# Patient Record
Sex: FEMALE | Race: WHITE
[De-identification: names, ages, dates, MRNs, and addresses within clinical notes are randomized per-mention and may not be internally consistent; named-entity substitution may affect disease eponyms.]

---

## 2017-01-31 ENCOUNTER — HOSPITAL ENCOUNTER (INPATIENT)
Dept: HOSPITAL 10 - E/R | Age: 47
LOS: 16 days | Discharge: NURSING, REHAB OR LONG-TERM CARE | DRG: 963 | End: 2017-02-16
Attending: INTERNAL MEDICINE | Admitting: INTERNAL MEDICINE
Payer: MEDICARE

## 2017-01-31 VITALS — DIASTOLIC BLOOD PRESSURE: 73 MMHG | SYSTOLIC BLOOD PRESSURE: 101 MMHG | RESPIRATION RATE: 22 BRPM | HEART RATE: 103 BPM

## 2017-01-31 VITALS — SYSTOLIC BLOOD PRESSURE: 112 MMHG | RESPIRATION RATE: 18 BRPM | DIASTOLIC BLOOD PRESSURE: 70 MMHG | HEART RATE: 77 BPM

## 2017-01-31 VITALS — SYSTOLIC BLOOD PRESSURE: 128 MMHG | RESPIRATION RATE: 22 BRPM | HEART RATE: 101 BPM | DIASTOLIC BLOOD PRESSURE: 89 MMHG

## 2017-01-31 VITALS — RESPIRATION RATE: 23 BRPM | DIASTOLIC BLOOD PRESSURE: 89 MMHG | HEART RATE: 95 BPM | SYSTOLIC BLOOD PRESSURE: 137 MMHG

## 2017-01-31 VITALS — HEART RATE: 84 BPM | DIASTOLIC BLOOD PRESSURE: 71 MMHG | SYSTOLIC BLOOD PRESSURE: 98 MMHG | RESPIRATION RATE: 22 BRPM

## 2017-01-31 VITALS — RESPIRATION RATE: 22 BRPM | DIASTOLIC BLOOD PRESSURE: 84 MMHG | HEART RATE: 96 BPM | SYSTOLIC BLOOD PRESSURE: 121 MMHG

## 2017-01-31 VITALS
BODY MASS INDEX: 24.31 KG/M2 | WEIGHT: 120.59 LBS | BODY MASS INDEX: 24.31 KG/M2 | WEIGHT: 120.59 LBS | HEIGHT: 59 IN | HEIGHT: 59 IN

## 2017-01-31 VITALS — DIASTOLIC BLOOD PRESSURE: 76 MMHG | RESPIRATION RATE: 28 BRPM | SYSTOLIC BLOOD PRESSURE: 118 MMHG | HEART RATE: 117 BPM

## 2017-01-31 VITALS — SYSTOLIC BLOOD PRESSURE: 119 MMHG | DIASTOLIC BLOOD PRESSURE: 87 MMHG | RESPIRATION RATE: 22 BRPM | HEART RATE: 85 BPM

## 2017-01-31 VITALS — SYSTOLIC BLOOD PRESSURE: 117 MMHG | HEART RATE: 103 BPM | DIASTOLIC BLOOD PRESSURE: 58 MMHG | RESPIRATION RATE: 22 BRPM

## 2017-01-31 VITALS — RESPIRATION RATE: 22 BRPM

## 2017-01-31 VITALS — RESPIRATION RATE: 20 BRPM | HEART RATE: 86 BPM | DIASTOLIC BLOOD PRESSURE: 78 MMHG | SYSTOLIC BLOOD PRESSURE: 110 MMHG

## 2017-01-31 VITALS — HEART RATE: 96 BPM | DIASTOLIC BLOOD PRESSURE: 90 MMHG | SYSTOLIC BLOOD PRESSURE: 135 MMHG | RESPIRATION RATE: 20 BRPM

## 2017-01-31 VITALS — HEART RATE: 103 BPM | RESPIRATION RATE: 24 BRPM | SYSTOLIC BLOOD PRESSURE: 139 MMHG | DIASTOLIC BLOOD PRESSURE: 96 MMHG

## 2017-01-31 VITALS — RESPIRATION RATE: 22 BRPM | SYSTOLIC BLOOD PRESSURE: 115 MMHG | HEART RATE: 94 BPM | DIASTOLIC BLOOD PRESSURE: 86 MMHG

## 2017-01-31 VITALS — HEART RATE: 108 BPM | RESPIRATION RATE: 22 BRPM | DIASTOLIC BLOOD PRESSURE: 87 MMHG | SYSTOLIC BLOOD PRESSURE: 114 MMHG

## 2017-01-31 DIAGNOSIS — D72.829: ICD-10-CM

## 2017-01-31 DIAGNOSIS — N18.6: ICD-10-CM

## 2017-01-31 DIAGNOSIS — I95.9: ICD-10-CM

## 2017-01-31 DIAGNOSIS — J96.00: ICD-10-CM

## 2017-01-31 DIAGNOSIS — I12.0: ICD-10-CM

## 2017-01-31 DIAGNOSIS — G93.40: ICD-10-CM

## 2017-01-31 DIAGNOSIS — S06.5X0A: Primary | ICD-10-CM

## 2017-01-31 DIAGNOSIS — E87.0: ICD-10-CM

## 2017-01-31 DIAGNOSIS — R29.709: ICD-10-CM

## 2017-01-31 DIAGNOSIS — E87.5: ICD-10-CM

## 2017-01-31 DIAGNOSIS — J94.8: ICD-10-CM

## 2017-01-31 DIAGNOSIS — S02.19XA: ICD-10-CM

## 2017-01-31 DIAGNOSIS — J93.9: ICD-10-CM

## 2017-01-31 DIAGNOSIS — S02.0XXA: ICD-10-CM

## 2017-01-31 DIAGNOSIS — J69.0: ICD-10-CM

## 2017-01-31 DIAGNOSIS — R14.0: ICD-10-CM

## 2017-01-31 DIAGNOSIS — S06.330A: ICD-10-CM

## 2017-01-31 DIAGNOSIS — S27.391A: ICD-10-CM

## 2017-01-31 DIAGNOSIS — G40.209: ICD-10-CM

## 2017-01-31 DIAGNOSIS — R09.02: ICD-10-CM
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
## 2017-01-31 DIAGNOSIS — K21.9: ICD-10-CM

## 2017-01-31 DIAGNOSIS — E87.2: ICD-10-CM

## 2017-01-31 DIAGNOSIS — T79.7XXA: ICD-10-CM
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
## 2017-01-31 LAB
ALBUMIN SERPL-MCNC: 4.8 G/DL (ref 3.3–4.9)
ALBUMIN/GLOB SERPL: 1.26 {RATIO}
ALP SERPL-CCNC: 242 IU/L (ref 42–121)
ALT SERPL-CCNC: 20 IU/L (ref 13–69)
AMMONIA PLAS-SCNC: < 9 UMOL/L (ref 9–30)
ANION GAP SERPL CALC-SCNC: 27 MMOL/L (ref 8–16)
APTT BLD: 30.9 SEC (ref 25–35)
ARTERIAL COHB: 0.5 % (ref 0–3)
ARTERIAL FRACTION OF OXYHGB: 98.8 % (ref 93–99)
ARTERIAL METHB: 0.3 % (ref 0–1.5)
ARTERIAL PATENCY WRIST A: (no result)
ARTERIAL TOTAL HEMGLOBIN: 13.7 G/DL (ref 12–18)
AST SERPL-CCNC: 26 IU/L (ref 15–46)
BASE EXCESS BLDA CALC-SCNC: -0.6 MMOL/L (ref -3–3)
BASOPHILS # BLD AUTO: 0 10^3/UL (ref 0–0.1)
BASOPHILS NFR BLD: 0 % (ref 0–2)
BILIRUB DIRECT SERPL-MCNC: 0 MG/DL (ref 0–0.2)
BILIRUB SERPL-MCNC: 0.5 MG/DL (ref 0.2–1.3)
BLOOD GAS TIDAL VOLUME: 450 ML
BUN SERPL-MCNC: 24 MG/DL (ref 7–20)
CALCIUM SERPL-MCNC: 9.6 MG/DL (ref 8.4–10.2)
CHLORIDE SERPL-SCNC: 93 MMOL/L (ref 97–110)
CK MB SERPL-MCNC: 2.64 NG/ML (ref 0–2.4)
CK SERPL-CCNC: 124 IU/L (ref 23–200)
CO2 SERPL-SCNC: 24 MMOL/L (ref 21–31)
CONDITION: 1
CREAT SERPL-MCNC: 5.32 MG/DL (ref 0.44–1)
EOSINOPHIL # BLD: 0 10^3/UL (ref 0–0.5)
EOSINOPHIL NFR BLD: 0 % (ref 0–7)
ERYTHROCYTE [DISTWIDTH] IN BLOOD BY AUTOMATED COUNT: 14.9 % (ref 11.5–14.5)
GLOBULIN SER-MCNC: 3.8 G/DL (ref 1.3–3.2)
GLUCOSE SERPL-MCNC: 151 MG/DL (ref 70–220)
HCO3 BLDA-SCNC: 21.5 MMOL/L (ref 22–26)
HCT VFR BLD CALC: 46.6 % (ref 37–47)
HGB BLD-MCNC: 15.3 G/DL (ref 12–16)
INR PPP: 1.02
LACTATE SERPL-SCNC: 3 MMOL/L (ref 0.5–2.2)
LH ANALYZER COMMENTS: 1
LYMPHOCYTES # BLD AUTO: 0.4 10^3/UL (ref 0.8–2.9)
LYMPHOCYTES NFR BLD AUTO: 2.4 % (ref 15–51)
MCH RBC QN AUTO: 29.2 PG (ref 29–33)
MCHC RBC AUTO-ENTMCNC: 32.8 G/DL (ref 32–37)
MCV RBC AUTO: 89.1 FL (ref 82–101)
MODE: (no result)
MONOCYTES # BLD: 0.5 10^3/UL (ref 0.3–0.9)
MONOCYTES NFR BLD: 3.2 % (ref 0–11)
NEUTROPHILS # BLD: 16.1 10^3/UL (ref 1.6–7.5)
NEUTROPHILS NFR BLD AUTO: 94.4 % (ref 39–77)
NRBC # BLD MANUAL: 0 10^3/UL (ref 0–0)
NRBC BLD QL: 0 /100WBC (ref 0–0)
O2 A-A PPRESDIFF RESPIRATORY: 227.5 MMHG (ref 7–24)
O2/TOTAL GAS SETTING VFR VENT: 100 %
PLATELET # BLD: 160 10^3/UL (ref 140–440)
PMV BLD AUTO: 9.6 FL (ref 7.4–10.4)
POTASSIUM SERPL-SCNC: 4.6 MMOL/L (ref 3.5–5.1)
PROT SERPL-MCNC: 8.6 G/DL (ref 6.1–8.1)
PROTHROMBIN TIME: 13.4 SEC (ref 12.2–14.2)
PT RATIO: 1
RBC # BLD AUTO: 5.23 10^6/UL (ref 4.2–5.4)
SODIUM SERPL-SCNC: 139 MMOL/L (ref 135–144)
TOTAL CELLS COUNTED PERCENT: 100 %
TROPONIN I SERPL-MCNC: < 0.012 NG/ML (ref 0–0.12)
WBC # BLD AUTO: 17 10^3/UL (ref 4.8–10.8)
WBC # BLD: 17 10^3/UL (ref 4.8–10.8)

## 2017-01-31 PROCEDURE — 86850 RBC ANTIBODY SCREEN: CPT

## 2017-01-31 PROCEDURE — 94640 AIRWAY INHALATION TREATMENT: CPT

## 2017-01-31 PROCEDURE — 86900 BLOOD TYPING SEROLOGIC ABO: CPT

## 2017-01-31 PROCEDURE — 74177 CT ABD & PELVIS W/CONTRAST: CPT

## 2017-01-31 PROCEDURE — 82553 CREATINE MB FRACTION: CPT

## 2017-01-31 PROCEDURE — 97161 PT EVAL LOW COMPLEX 20 MIN: CPT

## 2017-01-31 PROCEDURE — 74000: CPT

## 2017-01-31 PROCEDURE — 87040 BLOOD CULTURE FOR BACTERIA: CPT

## 2017-01-31 PROCEDURE — 80048 BASIC METABOLIC PNL TOTAL CA: CPT

## 2017-01-31 PROCEDURE — 96375 TX/PRO/DX INJ NEW DRUG ADDON: CPT

## 2017-01-31 PROCEDURE — 83605 ASSAY OF LACTIC ACID: CPT

## 2017-01-31 PROCEDURE — 90686 IIV4 VACC NO PRSV 0.5 ML IM: CPT

## 2017-01-31 PROCEDURE — 87081 CULTURE SCREEN ONLY: CPT

## 2017-01-31 PROCEDURE — 84100 ASSAY OF PHOSPHORUS: CPT

## 2017-01-31 PROCEDURE — 96374 THER/PROPH/DIAG INJ IV PUSH: CPT

## 2017-01-31 PROCEDURE — 97110 THERAPEUTIC EXERCISES: CPT

## 2017-01-31 PROCEDURE — 83735 ASSAY OF MAGNESIUM: CPT

## 2017-01-31 PROCEDURE — 85610 PROTHROMBIN TIME: CPT

## 2017-01-31 PROCEDURE — 82962 GLUCOSE BLOOD TEST: CPT

## 2017-01-31 PROCEDURE — 0BH17EZ INSERTION OF ENDOTRACHEAL AIRWAY INTO TRACHEA, VIA NATURAL OR ARTIFICIAL OPENING: ICD-10-PCS

## 2017-01-31 PROCEDURE — 86901 BLOOD TYPING SEROLOGIC RH(D): CPT

## 2017-01-31 PROCEDURE — 95819 EEG AWAKE AND ASLEEP: CPT

## 2017-01-31 PROCEDURE — 71010: CPT

## 2017-01-31 PROCEDURE — 92611 MOTION FLUOROSCOPY/SWALLOW: CPT

## 2017-01-31 PROCEDURE — 93005 ELECTROCARDIOGRAM TRACING: CPT

## 2017-01-31 PROCEDURE — 80053 COMPREHEN METABOLIC PANEL: CPT

## 2017-01-31 PROCEDURE — 71250 CT THORAX DX C-: CPT

## 2017-01-31 PROCEDURE — 85025 COMPLETE CBC W/AUTO DIFF WBC: CPT

## 2017-01-31 PROCEDURE — 84484 ASSAY OF TROPONIN QUANT: CPT

## 2017-01-31 PROCEDURE — 90935 HEMODIALYSIS ONE EVALUATION: CPT

## 2017-01-31 PROCEDURE — 85730 THROMBOPLASTIN TIME PARTIAL: CPT

## 2017-01-31 PROCEDURE — 82803 BLOOD GASES ANY COMBINATION: CPT

## 2017-01-31 PROCEDURE — 85576 BLOOD PLATELET AGGREGATION: CPT

## 2017-01-31 PROCEDURE — P9047 ALBUMIN (HUMAN), 25%, 50ML: HCPCS

## 2017-01-31 PROCEDURE — 97530 THERAPEUTIC ACTIVITIES: CPT

## 2017-01-31 PROCEDURE — 31500 INSERT EMERGENCY AIRWAY: CPT

## 2017-01-31 PROCEDURE — 82550 ASSAY OF CK (CPK): CPT

## 2017-01-31 PROCEDURE — 74230 X-RAY XM SWLNG FUNCJ C+: CPT

## 2017-01-31 PROCEDURE — 72125 CT NECK SPINE W/O DYE: CPT

## 2017-01-31 PROCEDURE — 97116 GAIT TRAINING THERAPY: CPT

## 2017-01-31 PROCEDURE — 94664 DEMO&/EVAL PT USE INHALER: CPT

## 2017-01-31 PROCEDURE — 36600 WITHDRAWAL OF ARTERIAL BLOOD: CPT

## 2017-01-31 PROCEDURE — 71260 CT THORAX DX C+: CPT

## 2017-01-31 PROCEDURE — 92526 ORAL FUNCTION THERAPY: CPT

## 2017-01-31 PROCEDURE — 82140 ASSAY OF AMMONIA: CPT

## 2017-01-31 PROCEDURE — 94002 VENT MGMT INPAT INIT DAY: CPT

## 2017-01-31 PROCEDURE — 70450 CT HEAD/BRAIN W/O DYE: CPT

## 2017-01-31 PROCEDURE — 92610 EVALUATE SWALLOWING FUNCTION: CPT

## 2017-01-31 PROCEDURE — 5A1955Z RESPIRATORY VENTILATION, GREATER THAN 96 CONSECUTIVE HOURS: ICD-10-PCS

## 2017-01-31 PROCEDURE — 94770: CPT

## 2017-01-31 PROCEDURE — 94003 VENT MGMT INPAT SUBQ DAY: CPT

## 2017-01-31 RX ADMIN — PROPOFOL STA MLS/HR: 10 INJECTION, EMULSION INTRAVENOUS at 14:47

## 2017-01-31 RX ADMIN — DIPHENHYDRAMINE HYDROCHLORIDE SCH MG: 50 INJECTION, SOLUTION INTRAMUSCULAR; INTRAVENOUS at 18:54

## 2017-01-31 RX ADMIN — PROPOFOL STA MLS/HR: 10 INJECTION, EMULSION INTRAVENOUS at 14:55

## 2017-01-31 RX ADMIN — SEVELAMER CARBONATE SCH GM: 800 POWDER, FOR SUSPENSION ORAL at 18:00

## 2017-01-31 RX ADMIN — HYDROMORPHONE HYDROCHLORIDE PRN MG: 1 INJECTION, SOLUTION INTRAMUSCULAR; INTRAVENOUS; SUBCUTANEOUS at 22:50

## 2017-01-31 RX ADMIN — DEXAMETHASONE SODIUM PHOSPHATE PRN MG: 10 INJECTION, SOLUTION INTRAMUSCULAR; INTRAVENOUS at 20:32

## 2017-01-31 NOTE — CONS
Date/Time of Note


Date/Time of Note


DATE: 17 


TIME: 16:25





Assessment/Plan


Assessment/Plan


Additional Assessment/Plan


Assessment and recommendations;





1.  Patient admitted with head trauma with subarachnoid hemorrhage and 

parenchymal contusions.





2.  Chest x-ray was reviewed from today which is essentially clear.  Next





3.  Mild leukocytosis possibly stress response.





4.  End-stage renal disease on hemodialysis. 





5.  Hypertension.  Patient currently on nicardipine drip.





6.  Ventilator settings were reviewed patient currently is on assist control of 

816 tidal volume 450 PEEP of 500% FiO2.  An ABG has been ordered I would 

recommend hyperventilating the patient to decrease potential cerebral edema 

ventilator settings were adjusted rate has been increased to 22 tidal volume 

increased to 500.  Once ABGs done I will review to make further 

recommendations.  Meanwhile continue supportive care.  Neurosurgical consult is 

appreciated.  Patient will be monitored with frequent neurological examination.

  Specifically any change in pupillary size.





Consultation Date/Type/Reason


Admit Date/Time


2017


Date of Consultation:  2017


Type of Consultation:  Pulmonary/critical care.


Reason for Consultation


46-year-old  lady brought into the emergency room after sustaining a 

fall at home with altered level of consciousness.  Patient evidently tripped 

over an object.  Ms. Talley is a 46-year-old  lady who was brought 

into the emergency room after the family called 911 after the patient sustained 

a fall and hit the back of her head on the floor patient initially was laid in 

bed however she started having altered mental status therefore she was brought 

into the ER at [facility].  Evaluation CT scan of the head was done 

which is showing mild bilateral scattered subarachnoid hemorrhages as well as 

frontal cerebral contusions the patient had waxing and waning mental status.  

She was intubated for airway protection.  Patient also was quite hypertensive 

on presentation.  Prior to the fall there were no symptoms.





Past medical history:


 #1 hypertension 


#2 end-stage renal disease on hemodialysis 


#3 acid reflux disease #4 history of sinus tachycardia 


#4  


#5  History of AV fistula in the left arm next





Medications; were reviewed.





Allergies; none





Social history noncontributory





Family history; patient is 





Occupation history: not available





Review of systems: currently unable to be obtained





General examination Young orally intubated, sedated.





Past Medical History


Medical History:  GERD, hypertension, renal disease (On hemodialysis)





Social History


Alcohol Use:  none


Smoking Status:  Never smoker


Drug Use:  none





Exam/Review of Systems


Vital Signs


Vitals





 Vital Signs








  Date Time  Temp Pulse Resp B/P Pulse Ox O2 Delivery O2 Flow Rate FiO2


 


17 15:30  82  116/87    


 


17 13:00   18  100   100


 


17 13:00      Mechanical Ventilator  


 


17 12:07 98.0       











Exam


HEENT examination;





Supple neck.  Orally intubated.  Midsize pupils equally reactive to light 

bilaterally.  No neck masses.  No obvious head trauma visible.


Trachea.  No thyromegaly.  No neck bruits.  Next





Chest examination; clear to auscultation.  S1-S2 audible no murmurs regular 

rhythm.  





Abdomen examination; soft, nondistended, bowel sounds audible.  Umbilicus is 

inverted.  There is a well-healed infraumbilical laparotomy scar.





Extremity examination; patient has an AV shunt in the left arm with a positive 

thrill.  There is no peripheral edema.  Pulses 1+ bilaterally.  Next





CNS examination; patient is currently sedated.





Results


Result Diagram:  


17 1235                                                                   

             17 1235





Results 24 hrs





Laboratory Tests








Test


  17


12:35 17


12:38


 


Activated Partial


Thromboplast Time 30.9  


  


 


 


Alanine Aminotransferase


(ALT/SGPT) 20  


  


 


 


Albumin 4.8   


 


Albumin/Globulin Ratio 1.26   


 


Alkaline Phosphatase 242  H 


 


Ammonia < 9  L 


 


Anion Gap 27  H 


 


Aspartate Amino Transf


(AST/SGOT) 26  


  


 


 


Basophils # 0.0   


 


Basophils % 0.0   


 


Blood Morphology Comment    


 


Blood Urea Nitrogen 24  H 


 


Calcium Level 9.6   


 


Carbon Dioxide Level 24   


 


Chloride Level 93  L 


 


Creatine Kinase 124   


 


Creatine Kinase Index 2.1   


 


Creatinine 5.32  H 


 


Creatinine Kinase MB (Mass) 2.64  H 


 


Differential Comment AUTO w/SCAN   


 


Direct Bilirubin 0.00   


 


Eosinophils # 0.0   


 


Eosinophils % 0.0   


 


Globulin 3.80  H 


 


Glucose Level 151   


 


Hematocrit 46.6   


 


Hemoglobin 15.3   


 


INR International Normalized


Ratio 1.02  


  


 


 


Indirect Bilirubin 0.5   


 


Lactic Acid Level 3.0  H 


 


Lymphocytes # 0.4  L 


 


Lymphocytes % 2.4  L 


 


Mean Corpuscular Hemoglobin 29.2   


 


Mean Corpuscular Hemoglobin


Concent 32.8  


  


 


 


Mean Corpuscular Volume 89.1   


 


Mean Platelet Volume 9.6   


 


Monocytes # 0.5   


 


Monocytes % 3.2   


 


Neutrophils # 16.1  H 


 


Neutrophils % 94.4  H 


 


Nucleated Red Blood Cells # 0.0   


 


Nucleated Red Blood Cells % 0.0   


 


Platelet Count 160  # 


 


Potassium Level 4.6   


 


Prothrombin Time 13.4   


 


Prothrombin Time Ratio 1.0   


 


Red Blood Count 5.23   


 


Red Cell Distribution Width 14.9  H 


 


Sodium Level 139   


 


Total Bilirubin 0.5   


 


Total Protein 8.6  H 


 


Troponin I < 0.012   


 


White Blood Count 17.0  #H 


 


Bedside Glucose  148  











Medications


Medications





 Current Medications


Labetalol HCl (Labetalol) 20 mg Q20M  PRN IV ELEVATED BLOOD PRESSURE Last 

administered on t 13:33; Admin Dose 20 MG;  Start 17 at 12:30


Labetalol HCl (Labetalol) 10 mg Q10M  PRN IV ELEVATED BLOOD PRESSURE;  Start  at 14:00


Ondansetron HCl (Zofran Inj) 4 mg Q6H  PRN IV NAUSEA AND/OR VOMITING;  Start  at 14:00


Acetaminophen (Tylenol Liquid) 650 mg Q6H  PRN PO PAIN LEVEL 1-3 OR FEVER;  

Start 17 at 14:00


Acetaminophen (Tylenol Supp) 650 mg Q4H  PRN [place] PAIN LEVEL 1-3 OR FEVER;  Start 

17 at 14:00


Famotidine (Pepcid Iv) 20 mg DAILY IV ;  Start 17 at 15:00


Eye Lubricant (Artificial Tears Oph) 1 drop TID BOTH EYES ;  Start 17 at 21

:00


Atorvastatin Calcium (Lipitor) 20 mg QHS PO ;  Start 17 at 21:00


Multivit/Ca Carb/ B Cmplx/FA/Prenat (Charlotte-Argenis) 1 tab DAILY PO ;  Start 17 

at 09:00











CHADD CORBETT 2017 16:33

## 2017-01-31 NOTE — CONS
DATE OF ADMISSION: 2017

DATE OF CONSULTATION:  2017

 

 

 

TYPE OF CONSULTATION:  Neurosurgical consultation.

 

REQUESTING PHYSICIAN:  Dr. Bateman.

 

INDICATION FOR CONSULTATION:  Subdural hematoma and traumatic brain injury.

 

HISTORY OF PRESENT ILLNESS:  The patient is a 46-year-old Trinidadian-speaking female with history of hy
pertension, end-stage renal disease on dialysis and GERD who reportedly fell on concrete in front of
 the house.  According to the neighbors, the patient tripped and fell and was taken into the bedroom
 and laid in the bed.  The patient apparently was altered.  The patient's  reportedly came.  
The patient was called by the neighbors who came to see the patient when she was found to be letharg
ic with nausea and vomiting.  To protect her airway, the patient was paralyzed, sedated and intubate
d.  The patient had a CT scan which showed frontal contusions as well as an 8 mm right convexity sub
dural hematoma with some traumatic subarachnoid blood, 3 mm midline shift.  There is also a nondispl
aced fracture of the right parietal and temporal bone.  The patient currently is on propofol and tho
ugh intubated and sedated, the patient would apparently open her eyes without deep sedation and move
s all 4 extremities semipurposefully.  Even with the propofol turned off, the patient became agitate
d, pulling at restraints and moving upper and lower extremities, though I was not able to get her to
 clearly open her eyes, but this is likely secondary to sedation.

 

PAST MEDICAL HISTORY:  Significant for hypertension, end-stage renal disease, gastroesophageal reflu
x, sinus tachycardia.  Patient apparently also has severe allergic reaction for which she takes pred
nisone.

 

PAST SURGICAL HISTORY:  Significant for  section and left upper extremity fistula for dialys
is.

 

FAMILY HISTORY:  Significant for diabetes, end-stage liver disease.

 

REVIEW OF SYSTEMS:  Unable to obtain secondary to the patient's current neurologic status.

 

HOME MEDICATIONS: Include:

1.  Renvela.

2.  Lopressor.

3.  Losartan.

4.  Norvasc.

5.  Atorvastatin.

6.  Zofran

7.  Charlotte-Argenis.

8.  Prednisone which apparently is a p.r.n. medication.

 

SOCIAL HISTORY:  The patient lives with her .  She apparently uses a wheelchair, although the
 indication for this is not clear.  The reportedly does not take any blood thinning medications.

 

PHYSICAL EXAMINATION:

VITAL SIGNS:  The patient's temperature 97, pulse 82, respirations 18, blood pressure 116/87.  Patie
nt initially was 219/134.

GENERAL:  The patient is a well-developed, well nourished, mildly obese female lying in the hospital
 bed, intubated.

HEAD AND NECK:  Normocephalic.  She has no Betts sign, periorbital ecchymoses, otorrhea or rhinorrh
ea.  The patient's neck has no deformities.

CARDIAC:  Regular rate and rhythm.

LUNGS:  Clear to auscultation.

ABDOMEN:  Nontender, nondistended, soft.

EXTREMITIES:  No clubbing, cyanosis, or edema.  She has a left upper extremity fistula.

NEUROLOGIC:  Again, the patient is sedated.  I turned the sedation off and the patient starts to bec
ome very agitated and may have minimally open her eyes.  Given her significant agitation, the propof
ol was restarted.  She did appear to move purposefully.  The patient does appear to have 1 beat of c
lonus on the right side and 1+ deep tendon reflexes throughout with no clonus, Babinski or Bobby 
sign.  She has normal tone and bulk.  Sensation: The patient appears to react to noxious stimuli dif
fusely.

 

LABORATORY DATA:  The patient's labs:  A white count was 17, hemoglobin 15.3, platelet 160, neutroph
il count was 94.4.  Coagulation profile was within normal limits with a PT of 13.4, INR 1.02 and APT
T of 30.9.  The patient had was to have a platelet function assay but this has not yet been sent.  S
odium 139, BUN and creatinine was 24 and 5.32.  Alkaline phosphatase is 242, CK-MB was elevated 2.64
.

 

IMAGING:  I reviewed the patient's CT of the cervical spine which showed no evidence of fracture or 
subluxation.  She appears to have tracheal seal at the level of the thoracic inlet per the radiologi
st.  The patient had a CT scan of the brain as reported in the history of present illness.

 

ASSESSMENT AND PLAN:  A 46-year-old female status post apparent traumatic closed head trauma with arreola
bdural hematoma and frontal contusions.  The patient has a subdural hematoma.  This is causing some 
mild midline shift, but overall is not particularly large.  In that of itself, I would recommend cassandra
cuation at this point in time.  The patient's neurologic status is likely secondary to the frontal c
ontusion, which I expect may blossom given the acute injury.  The patient appears to have some sligh
tly prominent ventricular size; however, this is chronic.  This has been this way chronically as I r
eviewed the CT scan from 2016 and the ventricular size appears to be the same.  The patient s
hould be admitted to the ICU and repeat CT scan performed to assess for any progression.  The patien
t hopefully can be weaned and be extubated as this may optimize her exam as it is difficult to asses
s the patient currently due to more  sedation likely than her neurologic issue, but this cannot be c
learly seen or determined.  At this point, I would not recommend any mannitol as well, though given 
the patient's comorbidities, medical management may be optimized prior to surgical decompression.  T
here is not reported history of any blood thinning medications, although given the skull fracture pr
esent, the severity of injury probably is not disproportionate with the amount of blood seen to sugg
est a coagulopathy.

 

Thank you for allowing me to participate in the care of this patient.  Greater than 60 minutes spent
 in critical care, neurologic system.

 

 

Dictated By: ODALIS MADISON MD, LG/BITA

DD:    2017 17:18:52

DT:    2017 18:30:55

Conf#: [id number]

DID#:  [id number]

## 2017-01-31 NOTE — HP
Date/Time of Note


Date/Time of Note


DATE: 17 


TIME: 15:54





Assessment/Plan


VTE Prophylaxis


VTE Prophylaxis Intervention:  SCD's





Lines/Catheters


Urinary Cath still in place:  Yes


Reason Cath still needed:  other (indicate) (Patient on dialysis, unclear how 

much urine output she has)





Assessment/Plan


Assessment/Plan


46-year-old female:





1.  Status post fall with head trauma with skull fracture including Bilateral 

acute inferior frontal hemorrhagic contusions, right greater than left, 8 mm 

right convexity subdural hematoma, mild scattered subarachnoid hemorrhage, 3 mm 

leftward midline shift of the septum pellucidum and right temporal-parietal 

scalp swelling with nondisplaced, oblique fracture through the right parietal 

and temporal skull.


Dr. Palomo from neurosurgery has been consulted, he has recommended for the 

patient to have a repeat CAT scan in 6 hours from the original one, blood 

pressure control, platelet function assay, close neurological monitoring the 

patient is being admitted to the intensive care unit.  He will follow-up with 

the patient later today





2.  Acute respiratory failure secondary to severe encephalopathy, post fall 

with significant head trauma: Patient is intubated, airway secured, pulmonary 

critical care consult with Dr. Hanks.





3.  Leukocytosis: Likely from demargination post trauma.  We will continue to 

monitor with a.m. labs.  Patient remains afebrile,





4.  Hypertension: Nicardipine drip has been ordered, patient also has as needed 

labetalol however once she was started on propofol for sedation her systolic 

blood pressure did come down to 110's





5.  End-stage renal disease on hemodialysis: Patient is on Monday/Wednesday/

Friday schedule.  She was dialyzed yesterday.  Dr. Chaney has been notified





6.  Lactic acidosis: We will trend her lactate level, no signs of acute 

infection.





7.  Gastroesophageal reflux disease: Pepcid IV





Prophylaxis: Pepcid for GI prophylaxis, SCDs for DVT prophylaxis


Disposition: ICU admission, blood pressure control, repeat CAT scan in 6 hours, 

neurosurgery evaluation, pulmonary critical care evaluation, nephrology consult





HPI/ROS


Admit Date/Time


Admit Date/Time








Hx of Present Illness


Mrs. Bermudez is a 46-year-old female with history of hypertension, end-stage 

renal disease on hemodialysis, gastroesophageal reflux disease who apparently 

has been in her usual state of health until this morning.  The patient is 

currently encephalopathic lethargic and had to be intubated therefore I cannot 

get any history from her.  Her  is not at the bedside at this time 

either.


According to the ER physician who did get the history from the , the 

patient was doing well this morning, she unfortunately tripped and fell on the 

concrete hitting the back of her head first, witnessed by neighbors who took 

her inside and assisted her to lie back in bed.  The patient since the fall has 

been lethargic and more somnolent, so the neighbors called back the  who 

was himself at her doctor appointment.  By the time she arrived in the 

emergency department she was severely lethargic, requiring intubation to 

protect her airway.  CAT scan of the brain did show a subdural hematoma and 

also areas of brain contusion.  She also has a skull fracture.  Neurosurgery 

was notified, Dr. Palomo, who is concerned that her brain contusions may 

blossom later, the patient needs a repeat CAT of the head scan in 6 hours, also 

she need sedation and control of her blood pressure.  She is being admitted to 

the intensive care unit.  She was dialyzed last yesterday, Dr. Chaney has been 

notified regarding her dialysis needs.





ROS


Unable to obtain





PMH/Family/Social


Past Medical History


Medical History:  GERD, hypertension, renal disease (On hemodialysis)





Past Surgical History


Status post  remotely


Status post AV fistula placement for dialysis





Family History


Significant Family History:  no pertinent family hx





Social History


Alcohol Use:  none


Smoking Status:  Never smoker


Drug Use:  none





Exam/Review of Systems


Vital Signs


Vitals





 Vital Signs








  Date Time  Temp Pulse Resp B/P Pulse Ox O2 Delivery O2 Flow Rate FiO2


 


17 15:30  82  116/87    


 


17 13:00   18  100   100


 


17 13:00      Mechanical Ventilator  


 


17 12:07 98.0       











Exam


Constitutional:  other (Sedated and intubated, also still under the influence 

of paralytics)


Eyes:  PERRL (Sluggish)


ENMT:  intubated, nl external ears & nose


Respiratory:  clear to auscultation, other (Intubated and on the vent)


Cardiovascular:  nl pulses, regular rate and rhythm


Gastrointestinal:  non-tender, soft


Musculoskeletal:  nl extremities to inspection


Extremities:  normal pulses, other (No edema, clubbing or cyanosis)


Neurological:  other (Sedated), unresponsive





Labs


Result Diagram:  


17 [id number]                                                                   

             1/31/17 1235








Medications


Medications





 Current Medications


Labetalol HCl (Labetalol) 20 mg Q20M  PRN IV ELEVATED BLOOD PRESSURE Last 

administered on t 13:33; Admin Dose 20 MG;  Start 17 at 12:30


Labetalol HCl 10 mg 10 mg Q10M  PRN IV ELEVATED BLOOD PRESSURE;  Start 17 

at 14:00


Midazolam HCl/ Dextrose (Versed/D5W) 50 ml @ 1 mls/hr Q24H IV ;  Start 17 

at 13:53


Ondansetron HCl (Zofran Inj) 4 mg Q6H  PRN IV NAUSEA AND/OR VOMITING;  Start  at 14:00


Acetaminophen (Tylenol Liquid) 650 mg Q6H  PRN PO PAIN LEVEL 1-3 OR FEVER;  

Start 17 at 14:00


Acetaminophen (Tylenol Supp) 650 mg Q4H  PRN [place] PAIN LEVEL 1-3 OR FEVER;  Start 

17 at 14:00


Famotidine (Pepcid Iv) 20 mg DAILY IV ;  Start 17 at 15:00


Eye Lubricant (Artificial Tears Oph) 1 drop TID BOTH EYES ;  Start 17 at 21

:00


Atorvastatin Calcium (Lipitor) 20 mg QHS PO ;  Start 17 at 21:00


Multivit/Ca Carb/ B Cmplx/FA/Prenat (Charlotte-Argenis) 1 tab DAILY PO ;  Start 17 

at 09:00





Procedures


Procedures


PROCEDURE:   CT Brain without contrast. 


 


CLINICAL INDICATION:   Headaches.   Neurologic deficit  


 


TECHNIQUE:   A CT of the brain was performed on multidetector high-resolution 

CT scanner utilizing axial sections from the skull base through the vertex 

without contrast.  One or more of the following dose reduction techniques were 

used: Automated exposure control, Adjustment of the mA and/or kV according to 

patient size, and/or use of iterative reconstruction technique.


 


DOSE: CTDI = 45 mGy and the DLP = 630 mGy-cm. 


 


COMPARISON:   Head CT 2016  


 


FINDINGS:


 


Right greater than left acute inferior frontal hemorrhagic contusions. There is 

an 8 mm right convexity subdural hematoma.  Mild scattered subarachnoid 

hemorrhage is also noted.  There is 3 mm leftward midline shift of the septum 

pellucidum. Right temporal-parietal scalp swelling with nondisplaced, oblique 

fracture through the right parietal and temporal skull. Chronic appearing 

bilateral basal ganglia lacunar infarcts. The ventricles are normal in size for 

age. No significant opacification of the visualized paranasal sinuses or 

mastoids. 


 


IMPRESSION:


 


Bilateral acute inferior frontal hemorrhagic contusions, right greater than 

left.


There is an 8 mm right convexity subdural hematoma.  


Mild scattered subarachnoid hemorrhage is also noted.  


There is 3 mm leftward midline shift of the septum pellucidum. 


Right temporal-parietal scalp swelling with nondisplaced, oblique fracture 

through the right parietal and temporal skull.


 


A call report attempt was made to Dr. BURRELL at 2017 12:41:38 PM who was 

not available at this time, however, was already aware of the findings after 

discussion with ER  Chinmay.


 


RPTAT: AA


_____________________________________________ 


.Jose Maria Hoskins MD, MD           Date    Time 


Electronically viewed and signed by .Jose Maria Hoskins MD, MD on 2017 12:47 


 


D:  2017 12:47  T:  2017 12:47











PROCEDURE:   CT Cervical Spine without contrast. 


 


CLINICAL INDICATION:   Trauma, neck pain.


 


TECHNIQUE:   A CT of the cervical spine was performed on a multidetector CT 

scanner utilizing high-resolution axial imaging from the skull base through the 

cervical thoracic junction.  Sagittal and coronal reconstructions were 

performed.  CTDI:  22 mGy.  DLP:  350 mGycm. One or more of the following dose 

reduction techniques were used: Automated exposure control, Adjustment of the 

mA and/or kV according to patient size, and/or use of iterative reconstruction 

technique.


 


 


COMPARISON:   None available. 


 


FINDINGS:


 


There is normal lordosis of the cervical spine.  No acute cervical vertebral 

fracture or subluxation. The prevertebral soft tissues are unremarkable.  The 

partially visualized lung apices are unremarkable. Small air-filled structure 

along the right posterior lateral aspect of the trachea at the level of the 

thoracic inlet measures 8 mm.


 


IMPRESSION:


 


No acute cervical vertebral fracture or subluxation.


Small tracheocele at the level of the thoracic inlet.


 


 RPTAT: AA


_____________________________________________ 


.Jose Maria Hoskins MD, MD           Date    Time 


Electronically viewed and signed by .Jose Maria Hoskins MD, MD on 2017 12:56 


 


D:  2017 12:56  T:  2017 12:56














PROCEDURE:   XR Chest. 


 


CLINICAL INDICATION:   Endotracheal tube adjustment


  


 


TECHNIQUE:   Chest AP portable. 


 


COMPARISON:   2017 at 1302 hours 


 


FINDINGS:


The endotracheal tube is now at the hipolito.  (Pullback 2-3 cm).


The mediastinal structures are unremarkable.  The heart is normal in size and 

configuration.  The pulmonary vascularity is normal.  The lung fields are 

unremarkable.  No consolidation is identified.  The pleural spaces are 

unremarkable.  The axial skeleton is unremarkable.  


 


IMPRESSION:


Endotracheal tube at the hipolito (pulled back 2-3 cm)


No active intrathoracic disease.    


 


 


Case discussed with Dr. Burrell


 


RPTAT: HGDB


_____________________________________________ 


.Danial Garcia MD, MD           Date    Time 


Electronically viewed and signed by .Danial Garcia MD, MD on 2017 13:35 


 








EKG: Normal sinus rhythm at 80 bpm, incomplete right bundle branch block.











CECILIO JIMENEZ 2017 16:04

## 2017-01-31 NOTE — RADRPT
PROCEDURE:   Chest x-ray 

 

CLINICAL INDICATION:   Altered mental status.  Post intubation.

 

TECHNIQUE:   Chest single view

 

COMPARISON:   10/02/2016 

 

FINDINGS:

There is interval placement of endotracheal tube which terminates in the right mainstem bronchus.  R
ecommend it be pulled back 4 cm.  Heart is normal in size.  Bony vessels normal in caliber.  Lungs c
lear.  Costophrenic angles are sharp.  There is a stent in the left arm.

 

IMPRESSION:

 

 

1.  Interval placement of endotracheal tube which terminates in the right mainstem bronchus.  Recomm
end it be pulled back 4 cm.

2.  Lungs clear.

3.  Vascular stent in the left arm

 

Call report was made to Dr. Bateman on 1/31/2017 1:15:48 PM

 

 

RPTAT: HH

_____________________________________________ 

.Dain Bond MD, MD           Date    Time 

Electronically viewed and signed by .Dain Bond MD, MD on 01/31/2017 13:16 

 

D:  01/31/2017 13:16  T:  01/31/2017 13:16

.W/

## 2017-01-31 NOTE — RADRPT
PROCEDURE:   CT Brain without contrast. 

 

CLINICAL INDICATION: Brain contusion follow-up

 

TECHNIQUE:   A CT of the brain was performed on a GE LightSpeed 64-slice CT scanner utilizing axial 
imaging from the skull base through the vertex without IV contrast.  Multiplanar reformatted images 
were made.  Images were reviewed on a PACS workstation.  The CTDIvol is 45.01 mGy and the DLP is 630
.20 mGycm.  

 

One of the following 3 dose reduction techniques were used:  Automated exposure control; adjustment 
of the mA and/or kV according to patient size; or use of iterative reconstruction technique. 

 

COMPARISON:   01/31/2017 CT at 12:17 p.m. 

 

FINDINGS:

 

The visualized scalp again demonstrates a right temporal and parietal scalp hematoma as well as the 
left parietal occipital scalp hematoma.  Underlying fractures of the right skull muscle portion of t
he temporal bone which is complex is noted and extends into the right temporal mandibular condylar f
adelaida as well as the right mastoid air cells.  Soft tissue attenuation compatible with blood is noted
 in the right mesial temperature and and a punctate focus of air is present compatible with a small 
amount of pneumocephalus. Recommend temporal bone CT to further evaluate.  A right parietal calvaria
l fracture is also present which is nondisplaced.  In addition, widening of the right lambdoid sutur
e is present with an associated midline occipital fracture with fragment which measures 1.8 cm and i
s minimally anteriorly displaced.

 

An associated 6 mm epidural hematoma is noted adjacent to the right parietal calvarial fracture with
 in the right parietal occipital region.  The previously-described 8 mm right holohemispheric subdur
al hematoma is again noted. Large right inferior frontal gyrus and frontal and middle gyral hemorrha
gic contusions are noted.  Associated mild hemorrhage is noted in the anterior interhemispheric fiss
ure with foci of subarachnoid hemorrhage.  Decreased attenuation is again noted in the bilateral bas
al ganglia compatible with chronic bilateral basal ganglia lacunar infarcts.  Stable 3 mm right to l
eft midline shift or herniation is present of the septum pellucidum.  The ventricles demonstrate ear
ly hydrocephalus.

 

The bilateral orbits are normal.  The bilateral paranasal sinuses demonstrate chronic left septated 
sphenoid sinusitis.  The left mastoid air cells are clear.  The right mastoid demonstrate soft tissu
e attenuation and fluid as well as involvement of the right middle ear.  Recommend correlation with 
a right hemotympanum.

 

IMPRESSION:

 

1.  Multiple acute fractures involving the right squamosal and mastoid temporal bone, right parietal
 bone and occipital bone with involvement of the right lambdoid suture. Associated pneumocephalus is
 noted at the level of the right anterior complex mastoid fracture and recommend temporal bone CT to
 further evaluate.

2.  Right posterior parietal and occipital 6 mm epidural hematoma and right 8 mm holohemispheric sub
dural hematoma most prominent in the right temporal region.

3.  Subdural hemorrhage extending into the anterior interhemispheric fissure.

4.  Chronic bilateral basal ganglia lacunar infarcts

5.  3 mm right to left midline transcerebral herniation

6.  Early mild hydrocephalus

7.  Right temporal parietal and left occipital scalp hematomas with underlying fractures as describe
d above.

 

A call report was made to Dr. Bateman at 1/31/2017 6:54:58 PM following the completion of the exami
nation by the undersigned.

 

 

 

RPTAT: HDC

_____________________________________________ 

.Argentina Handley MD, MD           Date    Time 

Electronically viewed and signed by .Argentina Handley MD, MD on 01/31/2017 19:15 

 

D:  01/31/2017 19:15  T:  01/31/2017 19:15

.C/

## 2017-01-31 NOTE — RADRPT
PROCEDURE:   XR Chest. 

 

CLINICAL INDICATION:   Endotracheal tube adjustment

  

 

TECHNIQUE:   Chest AP portable. 

 

COMPARISON:   01/31/2017 at 1302 hours 

 

FINDINGS:

The endotracheal tube is now at the hipolito.  (Pullback 2-3 cm).

The mediastinal structures are unremarkable.  The heart is normal in size and configuration.  The pu
lmonary vascularity is normal.  The lung fields are unremarkable.  No consolidation is identified.  
The pleural spaces are unremarkable.  The axial skeleton is unremarkable.  

 

IMPRESSION:

Endotracheal tube at the hipolito (pulled back 2-3 cm)

No active intrathoracic disease.    

 

 

Case discussed with Dr. Bateman

 

RPTAT: HGDB

_____________________________________________ 

.Danial Garcia MD, MD           Date    Time 

Electronically viewed and signed by .Danial Garcia MD, MD on 01/31/2017 13:35 

 

D:  01/31/2017 13:35  T:  01/31/2017 13:35

.B/

## 2017-01-31 NOTE — ERA
ER Documentation


Chief Complaint


Date/Time


DATE: 1/31/17 


TIME: 14:17


Chief Complaint


PT FELL HIT BACK OF THE HEAD; NO KO; VOMITTED SEVERAL TIMES





HPI


This is a 46-year-old female with a known history of end-stage renal disease on 

hemodialysis which she receives every Monday Wednesday and Friday.  The patient 

had a full run of dialysis yesterday.  The patient awoke at 8 AM this morning 

and stated she did not have any complaints such as a headache or changes in 

vision.  She has had intermittent headaches over the past several months which 

she has had evaluated and was told could be a result of her dialysis.  The 

patient indicated that she had a mechanical trip and fall outside her home 

between 8 and 9 AM this morning.  This was witnessed by neighbors she fell back 

and hit the right side of her head on cement.  The patient had fallen backwards 

on from 3 steps on her front porch.  The patient stated she did not have a 

complete loss of consciousness but is complaining of a significant amount of 

pain on the right side of her head.  She is experiencing photophobia.  A 

neighbor had accompanied her into her house and laid her down on her bed.  Her 

 had returned from a doctor's office appointment for himself around 9 AM 

and immediately brought her to the emergency department to be further evaluated 

as the patient appeared very drowsy according to the .  The patient 

denies any other pain such as chest pain abdominal pain back pain and the 

patient was able to ambulate.  She denies any numbness or tingling of her upper 

or lower extremities.  She states the headache is 10 out of 10 and she has had 

multiple episodes of nonbloody nonbilious emesis since the fall with persistent 

nausea.





ROS


All systems reviewed and are negative except as per history of present illness.





Medications


Home Meds


Active Scripts


Ondansetron Hcl* (Ondansetron Hcl*) 4 Mg Tablet, 4 MG PO Q8 Y for NAUSEA, #30 

TAB 1 Refill


   Prov:DEZ NASH MD         10/6/14


Reported Medications


Metoprolol Tartrate* (Lopressor*) 50 Mg Tab, 50 MG PO BID, #60 TAB


   1/31/17


Atorvastatin Calcium* (Atorvastatin Calcium*) 20 Mg Tablet, 20 MG PO QHS, #30 

TAB


   1/31/17


Omeprazole* (Omeprazole*) 40 Mg Capsule.dr, 40 MG PO AC BREAKFAST, #30 CAP


   8/4/16


Sevelamer Carbonate* (Renvela*) 800 Mg Tablet, 1600 GM PO WITH MEALS, TAB


   8/4/16


Amlodipine Besylate* (Norvasc*) 10 Mg Tablet, 10 MG PO DAILY, TAB


   8/4/16


Prednisone* (Prednisone*) 20 Mg Tab, 20 MG PO DAILY, TAB


   8/4/16


Losartan Potassium* (Losartan Potassium*) 50 Mg Tablet, 50 MG PO DAILY, TAB


   8/4/16


Multivit/Ca Carb/B Cmplx/Fa* (Charlotte-Argenis*) 1 Tab Tab, 1 TAB PO DAILY, TAB


   10/5/14


Discontinued Reported Medications


Hydrocodone Bit-Acetaminophen* (Norco*)  Mg Tablet, 1 TAB PO BID Y for 

PAIN, TAB


   8/4/16


Discontinued Scripts


Ondansetron (Ondansetron Odt) 8 Mg Tab.rapdis, 8 MG PO Q6H Y for NAUSEA AND/OR 

VOMITING, #8 TAB


   Prov:ZANA REYEZ MD         12/17/16


Hydrocodone/Acetaminophen (Norco 5-325 Tablet) 1 Each Tablet, 1 TAB PO Q6H Y 

for PAIN, #10 TAB


   Prov:ZANA REYEZ MD         12/17/16


Acetamin/Butalbital/Caffeine* (Fioricet*) 1 Tab Tab, 1 TAB PO Q4H Y for PAIN 

LEVEL 1-5, #10 TAB


   Prov:KARO ISIDRO         8/4/16


Acetamin/Butalbital/Caffeine* (Fioricet*) 1 Tab Tab, 1 TAB PO Q4H Y for PAIN 

LEVEL 1-5, #10 TAB


   Prov:KARO ISIDRO         8/4/16





Allergies


Allergies:  


Coded Allergies:  


     morphine (Verified  Allergy, Unknown, rashes on face and arms, 1/31/17)





PMhx/Soc


History of Surgery:  Yes (LEFT AV SHUNT)


Anesthesia Reaction:  No


Hx Neurological Disorder:  No


Hx Respiratory Disorders:  No


Hx Cardiac Disorders:  Yes (HTN)


Hx Psychiatric Problems:  No


Hx Miscellaneous Medical Probl:  Yes (HD, DM)


Hx Alcohol Use:  No


Hx Substance Use:  No


Hx Tobacco Use:  No


Smoking Status:  Unknown if ever smoked





Physical Exam


Vitals





Vital Signs








  Date Time  Temp Pulse Resp B/P Pulse Ox O2 Delivery O2 Flow Rate FiO2


 


1/31/17 17:30  90  107/79    


 


1/31/17 17:00  87  118/80    


 


1/31/17 16:45  90 22  100   50


 


1/31/17 16:30  88  120/83    


 


1/31/17 16:00  81  116/83    


 


1/31/17 15:30  82  116/87    


 


1/31/17 15:00  89 18  100   100


 


1/31/17 15:00  79  123/87    


 


1/31/17 14:30  114  173/101    


 


1/31/17 14:00  66  167/91    


 


1/31/17 13:30  77  179/92    


 


1/31/17 13:00  90 18  100   100


 


1/31/17 13:00  151  219/134 100 Mechanical Ventilator  


 


1/31/17 12:07 98.0 88 19 230/136 99   








Physical Exam


Constitutional:Well-developed. Well-nourished.  Sitting in a wheelchair drowsy 

with her eyes closed.


HEENT:Normocephalic.  No nasal septal hematoma.  Right hemotympanum.  Dried 

blood present within the right nostril.  Minimal amount of blood present within 

the oropharynx with a small abrasion to the right side of the tongue.Pupils 

were equal round reactive to light. Moist mucous membranes.No tonsillar 

exudates.


Neck: No nuchal rigidity. No lymphadenopathy. No posterior cervical spine 

tenderness or step-offs.


Respiratory: Not using accessory muscles of respiration.Lungs were clear to 

auscultation bilaterally. No rhonchi. No rales. No wheezing. 


Cardiovascular: Regular rate regular rhythm.No murmurs. No rubs were 

appreciated.S1, S2 normal. Distal pulses are palpable 2+ bilaterally.


GI: Abdomen was soft. Nontender. Non Distended. No pulsatile abdominal masses 

or bruits. No rebound. No guarding. Bowel sounds were present and normal. 


Muscle skeletal: Full range of motion of both the upper and lower extremities 

bilaterally.Normal muscle tone.No assymetrical calf tenderness or swelling. 


Skin: No petechia, no purpura. No lesions on the palms or the soles of the 

feet. No maculopapular rash.  Positive thrill and bruit of the left upper 

extremity.


NEURO: Patient was alert, awake, to person place and time.  The patient had no 

facial droop.  Gait was attempted to be observed the patient was very unsteady 

in her gait.  Handgrip equal and symmetrical bilaterally.  Patient follows all 

verbal command.  Patient was slow to answer questions but had no slurred 

speech.  Patient was drowsy but arousable with a sternal rub.


Result Diagram:  


1/31/17 1235                                                                   

             1/31/17 1235





Results 24 hrs





 Laboratory Tests








Test


  1/31/17


12:35 1/31/17


12:38 1/31/17


14:51 1/31/17


15:05


 


Activated Partial


Thromboplast Time 30.9Sec 


  


  


  


 


 


Alanine Aminotransferase


(ALT/SGPT) 20IU/L 


  


  


  


 


 


Albumin 4.8g/dl    


 


Albumin/Globulin Ratio 1.26    


 


Alkaline Phosphatase 242IU/L    


 


Ammonia < 9umol/l    


 


Anion Gap 27    


 


Aspartate Amino Transf


(AST/SGOT) 26IU/L 


  


  


  


 


 


Basophils # 0.010^3/ul    


 


Basophils % 0.0%    


 


Blood Morphology Comment     


 


Blood Urea Nitrogen 24mg/dl    


 


Calcium Level 9.6mg/dl    


 


Carbon Dioxide Level 24mmol/L    


 


Chloride Level 93mmol/L    


 


Creatine Kinase 124IU/L    


 


Creatine Kinase Index 2.1    


 


Creatinine 5.32mg/dl    


 


Creatinine Kinase MB (Mass) 2.64ng/ml    


 


Differential Comment AUTO w/SCAN    


 


Direct Bilirubin 0.00mg/dl    


 


Eosinophils # 0.010^3/ul    


 


Eosinophils % 0.0%    


 


Globulin 3.80g/dl    


 


Glucose Level 151mg/dl    


 


Hematocrit 46.6%    


 


Hemoglobin 15.3g/dl    


 


INR International Normalized


Ratio 1.02 


  


  


  


 


 


Indirect Bilirubin 0.5mg/dl    


 


Lactic Acid Level 3.0mmol/L    


 


Lymphocytes # 0.410^3/ul    


 


Lymphocytes % 2.4%    


 


Mean Corpuscular Hemoglobin 29.2pg    


 


Mean Corpuscular Hemoglobin


Concent 32.8g/dl 


  


  


  


 


 


Mean Corpuscular Volume 89.1fl    


 


Mean Platelet Volume 9.6fl    


 


Monocytes # 0.510^3/ul    


 


Monocytes % 3.2%    


 


Neutrophils # 16.110^3/ul    


 


Neutrophils % 94.4%    


 


Nucleated Red Blood Cells # 0.010^3/ul    


 


Nucleated Red Blood Cells % 0.0/100WBC    


 


Platelet Count 51481^3/UL    


 


Potassium Level 4.6mmol/L    


 


Prothrombin Time 13.4Sec    


 


Prothrombin Time Ratio 1.0    


 


Red Blood Count 5.2310^6/ul    


 


Red Cell Distribution Width 14.9%    


 


Sodium Level 139mmol/L    


 


Total Bilirubin 0.5mg/dl    


 


Total Protein 8.6g/dl    


 


Troponin I < 0.012ng/ml    


 


White Blood Count 17.010^3/ul    


 


Bedside Glucose  148mg/dL   


 


Arterial Blood HCO3   21.5mmol/L  


 


Arterial Blood Base Excess   -0.6mmol/L  


 


Arterial Blood Oxygen


Saturation 


  


  99.6mmHG 


  


 


 


Jed Test   ACCEPTAB  


 


Arterial Blood Gas Puncture


Site 


  


  Right Brachial 


  


 


 


Arterial Blood


Carboxyhemoglobin 


  


  0.5% 


  


 


 


Arterial Blood Date Drawn


  


  


  1/31/2017


4:20:58 PM 


 


 


Arterial Blood Methemoglobin   0.3%  


 


Arterial Blood pCO2 (Temp


correct) 


  


  28.5mmhg 


  


 


 


Arterial Blood pH (Temp


corrected) 


  


  7.495 


  


 


 


Arterial Blood pO2 (Temp


corrected) 


  


  457.0mmHG 


  


 


 


Blood Gas A-a O2 Differential   227.5mmHg  


 


Blood Gas Actual Respiration


Rate 


  


  18 


  


 


 


Blood Gas Modality   VENT - AC  


 


Blood Gas Notified Time


  


  


  1/31/2017


4:25:32 PM 


 


 


Blood Gas Notified Whom   MDA  


 


Blood Gas Respiration Rate   18.0  


 


Blood Gas Specimen Source   Blood arterial  


 


Blood Gas Temperature   37.0C  


 


Blood Gas Tidal Volume   450.0mL  


 


FiO2   100.0%  


 


Oxyhemoglobin Percent   98.8%  


 


Total Hemoglobin   13.7g/dl  


 


Platelet Func


Collagen/Epinephrine 


  


  


  154Secs. 


 


 


Platelet Function Collagen/ADP    Secs. 








 Current Medications








 Medications


  (Trade)  Dose


 Ordered  Sig/Carroll


 Route


 PRN Reason  Start Time


 Stop Time Status Last Admin


Dose Admin


 


 Labetalol HCl


  (Labetalol)  20 mg  Q20M  PRN


 IV


 ELEVATED BLOOD PRESSURE  1/31/17 12:30


    1/31/17 13:33


 


 


 Rocuronium Bromide


  (Zemuron)  70 mg  ONCE  STAT


 IV


   1/31/17 12:34


 1/31/17 12:37 DC 1/31/17 12:56


 


 


 Etomidate 20 mg  20 mg  ONCE  STAT


 IV


   1/31/17 12:34


 1/31/17 12:37 DC 1/31/17 12:56


 


 


 Midazolam HCl/


 Dextrose


  (Versed/D5W)  50 ml @ 3


 mls/hr  [id number] STAT


 IV


   1/31/17 12:34


 1/31/17 14:14 DC  


 


 


 Labetalol HCl 10


 mg  10 mg  Q10M  PRN


 IV


 ELEVATED BLOOD PRESSURE  1/31/17 14:00


     


 


 


 Nicardipine HCl/


 Dextrose


  (Cardene Iv/D5W)  250 ml @ 


 50 mls/hr  PER PROTOCOL


 IV


   1/31/17 14:00


     


 


 


 Acetaminophen 650


 mg  650 mg  Q4H  PRN


 PO


 TEMP GREATER THAN 99.6F  1/31/17 14:00


 1/31/17 14:10 DC  


 


 


 Midazolam HCl/


 Dextrose


  (Versed/D5W)  50 ml @ 1


 mls/hr  Q24H


 IV


   1/31/17 13:53


 1/31/17 15:54 DC  


 


 


 Ondansetron HCl


  (Zofran Inj)  4 mg  Q6H  PRN


 IV


 NAUSEA AND/OR VOMITING  1/31/17 14:00


     


 


 


 Albuterol


  (Ventolin Hfa)  4 puff  Q2H RESP THERAPY  PRN


 INH


 SHORTNESS OF BREATH  1/31/17 14:00


     


 


 


 Acetaminophen


  (Tylenol Liquid)  650 mg  Q6H  PRN


 PO


 PAIN LEVEL 1-3 OR FEVER  1/31/17 14:00


     


 


 


 Acetaminophen


  (Tylenol Supp)  650 mg  Q4H  PRN


 FL


 PAIN LEVEL 1-3 OR FEVER  1/31/17 14:00


     


 


 


 Famotidine


  (Pepcid Iv)  20 mg  DAILY


 IV


   1/31/17 15:00


    1/31/17 18:54


 


 


 Eye Lubricant 1


 drop  1 drop  TID


 BOTH EYES


   1/31/17 21:00


     


 


 


 Propofol  100 ml @ 


 1.65 mls/hr  ONCE  STAT


 IV


   1/31/17 14:11


 2/3/17 02:47  1/31/17 14:55


 


 


 Sodium Chloride


  (NS)  500 ml @ 


 500 mls/hr  Q1H STAT


 IV


   1/31/17 14:14


 1/31/17 15:13 DC 1/31/17 15:10


 


 


 Atorvastatin


 Calcium


  (Lipitor)  20 mg  QHS


 PO


   1/31/17 21:00


     


 


 


 Multivit/Ca Carb/


 B Cmplx/FA/Prenat


  (Charlotte-Argenis)  1 tab  DAILY


 PO


   2/1/17 09:00


     


 


 


 Sevelamer


 Carbonate


  (Renvela)  1.6 gm  WITH  MEALS


 PO


   1/31/17 18:00


     


 











Procedures/MDM


The patient presented to the emergency department with an acute single headache 

that presented within hours of onset my differential diagnosis included but was 

not limited to meningitis, SAH, intracerebral hemorrhage, hypertensive 

encephalopathy, cranial artery dissection, cerebral venous sinus thrombosis, 

traumatic, acute sinusitis. The patient has no ocular symptoms to suggest 

temporal neuritis, acute narrow-angle glaucoma or pituitary apoplexy.  The 

patient did not appear to have a toxic or metabolic etiology such as fever, 

hypoglycemia, high-altitude disease or carbon monoxide poisoning. 





Given that the patient had a traumatic injury to the brain I did feel this was 

was more likely result of a traumatic subdural hematoma.  The patient had a 

complete neurologic and fundoscopic exam performed by myself that was normal 

with  no focal neurological deficits or retinal hemorrhage.  


The CT scan of the head ordered reviewed by myself as well as the radiologist 

indicated the following:


Bilateral acute inferior frontal hemorrhagic contusions, right greater than 

left.


There is an 8 mm right convexity subdural hematoma.  


Mild scattered subarachnoid hemorrhage is also noted.  


There is 3 mm leftward midline shift of the septum pellucidum. 


Right temporal-parietal scalp swelling with nondisplaced, oblique fracture 

through the right parietal and temporal skull.





This patient presented to the emergency department with severely elevated blood 

pressure with the intracerebral hemorrhage. The patient had uncontrolled 

hypertensive with end-organ damage to suggest hypertensive emergency. The 

treatment goal was immediate reduction of the mean arterial blood pressure. 

This was done in a controlled, graded manor, using improvement of the patient's 

condition as a guide. The patient's blood pressure reduction did not exceed 

more then a 20-25 percent reduction within the first 30 to 60 minutes.  The 

patient was put on a cardiac monitor, continuous pulse oximetry, and IV access 

was established by nursing staff. The antihypertensive agent used was IV 

labetalol was started on a nicardipine drip.





A neurosurgical consult was immediately placed to Dr. West.  I indicated to 

Dr. West that I had to intubate the patient for impending airway failure as 

the patient became more lethargic on physical exam.  I have completed a 

thorough neurological examination prior to the RSI that was used to intubate 

the patient with etomidate and rocuronium.  A 7.50 endotracheal tube was seen 

to pass going through the cords.  The tube appeared to be too deep from the 

chest radiograph and therefore was removed by 2 cm as it was initially the lip 

line of 22 cm.  The repeat chest radiograph also indicated that the tube still 

remain to deepen was at the hipolito.  As suggested by the radiologist the tube 

was pulled back to further centimeters.  I did not repeat the chest radiograph 

after had removed the tube the further 2 cm.





12 Lead EKG tracing ordered and reviewed by myself showed: 


Normal sinus rhythm of 65 bpm and no arrhythmia.


FL interval normal.


QRS duration normal.


No ST segment elevation.


No ST segment depression. No changes consistent with acute ischemia. 





The patient had leukocytosis with an elevated lactic acid of 3.0.  However I 

did not feel that the lactic acidosis was a result of sepsis but rather a 

result of the patient's intracerebral hemorrhage and therefore antibiotics were 

not given at this time.  I did however obtain blood cultures and urine culture 

and a Kamara catheter had been placed.  The patient had been placed on a 

propofol drip for sedation. 





The patient will be admitted to the intensive care unit in serious condition 

with an anticipated stay of greater than 2 midnights to Dr. Gan who is seen 

the patient in the past.


Critical Care:


Time: 65 minutes


Treatments/Evaluations: Close monitoring and treatment of unstable vital signs, 

cardiorespiratory, and neurologic status, while maintaining tight balance of 

fluid, respiratory, and cardiac interventions.  Time does not include 

performing any of the above billable procedures.





Departure


Diagnosis:  


 Primary Impression:  


 Subdural hematoma, post-traumatic


 Additional Impressions:  


 Cerebral contusion


 Hypertensive emergency without congestive heart failure


 Lactic acidosis


Condition:  Serious











ASHANTIBUTCH MOHAMUDA Jan 31, 2017 14:28

## 2017-01-31 NOTE — RADRPT
PROCEDURE:   CT Brain without contrast. 

 

CLINICAL INDICATION:   Headaches.   Neurologic deficit  

 

TECHNIQUE:   A CT of the brain was performed on multidetector high-resolution CT scanner utilizing a
xial sections from the skull base through the vertex without contrast.  One or more of the following
 dose reduction techniques were used: Automated exposure control, Adjustment of the mA and/or kV acc
ording to patient size, and/or use of iterative reconstruction technique.

 

DOSE: CTDI = 45 mGy and the DLP = 630 mGy-cm. 

 

COMPARISON:   Head CT 12/17/2016  

 

FINDINGS:

 

Right greater than left acute inferior frontal hemorrhagic contusions. There is an 8 mm right convex
ity subdural hematoma.  Mild scattered subarachnoid hemorrhage is also noted.  There is 3 mm leftwar
d midline shift of the septum pellucidum. Right temporal-parietal scalp swelling with nondisplaced, 
oblique fracture through the right parietal and temporal skull. Chronic appearing bilateral basal ga
nglia lacunar infarcts. The ventricles are normal in size for age. No significant opacification of t
he visualized paranasal sinuses or mastoids. 

 

IMPRESSION:

 

Bilateral acute inferior frontal hemorrhagic contusions, right greater than left.

There is an 8 mm right convexity subdural hematoma.  

Mild scattered subarachnoid hemorrhage is also noted.  

There is 3 mm leftward midline shift of the septum pellucidum. 

Right temporal-parietal scalp swelling with nondisplaced, oblique fracture through the right parieta
l and temporal skull.

 

A call report attempt was made to Dr. BURRELL at 1/31/2017 12:41:38 PM who was not available at thi
s time, however, was already aware of the findings after discussion with ER  Chinmay.

 

RPTAT: AA

_____________________________________________ 

.Jose Maria Hoskins MD, MD           Date    Time 

Electronically viewed and signed by .Jose Maria Hoskins MD, MD on 01/31/2017 12:47 

 

D:  01/31/2017 12:47  T:  01/31/2017 12:47

.T/

## 2017-01-31 NOTE — QN
Documentation


Comment


[id number] consult


A/P ESRD


HTN


CVA 


ASHD


VDRF


PLAN BP CONTROL


HD PRITESH CASTILLO MD Jan 31, 2017 16:41

## 2017-01-31 NOTE — RADRPT
PROCEDURE:   CT Cervical Spine without contrast. 

 

CLINICAL INDICATION:   Trauma, neck pain.

 

TECHNIQUE:   A CT of the cervical spine was performed on a multidetector CT scanner utilizing high-r
esolution axial imaging from the skull base through the cervical thoracic junction.  Sagittal and co
pushpa reconstructions were performed.  CTDI:  22 mGy.  DLP:  350 mGycm. One or more of the following
 dose reduction techniques were used: Automated exposure control, Adjustment of the mA and/or kV acc
ording to patient size, and/or use of iterative reconstruction technique.

 

 

COMPARISON:   None available. 

 

FINDINGS:

 

There is normal lordosis of the cervical spine.  No acute cervical vertebral fracture or subluxation
. The prevertebral soft tissues are unremarkable.  The partially visualized lung apices are unremark
able. Small air-filled structure along the right posterior lateral aspect of the trachea at the leve
l of the thoracic inlet measures 8 mm.

 

IMPRESSION:

 

No acute cervical vertebral fracture or subluxation.

Small tracheocele at the level of the thoracic inlet.

 

 RPTAT: AA

_____________________________________________ 

.Jose Maria Hoskins MD, MD           Date    Time 

Electronically viewed and signed by .Jose Maria Hoskins MD, MD on 01/31/2017 12:56 

 

D:  01/31/2017 12:56  T:  01/31/2017 12:56

.T/

## 2017-01-31 NOTE — RADRPT
PROCEDURE:   XR Chest. 

 

CLINICAL INDICATION:   Endotracheal tube adjustment. 

 

TECHNIQUE:   AP view of the chest was obtained. 

 

COMPARISON:   01/31/2017 

 

FINDINGS:

The cardiomediastinal silhouette is within normal limits. There is redemonstration of endotracheal t
ube with tip approximately 1.4 cm above the hipolito, which is improved compared to prior examination 
when it was 0.4 cm above the hipolito.  There is a nasogastric tube in place with tip and side port wi
thin the gastric body The lungs are clear. No signs of pleural fluid or pneumothorax are seen. The o
sseous structures and soft tissues are unremarkable. 

 

IMPRESSION:

1.  No evidence for active cardiopulmonary disease. 

2.  Endotracheal tube with tip 1.4 cm above the hipolito.  Retraction approximate 1-2 cm recommended f
or optimal positioning.

3.  Nasogastric tube in satisfactory position.

 

The above findings were discussed with Patient's Nurse Brian by telephone  on 1/31/2017 11:45:00 PM.

 

RPTAT: HGAS

_____________________________________________ 

.Rafal Daily MD, MD           Date    Time 

Electronically viewed and signed by .Rafal Daily MD, MD on 01/31/2017 23:45 

 

D:  01/31/2017 23:45  T:  01/31/2017 23:45

.S/

## 2017-01-31 NOTE — CONS
DATE OF ADMISSION: 01/31/2017

DATE OF CONSULTATION:  

 

 

 

TYPE OF CONSULTATION:  Nephrology

 

Thank you, Dr. Jimenez, for kindly asking me to see this patient in nephrology consultation.

 

HISTORY OF PRESENT ILLNESS:  A 46-year-old female with a history of ESRD presented to this hospital 
after a fall.  The patient is currently intubated, unable to give any detailed history.  She was not
ed to have uncontrolled hypertension.  Blood pressure 219/134.  The patient is being admitted for fu
rther management.  The patient's WBC 17, hematocrit 46.6, platelet count of 160.  The patient's sodi
um 139, potassium 4.6, BUN 24, creatinine 5.32.  The patient had a chest x-ray done, shows endotrach
eal tube at the hipolito.  Brain CT scan shows patient has bilateral acute inferior frontal hemorrhage
 contusion, right greater than left, 8 mm right convexity subdural hematoma, mild scattered subarach
noid hemorrhage, 3 mm leftward shift of the septum pellucidum, right temporoparietal scalp swelling 
with known displaced oblique fracture through the right parietal and temporal skull.

 

PAST MEDICAL HISTORY:  Includes ESRD, hypertension, AV fistula in the upper extremity.

 

ALLERGY HISTORY:  MORPHINE.

 

SOCIAL History AND FAMILY HISTORY:  Cannot be obtained.

 

MEDICATION HISTORY:  Includes the patient is on:

1.  Amlodipine.

2.  Atorvastatin.

3.  Losartan.

4.  Metoprolol.

5.  Multiple vitamin.

6.  Omeprazole.

7.  Zofran.  

8.  Prednisone.  

9.  Renvela.

 

REVIEW OF SYSTEMS:  As mentioned, cannot be obtained.

 

PHYSICAL EXAMINATION:

GENERAL:  The patient is intubated and sedated.

VITAL SIGNS:  Pulse 79, blood pressure ____.

HEENT:  Head is atraumatic, normocephalic.  Pupils are equal, reactive.  No pale conjunctivae.  No i
cterus.

NECK:  Supple.

LUNGS:  Clear.

CARDIOVASCULAR:  S1, S2 normal.

ABDOMEN:  Soft.  Bowel sounds present, ____. 

EXTREMITIES:  No cyanosis, clubbing, edema.

CENTRAL NERVOUS SYSTEM:  The patient is intubated and sedated.

 

IMPRESSION:

1.  The patient has acute hemorrhagic stroke.

2.  Malignant hypertension.

3.  Endstage renal disease.

4.  Leukocytosis.  

5.  Respiratory failure.

6.  Incomplete database.

 

PLAN:  To continue to control blood pressure.  The patient will be undergoing hemodialysis as per he
r regular schedule since patient's electrolytes are stable.  The patient's electrolytes will be yen
tored very closely.

 

Thank you, Dr. Jimenez, for kindly asking me to see this patient in nephrology consultation.

 

 

Dictated By: PRITESH MACKEY MD

 

BS/NTS

DD:    01/31/2017 16:37:46

DT:    01/31/2017 18:13:03

Conf#: [id number]

DID#:  [id number]

CC: CECILIO JIMENEZ MD;*EndCC*

## 2017-01-31 NOTE — CONS
Date/Time of Note


Date/Time of Note


DATE: 1/31/17 


TIME: 22:39





Assessment/Plan


Assessment/Plan


Additional Assessment/Plan


Repeat CT mostly stable. Some increased parietal extra-axial blood-interpreted 

as epidural by radiologist but not clear to me nor large. Subdural hematoma 

stable, frontal contusions appear stable grossly. I do not appreciate 

pneumocephalus but could be present. Skull fractures are not significantly 

displaced to require surgical treatment.





Consultation Date/Type/Reason


Admit Date/Time


Jan 31, 2017 at 13:31


Initial Consult Date


1/31/17


Type of Consultation:  Neurosurgery





Exam/Review of Systems


Vital Signs


Vitals





 Vital Signs








  Date Time  Temp Pulse Resp B/P Pulse Ox O2 Delivery O2 Flow Rate FiO2


 


1/31/17 21:36  99 22  100   40


 


1/31/17 21:15    101/73    


 


1/31/17 21:00      Mechanical Ventilator  


 


1/31/17 20:00 100.8       











Results


Result Diagram:  


1/31/17 1235                                                                   

             1/31/17 1235





Results 24 hrs





Laboratory Tests








Test


  1/31/17


12:35 1/31/17


12:38 1/31/17


14:51 1/31/17


15:05


 


Activated Partial


Thromboplast Time 30.9  


  


  


  


 


 


Alanine Aminotransferase


(ALT/SGPT) 20  


  


  


  


 


 


Albumin 4.8     


 


Albumin/Globulin Ratio 1.26     


 


Alkaline Phosphatase 242  H   


 


Ammonia < 9  L   


 


Anion Gap 27  H   


 


Aspartate Amino Transf


(AST/SGOT) 26  


  


  


  


 


 


Basophils # 0.0     


 


Basophils % 0.0     


 


Blood Morphology Comment      


 


Blood Urea Nitrogen 24  H   


 


Calcium Level 9.6     


 


Carbon Dioxide Level 24     


 


Chloride Level 93  L   


 


Creatine Kinase 124     


 


Creatine Kinase Index 2.1     


 


Creatinine 5.32  H   


 


Creatinine Kinase MB (Mass) 2.64  H   


 


Differential Comment AUTO w/SCAN     


 


Direct Bilirubin 0.00     


 


Eosinophils # 0.0     


 


Eosinophils % 0.0     


 


Globulin 3.80  H   


 


Glucose Level 151     


 


Hematocrit 46.6     


 


Hemoglobin 15.3     


 


INR International Normalized


Ratio 1.02  


  


  


  


 


 


Indirect Bilirubin 0.5     


 


Lactic Acid Level 3.0  H   


 


Lymphocytes # 0.4  L   


 


Lymphocytes % 2.4  L   


 


Mean Corpuscular Hemoglobin 29.2     


 


Mean Corpuscular Hemoglobin


Concent 32.8  


  


  


  


 


 


Mean Corpuscular Volume 89.1     


 


Mean Platelet Volume 9.6     


 


Monocytes # 0.5     


 


Monocytes % 3.2     


 


Neutrophils # 16.1  H   


 


Neutrophils % 94.4  H   


 


Nucleated Red Blood Cells # 0.0     


 


Nucleated Red Blood Cells % 0.0     


 


Platelet Count 160  #   


 


Potassium Level 4.6     


 


Prothrombin Time 13.4     


 


Prothrombin Time Ratio 1.0     


 


Red Blood Count 5.23     


 


Red Cell Distribution Width 14.9  H   


 


Sodium Level 139     


 


Total Bilirubin 0.5     


 


Total Protein 8.6  H   


 


Troponin I < 0.012     


 


White Blood Count 17.0  #H   


 


Bedside Glucose  148    


 


Arterial Blood HCO3   21.5  L 


 


Arterial Blood Base Excess   -0.6   


 


Arterial Blood Oxygen


Saturation 


  


  99.6  H


  


 


 


Jed Test   ACCEPTAB   


 


Arterial Blood Gas Puncture


Site 


  


  Right Brachial


  


 


 


Arterial Blood


Carboxyhemoglobin 


  


  0.5  


  


 


 


Arterial Blood Date Drawn


  


  


  1/31/2017


4:20:58 PM 


 


 


Arterial Blood Methemoglobin   0.3   


 


Arterial Blood pCO2 (Temp


correct) 


  


  28.5  L


  


 


 


Arterial Blood pH (Temp


corrected) 


  


  7.495  H


  


 


 


Arterial Blood pO2 (Temp


corrected) 


  


  457.0  H


  


 


 


Blood Gas A-a O2 Differential   227.5  H 


 


Blood Gas Actual Respiration


Rate 


  


  18  


  


 


 


Blood Gas Modality   VENT - AC   


 


Blood Gas Notified Time


  


  


  1/31/2017


4:25:32 PM 


 


 


Blood Gas Notified Whom   MDA   


 


Blood Gas Respiration Rate   18.0   


 


Blood Gas Specimen Source


  


  


  Blood arterial


  


 


 


Blood Gas Temperature   37.0   


 


Blood Gas Tidal Volume   450.0   


 


FiO2   100.0   


 


Oxyhemoglobin Percent   98.8   


 


Total Hemoglobin   13.7   


 


Platelet Func


Collagen/Epinephrine 


  


  


  154  


 


 


Platelet Function Collagen/ADP      











Medications


Medications





 Current Medications


Labetalol HCl (Labetalol) 20 mg Q20M  PRN IV ELEVATED BLOOD PRESSURE Last 

administered on 1/31/17at 13:33; Admin Dose 20 MG;  Start 1/31/17 at 12:30


Labetalol HCl (Labetalol) 10 mg Q10M  PRN IV ELEVATED BLOOD PRESSURE;  Start 1/ 31/17 at 14:00


Ondansetron HCl (Zofran Inj) 4 mg Q6H  PRN IV NAUSEA AND/OR VOMITING Last 

administered on 1/31/17at 20:32; Admin Dose 4 MG;  Start 1/31/17 at 14:00


Acetaminophen (Tylenol Liquid) 650 mg Q6H  PRN PO PAIN LEVEL 1-3 OR FEVER;  

Start 1/31/17 at 14:00


Acetaminophen (Tylenol Supp) 650 mg Q4H  PRN NM PAIN LEVEL 1-3 OR FEVER;  Start 

1/31/17 at 14:00


Famotidine (Pepcid Iv) 20 mg DAILY IV  Last administered on 1/31/17at 18:54; 

Admin Dose 20 MG;  Start 1/31/17 at 15:00


Eye Lubricant (Artificial Tears Oph) 1 drop TID BOTH EYES ;  Start 1/31/17 at 21

:00


Atorvastatin Calcium (Lipitor) 20 mg QHS PO ;  Start 1/31/17 at 21:00


Multivit/Ca Carb/ B Cmplx/FA/Prenat (Charlotte-Argenis) 1 tab DAILY PO ;  Start 2/1/17 

at 09:00


Hydromorphone HCl (Dilaudid) 0.5 mg Q4H  PRN IV PAIN;  Start 1/31/17 at 21:30











ODALIS MADISON MD Jan 31, 2017 22:42

## 2017-01-31 NOTE — HP
DATE OF ADMISSION: 2017

 

PRIMARY CARE PHYSICIAN:  Unclear.  

 

PREVIOUS NEPHROLOGIST: Dr. John Chaney MD

 

CHIEF COMPLAINT ON ADMISSION:  Altered level of consciousness, status post fall.

 

HISTORY OF PRESENT ILLNESS:  This is a 46-year-old Persian-speaking female with a known history of h
ypertension, gastroesophageal reflux disease, end-stage renal disease on hemodialysis for the past 3
 to 4 years now, who apparently had an episode of fall on concrete this morning on the porch in fron
t of her house.  According to the report, it was a trip and fall.  She was taken to the bedroom by t
he neighbors and laid down on the bed.  Apparently she was altered, with generalized weakness and le
thargy.  Her ,  who was out at a doctor's appointment was called back by the neighbors.  When
 the  brought the patient to the emergency department, according to the emergency department 
physician, the patient was extremely lethargic, unable to protect her airway; therefore, she had to 
be intubated.  Currently, the patient is intubated, unable to give any history and the  is no
t present at the bedside.  Therefore, the history is obtained from the ER chart and from the ER phys
ician.  The patient is again currently intubated.  She is hypertensive, therefore has required 2 dos
es of labetalol.  She had a CAT scan of the brain done which did show bilateral acute inferior front
al hemorrhage contusions, right greater than left, 8 mm right convexity subdural hematoma, mild scat
tered subarachnoid hemorrhage,  3 mm leftward midline shift of the septum pellucidum, right temporop
arietal scalp swelling, nondisplaced oblique fracture to the right parietal and temporal skull.  The
 ER physician, Dr. Bateman, did notify Dr. Palomo the neurosurgeon, who has agreed with intubation
 of the patient along with admission to the intensive care unit with close monitoring, blood pressur
e control and also repeat CAT scan in 6 hours.  A CAT scan of the brain was ordered for 6:00 p.m. as
 the first CAT scan was done around 12:00 p.m.  She also had a CAT scan of the cervical spine which 
was negative for acute trauma.  Patient is a dialysis patient and has been dialyzed yesterday.  She 
is already Monday, Wednesday, Friday.  I have notified Dr. Chaney, who has dialyzed her previously  h
ere at [facility].  She is currently on sedation and to be started on Cardene drip
 for blood pressure control.

 

ALLERGIES:  REPORTED ALLERGY TO MORPHINE.

 

PAST MEDICAL HISTORY:  Includes:

1.  Hypertension.

2.  Gastroesophageal reflux disease.

3.  End-stage renal disease on hemodialysis.

4.  Sinus tachycardia.

5.  Previous history of severe allergic reaction, for which she has taken p.r.n. prednisone before.

 

PAST SURGICAL HISTORY:  

1.  Status post  in the past.

2.  Status post fistula placement for dialysis.

 

FAMILY HISTORY:  This is again per medical records.  The patient's mother passed away at age 71.  Sh
kai had diabetes and end-stage liver disease.

 

REVIEW OF SYSTEMS:  Unable to obtain, patient is currently sedated and intubated.

 

OUTPATIENT MEDICATIONS:

1.  Norvasc 10 mg p.o. daily.

2.  Atorvastatin 20 mg p.o. at bedtime.

3.  Losartan 50 mg p.o. daily.

4.  Lopressor 50 mg p.o. b.i.d.

5.  Renvela 1600 grams p.o. q.a.c.

6.  Omeprazole 40 mg p.o. q.a.c.  with breakfast.

7.  Zofran 4 mg p.o. q. p.r.n. nausea.

8.  Multivitamin.

9.  Charlotte-Argenis 1 tab p.o. daily.

10.  Prednisone.  Per previous records, the patient was taking it as needed for severe allergic reac
tion.

 

PHYSICAL EXAMINATION:

VITAL SIGNS:  Temperature 98.0, heart rate of 88, sinus rhythm, respiratory rate 19, blood pressure 
latest one was 176/96, pulse ox currently 99%.

GENERAL:  Intubated on mechanical ventilation.  Generally, she is sedated and intubated.

HEENT:  Pupils are equally round, sluggish but reactive to light.  Mucous membranes are moist.  ET t
ube is in place.

NECK:  No JVD, no thyromegaly noted.

HEART:  Regular rate and rhythm, sinus rhythm currently.

LUNGS:  Clear to auscultation bilaterally.  Good air movement while on the ventilator.

EXTREMITIES:  No edema, clubbing or cyanosis.

NEUROLOGIC:  Sedated and intubated, still paralyzed by the time I saw her, which was right after int
ubation.

 

LABORATORY DATA:  White blood cell count is 17.0, hemoglobin 15.3, hematocrit 46.6, platelet count o
f 160.  Chemistry with a sodium of 139, potassium 4.6, chloride 93, bicarbonate 24, BUN 24, creatini
ne 5.32, glucose of 151.  Lactic acid 3.0, calcium 9.6, total bilirubin 0.5, AST 26, ALT 20, alkalin
e phosphatase 242.  Ammonia level less than 9, creatinine kinase 124.  Troponin less than 0.012.  To
vu protein 8.6, albumin  4.8.  INR 1.02, PT 13.4 and PTT 30.9.

 

EKG shows incomplete right bundle branch sinus rhythm, 80 beats per minute.

 

RADIOLOGICAL DATA:  Chest x-ray post-intubation, no active intrathoracic disease.  ET tube in the ca
sonja.  CAT scan of the brain showing bilateral acute inferior frontal hemorrhagic contusions, right 
greater than left, 8 mm right convexity subdural hematoma, 3 mm leftward midline shift of the septum
 pellucidum, mild scattered  subarachnoid hemorrhages and right temporoparietal scalp swelling with 
nondisplaced oblique fracture through the right parietal and temporal skull.

CT of the cervical spine, no acute cervical vertebral fracture or subluxation.

 

ASSESSMENT AND PLAN:  This is a 46-year-old female with:

1.  Status post fall.  Apparently a slip and fall, unfortunately, with significant head trauma inclu
ding a subdural hematoma, but also significant brain contusion with possibility to blossom.  Apparen
tly, a repeat CAT scan is ordered in 6 hours, therefore, at 6:00 p.m. today.  Dr. Palomo from Neuro
surgery has been consulted from the ER by the ER physician.  He has asked for platelet function test
ing which has been ordered.  Coagulation factors are fairly stable.  Platelet level is greater than 
150 at 160.  Patient is currently sedated and will achieve blood pressure goal systolic less than 14
0.  At this point, I have started on any Cardizem injury and labetalol p.r.n. also as a backup.

 

CRITICAL care will be consulted for vent management.

1.  Acute respiratory failure secondary to severe encephalopathy status post head trauma.  Patient i
s intubated for airway protection.  Pulmonary critical care has been consulted.

2.  End-stage renal disease on hemodialysis, last dialysis was yesterday.  Dr. Chaney has been consul
aliyah to manage her dialysis needs.

3.  Hypertension.  Again, the patient will be started on nicardipine drip along with labetalol p.r.n
..  If needed, will add labetalol drip.

4.  Gastroesophageal reflux disease.  For now, we will have her on Pepcid IV b.i.d. and revaluate la
ter on.

5.  Leukocytosis may be demargination from trauma.  We will just monitor with daily labs.

6.  Mild lactic acidosis.  Again, will recheck and trend her lactic acid level.

7.  Prophylaxis:  Pepcid for gastrointestinal prophylaxis.  SCDs to lower extremity for DVT prophyla
xis.

 

DISPOSITION:  The patient is admitted to the intensive care unit on nicardipine drip for blood press
ure control.  Total function assay is pending.  Neurosurgery is to see the patient.  Repeat CAT scan
 has been ordered for 6:00 p.m. today and critical care was also consulted.

 

 

Dictated By: CECILIO BEDOLLA/NTS

DD:    2017 14:39:21

DT:    2017 16:12:43

Conf#: [id number]

DID#:  [id number]

## 2017-02-01 VITALS — RESPIRATION RATE: 16 BRPM | HEART RATE: 100 BPM | SYSTOLIC BLOOD PRESSURE: 108 MMHG | DIASTOLIC BLOOD PRESSURE: 81 MMHG

## 2017-02-01 VITALS — HEART RATE: 83 BPM | SYSTOLIC BLOOD PRESSURE: 100 MMHG | DIASTOLIC BLOOD PRESSURE: 70 MMHG | RESPIRATION RATE: 22 BRPM

## 2017-02-01 VITALS — RESPIRATION RATE: 22 BRPM | SYSTOLIC BLOOD PRESSURE: 126 MMHG | HEART RATE: 73 BPM | DIASTOLIC BLOOD PRESSURE: 74 MMHG

## 2017-02-01 VITALS — RESPIRATION RATE: 22 BRPM | DIASTOLIC BLOOD PRESSURE: 73 MMHG | SYSTOLIC BLOOD PRESSURE: 115 MMHG | HEART RATE: 85 BPM

## 2017-02-01 VITALS — HEART RATE: 93 BPM | DIASTOLIC BLOOD PRESSURE: 76 MMHG | RESPIRATION RATE: 18 BRPM | SYSTOLIC BLOOD PRESSURE: 128 MMHG

## 2017-02-01 VITALS — SYSTOLIC BLOOD PRESSURE: 105 MMHG | HEART RATE: 88 BPM | DIASTOLIC BLOOD PRESSURE: 73 MMHG | RESPIRATION RATE: 22 BRPM

## 2017-02-01 VITALS — RESPIRATION RATE: 22 BRPM | HEART RATE: 90 BPM | SYSTOLIC BLOOD PRESSURE: 110 MMHG | DIASTOLIC BLOOD PRESSURE: 81 MMHG

## 2017-02-01 VITALS — RESPIRATION RATE: 16 BRPM | SYSTOLIC BLOOD PRESSURE: 125 MMHG | HEART RATE: 96 BPM | DIASTOLIC BLOOD PRESSURE: 83 MMHG

## 2017-02-01 VITALS — SYSTOLIC BLOOD PRESSURE: 132 MMHG | HEART RATE: 97 BPM | RESPIRATION RATE: 22 BRPM | DIASTOLIC BLOOD PRESSURE: 87 MMHG

## 2017-02-01 VITALS — RESPIRATION RATE: 20 BRPM | HEART RATE: 101 BPM | SYSTOLIC BLOOD PRESSURE: 116 MMHG | DIASTOLIC BLOOD PRESSURE: 93 MMHG

## 2017-02-01 VITALS — SYSTOLIC BLOOD PRESSURE: 121 MMHG | HEART RATE: 104 BPM | DIASTOLIC BLOOD PRESSURE: 87 MMHG | RESPIRATION RATE: 16 BRPM

## 2017-02-01 VITALS — RESPIRATION RATE: 22 BRPM | HEART RATE: 98 BPM | SYSTOLIC BLOOD PRESSURE: 104 MMHG | DIASTOLIC BLOOD PRESSURE: 81 MMHG

## 2017-02-01 VITALS — SYSTOLIC BLOOD PRESSURE: 106 MMHG | RESPIRATION RATE: 22 BRPM | HEART RATE: 81 BPM | DIASTOLIC BLOOD PRESSURE: 70 MMHG

## 2017-02-01 VITALS — DIASTOLIC BLOOD PRESSURE: 73 MMHG | HEART RATE: 112 BPM | SYSTOLIC BLOOD PRESSURE: 99 MMHG | RESPIRATION RATE: 16 BRPM

## 2017-02-01 VITALS — SYSTOLIC BLOOD PRESSURE: 119 MMHG | HEART RATE: 94 BPM | RESPIRATION RATE: 22 BRPM | DIASTOLIC BLOOD PRESSURE: 86 MMHG

## 2017-02-01 VITALS — RESPIRATION RATE: 22 BRPM | DIASTOLIC BLOOD PRESSURE: 72 MMHG | SYSTOLIC BLOOD PRESSURE: 112 MMHG | HEART RATE: 89 BPM

## 2017-02-01 VITALS — HEART RATE: 95 BPM | SYSTOLIC BLOOD PRESSURE: 121 MMHG | DIASTOLIC BLOOD PRESSURE: 76 MMHG | RESPIRATION RATE: 23 BRPM

## 2017-02-01 VITALS — HEART RATE: 96 BPM | RESPIRATION RATE: 18 BRPM | SYSTOLIC BLOOD PRESSURE: 118 MMHG | DIASTOLIC BLOOD PRESSURE: 79 MMHG

## 2017-02-01 VITALS — RESPIRATION RATE: 23 BRPM | DIASTOLIC BLOOD PRESSURE: 93 MMHG | HEART RATE: 95 BPM | SYSTOLIC BLOOD PRESSURE: 116 MMHG

## 2017-02-01 VITALS — RESPIRATION RATE: 22 BRPM | SYSTOLIC BLOOD PRESSURE: 112 MMHG | HEART RATE: 89 BPM | DIASTOLIC BLOOD PRESSURE: 83 MMHG

## 2017-02-01 VITALS — HEART RATE: 85 BPM | DIASTOLIC BLOOD PRESSURE: 77 MMHG | RESPIRATION RATE: 22 BRPM | SYSTOLIC BLOOD PRESSURE: 117 MMHG

## 2017-02-01 VITALS — DIASTOLIC BLOOD PRESSURE: 81 MMHG | SYSTOLIC BLOOD PRESSURE: 112 MMHG | RESPIRATION RATE: 22 BRPM | HEART RATE: 81 BPM

## 2017-02-01 VITALS — RESPIRATION RATE: 16 BRPM

## 2017-02-01 VITALS — DIASTOLIC BLOOD PRESSURE: 97 MMHG | SYSTOLIC BLOOD PRESSURE: 131 MMHG | RESPIRATION RATE: 15 BRPM | HEART RATE: 108 BPM

## 2017-02-01 VITALS — DIASTOLIC BLOOD PRESSURE: 85 MMHG | HEART RATE: 100 BPM | SYSTOLIC BLOOD PRESSURE: 111 MMHG | RESPIRATION RATE: 22 BRPM

## 2017-02-01 VITALS — SYSTOLIC BLOOD PRESSURE: 120 MMHG | HEART RATE: 101 BPM | RESPIRATION RATE: 22 BRPM | DIASTOLIC BLOOD PRESSURE: 97 MMHG

## 2017-02-01 VITALS — SYSTOLIC BLOOD PRESSURE: 105 MMHG | RESPIRATION RATE: 22 BRPM | HEART RATE: 84 BPM | DIASTOLIC BLOOD PRESSURE: 73 MMHG

## 2017-02-01 VITALS — DIASTOLIC BLOOD PRESSURE: 79 MMHG | HEART RATE: 100 BPM | SYSTOLIC BLOOD PRESSURE: 129 MMHG | RESPIRATION RATE: 24 BRPM

## 2017-02-01 VITALS — DIASTOLIC BLOOD PRESSURE: 80 MMHG | HEART RATE: 93 BPM | RESPIRATION RATE: 24 BRPM | SYSTOLIC BLOOD PRESSURE: 121 MMHG

## 2017-02-01 VITALS — HEART RATE: 99 BPM | DIASTOLIC BLOOD PRESSURE: 67 MMHG | SYSTOLIC BLOOD PRESSURE: 99 MMHG

## 2017-02-01 VITALS — SYSTOLIC BLOOD PRESSURE: 116 MMHG | RESPIRATION RATE: 22 BRPM | DIASTOLIC BLOOD PRESSURE: 88 MMHG | HEART RATE: 97 BPM

## 2017-02-01 VITALS — SYSTOLIC BLOOD PRESSURE: 103 MMHG | HEART RATE: 83 BPM | RESPIRATION RATE: 22 BRPM | DIASTOLIC BLOOD PRESSURE: 78 MMHG

## 2017-02-01 VITALS — HEART RATE: 85 BPM | DIASTOLIC BLOOD PRESSURE: 80 MMHG | SYSTOLIC BLOOD PRESSURE: 120 MMHG | RESPIRATION RATE: 22 BRPM

## 2017-02-01 VITALS — SYSTOLIC BLOOD PRESSURE: 110 MMHG | HEART RATE: 87 BPM | DIASTOLIC BLOOD PRESSURE: 82 MMHG

## 2017-02-01 VITALS — SYSTOLIC BLOOD PRESSURE: 122 MMHG | DIASTOLIC BLOOD PRESSURE: 73 MMHG | HEART RATE: 81 BPM | RESPIRATION RATE: 40 BRPM

## 2017-02-01 VITALS — SYSTOLIC BLOOD PRESSURE: 131 MMHG | DIASTOLIC BLOOD PRESSURE: 83 MMHG | HEART RATE: 81 BPM

## 2017-02-01 VITALS — RESPIRATION RATE: 22 BRPM | HEART RATE: 85 BPM | SYSTOLIC BLOOD PRESSURE: 110 MMHG | DIASTOLIC BLOOD PRESSURE: 71 MMHG

## 2017-02-01 VITALS — DIASTOLIC BLOOD PRESSURE: 86 MMHG | SYSTOLIC BLOOD PRESSURE: 125 MMHG | RESPIRATION RATE: 21 BRPM | HEART RATE: 108 BPM

## 2017-02-01 VITALS — RESPIRATION RATE: 16 BRPM | HEART RATE: 105 BPM

## 2017-02-01 VITALS — HEART RATE: 109 BPM | RESPIRATION RATE: 15 BRPM | DIASTOLIC BLOOD PRESSURE: 89 MMHG | SYSTOLIC BLOOD PRESSURE: 130 MMHG

## 2017-02-01 VITALS — SYSTOLIC BLOOD PRESSURE: 129 MMHG | HEART RATE: 90 BPM | DIASTOLIC BLOOD PRESSURE: 81 MMHG

## 2017-02-01 VITALS — RESPIRATION RATE: 22 BRPM | DIASTOLIC BLOOD PRESSURE: 85 MMHG | SYSTOLIC BLOOD PRESSURE: 111 MMHG | HEART RATE: 94 BPM

## 2017-02-01 VITALS — SYSTOLIC BLOOD PRESSURE: 118 MMHG | RESPIRATION RATE: 16 BRPM | DIASTOLIC BLOOD PRESSURE: 78 MMHG | HEART RATE: 97 BPM

## 2017-02-01 VITALS — DIASTOLIC BLOOD PRESSURE: 83 MMHG | HEART RATE: 92 BPM | SYSTOLIC BLOOD PRESSURE: 125 MMHG | RESPIRATION RATE: 18 BRPM

## 2017-02-01 VITALS — RESPIRATION RATE: 22 BRPM

## 2017-02-01 VITALS — DIASTOLIC BLOOD PRESSURE: 91 MMHG | SYSTOLIC BLOOD PRESSURE: 143 MMHG | RESPIRATION RATE: 16 BRPM | HEART RATE: 97 BPM

## 2017-02-01 VITALS — DIASTOLIC BLOOD PRESSURE: 85 MMHG | SYSTOLIC BLOOD PRESSURE: 126 MMHG | RESPIRATION RATE: 22 BRPM | HEART RATE: 98 BPM

## 2017-02-01 VITALS — SYSTOLIC BLOOD PRESSURE: 119 MMHG | RESPIRATION RATE: 16 BRPM | HEART RATE: 105 BPM | DIASTOLIC BLOOD PRESSURE: 78 MMHG

## 2017-02-01 VITALS — RESPIRATION RATE: 21 BRPM | HEART RATE: 108 BPM | DIASTOLIC BLOOD PRESSURE: 86 MMHG | SYSTOLIC BLOOD PRESSURE: 120 MMHG

## 2017-02-01 VITALS — RESPIRATION RATE: 23 BRPM | HEART RATE: 90 BPM | SYSTOLIC BLOOD PRESSURE: 127 MMHG | DIASTOLIC BLOOD PRESSURE: 85 MMHG

## 2017-02-01 VITALS — SYSTOLIC BLOOD PRESSURE: 118 MMHG | HEART RATE: 100 BPM | DIASTOLIC BLOOD PRESSURE: 76 MMHG | RESPIRATION RATE: 20 BRPM

## 2017-02-01 VITALS — HEART RATE: 89 BPM | DIASTOLIC BLOOD PRESSURE: 82 MMHG | SYSTOLIC BLOOD PRESSURE: 118 MMHG

## 2017-02-01 VITALS — DIASTOLIC BLOOD PRESSURE: 79 MMHG | RESPIRATION RATE: 16 BRPM | SYSTOLIC BLOOD PRESSURE: 117 MMHG | HEART RATE: 100 BPM

## 2017-02-01 VITALS — HEART RATE: 93 BPM

## 2017-02-01 VITALS — SYSTOLIC BLOOD PRESSURE: 111 MMHG | HEART RATE: 100 BPM | DIASTOLIC BLOOD PRESSURE: 89 MMHG

## 2017-02-01 VITALS — SYSTOLIC BLOOD PRESSURE: 137 MMHG | DIASTOLIC BLOOD PRESSURE: 93 MMHG

## 2017-02-01 VITALS — DIASTOLIC BLOOD PRESSURE: 75 MMHG | HEART RATE: 93 BPM | SYSTOLIC BLOOD PRESSURE: 91 MMHG

## 2017-02-01 VITALS — HEART RATE: 97 BPM

## 2017-02-01 LAB
ANION GAP SERPL CALC-SCNC: 27 MMOL/L (ref 8–16)
APTT BLD: 30.2 SEC (ref 25–35)
ARTERIAL COHB: 0.1 % (ref 0–3)
ARTERIAL FRACTION OF OXYHGB: 96.9 % (ref 93–99)
ARTERIAL METHB: 0.2 % (ref 0–1.5)
ARTERIAL PATENCY WRIST A: (no result)
ARTERIAL TOTAL HEMGLOBIN: 12.9 G/DL (ref 12–18)
BASE EXCESS BLDA CALC-SCNC: 6.7 MMOL/L (ref -3–3)
BASOPHILS # BLD AUTO: 0 10^3/UL (ref 0–0.1)
BASOPHILS NFR BLD: 0.1 % (ref 0–2)
BLOOD GAS LOW PEEP SETTING: 0 CMH2O
BLOOD GAS TIDAL VOLUME: 500 ML
BUN SERPL-MCNC: 37 MG/DL (ref 7–20)
CALCIUM SERPL-MCNC: 9.4 MG/DL (ref 8.4–10.2)
CHLORIDE SERPL-SCNC: 95 MMOL/L (ref 97–110)
CO2 SERPL-SCNC: 24 MMOL/L (ref 21–31)
CONDITION: 1
CREAT SERPL-MCNC: 6.81 MG/DL (ref 0.44–1)
EOSINOPHIL # BLD: 0 10^3/UL (ref 0–0.5)
EOSINOPHIL NFR BLD: 0 % (ref 0–7)
ERYTHROCYTE [DISTWIDTH] IN BLOOD BY AUTOMATED COUNT: 15 % (ref 11.5–14.5)
GLUCOSE SERPL-MCNC: 89 MG/DL (ref 70–220)
HCO3 BLDA-SCNC: 26.7 MMOL/L (ref 22–26)
HCT VFR BLD CALC: 39.8 % (ref 37–47)
HGB BLD-MCNC: 13.3 G/DL (ref 12–16)
INR PPP: 1.16
LH ANALYZER COMMENTS: 1
LYMPHOCYTES # BLD AUTO: 0.8 10^3/UL (ref 0.8–2.9)
LYMPHOCYTES NFR BLD AUTO: 7 % (ref 15–51)
MAGNESIUM SERPL-MCNC: 2.2 MG/DL (ref 1.7–2.5)
MCH RBC QN AUTO: 29.5 PG (ref 29–33)
MCHC RBC AUTO-ENTMCNC: 33.4 G/DL (ref 32–37)
MCV RBC AUTO: 88.4 FL (ref 82–101)
MODE: (no result)
MONOCYTES # BLD: 0.7 10^3/UL (ref 0.3–0.9)
MONOCYTES NFR BLD: 6.7 % (ref 0–11)
NEUTROPHILS # BLD: 9.4 10^3/UL (ref 1.6–7.5)
NEUTROPHILS NFR BLD AUTO: 86.2 % (ref 39–77)
NRBC # BLD MANUAL: 0 10^3/UL (ref 0–0)
NRBC BLD QL: 0 /100WBC (ref 0–0)
O2 A-A PPRESDIFF RESPIRATORY: 166.3 MMHG (ref 7–24)
O2/TOTAL GAS SETTING VFR VENT: 40 %
PHOSPHATE SERPL-MCNC: 2.3 MG/DL (ref 2.5–4.9)
PLATELET # BLD: 170 10^3/UL (ref 140–440)
PMV BLD AUTO: 9.8 FL (ref 7.4–10.4)
POTASSIUM SERPL-SCNC: 6.5 MMOL/L (ref 3.5–5.1)
PROTHROMBIN TIME: 14.9 SEC (ref 12.2–14.2)
PT RATIO: 1.2
RBC # BLD AUTO: 4.5 10^6/UL (ref 4.2–5.4)
SODIUM SERPL-SCNC: 139 MMOL/L (ref 135–144)
WBC # BLD AUTO: 10.9 10^3/UL (ref 4.8–10.8)
WBC # BLD: 10.9 10^3/UL (ref 4.8–10.8)

## 2017-02-01 PROCEDURE — 5A1D60Z: ICD-10-PCS | Performed by: INTERNAL MEDICINE

## 2017-02-01 RX ADMIN — PROPOFOL SCH MLS/HR: 10 INJECTION, EMULSION INTRAVENOUS at 06:30

## 2017-02-01 RX ADMIN — CARBOXYMETHYLCELLULOSE SODIUM SCH DROP: 10 GEL OPHTHALMIC at 20:48

## 2017-02-01 RX ADMIN — DIPHENHYDRAMINE HYDROCHLORIDE SCH MG: 50 INJECTION, SOLUTION INTRAMUSCULAR; INTRAVENOUS at 11:25

## 2017-02-01 RX ADMIN — ATORVASTATIN CALCIUM SCH MG: 20 TABLET, FILM COATED ORAL at 00:13

## 2017-02-01 RX ADMIN — CARBOXYMETHYLCELLULOSE SODIUM SCH DROP: 10 GEL OPHTHALMIC at 13:03

## 2017-02-01 RX ADMIN — ACETAMINOPHEN PRN MG: 160 SOLUTION ORAL at 15:10

## 2017-02-01 RX ADMIN — PROPOFOL SCH MLS/HR: 10 INJECTION, EMULSION INTRAVENOUS at 13:03

## 2017-02-01 RX ADMIN — SEVELAMER CARBONATE SCH GM: 800 POWDER, FOR SUSPENSION ORAL at 11:25

## 2017-02-01 RX ADMIN — PROPOFOL SCH MLS/HR: 10 INJECTION, EMULSION INTRAVENOUS at 23:20

## 2017-02-01 RX ADMIN — CARBOXYMETHYLCELLULOSE SODIUM SCH DROP: 10 GEL OPHTHALMIC at 00:13

## 2017-02-01 RX ADMIN — PROPOFOL SCH MLS/HR: 10 INJECTION, EMULSION INTRAVENOUS at 17:17

## 2017-02-01 RX ADMIN — SEVELAMER CARBONATE SCH GM: 800 POWDER, FOR SUSPENSION ORAL at 17:35

## 2017-02-01 RX ADMIN — ATORVASTATIN CALCIUM SCH MG: 20 TABLET, FILM COATED ORAL at 20:47

## 2017-02-01 RX ADMIN — PROPOFOL SCH MLS/HR: 10 INJECTION, EMULSION INTRAVENOUS at 02:27

## 2017-02-01 RX ADMIN — ACETAMINOPHEN PRN MG: 160 SOLUTION ORAL at 01:54

## 2017-02-01 RX ADMIN — ACETAMINOPHEN PRN MG: 160 SOLUTION ORAL at 22:13

## 2017-02-01 RX ADMIN — CARBOXYMETHYLCELLULOSE SODIUM SCH DROP: 10 GEL OPHTHALMIC at 08:43

## 2017-02-01 RX ADMIN — SEVELAMER CARBONATE SCH GM: 800 POWDER, FOR SUSPENSION ORAL at 07:35

## 2017-02-01 NOTE — CONS
Date/Time of Note


Date/Time of Note


DATE: 2/1/17 


TIME: 09:16





Assessment/Plan


Assessment/Plan


Additional Assessment/Plan


Assessment and recommendations; next





1.  Patient admitted with intracranial bleed resulting from a fall resulting in 

subarachnoid and intraparenchymal contusions.  CT scan of head did not show any 

significant change.





2.  End-stage renal disease on hemodialysis.  Currently getting dialyzed at 

bedside.  Next





3.  Initial chest x-ray was clear from yesterday a repeat chest x-ray from 

today is pending.  My impression is that there is currently no evidence of any 

infective process.  Next





4.  She has remained hemodynamically stable.





Chest x-ray from today is pending.  Once that is done I will review it reviewed 

and make further recommendations.  ABG also has been ordered.  Meanwhile 

continue current supportive care.  Maintain current ventilator settings are 

assist control of 22, tidal volume of 500, PEEP of 5, 30% FiO2.  After dialysis 

patient be given a sedation vacation to assess mental status.  Possibly depend 

upon adequate mental status recovery.





Consultation Date/Type/Reason


Admit Date/Time


Jan 31, 2017 at 13:31


Initial Consult Date


1/31/17


Type of Consultation:  Neurosurgery





24 HR Interval Summary


Free Text/Dictation


Patient's condition remains critical.  Still on mechanical ventilation.  

Patient has remained hemodynamically stable not requiring any pressor support.  

No seizure activity noted. 





General examination; middle aged woman ,on mechanical ventilation orally 

intubated, sedated ,currently in no distress.





Exam/Review of Systems


Vital Signs


Vitals





 Vital Signs








  Date Time  Temp Pulse Resp B/P Pulse Ox O2 Delivery O2 Flow Rate FiO2


 


2/1/17 09:00  96      


 


2/1/17 08:00   22     


 


2/1/17 08:00        40


 


2/1/17 08:00 100.8   118/83 100 Mechanical Ventilator  














 Intake and Output   


 


 1/31/17 1/31/17 2/1/17





 15:00 23:00 07:00


 


Intake Total  46.2 ml 295.5 ml


 


Output Total  0 ml 0 ml


 


Balance  46.2 ml 295.5 ml











Exam


HEENT examination; supple neck.  No thyromegaly.  Orally intubated.  Pupils are 

midsize, reactive to light bilaterally and equally.  No thyromegaly. 





Chest examination; clear to auscultation bilaterally.  S1-S2 audible, no murmurs

, regular rhythm.





Abdomen examination; no peripheral edema.  There is an AV shunt in the left arm.





Extremity examination; patient currently is sedated.





Results


Result Diagram:  


2/1/17 0400                                                                    

            2/1/17 0400





Results 24 hrs





Laboratory Tests








Test


  1/31/17


12:35 1/31/17


12:38 1/31/17


14:51 1/31/17


15:05


 


Activated Partial


Thromboplast Time 30.9  


  


  


  


 


 


Alanine Aminotransferase


(ALT/SGPT) 20  


  


  


  


 


 


Albumin 4.8     


 


Albumin/Globulin Ratio 1.26     


 


Alkaline Phosphatase 242  H   


 


Ammonia < 9  L   


 


Anion Gap 27  H   


 


Aspartate Amino Transf


(AST/SGOT) 26  


  


  


  


 


 


Basophils # 0.0     


 


Basophils % 0.0     


 


Blood Morphology Comment      


 


Blood Urea Nitrogen 24  H   


 


Calcium Level 9.6     


 


Carbon Dioxide Level 24     


 


Chloride Level 93  L   


 


Creatine Kinase 124     


 


Creatine Kinase Index 2.1     


 


Creatinine 5.32  H   


 


Creatinine Kinase MB (Mass) 2.64  H   


 


Differential Comment AUTO w/SCAN     


 


Direct Bilirubin 0.00     


 


Eosinophils # 0.0     


 


Eosinophils % 0.0     


 


Globulin 3.80  H   


 


Glucose Level 151     


 


Hematocrit 46.6     


 


Hemoglobin 15.3     


 


INR International Normalized


Ratio 1.02  


  


  


  


 


 


Indirect Bilirubin 0.5     


 


Lactic Acid Level 3.0  H   


 


Lymphocytes # 0.4  L   


 


Lymphocytes % 2.4  L   


 


Mean Corpuscular Hemoglobin 29.2     


 


Mean Corpuscular Hemoglobin


Concent 32.8  


  


  


  


 


 


Mean Corpuscular Volume 89.1     


 


Mean Platelet Volume 9.6     


 


Monocytes # 0.5     


 


Monocytes % 3.2     


 


Neutrophils # 16.1  H   


 


Neutrophils % 94.4  H   


 


Nucleated Red Blood Cells # 0.0     


 


Nucleated Red Blood Cells % 0.0     


 


Platelet Count 160  #   


 


Potassium Level 4.6     


 


Prothrombin Time 13.4     


 


Prothrombin Time Ratio 1.0     


 


Red Blood Count 5.23     


 


Red Cell Distribution Width 14.9  H   


 


Sodium Level 139     


 


Total Bilirubin 0.5     


 


Total Protein 8.6  H   


 


Troponin I < 0.012     


 


White Blood Count 17.0  #H   


 


Bedside Glucose  148    


 


Arterial Blood HCO3   21.5  L 


 


Arterial Blood Base Excess   -0.6   


 


Arterial Blood Oxygen


Saturation 


  


  99.6  H


  


 


 


Jed Test   ACCEPTAB   


 


Arterial Blood Gas Puncture


Site 


  


  Right Brachial


  


 


 


Arterial Blood


Carboxyhemoglobin 


  


  0.5  


  


 


 


Arterial Blood Date Drawn


  


  


  1/31/2017


4:20:58 PM 


 


 


Arterial Blood Methemoglobin   0.3   


 


Arterial Blood pCO2 (Temp


correct) 


  


  28.5  L


  


 


 


Arterial Blood pH (Temp


corrected) 


  


  7.495  H


  


 


 


Arterial Blood pO2 (Temp


corrected) 


  


  457.0  H


  


 


 


Blood Gas A-a O2 Differential   227.5  H 


 


Blood Gas Actual Respiration


Rate 


  


  18  


  


 


 


Blood Gas Modality   VENT - AC   


 


Blood Gas Notified Time


  


  


  1/31/2017


4:25:32 PM 


 


 


Blood Gas Notified Whom   MDA   


 


Blood Gas Respiration Rate   18.0   


 


Blood Gas Specimen Source


  


  


  Blood arterial


  


 


 


Blood Gas Temperature   37.0   


 


Blood Gas Tidal Volume   450.0   


 


FiO2   100.0   


 


Oxyhemoglobin Percent   98.8   


 


Total Hemoglobin   13.7   


 


Platelet Func


Collagen/Epinephrine 


  


  


  154  


 


 


Platelet Function Collagen/ADP      














Test


  2/1/17


04:00 


  


  


 


 


Activated Partial


Thromboplast Time 30.2  


  


  


  


 


 


Anion Gap 27  H   


 


Basophils # 0.0     


 


Basophils % 0.1     


 


Blood Morphology Comment      


 


Blood Urea Nitrogen 37  #H   


 


Calcium Level 9.4     


 


Carbon Dioxide Level 24     


 


Chloride Level 95  L   


 


Creatinine 6.81  H   


 


Eosinophils # 0.0     


 


Eosinophils % 0.0     


 


Glucose Level 89  #   


 


Hematocrit 39.8     


 


Hemoglobin 13.3     


 


INR International Normalized


Ratio 1.16  


  


  


  


 


 


Lymphocytes # 0.8     


 


Lymphocytes % 7.0  L   


 


Magnesium Level 2.2     


 


Mean Corpuscular Hemoglobin 29.5     


 


Mean Corpuscular Hemoglobin


Concent 33.4  


  


  


  


 


 


Mean Corpuscular Volume 88.4     


 


Mean Platelet Volume 9.8     


 


Monocytes # 0.7     


 


Monocytes % 6.7     


 


Neutrophils # 9.4  H   


 


Neutrophils % 86.2  H   


 


Nucleated Red Blood Cells # 0.0     


 


Nucleated Red Blood Cells % 0.0     


 


Phosphorus Level 2.3  L   


 


Platelet Count 170     


 


Potassium Level 6.5  *H   


 


Prothrombin Time 14.9  H   


 


Prothrombin Time Ratio 1.2     


 


Red Blood Count 4.50     


 


Red Cell Distribution Width 15.0  H   


 


Sodium Level 139     


 


White Blood Count 10.9  #H   











Medications


Medications





 Current Medications


Labetalol HCl (Labetalol) 20 mg Q20M  PRN IV ELEVATED BLOOD PRESSURE Last 

administered on 1/31/17at 13:33; Admin Dose 20 MG;  Start 1/31/17 at 12:30


Labetalol HCl (Labetalol) 10 mg Q10M  PRN IV ELEVATED BLOOD PRESSURE;  Start 1/ 31/17 at 14:00


Ondansetron HCl (Zofran Inj) 4 mg Q6H  PRN IV NAUSEA AND/OR VOMITING Last 

administered on 1/31/17at 20:32; Admin Dose 4 MG;  Start 1/31/17 at 14:00


Acetaminophen (Tylenol Liquid) 650 mg Q6H  PRN PO PAIN LEVEL 1-3 OR FEVER Last 

administered on 2/1/17at 01:54; Admin Dose 650 MG;  Start 1/31/17 at 14:00


Acetaminophen (Tylenol Supp) 650 mg Q4H  PRN [place] PAIN LEVEL 1-3 OR FEVER;  Start 

1/31/17 at 14:00


Famotidine (Pepcid Iv) 20 mg DAILY IV  Last administered on 1/31/17at 18:54; 

Admin Dose 20 MG;  Start 1/31/17 at 15:00


Eye Lubricant (Artificial Tears Oph) 1 drop TID BOTH EYES  Last administered on 

2/1/17at 08:43; Admin Dose 1 DROP;  Start 1/31/17 at 21:00


Atorvastatin Calcium (Lipitor) 20 mg QHS PO  Last administered on 2/1/17at 00:13

; Admin Dose 20 MG;  Start 1/31/17 at 21:00


Multivit/Ca Carb/ B Cmplx/FA/Prenat ([place]-Argenis) 1 tab DAILY PO ;  Start 2/1/17 

at 09:00


Hydromorphone HCl 0.5 mg 0.5 mg Q4H  PRN IV PAIN Last administered on 1/31/17at 

22:50; Admin Dose 0.5 MG;  Start 1/31/17 at 21:30


Propofol (Diprivan) 100 ml @  1.641 mls/ hr Q12H IV  Last administered on 2/1/ 17at 06:30; Admin Dose 9.846 MLS/HR;  Start 2/1/17 at 01:30











CHADD CORBETT Feb 1, 2017 09:24

## 2017-02-01 NOTE — PN
Date/Time of Note


Date/Time of Note


DATE: 2/1/17 


TIME: 08:42





Assessment/Plan


VTE Prophylaxis


VTE Prophylaxis Intervention:  SCD's





Lines/Catheters


IV Catheter Type (from Nrs):  Peripheral IV


Urinary Cath still in place:  Yes


Reason Cath still needed:  other (indicate) (will be removed today )





Assessment/Plan


Assessment/Plan


46-year-old female:





1.  Status post fall with head trauma with skull fracture including Bilateral 

acute inferior frontal hemorrhagic contusions, right greater than left, 8 mm 

right convexity subdural hematoma, mild scattered subarachnoid hemorrhage, 3 mm 

leftward midline shift of the septum pellucidum and right temporal-parietal 

scalp swelling with nondisplaced, oblique fracture through the right parietal 

and temporal skull. 


Reported Epidural hematoma on 2nd CT head 


Dr. Palomo from neurosurgery following 


Repeating CT head this AM 


So far MS mostly unchanged 





2.  Acute respiratory failure secondary to severe encephalopathy, post fall 

with significant head trauma: 


On vent, with propofol for sedation 


Dr Hazel following





3.  Leukocytosis: Likely from demargination post trauma.  Improving as of this 

AM


Patient remains afebrile,





4.  Hypertension: Actually has remained normotensive since started on propofol 

for sedation.





5.  End-stage renal disease on hemodialysis, hyperkalemia this AM: on Monday/

Wednesday/Friday schedule. 


HD being done currently, patient with K up to 6.5


Dr. Chaney following. 





6.  Lactic acidosis: f/u lactate level 


No signs of acute infection.





7.  Gastroesophageal reflux disease: continue Pepcid IV





Prophylaxis: Pepcid for GI prophylaxis, SCDs for DVT prophylaxis


Disposition: Repeat CAT scan head this AM, follow up further Neurosurgery 

recommendations and further Pulmonary recs





Subjective


24 Hr Interval Summary


Free Text/Dictation


Patient sedated and intubated currently 


Patient had episode of vomiting last night, now has NGT to intermittent suction 


K up to 6.5 so HD being done


CT head pending this AM and Note from Neurosurgery last night noted





Exam/Review of Systems


Vital Signs


Vitals





 Vital Signs








  Date Time  Temp Pulse Resp B/P Pulse Ox O2 Delivery O2 Flow Rate FiO2


 


2/1/17 08:30  103      


 


2/1/17 08:00   22     


 


2/1/17 08:00        40


 


2/1/17 08:00 100.8   118/83 100 Mechanical Ventilator  














 Intake and Output   


 


 1/31/17 1/31/17 2/1/17





 15:00 23:00 07:00


 


Intake Total  46.2 ml 295.5 ml


 


Output Total  0 ml 0 ml


 


Balance  46.2 ml 295.5 ml











Exam


Constitutional:  other (sedated and intubated and currently on HD )


Eyes:  PERRL


Respiratory:  clear to auscultation, normal air movement


Cardiovascular:  nl pulses, regular rate and rhythm


Gastrointestinal:  non-tender, soft


Musculoskeletal:  nl extremities to inspection


Extremities:  normal pulses, other (no edema, clubbing or cyanosis )


Neurological:  other (sedated and intubated )





Results


Result Diagram:  


2/1/17 0400 2/1/17 0400





Results 24 hrs





Laboratory Tests








Test


  1/31/17


12:35 1/31/17


12:38 1/31/17


14:51 1/31/17


15:05


 


Activated Partial


Thromboplast Time 30.9  


  


  


  


 


 


Alanine Aminotransferase


(ALT/SGPT) 20  


  


  


  


 


 


Albumin 4.8     


 


Albumin/Globulin Ratio 1.26     


 


Alkaline Phosphatase 242  H   


 


Ammonia < 9  L   


 


Anion Gap 27  H   


 


Aspartate Amino Transf


(AST/SGOT) 26  


  


  


  


 


 


Basophils # 0.0     


 


Basophils % 0.0     


 


Blood Morphology Comment      


 


Blood Urea Nitrogen 24  H   


 


Calcium Level 9.6     


 


Carbon Dioxide Level 24     


 


Chloride Level 93  L   


 


Creatine Kinase 124     


 


Creatine Kinase Index 2.1     


 


Creatinine 5.32  H   


 


Creatinine Kinase MB (Mass) 2.64  H   


 


Differential Comment AUTO w/SCAN     


 


Direct Bilirubin 0.00     


 


Eosinophils # 0.0     


 


Eosinophils % 0.0     


 


Globulin 3.80  H   


 


Glucose Level 151     


 


Hematocrit 46.6     


 


Hemoglobin 15.3     


 


INR International Normalized


Ratio 1.02  


  


  


  


 


 


Indirect Bilirubin 0.5     


 


Lactic Acid Level 3.0  H   


 


Lymphocytes # 0.4  L   


 


Lymphocytes % 2.4  L   


 


Mean Corpuscular Hemoglobin 29.2     


 


Mean Corpuscular Hemoglobin


Concent 32.8  


  


  


  


 


 


Mean Corpuscular Volume 89.1     


 


Mean Platelet Volume 9.6     


 


Monocytes # 0.5     


 


Monocytes % 3.2     


 


Neutrophils # 16.1  H   


 


Neutrophils % 94.4  H   


 


Nucleated Red Blood Cells # 0.0     


 


Nucleated Red Blood Cells % 0.0     


 


Platelet Count 160  #   


 


Potassium Level 4.6     


 


Prothrombin Time 13.4     


 


Prothrombin Time Ratio 1.0     


 


Red Blood Count 5.23     


 


Red Cell Distribution Width 14.9  H   


 


Sodium Level 139     


 


Total Bilirubin 0.5     


 


Total Protein 8.6  H   


 


Troponin I < 0.012     


 


White Blood Count 17.0  #H   


 


Bedside Glucose  148    


 


Arterial Blood HCO3   21.5  L 


 


Arterial Blood Base Excess   -0.6   


 


Arterial Blood Oxygen


Saturation 


  


  99.6  H


  


 


 


Jed Test   ACCEPTAB   


 


Arterial Blood Gas Puncture


Site 


  


  Right Brachial


  


 


 


Arterial Blood


Carboxyhemoglobin 


  


  0.5  


  


 


 


Arterial Blood Date Drawn


  


  


  1/31/2017


4:20:58 PM 


 


 


Arterial Blood Methemoglobin   0.3   


 


Arterial Blood pCO2 (Temp


correct) 


  


  28.5  L


  


 


 


Arterial Blood pH (Temp


corrected) 


  


  7.495  H


  


 


 


Arterial Blood pO2 (Temp


corrected) 


  


  457.0  H


  


 


 


Blood Gas A-a O2 Differential   227.5  H 


 


Blood Gas Actual Respiration


Rate 


  


  18  


  


 


 


Blood Gas Modality   VENT - AC   


 


Blood Gas Notified Time


  


  


  1/31/2017


4:25:32 PM 


 


 


Blood Gas Notified Whom   MDA   


 


Blood Gas Respiration Rate   18.0   


 


Blood Gas Specimen Source


  


  


  Blood arterial


  


 


 


Blood Gas Temperature   37.0   


 


Blood Gas Tidal Volume   450.0   


 


FiO2   100.0   


 


Oxyhemoglobin Percent   98.8   


 


Total Hemoglobin   13.7   


 


Platelet Func


Collagen/Epinephrine 


  


  


  154  


 


 


Platelet Function Collagen/ADP      














Test


  2/1/17


04:00 


  


  


 


 


Activated Partial


Thromboplast Time 30.2  


  


  


  


 


 


Anion Gap 27  H   


 


Basophils # 0.0     


 


Basophils % 0.1     


 


Blood Morphology Comment      


 


Blood Urea Nitrogen 37  #H   


 


Calcium Level 9.4     


 


Carbon Dioxide Level 24     


 


Chloride Level 95  L   


 


Creatinine 6.81  H   


 


Eosinophils # 0.0     


 


Eosinophils % 0.0     


 


Glucose Level 89  #   


 


Hematocrit 39.8     


 


Hemoglobin 13.3     


 


INR International Normalized


Ratio 1.16  


  


  


  


 


 


Lymphocytes # 0.8     


 


Lymphocytes % 7.0  L   


 


Magnesium Level 2.2     


 


Mean Corpuscular Hemoglobin 29.5     


 


Mean Corpuscular Hemoglobin


Concent 33.4  


  


  


  


 


 


Mean Corpuscular Volume 88.4     


 


Mean Platelet Volume 9.8     


 


Monocytes # 0.7     


 


Monocytes % 6.7     


 


Neutrophils # 9.4  H   


 


Neutrophils % 86.2  H   


 


Nucleated Red Blood Cells # 0.0     


 


Nucleated Red Blood Cells % 0.0     


 


Phosphorus Level 2.3  L   


 


Platelet Count 170     


 


Potassium Level 6.5  *H   


 


Prothrombin Time 14.9  H   


 


Prothrombin Time Ratio 1.2     


 


Red Blood Count 4.50     


 


Red Cell Distribution Width 15.0  H   


 


Sodium Level 139     


 


White Blood Count 10.9  #H   











Medications


Medications





 Current Medications


Labetalol HCl (Labetalol) 20 mg Q20M  PRN IV ELEVATED BLOOD PRESSURE Last 

administered on 1/31/17at 13:33; Admin Dose 20 MG;  Start 1/31/17 at 12:30


Labetalol HCl (Labetalol) 10 mg Q10M  PRN IV ELEVATED BLOOD PRESSURE;  Start 1/ 31/17 at 14:00


Ondansetron HCl (Zofran Inj) 4 mg Q6H  PRN IV NAUSEA AND/OR VOMITING Last 

administered on 1/31/17at 20:32; Admin Dose 4 MG;  Start 1/31/17 at 14:00


Acetaminophen (Tylenol Liquid) 650 mg Q6H  PRN PO PAIN LEVEL 1-3 OR FEVER Last 

administered on 2/1/17at 01:54; Admin Dose 650 MG;  Start 1/31/17 at 14:00


Acetaminophen (Tylenol Supp) 650 mg Q4H  PRN CA PAIN LEVEL 1-3 OR FEVER;  Start 

1/31/17 at 14:00


Famotidine (Pepcid Iv) 20 mg DAILY IV  Last administered on 1/31/17at 18:54; 

Admin Dose 20 MG;  Start 1/31/17 at 15:00


Eye Lubricant (Artificial Tears Oph) 1 drop TID BOTH EYES  Last administered on 

2/1/17at 00:13; Admin Dose 1 DROP;  Start 1/31/17 at 21:00


Atorvastatin Calcium (Lipitor) 20 mg QHS PO  Last administered on 2/1/17at 00:13

; Admin Dose 20 MG;  Start 1/31/17 at 21:00


Multivit/Ca Carb/ B Cmplx/FA/Prenat ([place]-Argenis) 1 tab DAILY PO ;  Start 2/1/17 

at 09:00


Hydromorphone HCl 0.5 mg 0.5 mg Q4H  PRN IV PAIN Last administered on 1/31/17at 

22:50; Admin Dose 0.5 MG;  Start 1/31/17 at 21:30


Propofol (Diprivan) 100 ml @  1.641 mls/ hr Q12H IV  Last administered on 2/1/ 17at 06:30; Admin Dose 9.846 MLS/HR;  Start 2/1/17 at 01:30











CECILIO JIMENEZ Feb 1, 2017 08:55

## 2017-02-01 NOTE — RADRPT
PROCEDURE:   XR Chest. 

 

CLINICAL INDICATION:   Shortness of breath.

 

TECHNIQUE:   Single frontal view. 

 

COMPARISON:   01/31/2017. 

 

FINDINGS:

The endotracheal is 8.5 cm above the hipolito with the tip C6-7 level. This should be advanced approxi
mately 5 cm.  The nasogastric tube tip is in the stomach.  There is mild air space disease in the ri
ght upper lobe consistent with pneumonia.  The lungs are otherwise clear.

The heart is enlarged.  There is a vascular stent in the left lower axillary region. 

There is no pleural effusion. 

There is no pneumothorax.   

 

IMPRESSION:

1.  The endotracheal tube should be advanced approximately 5 cm.

2.  No other change from 01/31/2017.

3.  Results given to Maria D, the respiratory therapist for the patient in ICU.  

 

RPTAT: QQ

_____________________________________________ 

.Chinmay Peter MD, MD           Date    Time 

Electronically viewed and signed by .Chinmay Peter MD, MD on 02/01/2017 12:50 

 

D:  02/01/2017 12:50  T:  02/01/2017 12:50

.R/

## 2017-02-01 NOTE — RADRPT
PROCEDURE:  Noncontrast CT Head. 

 

CLINICAL INDICATION: Intracranial hemorrhage.  Follow-up examination. 

 

TECHNIQUE: Noncontrast CT of the head was obtained. The administered radiation dose was CTDI vol =  
45.01 mGy, DLP = 630.02 mGy-cm.

 

COMPARISON: Noncontrast CT of the head from January 31, 2017 as well as several prior examinations.

 

FINDINGS:

 

There is a stable right inferior frontal intraparenchymal hemorrhage with mild surrounding vasogenic
 edema.  There is also a stable 3 mm anterior inferior falcine subdural hemorrhage.  There is a stab
le right frontal temporal parietal subdural hemorrhage also again noted which is not significant manjula
nge measuring maximally within the right parietal region approximately 6 mm. Otherwise the ventricle
s and sulci are within normal limits.

 

There are chronic bilateral basal ganglia and left thalamic lacunar infarctions.

There is no loss of gray-white differentiation to suggest acute territorial infarction.   

There is stable 3 mm of leftward midline shift. 

 

The orbits are within normal limits. 

There is complete left sphenoid sinus mucosal thickening with mild right sphenoid sinus mucosal thic
kening. 

 

No destructive osseous lesion is identified. There are comminuted fractures of the right temporal dina
ne extending to the anterior aspect of the right mastoid air cells and into the right temporal parie
vu calvaria which are nondisplaced. These are further detailed on prior CT of the head report.  The
re is improved a right frontal scalp hematoma measuring up to 5 mm.  There is a stable left parietal
 scalp hematoma measuring up to 10 mm.

 

IMPRESSION:

 

1. Improved right frontal scalp hematoma. Otherwise no significant change.

 

2. Stable right inferior frontal lobe intraparenchymal hemorrhage with mild surrounding vasogenic ed
ema. generalized cerebral volume loss.

 

3. Stable right frontal temporal parietal subdural hemorrhage as detailed above.  There is also smal
l anterior inferior falcine subdural hemorrhage.

 

4. Stable 3 mm of leftward midline shift.

 

5.  Right mastoid bone and temporal parietal calvarial fractures as detailed above.

 

6.  Chronic bilateral basal ganglia and left thalamic lacunar infarctions.

 

Further findings as detailed above.

 

RPTAT: PP

_____________________________________________ 

.Diogeens Turner MD, MD           Date    Time 

Electronically viewed and signed by .Diogenes Turner MD, MD on 02/01/2017 13:34 

 

D:  02/01/2017 13:34  T:  02/01/2017 13:34

.F/

## 2017-02-01 NOTE — RADRPT
PROCEDURE:   XR Chest. 

 

CLINICAL INDICATION:   Endotracheal tube positioning.

 

TECHNIQUE:   Single AP portable chest. 

 

COMPARISON:   02/01/2017 

 

FINDINGS:

 

Fax silhouette is mildly enlarged The endotracheal tube tip is at the thoracic inlet 7 cm above the 
hipolito.  The NG tube is in stable position.  . Patchy right upper lobe airspace opacity increase in 
conspicuity compared to previous study.  May represent atelectasis or pneumonic infiltrate.  Mild pr
ominence of the pulmonary vascularity.   No pneumothorax. The osseous structures and soft tissues ar
e unremarkable. 

 

IMPRESSION:

1. Patchy right upper lobe airspace opacity and/or atelectasis with mild vascular prominence.

2.  Endotracheal tube tip 7 cm above the hipolito.

 

RPTAT: HH

_____________________________________________ 

Physician Ricardo           Date    Time 

Electronically viewed and signed by Physician Ricardo on 02/01/2017 13:47 

 

D:  02/01/2017 13:47  T:  02/01/2017 13:47

JS/

## 2017-02-01 NOTE — CONS
Date/Time of Note


Date/Time of Note


DATE: 2/1/17 


TIME: 17:49





Assessment/Plan


Assessment/Plan


Chief Complaint/Hosp Course


s/p traumatic R > L frontal contusion, right SDH.


Likely better exam but suppressed by sedation. Current scan does not appear 

operative and would not recommend ICP monitor.


Recommend weaning sedation and progressing to extubation as tolerated. 


Cont. IC I observation. 


Repeat imaging if exam changes, but bleeding not likely to progress at this 

point given absence of true coagulopathy/platelet dysfunction though swelling 

may still be an issue.


Problems:  





Consultation Date/Type/Reason


Admit Date/Time


Jan 31, 2017 at 13:31


Initial Consult Date


1/31/17


Type of Consultation:  Neurosurgery





24 HR Interval Summary


Free Text/Dictation


Intubated, sedated.





Exam/Review of Systems


Vital Signs


Vitals





 Vital Signs








  Date Time  Temp Pulse Resp B/P Pulse Ox O2 Delivery O2 Flow Rate FiO2


 


2/1/17 17:30 101.2 93 18 128/76 100   


 


2/1/17 17:00      Mechanical Ventilator  


 


2/1/17 16:15        40














 Intake and Output   


 


 1/31/17 1/31/17 2/1/17





 15:00 23:00 07:00


 


Intake Total  46.2 ml 295.5 ml


 


Output Total  0 ml 0 ml


 


Balance  46.2 ml 295.5 ml











Exam


Constitutional:  alert, oriented


Neurological:  other (remains heavilty sedated as very agitated off sedation. 

MORRIS semi-purposfully but doesn't follow coammands. PEERL. Intubated )





Results


Result Diagram:  


2/1/17 0400                                                                    

            2/1/17 0400





Results 24 hrs





Laboratory Tests








Test


  2/1/17


04:00 2/1/17


09:10 2/1/17


09:13 2/1/17


15:12


 


Activated Partial


Thromboplast Time 30.2  


  


  


  


 


 


Anion Gap 27  H   


 


Basophils # 0.0     


 


Basophils % 0.1     


 


Blood Morphology Comment      


 


Blood Urea Nitrogen 37  #H   


 


Calcium Level 9.4     


 


Carbon Dioxide Level 24     


 


Chloride Level 95  L   


 


Creatinine 6.81  H   


 


Eosinophils # 0.0     


 


Eosinophils % 0.0     


 


Glucose Level 89  #   


 


Hematocrit 39.8     


 


Hemoglobin 13.3     


 


INR International Normalized


Ratio 1.16  


  


  


  


 


 


Lymphocytes # 0.8     


 


Lymphocytes % 7.0  L   


 


Magnesium Level 2.2     


 


Mean Corpuscular Hemoglobin 29.5     


 


Mean Corpuscular Hemoglobin


Concent 33.4  


  


  


  


 


 


Mean Corpuscular Volume 88.4     


 


Mean Platelet Volume 9.8     


 


Monocytes # 0.7     


 


Monocytes % 6.7     


 


Neutrophils # 9.4  H   


 


Neutrophils % 86.2  H   


 


Nucleated Red Blood Cells # 0.0     


 


Nucleated Red Blood Cells % 0.0     


 


Phosphorus Level 2.3  L   


 


Platelet Count 170     


 


Potassium Level 6.5  *H   


 


Prothrombin Time 14.9  H   


 


Prothrombin Time Ratio 1.2     


 


Red Blood Count 4.50     


 


Red Cell Distribution Width 15.0  H   


 


Sodium Level 139     


 


White Blood Count 10.9  #H   


 


Lactic Acid Level  2.7  H  4.8  *H


 


Arterial Blood HCO3   26.7  H 


 


Arterial Blood Base Excess   6.7  H 


 


Arterial Blood Oxygen


Saturation 


  


  97.2  


  


 


 


Jed Test   ACCEPTAB   


 


Arterial Blood Gas Puncture


Site 


  


  Right Radial  


  


 


 


Arterial Blood


Carboxyhemoglobin 


  


  0.1  


  


 


 


Arterial Blood Date Drawn


  


  


  2/1/2017


1:40:37 PM 


 


 


Arterial Blood Methemoglobin   0.2   


 


Arterial Blood pCO2 (Temp


correct) 


  


  25.5  L


  


 


 


Arterial Blood pH (Temp


corrected) 


  


  7.638  *H


  


 


 


Arterial Blood pO2 (Temp


corrected) 


  


  89.6  


  


 


 


Blood Gas A-a O2 Differential   166.3  H 


 


Blood Gas Actual Respiration


Rate 


  


  22  


  


 


 


Blood Gas Critical Value Read


Back 


  


  MARIA ESHTER SCHMITT RN  


  


 


 


Blood Gas Low PEEP Setting   0   


 


Blood Gas Modality   VENT - AC   


 


Blood Gas Notified Time


  


  


  2/1/2017


1:52:12 PM 


 


 


Blood Gas Notified Whom   JLD   


 


Blood Gas Respiration Rate   22.0   


 


Blood Gas Specimen Source


  


  


  Blood arterial


  


 


 


Blood Gas Temperature   37.0   


 


Blood Gas Tidal Volume   500.0   


 


FiO2   40.0   


 


Oxyhemoglobin Percent   96.9   


 


Total Hemoglobin   12.9   











Medications


Medications





 Current Medications


Labetalol HCl (Labetalol) 20 mg Q20M  PRN IV ELEVATED BLOOD PRESSURE Last 

administered on 1/31/17at 13:33; Admin Dose 20 MG;  Start 1/31/17 at 12:30


Labetalol HCl (Labetalol) 10 mg Q10M  PRN IV ELEVATED BLOOD PRESSURE;  Start 1/ 31/17 at 14:00


Ondansetron HCl (Zofran Inj) 4 mg Q6H  PRN IV NAUSEA AND/OR VOMITING Last 

administered on 1/31/17at 20:32; Admin Dose 4 MG;  Start 1/31/17 at 14:00


Acetaminophen (Tylenol Liquid) 650 mg Q6H  PRN PO PAIN LEVEL 1-3 OR FEVER Last 

administered on 2/1/17at 15:10; Admin Dose 650 MG;  Start 1/31/17 at 14:00


Acetaminophen (Tylenol Supp) 650 mg Q4H  PRN PA PAIN LEVEL 1-3 OR FEVER;  Start 

1/31/17 at 14:00


Famotidine (Pepcid Iv) 20 mg DAILY IV  Last administered on 2/1/17at 11:25; 

Admin Dose 20 MG;  Start 1/31/17 at 15:00


Eye Lubricant (Artificial Tears Oph) 1 drop TID BOTH EYES  Last administered on 

2/1/17at 13:03; Admin Dose 1 DROP;  Start 1/31/17 at 21:00


Atorvastatin Calcium (Lipitor) 20 mg QHS PO  Last administered on 2/1/17at 00:13

; Admin Dose 20 MG;  Start 1/31/17 at 21:00


Multivit/Ca Carb/ B Cmplx/FA/Prenat ([place]-Argenis) 1 tab DAILY PO  Last 

administered on 2/1/17at 11:25; Admin Dose 1 TAB;  Start 2/1/17 at 09:00


Hydromorphone HCl 0.5 mg 0.5 mg Q4H  PRN IV PAIN Last administered on 1/31/17at 

22:50; Admin Dose 0.5 MG;  Start 1/31/17 at 21:30


Propofol (Diprivan) 100 ml @  1.641 mls/ hr Q12H IV  Last administered on 2/1/ 17at 17:17; Admin Dose 13.128 MLS/HR;  Start 2/1/17 at 01:30





Procedures


Procedures


CT head stable c/w last night's study. Stable v smaller SDH and contusion. No 

IVH.











ODALIS MADISON MD Feb 1, 2017 17:55

## 2017-02-01 NOTE — CONS
Date/Time of Note


Date/Time of Note


DATE: 2/1/17 


TIME: 20:14





Assessment/Plan


Assessment/Plan


Chief Complaint/Hosp Course


IMPRESSION:


1.  The patient has acute hemorrhagic stroke./sdh/trauma


2.  Malignant hypertension.


3.  Endstage renal disease.


4.  Leukocytosis.  


5.  Respiratory failure.


6.  Incomplete database


7 hyperkalemia


plan hd


Problems:  





Consultation Date/Type/Reason


Admit Date/Time


Jan 31, 2017 at 13:31


Initial Consult Date


1/31/17


Type of Consultation:  renal





24 HR Interval Summary


Subjective hx not possible:  pt non-verbal





Exam/Review of Systems


Vital Signs


Vitals





 Vital Signs








  Date Time  Temp Pulse Resp B/P Pulse Ox O2 Delivery O2 Flow Rate FiO2


 


2/1/17 17:45  92 16  100   40


 


2/1/17 17:30 101.2   128/76    


 


2/1/17 17:00      Mechanical Ventilator  














 Intake and Output   


 


 1/31/17 1/31/17 2/1/17





 15:00 23:00 07:00


 


Intake Total  46.2 ml 295.5 ml


 


Output Total  0 ml 0 ml


 


Balance  46.2 ml 295.5 ml











Exam


Respiratory:  clear to auscultation


Cardiovascular:  regular rate and rhythm


Gastrointestinal:  soft


Musculoskeletal:  nl extremities to inspection


Extremities:  normal pulses


Neurological:  unresponsive





Results


Result Diagram:  


2/1/17 0400                                                                    

            2/1/17 0400





Results 24 hrs





Laboratory Tests








Test


  2/1/17


04:00 2/1/17


09:10 2/1/17


09:13 2/1/17


15:12


 


Activated Partial


Thromboplast Time 30.2  


  


  


  


 


 


Anion Gap 27  H   


 


Basophils # 0.0     


 


Basophils % 0.1     


 


Blood Morphology Comment      


 


Blood Urea Nitrogen 37  #H   


 


Calcium Level 9.4     


 


Carbon Dioxide Level 24     


 


Chloride Level 95  L   


 


Creatinine 6.81  H   


 


Eosinophils # 0.0     


 


Eosinophils % 0.0     


 


Glucose Level 89  #   


 


Hematocrit 39.8     


 


Hemoglobin 13.3     


 


INR International Normalized


Ratio 1.16  


  


  


  


 


 


Lymphocytes # 0.8     


 


Lymphocytes % 7.0  L   


 


Magnesium Level 2.2     


 


Mean Corpuscular Hemoglobin 29.5     


 


Mean Corpuscular Hemoglobin


Concent 33.4  


  


  


  


 


 


Mean Corpuscular Volume 88.4     


 


Mean Platelet Volume 9.8     


 


Monocytes # 0.7     


 


Monocytes % 6.7     


 


Neutrophils # 9.4  H   


 


Neutrophils % 86.2  H   


 


Nucleated Red Blood Cells # 0.0     


 


Nucleated Red Blood Cells % 0.0     


 


Phosphorus Level 2.3  L   


 


Platelet Count 170     


 


Potassium Level 6.5  *H   


 


Prothrombin Time 14.9  H   


 


Prothrombin Time Ratio 1.2     


 


Red Blood Count 4.50     


 


Red Cell Distribution Width 15.0  H   


 


Sodium Level 139     


 


White Blood Count 10.9  #H   


 


Lactic Acid Level  2.7  H  4.8  *H


 


Arterial Blood HCO3   26.7  H 


 


Arterial Blood Base Excess   6.7  H 


 


Arterial Blood Oxygen


Saturation 


  


  97.2  


  


 


 


Jed Test   ACCEPTAB   


 


Arterial Blood Gas Puncture


Site 


  


  Right Radial  


  


 


 


Arterial Blood


Carboxyhemoglobin 


  


  0.1  


  


 


 


Arterial Blood Date Drawn


  


  


  2/1/2017


1:40:37 PM 


 


 


Arterial Blood Methemoglobin   0.2   


 


Arterial Blood pCO2 (Temp


correct) 


  


  25.5  L


  


 


 


Arterial Blood pH (Temp


corrected) 


  


  7.638  *H


  


 


 


Arterial Blood pO2 (Temp


corrected) 


  


  89.6  


  


 


 


Blood Gas A-a O2 Differential   166.3  H 


 


Blood Gas Actual Respiration


Rate 


  


  22  


  


 


 


Blood Gas Critical Value Read


Back 


  


  M OSKAR HERRERA  


  


 


 


Blood Gas Low PEEP Setting   0   


 


Blood Gas Modality   VENT - AC   


 


Blood Gas Notified Time


  


  


  2/1/2017


1:52:12 PM 


 


 


Blood Gas Notified Whom   JLD   


 


Blood Gas Respiration Rate   22.0   


 


Blood Gas Specimen Source


  


  


  Blood arterial


  


 


 


Blood Gas Temperature   37.0   


 


Blood Gas Tidal Volume   500.0   


 


FiO2   40.0   


 


Oxyhemoglobin Percent   96.9   


 


Total Hemoglobin   12.9   











Medications


Medications





 Current Medications


Labetalol HCl (Labetalol) 20 mg Q20M  PRN IV ELEVATED BLOOD PRESSURE Last 

administered on 1/31/17at 13:33; Admin Dose 20 MG;  Start 1/31/17 at 12:30


Labetalol HCl (Labetalol) 10 mg Q10M  PRN IV ELEVATED BLOOD PRESSURE;  Start 1/ 31/17 at 14:00


Ondansetron HCl (Zofran Inj) 4 mg Q6H  PRN IV NAUSEA AND/OR VOMITING Last 

administered on 1/31/17at 20:32; Admin Dose 4 MG;  Start 1/31/17 at 14:00


Acetaminophen (Tylenol Liquid) 650 mg Q6H  PRN PO PAIN LEVEL 1-3 OR FEVER Last 

administered on 2/1/17at 15:10; Admin Dose 650 MG;  Start 1/31/17 at 14:00


Acetaminophen (Tylenol Supp) 650 mg Q4H  PRN IN PAIN LEVEL 1-3 OR FEVER;  Start 

1/31/17 at 14:00


Famotidine (Pepcid Iv) 20 mg DAILY IV  Last administered on 2/1/17at 11:25; 

Admin Dose 20 MG;  Start 1/31/17 at 15:00


Eye Lubricant (Artificial Tears Oph) 1 drop TID BOTH EYES  Last administered on 

2/1/17at 13:03; Admin Dose 1 DROP;  Start 1/31/17 at 21:00


Atorvastatin Calcium (Lipitor) 20 mg QHS PO  Last administered on 2/1/17at 00:13

; Admin Dose 20 MG;  Start 1/31/17 at 21:00


Multivit/Ca Carb/ B Cmplx/FA/Prenat ([place]-Argenis) 1 tab DAILY PO  Last 

administered on 2/1/17at 11:25; Admin Dose 1 TAB;  Start 2/1/17 at 09:00


Hydromorphone HCl 0.5 mg 0.5 mg Q4H  PRN IV PAIN Last administered on 1/31/17at 

22:50; Admin Dose 0.5 MG;  Start 1/31/17 at 21:30


Propofol (Diprivan) 100 ml @  1.641 mls/ hr Q12H IV  Last administered on 2/1/ 17at 17:17; Admin Dose 13.128 MLS/HR;  Start 2/1/17 at 01:30











PRITESH MACKEY MD Feb 1, 2017 20:15

## 2017-02-02 VITALS — SYSTOLIC BLOOD PRESSURE: 117 MMHG | HEART RATE: 102 BPM | RESPIRATION RATE: 20 BRPM | DIASTOLIC BLOOD PRESSURE: 81 MMHG

## 2017-02-02 VITALS — RESPIRATION RATE: 16 BRPM

## 2017-02-02 VITALS — SYSTOLIC BLOOD PRESSURE: 170 MMHG | HEART RATE: 100 BPM | RESPIRATION RATE: 16 BRPM | DIASTOLIC BLOOD PRESSURE: 100 MMHG

## 2017-02-02 VITALS — SYSTOLIC BLOOD PRESSURE: 154 MMHG | HEART RATE: 90 BPM | DIASTOLIC BLOOD PRESSURE: 82 MMHG | RESPIRATION RATE: 19 BRPM

## 2017-02-02 VITALS — HEART RATE: 118 BPM | SYSTOLIC BLOOD PRESSURE: 74 MMHG | RESPIRATION RATE: 16 BRPM | DIASTOLIC BLOOD PRESSURE: 52 MMHG

## 2017-02-02 VITALS — RESPIRATION RATE: 15 BRPM | SYSTOLIC BLOOD PRESSURE: 153 MMHG | HEART RATE: 88 BPM | DIASTOLIC BLOOD PRESSURE: 84 MMHG

## 2017-02-02 VITALS — RESPIRATION RATE: 15 BRPM | HEART RATE: 99 BPM

## 2017-02-02 VITALS — HEART RATE: 135 BPM | RESPIRATION RATE: 16 BRPM | SYSTOLIC BLOOD PRESSURE: 94 MMHG | DIASTOLIC BLOOD PRESSURE: 60 MMHG

## 2017-02-02 VITALS — SYSTOLIC BLOOD PRESSURE: 130 MMHG | HEART RATE: 85 BPM | RESPIRATION RATE: 16 BRPM | DIASTOLIC BLOOD PRESSURE: 91 MMHG

## 2017-02-02 VITALS — RESPIRATION RATE: 27 BRPM | SYSTOLIC BLOOD PRESSURE: 135 MMHG | DIASTOLIC BLOOD PRESSURE: 75 MMHG | HEART RATE: 107 BPM

## 2017-02-02 VITALS — HEART RATE: 89 BPM | SYSTOLIC BLOOD PRESSURE: 156 MMHG | DIASTOLIC BLOOD PRESSURE: 82 MMHG | RESPIRATION RATE: 20 BRPM

## 2017-02-02 VITALS — DIASTOLIC BLOOD PRESSURE: 118 MMHG | RESPIRATION RATE: 16 BRPM | HEART RATE: 89 BPM | SYSTOLIC BLOOD PRESSURE: 144 MMHG

## 2017-02-02 VITALS — DIASTOLIC BLOOD PRESSURE: 82 MMHG | SYSTOLIC BLOOD PRESSURE: 105 MMHG | RESPIRATION RATE: 16 BRPM | HEART RATE: 142 BPM

## 2017-02-02 VITALS — HEART RATE: 100 BPM | DIASTOLIC BLOOD PRESSURE: 65 MMHG | SYSTOLIC BLOOD PRESSURE: 103 MMHG | RESPIRATION RATE: 27 BRPM

## 2017-02-02 VITALS — RESPIRATION RATE: 20 BRPM | HEART RATE: 87 BPM | DIASTOLIC BLOOD PRESSURE: 82 MMHG | SYSTOLIC BLOOD PRESSURE: 146 MMHG

## 2017-02-02 VITALS — RESPIRATION RATE: 18 BRPM | HEART RATE: 103 BPM | DIASTOLIC BLOOD PRESSURE: 80 MMHG | SYSTOLIC BLOOD PRESSURE: 113 MMHG

## 2017-02-02 VITALS — RESPIRATION RATE: 25 BRPM | DIASTOLIC BLOOD PRESSURE: 84 MMHG | SYSTOLIC BLOOD PRESSURE: 133 MMHG | HEART RATE: 99 BPM

## 2017-02-02 VITALS — DIASTOLIC BLOOD PRESSURE: 82 MMHG | HEART RATE: 98 BPM | RESPIRATION RATE: 18 BRPM | SYSTOLIC BLOOD PRESSURE: 139 MMHG

## 2017-02-02 VITALS — RESPIRATION RATE: 18 BRPM | HEART RATE: 87 BPM | SYSTOLIC BLOOD PRESSURE: 147 MMHG | DIASTOLIC BLOOD PRESSURE: 79 MMHG

## 2017-02-02 VITALS — SYSTOLIC BLOOD PRESSURE: 120 MMHG | RESPIRATION RATE: 16 BRPM | HEART RATE: 83 BPM | DIASTOLIC BLOOD PRESSURE: 89 MMHG

## 2017-02-02 VITALS — RESPIRATION RATE: 20 BRPM | SYSTOLIC BLOOD PRESSURE: 112 MMHG | HEART RATE: 103 BPM | DIASTOLIC BLOOD PRESSURE: 78 MMHG

## 2017-02-02 VITALS — DIASTOLIC BLOOD PRESSURE: 87 MMHG | HEART RATE: 95 BPM | SYSTOLIC BLOOD PRESSURE: 110 MMHG | RESPIRATION RATE: 16 BRPM

## 2017-02-02 VITALS — RESPIRATION RATE: 19 BRPM | HEART RATE: 95 BPM | DIASTOLIC BLOOD PRESSURE: 82 MMHG | SYSTOLIC BLOOD PRESSURE: 148 MMHG

## 2017-02-02 VITALS — RESPIRATION RATE: 21 BRPM | DIASTOLIC BLOOD PRESSURE: 79 MMHG | HEART RATE: 94 BPM | SYSTOLIC BLOOD PRESSURE: 147 MMHG

## 2017-02-02 VITALS — HEART RATE: 97 BPM | DIASTOLIC BLOOD PRESSURE: 79 MMHG | RESPIRATION RATE: 19 BRPM | SYSTOLIC BLOOD PRESSURE: 116 MMHG

## 2017-02-02 VITALS — DIASTOLIC BLOOD PRESSURE: 85 MMHG | HEART RATE: 77 BPM | RESPIRATION RATE: 21 BRPM | SYSTOLIC BLOOD PRESSURE: 161 MMHG

## 2017-02-02 VITALS — HEART RATE: 141 BPM | SYSTOLIC BLOOD PRESSURE: 182 MMHG | RESPIRATION RATE: 28 BRPM | DIASTOLIC BLOOD PRESSURE: 137 MMHG

## 2017-02-02 VITALS — DIASTOLIC BLOOD PRESSURE: 91 MMHG | SYSTOLIC BLOOD PRESSURE: 128 MMHG | HEART RATE: 88 BPM | RESPIRATION RATE: 17 BRPM

## 2017-02-02 VITALS — RESPIRATION RATE: 20 BRPM | DIASTOLIC BLOOD PRESSURE: 81 MMHG | HEART RATE: 107 BPM | SYSTOLIC BLOOD PRESSURE: 112 MMHG

## 2017-02-02 VITALS — RESPIRATION RATE: 19 BRPM | HEART RATE: 91 BPM | SYSTOLIC BLOOD PRESSURE: 144 MMHG | DIASTOLIC BLOOD PRESSURE: 83 MMHG

## 2017-02-02 VITALS — HEART RATE: 98 BPM | RESPIRATION RATE: 16 BRPM | DIASTOLIC BLOOD PRESSURE: 80 MMHG | SYSTOLIC BLOOD PRESSURE: 94 MMHG

## 2017-02-02 VITALS — SYSTOLIC BLOOD PRESSURE: 103 MMHG | HEART RATE: 93 BPM | RESPIRATION RATE: 16 BRPM | DIASTOLIC BLOOD PRESSURE: 80 MMHG

## 2017-02-02 VITALS — DIASTOLIC BLOOD PRESSURE: 79 MMHG | SYSTOLIC BLOOD PRESSURE: 111 MMHG | HEART RATE: 107 BPM | RESPIRATION RATE: 20 BRPM

## 2017-02-02 VITALS — RESPIRATION RATE: 19 BRPM | SYSTOLIC BLOOD PRESSURE: 125 MMHG | HEART RATE: 90 BPM | DIASTOLIC BLOOD PRESSURE: 82 MMHG

## 2017-02-02 VITALS — RESPIRATION RATE: 26 BRPM | SYSTOLIC BLOOD PRESSURE: 115 MMHG | DIASTOLIC BLOOD PRESSURE: 76 MMHG | HEART RATE: 90 BPM

## 2017-02-02 VITALS — SYSTOLIC BLOOD PRESSURE: 128 MMHG | DIASTOLIC BLOOD PRESSURE: 95 MMHG | RESPIRATION RATE: 16 BRPM | HEART RATE: 94 BPM

## 2017-02-02 VITALS — DIASTOLIC BLOOD PRESSURE: 84 MMHG | SYSTOLIC BLOOD PRESSURE: 152 MMHG | HEART RATE: 90 BPM | RESPIRATION RATE: 17 BRPM

## 2017-02-02 VITALS — RESPIRATION RATE: 12 BRPM

## 2017-02-02 VITALS — HEART RATE: 107 BPM | RESPIRATION RATE: 22 BRPM | SYSTOLIC BLOOD PRESSURE: 121 MMHG | DIASTOLIC BLOOD PRESSURE: 97 MMHG

## 2017-02-02 VITALS — HEART RATE: 93 BPM | RESPIRATION RATE: 17 BRPM | DIASTOLIC BLOOD PRESSURE: 81 MMHG | SYSTOLIC BLOOD PRESSURE: 139 MMHG

## 2017-02-02 VITALS — RESPIRATION RATE: 16 BRPM | SYSTOLIC BLOOD PRESSURE: 118 MMHG | DIASTOLIC BLOOD PRESSURE: 90 MMHG | HEART RATE: 123 BPM

## 2017-02-02 VITALS — DIASTOLIC BLOOD PRESSURE: 85 MMHG | RESPIRATION RATE: 21 BRPM | HEART RATE: 112 BPM | SYSTOLIC BLOOD PRESSURE: 129 MMHG

## 2017-02-02 VITALS — SYSTOLIC BLOOD PRESSURE: 114 MMHG | HEART RATE: 106 BPM | DIASTOLIC BLOOD PRESSURE: 81 MMHG | RESPIRATION RATE: 19 BRPM

## 2017-02-02 VITALS — DIASTOLIC BLOOD PRESSURE: 77 MMHG | SYSTOLIC BLOOD PRESSURE: 122 MMHG | HEART RATE: 91 BPM | RESPIRATION RATE: 26 BRPM

## 2017-02-02 VITALS — SYSTOLIC BLOOD PRESSURE: 152 MMHG | HEART RATE: 93 BPM | RESPIRATION RATE: 17 BRPM | DIASTOLIC BLOOD PRESSURE: 81 MMHG

## 2017-02-02 VITALS — SYSTOLIC BLOOD PRESSURE: 155 MMHG | DIASTOLIC BLOOD PRESSURE: 82 MMHG | RESPIRATION RATE: 18 BRPM | HEART RATE: 97 BPM

## 2017-02-02 VITALS — SYSTOLIC BLOOD PRESSURE: 86 MMHG | RESPIRATION RATE: 16 BRPM | DIASTOLIC BLOOD PRESSURE: 75 MMHG | HEART RATE: 100 BPM

## 2017-02-02 VITALS — HEART RATE: 106 BPM | DIASTOLIC BLOOD PRESSURE: 85 MMHG | SYSTOLIC BLOOD PRESSURE: 117 MMHG | RESPIRATION RATE: 22 BRPM

## 2017-02-02 VITALS — HEART RATE: 101 BPM | DIASTOLIC BLOOD PRESSURE: 72 MMHG | RESPIRATION RATE: 24 BRPM | SYSTOLIC BLOOD PRESSURE: 112 MMHG

## 2017-02-02 VITALS — DIASTOLIC BLOOD PRESSURE: 84 MMHG | RESPIRATION RATE: 27 BRPM | SYSTOLIC BLOOD PRESSURE: 131 MMHG | HEART RATE: 97 BPM

## 2017-02-02 VITALS — SYSTOLIC BLOOD PRESSURE: 77 MMHG | RESPIRATION RATE: 16 BRPM | HEART RATE: 107 BPM | DIASTOLIC BLOOD PRESSURE: 59 MMHG

## 2017-02-02 VITALS — SYSTOLIC BLOOD PRESSURE: 115 MMHG | HEART RATE: 110 BPM | RESPIRATION RATE: 26 BRPM | DIASTOLIC BLOOD PRESSURE: 81 MMHG

## 2017-02-02 VITALS — SYSTOLIC BLOOD PRESSURE: 107 MMHG | DIASTOLIC BLOOD PRESSURE: 75 MMHG | HEART RATE: 88 BPM | RESPIRATION RATE: 16 BRPM

## 2017-02-02 VITALS — RESPIRATION RATE: 26 BRPM | HEART RATE: 92 BPM | SYSTOLIC BLOOD PRESSURE: 112 MMHG | DIASTOLIC BLOOD PRESSURE: 76 MMHG

## 2017-02-02 VITALS — HEART RATE: 95 BPM | RESPIRATION RATE: 22 BRPM | SYSTOLIC BLOOD PRESSURE: 157 MMHG | DIASTOLIC BLOOD PRESSURE: 85 MMHG

## 2017-02-02 VITALS — DIASTOLIC BLOOD PRESSURE: 80 MMHG | RESPIRATION RATE: 14 BRPM | HEART RATE: 89 BPM | SYSTOLIC BLOOD PRESSURE: 119 MMHG

## 2017-02-02 VITALS — RESPIRATION RATE: 21 BRPM

## 2017-02-02 VITALS — RESPIRATION RATE: 25 BRPM | SYSTOLIC BLOOD PRESSURE: 115 MMHG | HEART RATE: 87 BPM | DIASTOLIC BLOOD PRESSURE: 83 MMHG

## 2017-02-02 VITALS — RESPIRATION RATE: 22 BRPM | HEART RATE: 77 BPM | SYSTOLIC BLOOD PRESSURE: 104 MMHG | DIASTOLIC BLOOD PRESSURE: 68 MMHG

## 2017-02-02 VITALS — SYSTOLIC BLOOD PRESSURE: 165 MMHG | HEART RATE: 118 BPM | DIASTOLIC BLOOD PRESSURE: 99 MMHG | RESPIRATION RATE: 16 BRPM

## 2017-02-02 VITALS — DIASTOLIC BLOOD PRESSURE: 80 MMHG | HEART RATE: 87 BPM | RESPIRATION RATE: 21 BRPM | SYSTOLIC BLOOD PRESSURE: 114 MMHG

## 2017-02-02 VITALS — RESPIRATION RATE: 16 BRPM | SYSTOLIC BLOOD PRESSURE: 118 MMHG | DIASTOLIC BLOOD PRESSURE: 87 MMHG | HEART RATE: 84 BPM

## 2017-02-02 VITALS — HEART RATE: 85 BPM | SYSTOLIC BLOOD PRESSURE: 106 MMHG | DIASTOLIC BLOOD PRESSURE: 84 MMHG | RESPIRATION RATE: 16 BRPM

## 2017-02-02 VITALS — HEART RATE: 100 BPM | SYSTOLIC BLOOD PRESSURE: 103 MMHG | RESPIRATION RATE: 24 BRPM | DIASTOLIC BLOOD PRESSURE: 74 MMHG

## 2017-02-02 VITALS — RESPIRATION RATE: 23 BRPM

## 2017-02-02 VITALS — DIASTOLIC BLOOD PRESSURE: 85 MMHG | SYSTOLIC BLOOD PRESSURE: 118 MMHG | HEART RATE: 85 BPM | RESPIRATION RATE: 16 BRPM

## 2017-02-02 VITALS — HEART RATE: 88 BPM | DIASTOLIC BLOOD PRESSURE: 76 MMHG | RESPIRATION RATE: 22 BRPM | SYSTOLIC BLOOD PRESSURE: 109 MMHG

## 2017-02-02 VITALS — DIASTOLIC BLOOD PRESSURE: 70 MMHG | RESPIRATION RATE: 16 BRPM | SYSTOLIC BLOOD PRESSURE: 83 MMHG | HEART RATE: 102 BPM

## 2017-02-02 VITALS — DIASTOLIC BLOOD PRESSURE: 84 MMHG | SYSTOLIC BLOOD PRESSURE: 110 MMHG | RESPIRATION RATE: 27 BRPM | HEART RATE: 105 BPM

## 2017-02-02 VITALS — RESPIRATION RATE: 25 BRPM

## 2017-02-02 VITALS — HEART RATE: 103 BPM | RESPIRATION RATE: 16 BRPM

## 2017-02-02 VITALS — SYSTOLIC BLOOD PRESSURE: 176 MMHG | HEART RATE: 79 BPM | DIASTOLIC BLOOD PRESSURE: 84 MMHG | RESPIRATION RATE: 17 BRPM

## 2017-02-02 VITALS — RESPIRATION RATE: 16 BRPM | SYSTOLIC BLOOD PRESSURE: 111 MMHG | HEART RATE: 135 BPM | DIASTOLIC BLOOD PRESSURE: 100 MMHG

## 2017-02-02 VITALS — RESPIRATION RATE: 17 BRPM

## 2017-02-02 LAB
ANION GAP SERPL CALC-SCNC: 29 MMOL/L (ref 8–16)
ARTERIAL COHB: 0.1 % (ref 0–3)
ARTERIAL COHB: 0.1 % (ref 0–3)
ARTERIAL COHB: 0.3 % (ref 0–3)
ARTERIAL COHB: 0.3 % (ref 0–3)
ARTERIAL FRACTION OF OXYHGB: 94.1 % (ref 93–99)
ARTERIAL FRACTION OF OXYHGB: 95.2 % (ref 93–99)
ARTERIAL FRACTION OF OXYHGB: 95.4 % (ref 93–99)
ARTERIAL FRACTION OF OXYHGB: 98.8 % (ref 93–99)
ARTERIAL METHB: 0.2 % (ref 0–1.5)
ARTERIAL METHB: 0.3 % (ref 0–1.5)
ARTERIAL METHB: 0.3 % (ref 0–1.5)
ARTERIAL METHB: 0.4 % (ref 0–1.5)
ARTERIAL PATENCY WRIST A: (no result)
ARTERIAL TOTAL HEMGLOBIN: 12.3 G/DL (ref 12–18)
ARTERIAL TOTAL HEMGLOBIN: 12.7 G/DL (ref 12–18)
ARTERIAL TOTAL HEMGLOBIN: 13.7 G/DL (ref 12–18)
ARTERIAL TOTAL HEMGLOBIN: 14.3 G/DL (ref 12–18)
BASE EXCESS BLDA CALC-SCNC: -0.5 MMOL/L (ref -3–3)
BASE EXCESS BLDA CALC-SCNC: 0.1 MMOL/L (ref -3–3)
BASE EXCESS BLDA CALC-SCNC: 0.2 MMOL/L (ref -3–3)
BASE EXCESS BLDA CALC-SCNC: 1.3 MMOL/L (ref -3–3)
BASOPHILS # BLD AUTO: 0 10^3/UL (ref 0–0.1)
BASOPHILS NFR BLD: 0.2 % (ref 0–2)
BLOOD GAS HIGH PEEP SETTING: 40 CMH2O
BLOOD GAS LOW PEEP SETTING: 0 CMH2O
BLOOD GAS LOW PEEP SETTING: 0 CMH2O
BLOOD GAS LOW PEEP SETTING: 5 CMH2O
BLOOD GAS LOW PEEP SETTING: 5 CMH2O
BLOOD GAS PIP: 27
BLOOD GAS PIP: 45
BLOOD GAS TIDAL VOLUME: 350 ML
BLOOD GAS TIDAL VOLUME: 400 ML
BUN SERPL-MCNC: 30 MG/DL (ref 7–20)
CALCIUM SERPL-MCNC: 8.8 MG/DL (ref 8.4–10.2)
CHLORIDE SERPL-SCNC: 92 MMOL/L (ref 97–110)
CO2 SERPL-SCNC: 24 MMOL/L (ref 21–31)
CONDITION: 1
CREAT SERPL-MCNC: 5.96 MG/DL (ref 0.44–1)
EOSINOPHIL # BLD: 0 10^3/UL (ref 0–0.5)
EOSINOPHIL NFR BLD: 0 % (ref 0–7)
ERYTHROCYTE [DISTWIDTH] IN BLOOD BY AUTOMATED COUNT: 14.6 % (ref 11.5–14.5)
GLUCOSE SERPL-MCNC: 101 MG/DL (ref 70–220)
HCO3 BLDA-SCNC: 21.2 MMOL/L (ref 22–26)
HCO3 BLDA-SCNC: 23 MMOL/L (ref 22–26)
HCO3 BLDA-SCNC: 23.8 MMOL/L (ref 22–26)
HCO3 BLDA-SCNC: 26.7 MMOL/L (ref 22–26)
HCT VFR BLD CALC: 38.6 % (ref 37–47)
HGB BLD-MCNC: 13.3 G/DL (ref 12–16)
LH ANALYZER COMMENTS: 1
LYMPHOCYTES # BLD AUTO: 0.9 10^3/UL (ref 0.8–2.9)
LYMPHOCYTES NFR BLD AUTO: 6.1 % (ref 15–51)
MAGNESIUM SERPL-MCNC: 2 MG/DL (ref 1.7–2.5)
MCH RBC QN AUTO: 30.1 PG (ref 29–33)
MCHC RBC AUTO-ENTMCNC: 34.3 G/DL (ref 32–37)
MCV RBC AUTO: 87.8 FL (ref 82–101)
MODE: (no result)
MONOCYTES # BLD: 0.9 10^3/UL (ref 0.3–0.9)
MONOCYTES NFR BLD: 6.4 % (ref 0–11)
NEUTROPHILS # BLD: 12.1 10^3/UL (ref 1.6–7.5)
NEUTROPHILS NFR BLD AUTO: 87.3 % (ref 39–77)
NRBC # BLD MANUAL: 0 10^3/UL (ref 0–0)
NRBC BLD QL: 0 /100WBC (ref 0–0)
O2 A-A PPRESDIFF RESPIRATORY: 231 MMHG (ref 7–24)
O2 A-A PPRESDIFF RESPIRATORY: 237.7 MMHG (ref 7–24)
O2 A-A PPRESDIFF RESPIRATORY: 414.2 MMHG (ref 7–24)
O2 A-A PPRESDIFF RESPIRATORY: 565.9 MMHG (ref 7–24)
O2/TOTAL GAS SETTING VFR VENT: 100 %
O2/TOTAL GAS SETTING VFR VENT: 100 %
O2/TOTAL GAS SETTING VFR VENT: 50 %
O2/TOTAL GAS SETTING VFR VENT: 50 %
PHOSPHATE SERPL-MCNC: 6.1 MG/DL (ref 2.5–4.9)
PLATELET # BLD: 138 10^3/UL (ref 140–440)
PMV BLD AUTO: 10 FL (ref 7.4–10.4)
POTASSIUM SERPL-SCNC: 4.1 MMOL/L (ref 3.5–5.1)
RBC # BLD AUTO: 4.4 10^6/UL (ref 4.2–5.4)
SODIUM SERPL-SCNC: 141 MMOL/L (ref 135–144)
SUSPECT: 1
WBC # BLD AUTO: 13.9 10^3/UL (ref 4.8–10.8)
WBC # BLD: 13.9 10^3/UL (ref 4.8–10.8)

## 2017-02-02 PROCEDURE — 0W9B30Z DRAINAGE OF LEFT PLEURAL CAVITY WITH DRAINAGE DEVICE, PERCUTANEOUS APPROACH: ICD-10-PCS | Performed by: THORACIC SURGERY (CARDIOTHORACIC VASCULAR SURGERY)

## 2017-02-02 PROCEDURE — 0W9930Z DRAINAGE OF RIGHT PLEURAL CAVITY WITH DRAINAGE DEVICE, PERCUTANEOUS APPROACH: ICD-10-PCS | Performed by: THORACIC SURGERY (CARDIOTHORACIC VASCULAR SURGERY)

## 2017-02-02 RX ADMIN — PIPERACILLIN AND TAZOBACTAM SCH MLS/HR: 2; .25 INJECTION, POWDER, FOR SOLUTION INTRAVENOUS at 22:10

## 2017-02-02 RX ADMIN — DIPHENHYDRAMINE HYDROCHLORIDE SCH MG: 50 INJECTION, SOLUTION INTRAMUSCULAR; INTRAVENOUS at 09:31

## 2017-02-02 RX ADMIN — HYDROMORPHONE HYDROCHLORIDE PRN MG: 1 INJECTION, SOLUTION INTRAMUSCULAR; INTRAVENOUS; SUBCUTANEOUS at 03:10

## 2017-02-02 RX ADMIN — CARBOXYMETHYLCELLULOSE SODIUM SCH DROP: 10 GEL OPHTHALMIC at 20:41

## 2017-02-02 RX ADMIN — PIPERACILLIN AND TAZOBACTAM SCH MLS/HR: 2; .25 INJECTION, POWDER, FOR SOLUTION INTRAVENOUS at 11:16

## 2017-02-02 RX ADMIN — ATORVASTATIN CALCIUM SCH MG: 20 TABLET, FILM COATED ORAL at 20:40

## 2017-02-02 RX ADMIN — SEVELAMER CARBONATE SCH GM: 800 POWDER, FOR SUSPENSION ORAL at 11:16

## 2017-02-02 RX ADMIN — PROPOFOL SCH MLS/HR: 10 INJECTION, EMULSION INTRAVENOUS at 11:16

## 2017-02-02 RX ADMIN — SEVELAMER CARBONATE SCH GM: 800 POWDER, FOR SUSPENSION ORAL at 09:31

## 2017-02-02 RX ADMIN — PROPOFOL SCH MLS/HR: 10 INJECTION, EMULSION INTRAVENOUS at 20:41

## 2017-02-02 RX ADMIN — ACETAMINOPHEN PRN MG: 160 SOLUTION ORAL at 18:28

## 2017-02-02 RX ADMIN — CARBOXYMETHYLCELLULOSE SODIUM SCH DROP: 10 GEL OPHTHALMIC at 14:00

## 2017-02-02 RX ADMIN — CARBOXYMETHYLCELLULOSE SODIUM SCH DROP: 10 GEL OPHTHALMIC at 09:32

## 2017-02-02 NOTE — EN
Date/Time of Note


Date/Time of Note


DATE: 2/2/17 


TIME: 00:15





ER Progress Note


I responded to the CODE BLUE . the patient was intubated, however the 

respiratory therapist accidentally removed the endotracheal tube during 

suctioning.  When I arrived to the bedside, the patient was being bagged by the 

respiratory therapist.  The patient was admitted for subdural hematoma, acute 

respiratory failure in ICU





She was immediately medicated with etomidate 20 mg IV, rocuronium 50 mg IV.  He 

was intubated immediately without any difficulty





Endotracheal Intubation by me: 


Pre assessment performed.  See preceding note for details.  


Pre-oxygenation performed with 100% oxygen


   RSI:            Performed w/o complication or hypoxic events. Medications as 

ordered.


   Blade:         Glide scope


   ET Tube:      7.5 cm


   Depth:         22 cm at the lip





Intubation confirmed by colorimetric CO2, equal breath sounds, quiet over the 

stomach.











LAURENT US MD Feb 2, 2017 00:21

## 2017-02-02 NOTE — RADRPT
PROCEDURE:   XR Chest. 

 

CLINICAL INDICATION:   Bilateral chest tubes  

 

TECHNIQUE:   A single AP view of the chest was obtained.

 

COMPARISON:   Chest x-ray dated 02/02/2017 

 

FINDINGS:

 

The endotracheal tube tip is approximately  2.2 cm above the hipolito.  The tip of the enteric tube ex
tends below the left diaphragm. Bilateral chest tubes are in place.

 

Lung volumes are low.  There is extensive bilateral neck, chest and abdominal wall subcutaneous emph
ysema limiting evaluation of lung parenchyma. There are bilateral interstitial opacities predominate
ly and in upper lobe distribution. No  pleural effusion or pneumothorax is seen.  The cardiomediasti
nal silhouette is within normal limits for size.  The osseous structures are unremarkable.  Intraper
itoneal free air is again noted. 

 

IMPRESSION:

 

 

1.  Again noted is extensive bilateral neck, chest and abdominal wall subcutaneous emphysema with in
traperitoneal free air.  Bilateral chest tubes remain in place.

2.  Limited evaluation of lung parenchyma secondary to subcutaneous emphysema.  There appear to be b
ilateral upper lobe interstitial opacities which may reflect edema or pneumonitis.

3. Tubes and lines, as described above.

 

 

RPTAT: HH

_____________________________________________ 

.Jaci Arrington MD, MD           Date    Time 

Electronically viewed and signed by .Jaci Arrington MD, MD on 02/02/2017 05:57 

 

D:  02/02/2017 05:57  T:  02/02/2017 05:57

.G/

## 2017-02-02 NOTE — CONS
DATE OF ADMISSION: 01/31/2017

DATE OF CONSULTATION:  

 

 

 

REASON FOR CONSULTATION:  Bilateral pneumothoraces.

 

Thank you, Dr. Gan, for asking me to see this patient.

 

HISTORY OF PRESENT ILLNESS:  A 46-year-old female currently on dialysis who was admitted because of 
head trauma, was found to have bilateral acute inferior frontal hemorrhages and contusions and subdu
ral hematoma.  Subsequently, the patient had to be intubated and was found to have subcutaneous emph
ysema.  The chest x-ray showed extensive bilateral subcutaneous emphysema, large pneumothorax on the
 left side, intraperitoneal air under bilateral hemidiaphragms, trace right apical pneumothorax.  

 

PHYSICAL EXAMINATION:  

VITAL SIGNS:  The patient is currently intubated on a PEEP of 5, FiO2 is 40%, saturations 100%.  Blo
od pressure is 130/89, pulse is 130, respiration 15.

CARDIOVASCULAR:  Tachycardic.

LUNGS:  Diminished breath sounds at the bases.  

SKIN:  The patient has severe subcutaneous emphysema from the head all the way down to the mid abdom
en.

 

LABORATORY VALUES:  Significant for a hemoglobin of 13.3, white count of 10.9, platelet count 170.  
INR of 1.16, PT of 14.9.

 

IMPRESSION:

1.  Subcutaneous emphysema.

2.  Large left-sided pneumothorax.

3.  Atypical right pneumothorax.

 

RECOMMENDATIONS:  We will proceed with bilateral chest tube placement.  Discussed with the nursing s
taff.  Consent obtained from the .

 

 

Dictated By: SUNITA URBANO/BITA

DD:    02/02/2017 03:19:16

DT:    02/02/2017 05:59:43

Conf#: [id number]

DID#:  [id number]

## 2017-02-02 NOTE — OPR
DATE OF OPERATION:  

 

 

PREOPERATIVE DIAGNOSIS:  Bilateral pneumothoraces.

 

POSTOPERATIVE DIAGNOSIS:  Bilateral pneumothoraces.

 

PROCEDURE:

1.  Right chest tube placement.

2.  Left chest tube placement.

 

SURGEON:  Sunita Medina MD

 

ANESTHESIA:  Local.

 

CONSENT:  Risks, benefits, complications, alternative therapies explained to the patient's family, c
onsent obtained.

 

INDICATIONS:  This is a patient who was admitted with a skull fracture.  Subsequently, she was intub
ated and was found to have bilateral pneumothoraces with severe subcutaneous emphysema.

 

I used a total of 10 mL of 1% lidocaine for local anesthesia.

 

TECHNIQUE:  I made a 2 cm incision in the left chest mid axillary line, 7th intercostal space.  Inci
tasneem was taken down to the subcutaneous tissue which was then opened using Metzenbaum scissors.  Acc
ess was gained into the pleural cavity.  A 28 Gu[place]ese chest tube was placed into the pleural space, s
ecured to skin using 2-0 silk sutures.  Gush of air came out.  The same procedure was done on the ri
ght side.  It appeared that the patient has scars on the right chest, possibly from a previous thora
cic surgery.  Incision was made in the 6th intercostal space.  I dissected the subcutaneous tissues,
 put my finger into the pleural space, and there appear to be some adhesions; however, I was able to
 pass the 24-Guyanese chest tube posterolaterally and secured it to the skin using 2-0 silk sutures.  
The patient tolerated procedure well.

 

 

Dictated By: SUNITA URBANO/BITA

DD:    02/02/2017 03:15:18

DT:    02/02/2017 03:27:52

Conf#: [id number]

DID#:  [id number]

## 2017-02-02 NOTE — PN
Date/Time of Note


Date/Time of Note


DATE: 2/2/17 


TIME: 09:49





Assessment/Plan


VTE Prophylaxis


VTE Prophylaxis Intervention:  SCD's





Lines/Catheters


IV Catheter Type (from [facility]):  Peripheral IV


Urinary Cath still in place:  No





Assessment/Plan


Assessment/Plan


46-year-old female:





1.  Acute respiratory failure secondary to severe encephalopathy, post fall 

with significant head trauma and now with additional chest barotrauma overnight 

with subcutaneous emphysema, bilateral pneumothoraces and pneumoperitoneum, s/p 

bilateral chest tube placement overnight by CT surgery 


Appreciate all the assistance form Dr Ann overnight 


Pulmonary following 


CT abdo/pelvis pending





2. Status post fall with head trauma with skull fracture including Bilateral 

acute inferior frontal hemorrhagic contusions, right greater than left, 8 mm 

right convexity subdural hematoma, mild scattered subarachnoid hemorrhage, 3 mm 

leftward midline shift of the septum pellucidum and right temporal-parietal 

scalp swelling with nondisplaced, oblique fracture through the right parietal 

and temporal skull. 


Reported Epidural hematoma on 2nd CT head 


Dr. Palomo from neurosurgery following 


Unfortunately now with additional trauma, patient hemodynamically unstable now 

on Levo and even unresponsive of propofol, Repeating CT head this AM pending





3.  Leukocytosis: Likely from demargination post trauma. Back up today with 

additional trauma, and lactic acid back up 


Start Zosyn to cover for possible pulmo infection given acute chest trauma.


Patient remains afebrile so far 





4.  Hypertension: was hypotensive overnight and on Levophed now BP up again so 

Levo on hold. 





5.  End-stage renal disease on hemodialysis: on Monday/Wednesday/Friday 

schedule. 


HD due tomorrow


Dr. Chaney following. 





6.  Lactic acidosis: trend lactate further until normalizes. 


No signs of acute infection but additional trauma noted .





7.  Gastroesophageal reflux disease: continue Pepcid IV





Prophylaxis: Pepcid for GI prophylaxis, SCDs for DVT prophylaxis


Disposition: Repeat CAT scan head this AM, and also pending CT abdo/pelvis. 

Follow up further Neurosurgery recommendations and further Pulmonary recs





Subjective


24 Hr Interval Summary


Free Text/Dictation


Patient with chest barotrauma now overnight, had to be reintubated and with 

bilateral pneumothoraces, diffuse subcutaneous emphysema 


s/p b/l chest tubes placed and did require Levo overnight 


Now agitated back on propofol and BP up so titrating off Levo





Exam/Review of Systems


Vital Signs


Vitals





 Vital Signs








  Date Time  Temp Pulse Resp B/P Pulse Ox O2 Delivery O2 Flow Rate FiO2


 


2/2/17 09:00  84 16 118/87 100 Mechanical Ventilator  


 


2/2/17 08:00 97.8       


 


2/2/17 05:10        100














 Intake and Output   


 


 2/1/17 2/1/17 2/2/17





 15:00 23:00 07:00


 


Intake Total 860 ml 175.640 ml 33.756 ml


 


Output Total 1600 ml 0 ml 0 ml


 


Balance -740 ml 175.640 ml 33.756 ml











Exam


Constitutional:  non-verbal, other (intubated and back on sedation )


Eyes:  PERRL


Respiratory:  diminished breath sounds, other (bilateral Chest tubes ofr PTX )


Cardiovascular:  nl pulses, regular rate and rhythm


Gastrointestinal:  distended, firm (with siffuse subcut emphysema ), non-tender


Musculoskeletal:  other (mostly diffuse subcutaneous emphysema )


Extremities:  normal pulses


Neurological:  other (started to get agitated so back on sedation )





Results


Result Diagram:  


2/2/17 0534                                                                    

            2/2/17 0534





Results 24 hrs





Laboratory Tests








Test


  2/1/17


15:12 2/2/17


00:25 2/2/17


00:45 2/2/17


05:34


 


Lactic Acid Level 4.8  *H 1.9    3.1  H


 


Arterial Blood HCO3   26.7  H 


 


Arterial Blood Base Excess   0.2   


 


Arterial Blood Oxygen


Saturation 


  


  96.0  


  


 


 


Jed Test   ACCEPTAB   


 


Arterial Blood Gas Puncture


Site 


  


  Right Radial  


  


 


 


Arterial Blood


Carboxyhemoglobin 


  


  0.3  


  


 


 


Arterial Blood Date Drawn


  


  


  2/2/2017


12:50:00 AM 


 


 


Arterial Blood Methemoglobin   0.3   


 


Arterial Blood pCO2 (Temp


correct) 


  


  50.5  H


  


 


 


Arterial Blood pH (Temp


corrected) 


  


  7.341  L


  


 


 


Arterial Blood pO2 (Temp


corrected) 


  


  96.6  


  


 


 


Blood Gas A-a O2 Differential   565.9  H 


 


Blood Gas Actual Respiration


Rate 


  


  16  


  


 


 


Blood Gas High PEEP Setting   40.0   


 


Blood Gas Low PEEP Setting   5.0   


 


Blood Gas Modality   VENT - PC   


 


Blood Gas Notified Time


  


  


  2/2/2017


12:56:00 AM 


 


 


Blood Gas Notified Whom   MA   


 


Blood Gas Respiration Rate   16.0   


 


Blood Gas Specimen Source


  


  


  Blood arterial


  


 


 


Blood Gas Temperature   37.0   


 


FiO2   100.0   


 


Oxyhemoglobin Percent   95.4   


 


Total Hemoglobin   14.3   


 


Anion Gap    29  H


 


Basophils #    0.0  


 


Basophils %    0.2  


 


Blood Morphology Comment      


 


Blood Urea Nitrogen    30  H


 


Calcium Level    8.8  


 


Carbon Dioxide Level    24  


 


Chloride Level    92  L


 


Creatinine    5.96  H


 


Eosinophils #    0.0  


 


Eosinophils %    0.0  


 


Glucose Level    101  


 


Hematocrit    38.6  


 


Hemoglobin    13.3  


 


Lymphocytes #    0.9  


 


Lymphocytes %    6.1  L


 


Magnesium Level    2.0  


 


Mean Corpuscular Hemoglobin    30.1  


 


Mean Corpuscular Hemoglobin


Concent 


  


  


  34.3  


 


 


Mean Corpuscular Volume    87.8  


 


Mean Platelet Volume    10.0  


 


Monocytes #    0.9  


 


Monocytes %    6.4  


 


Neutrophils #    12.1  H


 


Neutrophils %    87.3  H


 


Nucleated Red Blood Cells #    0.0  


 


Nucleated Red Blood Cells %    0.0  


 


Phosphorus Level    6.1  #H


 


Platelet Count    138  L


 


Potassium Level    4.1  #


 


Red Blood Count    4.40  


 


Red Cell Distribution Width    14.6  H


 


Sodium Level    141  


 


White Blood Count    13.9  #H














Test


  2/2/17


08:50 


  


  


 


 


Arterial Blood HCO3 21.2  L   


 


Arterial Blood Base Excess 1.3     


 


Arterial Blood Oxygen


Saturation 99.3  H


  


  


  


 


 


Jed Test ACCEPTAB     


 


Arterial Blood Gas Puncture


Site Right Radial  


  


  


  


 


 


Arterial Blood


Carboxyhemoglobin 0.1  


  


  


  


 


 


Arterial Blood Date Drawn


  2/2/2017


9:20:31 AM 


  


  


 


 


Arterial Blood Methemoglobin 0.4     


 


Arterial Blood pCO2 (Temp


correct) 22.5  L


  


  


  


 


 


Arterial Blood pH (Temp


corrected) 7.591  *H


  


  


  


 


 


Arterial Blood pO2 (Temp


corrected) 276.3  H


  


  


  


 


 


Blood Gas A-a O2 Differential 414.2  H   


 


Blood Gas Actual Respiration


Rate 16  


  


  


  


 


 


Blood Gas Critical Value Read


Back Y GUY RN  


  


  


  


 


 


Blood Gas Inspiratory Pressure 45.0     


 


Blood Gas Low PEEP Setting 5.0     


 


Blood Gas Modality VENT - PC     


 


Blood Gas Notified Time


  2/2/2017


9:40:19 AM 


  


  


 


 


Blood Gas Notified Whom JLD     


 


Blood Gas Respiration Rate 16.0     


 


Blood Gas Specimen Source


  Blood arterial


  


  


  


 


 


Blood Gas Temperature 37.0     


 


FiO2 100.0     


 


Oxyhemoglobin Percent 98.8     


 


Total Hemoglobin 13.7     











Medications


Medications





 Current Medications


Labetalol HCl (Labetalol) 10 mg Q10M  PRN IV ELEVATED BLOOD PRESSURE;  Start 1/ 31/17 at 14:00


Ondansetron HCl (Zofran Inj) 4 mg Q6H  PRN IV NAUSEA AND/OR VOMITING Last 

administered on 1/31/17at 20:32; Admin Dose 4 MG;  Start 1/31/17 at 14:00


Acetaminophen (Tylenol Liquid) 650 mg Q6H  PRN PO PAIN LEVEL 1-3 OR FEVER Last 

administered on 2/1/17at 22:13; Admin Dose 650 MG;  Start 1/31/17 at 14:00


Acetaminophen (Tylenol Supp) 650 mg Q4H  PRN UT PAIN LEVEL 1-3 OR FEVER;  Start 

1/31/17 at 14:00


Famotidine (Pepcid Iv) 20 mg DAILY IV  Last administered on 2/2/17at 09:31; 

Admin Dose 20 MG;  Start 1/31/17 at 15:00


Eye Lubricant (Artificial Tears Oph) 1 drop TID BOTH EYES  Last administered on 

2/2/17at 09:32; Admin Dose 1 DROP;  Start 1/31/17 at 21:00


Atorvastatin Calcium (Lipitor) 20 mg QHS PO  Last administered on 2/1/17at 20:47

; Admin Dose 20 MG;  Start 1/31/17 at 21:00


Multivit/Ca Carb/ B Cmplx/FA/Prenat ([place]-Argenis) 1 tab DAILY PO  Last 

administered on 2/2/17at 09:31; Admin Dose 1 TAB;  Start 2/1/17 at 09:00


Hydromorphone HCl 0.5 mg 0.5 mg Q4H  PRN IV PAIN Last administered on 1/31/17at 

22:50; Admin Dose 0.5 MG;  Start 1/31/17 at 21:30


Propofol 100 ml @  1.641 mls/ hr Q12H IV  Last administered on 2/1/17at 23:20; 

Admin Dose 11.487 MLS/HR;  Start 2/1/17 at 01:30


Norepinephrine/ Dextrose (Levophed/D5W) 500 ml @  1.87 mls/hr TITRATE IV ;  

Start 2/2/17 at 09:00











CECILIO JIMENEZ Feb 2, 2017 10:00

## 2017-02-02 NOTE — CONS
Date/Time of Note


Date/Time of Note


DATE: 2/2/17 


TIME: 19:18





Assessment/Plan


Assessment/Plan


Chief Complaint/Hosp Course


s/p traumatic R > L frontal contusion, right SDH.


Likely better exam but suppressed by sedation. Current scan does not appear 

operative and would not recommend ICP monitor. At this point, unlikely to 

require surgery as there should not be further bleeding and frontal contusion 

not operative. 


Recommend weaning sedation and progressing to extubation as tolerated. 


Cont. ICU observation. 


Repeat imaging if exam changes, but bleeding not likely to progress at this 

point given absence of true coagulopathy/platelet dysfunction.


Problems:  





Consultation Date/Type/Reason


Admit Date/Time


Jan 31, 2017 at 13:31


Initial Consult Date


1/31/17


Type of Consultation:  Neurosurgery





24 HR Interval Summary


Free Text/Dictation


Intubated





Exam/Review of Systems


Vital Signs


Vitals





 Vital Signs








  Date Time  Temp Pulse Resp B/P Pulse Ox O2 Delivery O2 Flow Rate FiO2


 


2/2/17 18:45  105 27 110/84 100   


 


2/2/17 18:30 101.1     Mechanical Ventilator  


 


2/2/17 16:55        50














 Intake and Output   


 


 2/1/17 2/1/17 2/2/17





 15:00 23:00 07:00


 


Intake Total 860 ml 175.640 ml 33.756 ml


 


Output Total 1600 ml 0 ml 0 ml


 


Balance -740 ml 175.640 ml 33.756 ml











Exam


Constitutional:  No distress, No frail, No non-verbal, No obese, No other


Head:  normocephalic


Neurological:  other (sedated, doesnt open eyess but moves all extremities 

purposefully. Self-extubated last nigh but was re-intubted. Doesnt follwo 

commands but appears due to sedation.)





Results


Result Diagram:  


2/2/17 0534                                                                    

            2/2/17 0534





Results 24 hrs





Laboratory Tests








Test


  2/2/17


00:25 2/2/17


00:45 2/2/17


05:34 2/2/17


08:50


 


Lactic Acid Level 1.9    3.1  H 


 


Arterial Blood HCO3  26.7  H  21.2  L


 


Arterial Blood Base Excess  0.2    1.3  


 


Arterial Blood Oxygen


Saturation 


  96.0  


  


  99.3  H


 


 


Jed Test  ACCEPTAB    ACCEPTAB  


 


Arterial Blood Gas Puncture


Site 


  Right Radial  


  


  Right Radial  


 


 


Arterial Blood


Carboxyhemoglobin 


  0.3  


  


  0.1  


 


 


Arterial Blood Date Drawn


  


  2/2/2017


12:50:00 AM 


  2/2/2017


9:20:31 AM


 


Arterial Blood Methemoglobin  0.3    0.4  


 


Arterial Blood pCO2 (Temp


correct) 


  50.5  H


  


  22.5  L


 


 


Arterial Blood pH (Temp


corrected) 


  7.341  L


  


  7.591  *H


 


 


Arterial Blood pO2 (Temp


corrected) 


  96.6  


  


  276.3  H


 


 


Blood Gas A-a O2 Differential  565.9  H  414.2  H


 


Blood Gas Actual Respiration


Rate 


  16  


  


  16  


 


 


Blood Gas High PEEP Setting  40.0    


 


Blood Gas Low PEEP Setting  5.0    5.0  


 


Blood Gas Modality  VENT - PC    VENT - PC  


 


Blood Gas Notified Time


  


  2/2/2017


12:56:00 AM 


  2/2/2017


9:40:19 AM


 


Blood Gas Notified Whom  JONATHAN OLSEN  


 


Blood Gas Respiration Rate  16.0    16.0  


 


Blood Gas Specimen Source


  


  Blood arterial


  


  Blood arterial


 


 


Blood Gas Temperature  37.0    37.0  


 


FiO2  100.0    100.0  


 


Oxyhemoglobin Percent  95.4    98.8  


 


Total Hemoglobin  14.3    13.7  


 


Anion Gap   29  H 


 


Basophils #   0.0   


 


Basophils %   0.2   


 


Blood Morphology Comment      


 


Blood Urea Nitrogen   30  H 


 


Calcium Level   8.8   


 


Carbon Dioxide Level   24   


 


Chloride Level   92  L 


 


Creatinine   5.96  H 


 


Eosinophils #   0.0   


 


Eosinophils %   0.0   


 


Glucose Level   101   


 


Hematocrit   38.6   


 


Hemoglobin   13.3   


 


Lymphocytes #   0.9   


 


Lymphocytes %   6.1  L 


 


Magnesium Level   2.0   


 


Mean Corpuscular Hemoglobin   30.1   


 


Mean Corpuscular Hemoglobin


Concent 


  


  34.3  


  


 


 


Mean Corpuscular Volume   87.8   


 


Mean Platelet Volume   10.0   


 


Monocytes #   0.9   


 


Monocytes %   6.4   


 


Neutrophils #   12.1  H 


 


Neutrophils %   87.3  H 


 


Nucleated Red Blood Cells #   0.0   


 


Nucleated Red Blood Cells %   0.0   


 


Phosphorus Level   6.1  #H 


 


Platelet Count   138  L 


 


Potassium Level   4.1  # 


 


Red Blood Count   4.40   


 


Red Cell Distribution Width   14.6  H 


 


Sodium Level   141   


 


White Blood Count   13.9  #H 


 


Blood Gas Critical Value Read


Back 


  


  


  Y GUY RN  


 


 


Blood Gas Inspiratory Pressure    45.0  














Test


  2/2/17


12:00 2/2/17


12:20 


  


 


 


Arterial Blood HCO3 23.8     


 


Arterial Blood Base Excess 0.1     


 


Arterial Blood Oxygen


Saturation 94.5  L


  


  


  


 


 


Jed Test N/A     


 


Arterial Blood Gas Puncture


Site Right Brachial


  


  


  


 


 


Arterial Blood


Carboxyhemoglobin 0.1  


  


  


  


 


 


Arterial Blood Date Drawn


  2/2/2017


12:00:07 PM 


  


  


 


 


Arterial Blood Methemoglobin 0.3     


 


Arterial Blood pCO2 (Temp


correct) 35.2  


  


  


  


 


 


Arterial Blood pH (Temp


corrected) 7.447  


  


  


  


 


 


Arterial Blood pO2 (Temp


corrected) 79.2  L


  


  


  


 


 


Blood Gas A-a O2 Differential 237.7  H   


 


Blood Gas Actual Respiration


Rate 16  


  


  


  


 


 


Blood Gas Low PEEP Setting 0     


 


Blood Gas Modality VENT - AC     


 


Blood Gas Notified Time


  2/2/2017


12:23:03 PM 


  


  


 


 


Blood Gas Notified Whom JLD     


 


Blood Gas Respiration Rate 12.0     


 


Blood Gas Specimen Source


  Blood arterial


  


  


  


 


 


Blood Gas Temperature 37.0     


 


Blood Gas Tidal Volume 400.0     


 


FiO2 50.0     


 


Oxyhemoglobin Percent 94.1     


 


Total Hemoglobin 12.7     


 


Lactic Acid Level  1.7    











Medications


Medications





 Current Medications


Labetalol HCl (Labetalol) 10 mg Q10M  PRN IV ELEVATED BLOOD PRESSURE;  Start 1/ 31/17 at 14:00


Ondansetron HCl (Zofran Inj) 4 mg Q6H  PRN IV NAUSEA AND/OR VOMITING Last 

administered on 1/31/17at 20:32; Admin Dose 4 MG;  Start 1/31/17 at 14:00


Acetaminophen (Tylenol Liquid) 650 mg Q6H  PRN PO PAIN LEVEL 1-3 OR FEVER Last 

administered on 2/2/17at 18:28; Admin Dose 650 MG;  Start 1/31/17 at 14:00


Acetaminophen (Tylenol Supp) 650 mg Q4H  PRN OR PAIN LEVEL 1-3 OR FEVER;  Start 

1/31/17 at 14:00


Famotidine (Pepcid Iv) 20 mg DAILY IV  Last administered on 2/2/17at 09:31; 

Admin Dose 20 MG;  Start 1/31/17 at 15:00


Eye Lubricant (Artificial Tears Oph) 1 drop TID BOTH EYES  Last administered on 

2/2/17at 14:00; Admin Dose 1 DROP;  Start 1/31/17 at 21:00


Atorvastatin Calcium (Lipitor) 20 mg QHS PO  Last administered on 2/1/17at 20:47

; Admin Dose 20 MG;  Start 1/31/17 at 21:00


Multivit/Ca Carb/ B Cmplx/FA/Prenat ([place]-Argenis) 1 tab DAILY PO  Last 

administered on 2/2/17at 09:31; Admin Dose 1 TAB;  Start 2/1/17 at 09:00


Hydromorphone HCl 0.5 mg 0.5 mg Q4H  PRN IV PAIN Last administered on 2/2/17at 

03:10; Admin Dose 0.5 MG;  Start 1/31/17 at 21:30


Propofol 100 ml @  1.641 mls/ hr Q12H IV  Last administered on 2/2/17at 11:16; 

Admin Dose 1.641 MLS/HR;  Start 2/1/17 at 01:30


Norepinephrine 16 mg/Dextrose 500 ml @  1.87 mls/hr TITRATE IV ;  Start 2/2/17 

at 09:00


Piperacillin Sod/ Tazobactam Sod (Zosyn 2.25gm/ 50ml (Pmx)) 50 ml @  200 mls/hr 

Q8 IVPB  Last administered on 2/2/17at 11:16; Admin Dose 200 MLS/HR;  Start 2/2/ 17 at 11:00





Procedures


Procedures


CT today repeated. Unchanged.











ODALIS MADISON MD Feb 2, 2017 19:22

## 2017-02-02 NOTE — RADRPT
PROCEDURE:   XR Chest. 

 

CLINICAL INDICATION:   Endotracheal tube repositioning. 

 

TECHNIQUE:   AP view of the chest was obtained. 

 

COMPARISON:   Multiple prior exams the most recent on 02/01/2017 

 

FINDINGS:

The cardiomediastinal silhouette is within normal limits. There has been interval advancement of the
 endotracheal tube with tip in the right mainstem bronchus.  There is interval development of extens
lópez pneumothorax in the left hemithorax.  There is suggestion of a trace right apical pneumothorax, 
with small air overlying the right hemidiaphragm.  There is extensive subcutaneous emphysematous manjula
nges bilaterally.  This is new compared to the prior examination.  There is a patchy consolidation i
n the right upper lung zone.  There is orogastric tube in place with tip in the gastric body and alli
e port above the gastroesophageal junction.  There is interval development of air beneath the bilate
ral hemidiaphragms.  

 

IMPRESSION:

1.  Right mainstem bronchial intubation.  Recommend retracting 3 cm for more optimal positioning.

2.  Interval development of extensive pneumothorax on the left.  CT of the chest may be helpful for 
further evaluation.

3.  Interval development of small right-sided pneumothoraces.  

4.  Essentially stable appearance of patchy consolidation in the right upper lung zone.  

5.  Interval development of air beneath the bilateral hemidiaphragms, concerning for intra-abdominal
 perforation.  CT of the abdomen/pelvis is recommended for further evaluation.  

6.  Nasogastric tube with tip in the gastric body and side port at the gastroesophageal junction.  R
ecommend advancing 3-4 cm for sideport positioning within the stomach.

7.  Interval development of extensive subcutaneous emphysematous changes throughout the bilateral th
oracic soft tissues.

 

The above findings were discussed with Patient's Nurse Dai by telephone  on 2/2/2017 12:29:09 
AM, who will inform the physician.

 

RPTAT: HGAS

_____________________________________________ 

.Rafal Daily MD, MD           Date    Time 

Electronically viewed and signed by .Rafal Daily MD, MD on 02/02/2017 00:32 

 

D:  02/02/2017 00:32  T:  02/02/2017 00:32

.S/

## 2017-02-02 NOTE — CONS
Date/Time of Note


Date/Time of Note


DATE: 2/2/17 


TIME: 18:15





Assessment/Plan


Assessment/Plan


Chief Complaint/Hosp Course


IMPRESSION:


1.  The patient has acute hemorrhagic stroke./sdh/trauma


2.  Malignant hypertension.


3.  Endstage renal disease.


4.  Leukocytosis.  


5.  Respiratory failure.


6.  Incomplete database


7 hyperkalemia hx


plan hd


per neuro


clk labs


Problems:  





Consultation Date/Type/Reason


Admit Date/Time


Jan 31, 2017 at 13:31


Initial Consult Date


1/31/17


Type of Consultation:  renal





Exam/Review of Systems


Vital Signs


Vitals





 Vital Signs








  Date Time  Temp Pulse Resp B/P Pulse Ox O2 Delivery O2 Flow Rate FiO2


 


2/2/17 16:55  107 21  100   50


 


2/2/17 16:45    112/78  Mechanical Ventilator  


 


2/2/17 16:00 99.5       














 Intake and Output   


 


 2/1/17 2/1/17 2/2/17





 15:00 23:00 07:00


 


Intake Total 860 ml 175.640 ml 33.756 ml


 


Output Total 1600 ml 0 ml 0 ml


 


Balance -740 ml 175.640 ml 33.756 ml











Exam


Respiratory:  diminished breath sounds


Cardiovascular:  regular rate and rhythm


Gastrointestinal:  bowel sounds (+), soft


Extremities:  No edema


Neurological:  unresponsive





Results


Result Diagram:  


2/2/17 0534                                                                    

            2/2/17 0534





Results 24 hrs





Laboratory Tests








Test


  2/2/17


00:25 2/2/17


00:45 2/2/17


05:34 2/2/17


08:50


 


Lactic Acid Level 1.9    3.1  H 


 


Arterial Blood HCO3  26.7  H  21.2  L


 


Arterial Blood Base Excess  0.2    1.3  


 


Arterial Blood Oxygen


Saturation 


  96.0  


  


  99.3  H


 


 


Jed Test  ACCEPTAB    ACCEPTAB  


 


Arterial Blood Gas Puncture


Site 


  Right Radial  


  


  Right Radial  


 


 


Arterial Blood


Carboxyhemoglobin 


  0.3  


  


  0.1  


 


 


Arterial Blood Date Drawn


  


  2/2/2017


12:50:00 AM 


  2/2/2017


9:20:31 AM


 


Arterial Blood Methemoglobin  0.3    0.4  


 


Arterial Blood pCO2 (Temp


correct) 


  50.5  H


  


  22.5  L


 


 


Arterial Blood pH (Temp


corrected) 


  7.341  L


  


  7.591  *H


 


 


Arterial Blood pO2 (Temp


corrected) 


  96.6  


  


  276.3  H


 


 


Blood Gas A-a O2 Differential  565.9  H  414.2  H


 


Blood Gas Actual Respiration


Rate 


  16  


  


  16  


 


 


Blood Gas High PEEP Setting  40.0    


 


Blood Gas Low PEEP Setting  5.0    5.0  


 


Blood Gas Modality  VENT - PC    VENT - PC  


 


Blood Gas Notified Time


  


  2/2/2017


12:56:00 AM 


  2/2/2017


9:40:19 AM


 


Blood Gas Notified Whom  MA    JOSHUAD  


 


Blood Gas Respiration Rate  16.0    16.0  


 


Blood Gas Specimen Source


  


  Blood arterial


  


  Blood arterial


 


 


Blood Gas Temperature  37.0    37.0  


 


FiO2  100.0    100.0  


 


Oxyhemoglobin Percent  95.4    98.8  


 


Total Hemoglobin  14.3    13.7  


 


Anion Gap   29  H 


 


Basophils #   0.0   


 


Basophils %   0.2   


 


Blood Morphology Comment      


 


Blood Urea Nitrogen   30  H 


 


Calcium Level   8.8   


 


Carbon Dioxide Level   24   


 


Chloride Level   92  L 


 


Creatinine   5.96  H 


 


Eosinophils #   0.0   


 


Eosinophils %   0.0   


 


Glucose Level   101   


 


Hematocrit   38.6   


 


Hemoglobin   13.3   


 


Lymphocytes #   0.9   


 


Lymphocytes %   6.1  L 


 


Magnesium Level   2.0   


 


Mean Corpuscular Hemoglobin   30.1   


 


Mean Corpuscular Hemoglobin


Concent 


  


  34.3  


  


 


 


Mean Corpuscular Volume   87.8   


 


Mean Platelet Volume   10.0   


 


Monocytes #   0.9   


 


Monocytes %   6.4   


 


Neutrophils #   12.1  H 


 


Neutrophils %   87.3  H 


 


Nucleated Red Blood Cells #   0.0   


 


Nucleated Red Blood Cells %   0.0   


 


Phosphorus Level   6.1  #H 


 


Platelet Count   138  L 


 


Potassium Level   4.1  # 


 


Red Blood Count   4.40   


 


Red Cell Distribution Width   14.6  H 


 


Sodium Level   141   


 


White Blood Count   13.9  #H 


 


Blood Gas Critical Value Read


Back 


  


  


  Y GUY RN  


 


 


Blood Gas Inspiratory Pressure    45.0  














Test


  2/2/17


12:00 2/2/17


12:20 


  


 


 


Arterial Blood HCO3 23.8     


 


Arterial Blood Base Excess 0.1     


 


Arterial Blood Oxygen


Saturation 94.5  L


  


  


  


 


 


Jed Test N/A     


 


Arterial Blood Gas Puncture


Site Right Brachial


  


  


  


 


 


Arterial Blood


Carboxyhemoglobin 0.1  


  


  


  


 


 


Arterial Blood Date Drawn


  2/2/2017


12:00:07 PM 


  


  


 


 


Arterial Blood Methemoglobin 0.3     


 


Arterial Blood pCO2 (Temp


correct) 35.2  


  


  


  


 


 


Arterial Blood pH (Temp


corrected) 7.447  


  


  


  


 


 


Arterial Blood pO2 (Temp


corrected) 79.2  L


  


  


  


 


 


Blood Gas A-a O2 Differential 237.7  H   


 


Blood Gas Actual Respiration


Rate 16  


  


  


  


 


 


Blood Gas Low PEEP Setting 0     


 


Blood Gas Modality VENT - AC     


 


Blood Gas Notified Time


  2/2/2017


12:23:03 PM 


  


  


 


 


Blood Gas Notified Whom JOSHUAD     


 


Blood Gas Respiration Rate 12.0     


 


Blood Gas Specimen Source


  Blood arterial


  


  


  


 


 


Blood Gas Temperature 37.0     


 


Blood Gas Tidal Volume 400.0     


 


FiO2 50.0     


 


Oxyhemoglobin Percent 94.1     


 


Total Hemoglobin 12.7     


 


Lactic Acid Level  1.7    











Medications


Medications





 Current Medications


Labetalol HCl (Labetalol) 10 mg Q10M  PRN IV ELEVATED BLOOD PRESSURE;  Start 1/ 31/17 at 14:00


Ondansetron HCl (Zofran Inj) 4 mg Q6H  PRN IV NAUSEA AND/OR VOMITING Last 

administered on 1/31/17at 20:32; Admin Dose 4 MG;  Start 1/31/17 at 14:00


Acetaminophen (Tylenol Liquid) 650 mg Q6H  PRN PO PAIN LEVEL 1-3 OR FEVER Last 

administered on 2/1/17at 22:13; Admin Dose 650 MG;  Start 1/31/17 at 14:00


Acetaminophen (Tylenol Supp) 650 mg Q4H  PRN [place] PAIN LEVEL 1-3 OR FEVER;  Start 

1/31/17 at 14:00


Famotidine (Pepcid Iv) 20 mg DAILY IV  Last administered on 2/2/17at 09:31; 

Admin Dose 20 MG;  Start 1/31/17 at 15:00


Eye Lubricant (Artificial Tears Oph) 1 drop TID BOTH EYES  Last administered on 

2/2/17at 14:00; Admin Dose 1 DROP;  Start 1/31/17 at 21:00


Atorvastatin Calcium (Lipitor) 20 mg QHS PO  Last administered on 2/1/17at 20:47

; Admin Dose 20 MG;  Start 1/31/17 at 21:00


Multivit/Ca Carb/ B Cmplx/FA/Prenat ([place]-Argenis) 1 tab DAILY PO  Last 

administered on 2/2/17at 09:31; Admin Dose 1 TAB;  Start 2/1/17 at 09:00


Hydromorphone HCl 0.5 mg 0.5 mg Q4H  PRN IV PAIN Last administered on 2/2/17at 

03:10; Admin Dose 0.5 MG;  Start 1/31/17 at 21:30


Propofol 100 ml @  1.641 mls/ hr Q12H IV  Last administered on 2/2/17at 11:16; 

Admin Dose 1.641 MLS/HR;  Start 2/1/17 at 01:30


Norepinephrine 16 mg/Dextrose 500 ml @  1.87 mls/hr TITRATE IV ;  Start 2/2/17 

at 09:00


Piperacillin Sod/ Tazobactam Sod (Zosyn 2.25gm/ 50ml (Pmx)) 50 ml @  200 mls/hr 

Q8 IVPB  Last administered on 2/2/17at 11:16; Admin Dose 200 MLS/HR;  Start 2/2/ 17 at 11:00











PRITESH MACKEY MD Feb 2, 2017 18:16

## 2017-02-02 NOTE — RADRPT
PROCEDURE:   XR Chest. 

 

CLINICAL INDICATION:    Repositioning endotracheal intubation

 

TECHNIQUE:   Single frontal view  of the chest was obtained 

 

COMPARISON: Today, about 1 hour ago

 

FINDINGS:

 

Previously seen right mainstem bronchus intubation resolved, and tip is now about 10-15 mm above the
 hipolito.

 

Nasogastric tube again seen with tip and side port in the mid stomach. Transcutaneous pacing pad aga
in seen over the right shoulder region.

 

Severe bilateral subcutaneous emphysema seen.  This is unchanged over interval.  Intraperitoneal air
 is again seen on the bilateral hemidiaphragms, also without significant change over interval.

 

Cardiomegaly with somewhat dense right lung air space disease.  There may be air space disease and l
eft lung.  Evaluation of lung fields is limited secondary to subcutaneous emphysema.

 

Left pneumothorax.

 

 

IMPRESSION:

1.  Repositioning of endotracheal intubation, with tip now about 10-15 mm above the hipolito.

2.  Extensive subcutaneous emphysema and intraperitoneal air again seen, without significant change.

3.  Left pneumothorax.

4.  CT examination of the chest, abdomen and pelvis would be of further use.

 

RPTAT: UU

_____________________________________________ 

Physician Tahira           Date    Time 

Electronically viewed and signed by Physician Tahira on 02/02/2017 02:03 

 

D:  02/02/2017 02:03  T:  02/02/2017 02:03

RS/
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
## 2017-02-02 NOTE — RADRPT
PROCEDURE:   CT Brain without contrast. 

 

CLINICAL INDICATION:  Follow-up head trauma.

 

TECHNIQUE:   A multiplanar CT of the brain was performed on a CT scanner utilizing axial imaging fro
m the skull base through the vertex without IV contrast.  The CTDIvol is 44.46 mGy and the DLP is 63
0.20 mGycm.  One or more of the following dose reduction techniques were utilized:  Automated exposu
re control, adjustment of the mA and/or kV according to patient size, use of iterative reconstructio
n technique.

 

COMPARISON:   CT brain 02/01/2017 

 

FINDINGS:

 

Stable appearance of intraparenchymal hemorrhage involving the frontal lobes bilaterally with extens
lópez surrounding vasogenic edema of the right and minimal parenchymal hemorrhage and vasogenic edema 
involving the left gyrus rectus orbital frontal gyrus.  No significant interval change in subdural h
ematomas along the anterior falx, right frontal lobe, middle cranial fossa, right frontal convexity 
measuring 4 mm in greatest thickness, and right parietal region measuring 6 mm in greatest thickness
 without significant interval change.

 

No new intracranial hemorrhage, edema, or mass effect.  Stable 3 mm right-to-left midline shift. Sta
ble bilateral remote lacunar infarcts in the basal ganglia.

 

The remaining brain parenchyma is normal attenuation morphology with preservation of gray white diff
erentiation and age appropriate size of the ventricles and subarachnoid spaces.

 

Grossly stable size of right frontal and left parietal scalp hematomas with increased subcutaneous g
as within the posterior neck, frontotemporal scalp, and perineural/supraorbital regions bilaterally.

 

The posterior fossa contents, brainstem, craniocervical junction, orbits, and pituitary axis are unr
emarkable.

 

Redemonstration of right parietal , mastoid, and temporal bone fractures.

 

 

 

IMPRESSION:

 

1.  Little interval change in the bilateral frontal intraparenchymal hemorrhage with surrounding vas
ogenic edema greatest on the right.  No new intraparenchymal hemorrhage, edema, or mass effect.  Sta
ble 3 mm right to left midline shift. Stable subdural hematomas as detailed above.

 

2.  Grossly stable  right frontal and left parietal scalp hematomas with increased subcutaneous gas 
throughout the posterior neck frontal temporal as well as supraorbital facial soft tissues.

 

3.  Redemonstration of right parietal, mastoid, and temporal bone fractures.

 

4.  Small left bilateral basal ganglia lacunar infarcts.

 

 

 

 

RPTAT:AAJJ

_____________________________________________ 

DONTAE Larsen Physician           Date    Time 

Electronically viewed and signed by DONTAE Larsen Physician on 02/02/2017 15:41 

 

D:  02/02/2017 15:41  T:  02/02/2017 15:41

JS/

## 2017-02-03 VITALS — RESPIRATION RATE: 19 BRPM | DIASTOLIC BLOOD PRESSURE: 65 MMHG | HEART RATE: 82 BPM | SYSTOLIC BLOOD PRESSURE: 104 MMHG

## 2017-02-03 VITALS — HEART RATE: 95 BPM | SYSTOLIC BLOOD PRESSURE: 99 MMHG | DIASTOLIC BLOOD PRESSURE: 76 MMHG

## 2017-02-03 VITALS — SYSTOLIC BLOOD PRESSURE: 113 MMHG | RESPIRATION RATE: 23 BRPM | DIASTOLIC BLOOD PRESSURE: 74 MMHG | HEART RATE: 98 BPM

## 2017-02-03 VITALS — SYSTOLIC BLOOD PRESSURE: 107 MMHG | RESPIRATION RATE: 23 BRPM | DIASTOLIC BLOOD PRESSURE: 75 MMHG | HEART RATE: 95 BPM

## 2017-02-03 VITALS — SYSTOLIC BLOOD PRESSURE: 86 MMHG | HEART RATE: 88 BPM | DIASTOLIC BLOOD PRESSURE: 60 MMHG | RESPIRATION RATE: 20 BRPM

## 2017-02-03 VITALS — SYSTOLIC BLOOD PRESSURE: 97 MMHG | DIASTOLIC BLOOD PRESSURE: 69 MMHG | HEART RATE: 81 BPM

## 2017-02-03 VITALS — RESPIRATION RATE: 22 BRPM | DIASTOLIC BLOOD PRESSURE: 77 MMHG | HEART RATE: 79 BPM | SYSTOLIC BLOOD PRESSURE: 108 MMHG

## 2017-02-03 VITALS — SYSTOLIC BLOOD PRESSURE: 115 MMHG | RESPIRATION RATE: 21 BRPM | HEART RATE: 91 BPM | DIASTOLIC BLOOD PRESSURE: 73 MMHG

## 2017-02-03 VITALS — HEART RATE: 86 BPM | DIASTOLIC BLOOD PRESSURE: 75 MMHG | SYSTOLIC BLOOD PRESSURE: 109 MMHG

## 2017-02-03 VITALS — RESPIRATION RATE: 23 BRPM | DIASTOLIC BLOOD PRESSURE: 66 MMHG | HEART RATE: 88 BPM | SYSTOLIC BLOOD PRESSURE: 97 MMHG

## 2017-02-03 VITALS — SYSTOLIC BLOOD PRESSURE: 106 MMHG | HEART RATE: 86 BPM | DIASTOLIC BLOOD PRESSURE: 74 MMHG | RESPIRATION RATE: 27 BRPM

## 2017-02-03 VITALS — HEART RATE: 91 BPM | RESPIRATION RATE: 22 BRPM | DIASTOLIC BLOOD PRESSURE: 76 MMHG | SYSTOLIC BLOOD PRESSURE: 110 MMHG

## 2017-02-03 VITALS — HEART RATE: 79 BPM | DIASTOLIC BLOOD PRESSURE: 64 MMHG | SYSTOLIC BLOOD PRESSURE: 94 MMHG

## 2017-02-03 VITALS — SYSTOLIC BLOOD PRESSURE: 109 MMHG | DIASTOLIC BLOOD PRESSURE: 72 MMHG | RESPIRATION RATE: 23 BRPM | HEART RATE: 92 BPM

## 2017-02-03 VITALS — SYSTOLIC BLOOD PRESSURE: 95 MMHG | DIASTOLIC BLOOD PRESSURE: 68 MMHG | RESPIRATION RATE: 22 BRPM | HEART RATE: 88 BPM

## 2017-02-03 VITALS — RESPIRATION RATE: 23 BRPM | SYSTOLIC BLOOD PRESSURE: 121 MMHG | HEART RATE: 91 BPM | DIASTOLIC BLOOD PRESSURE: 78 MMHG

## 2017-02-03 VITALS — HEART RATE: 75 BPM | DIASTOLIC BLOOD PRESSURE: 64 MMHG | RESPIRATION RATE: 21 BRPM | SYSTOLIC BLOOD PRESSURE: 98 MMHG

## 2017-02-03 VITALS — DIASTOLIC BLOOD PRESSURE: 70 MMHG | HEART RATE: 98 BPM | SYSTOLIC BLOOD PRESSURE: 96 MMHG

## 2017-02-03 VITALS — DIASTOLIC BLOOD PRESSURE: 79 MMHG | HEART RATE: 75 BPM | SYSTOLIC BLOOD PRESSURE: 113 MMHG

## 2017-02-03 VITALS — RESPIRATION RATE: 18 BRPM

## 2017-02-03 VITALS — RESPIRATION RATE: 19 BRPM | SYSTOLIC BLOOD PRESSURE: 117 MMHG | DIASTOLIC BLOOD PRESSURE: 79 MMHG | HEART RATE: 87 BPM

## 2017-02-03 VITALS — RESPIRATION RATE: 21 BRPM | HEART RATE: 81 BPM | DIASTOLIC BLOOD PRESSURE: 70 MMHG | SYSTOLIC BLOOD PRESSURE: 104 MMHG

## 2017-02-03 VITALS — SYSTOLIC BLOOD PRESSURE: 113 MMHG | HEART RATE: 95 BPM | DIASTOLIC BLOOD PRESSURE: 77 MMHG | RESPIRATION RATE: 22 BRPM

## 2017-02-03 VITALS — DIASTOLIC BLOOD PRESSURE: 79 MMHG | HEART RATE: 84 BPM | SYSTOLIC BLOOD PRESSURE: 121 MMHG

## 2017-02-03 VITALS — RESPIRATION RATE: 29 BRPM

## 2017-02-03 VITALS — RESPIRATION RATE: 20 BRPM

## 2017-02-03 VITALS — SYSTOLIC BLOOD PRESSURE: 98 MMHG | HEART RATE: 84 BPM | DIASTOLIC BLOOD PRESSURE: 71 MMHG | RESPIRATION RATE: 17 BRPM

## 2017-02-03 VITALS — HEART RATE: 81 BPM | DIASTOLIC BLOOD PRESSURE: 68 MMHG | RESPIRATION RATE: 14 BRPM | SYSTOLIC BLOOD PRESSURE: 99 MMHG

## 2017-02-03 VITALS — RESPIRATION RATE: 27 BRPM

## 2017-02-03 VITALS — RESPIRATION RATE: 21 BRPM

## 2017-02-03 VITALS — DIASTOLIC BLOOD PRESSURE: 70 MMHG | RESPIRATION RATE: 27 BRPM | HEART RATE: 81 BPM | SYSTOLIC BLOOD PRESSURE: 85 MMHG

## 2017-02-03 VITALS — SYSTOLIC BLOOD PRESSURE: 92 MMHG | RESPIRATION RATE: 19 BRPM | DIASTOLIC BLOOD PRESSURE: 62 MMHG | HEART RATE: 86 BPM

## 2017-02-03 VITALS — DIASTOLIC BLOOD PRESSURE: 64 MMHG | SYSTOLIC BLOOD PRESSURE: 94 MMHG | HEART RATE: 79 BPM

## 2017-02-03 VITALS — RESPIRATION RATE: 23 BRPM | DIASTOLIC BLOOD PRESSURE: 68 MMHG | HEART RATE: 87 BPM | SYSTOLIC BLOOD PRESSURE: 98 MMHG

## 2017-02-03 VITALS — SYSTOLIC BLOOD PRESSURE: 116 MMHG | RESPIRATION RATE: 15 BRPM | HEART RATE: 82 BPM | DIASTOLIC BLOOD PRESSURE: 78 MMHG

## 2017-02-03 VITALS — RESPIRATION RATE: 25 BRPM | SYSTOLIC BLOOD PRESSURE: 105 MMHG | DIASTOLIC BLOOD PRESSURE: 77 MMHG | HEART RATE: 87 BPM

## 2017-02-03 VITALS — DIASTOLIC BLOOD PRESSURE: 70 MMHG | SYSTOLIC BLOOD PRESSURE: 96 MMHG | RESPIRATION RATE: 22 BRPM | HEART RATE: 88 BPM

## 2017-02-03 VITALS — HEART RATE: 73 BPM | RESPIRATION RATE: 24 BRPM | DIASTOLIC BLOOD PRESSURE: 55 MMHG | SYSTOLIC BLOOD PRESSURE: 80 MMHG

## 2017-02-03 VITALS — RESPIRATION RATE: 22 BRPM

## 2017-02-03 VITALS — HEART RATE: 78 BPM | SYSTOLIC BLOOD PRESSURE: 124 MMHG | DIASTOLIC BLOOD PRESSURE: 86 MMHG

## 2017-02-03 VITALS — RESPIRATION RATE: 24 BRPM

## 2017-02-03 VITALS — DIASTOLIC BLOOD PRESSURE: 84 MMHG | HEART RATE: 96 BPM | SYSTOLIC BLOOD PRESSURE: 120 MMHG | RESPIRATION RATE: 27 BRPM

## 2017-02-03 VITALS — DIASTOLIC BLOOD PRESSURE: 79 MMHG | RESPIRATION RATE: 23 BRPM | HEART RATE: 86 BPM | SYSTOLIC BLOOD PRESSURE: 105 MMHG

## 2017-02-03 VITALS — RESPIRATION RATE: 23 BRPM | HEART RATE: 98 BPM | DIASTOLIC BLOOD PRESSURE: 70 MMHG | SYSTOLIC BLOOD PRESSURE: 96 MMHG

## 2017-02-03 VITALS — SYSTOLIC BLOOD PRESSURE: 110 MMHG | DIASTOLIC BLOOD PRESSURE: 75 MMHG | HEART RATE: 89 BPM | RESPIRATION RATE: 21 BRPM

## 2017-02-03 VITALS — RESPIRATION RATE: 28 BRPM

## 2017-02-03 VITALS — HEART RATE: 82 BPM | RESPIRATION RATE: 26 BRPM | DIASTOLIC BLOOD PRESSURE: 80 MMHG | SYSTOLIC BLOOD PRESSURE: 100 MMHG

## 2017-02-03 VITALS — DIASTOLIC BLOOD PRESSURE: 78 MMHG | HEART RATE: 100 BPM | SYSTOLIC BLOOD PRESSURE: 99 MMHG

## 2017-02-03 VITALS — DIASTOLIC BLOOD PRESSURE: 73 MMHG | RESPIRATION RATE: 26 BRPM | HEART RATE: 88 BPM | SYSTOLIC BLOOD PRESSURE: 105 MMHG

## 2017-02-03 VITALS — HEART RATE: 76 BPM | DIASTOLIC BLOOD PRESSURE: 75 MMHG | SYSTOLIC BLOOD PRESSURE: 105 MMHG

## 2017-02-03 VITALS — RESPIRATION RATE: 21 BRPM | HEART RATE: 96 BPM | SYSTOLIC BLOOD PRESSURE: 93 MMHG | DIASTOLIC BLOOD PRESSURE: 69 MMHG

## 2017-02-03 VITALS — RESPIRATION RATE: 32 BRPM

## 2017-02-03 VITALS — HEART RATE: 95 BPM | DIASTOLIC BLOOD PRESSURE: 72 MMHG | SYSTOLIC BLOOD PRESSURE: 97 MMHG | RESPIRATION RATE: 23 BRPM

## 2017-02-03 VITALS — RESPIRATION RATE: 20 BRPM | SYSTOLIC BLOOD PRESSURE: 108 MMHG | HEART RATE: 96 BPM | DIASTOLIC BLOOD PRESSURE: 74 MMHG

## 2017-02-03 VITALS — SYSTOLIC BLOOD PRESSURE: 109 MMHG | DIASTOLIC BLOOD PRESSURE: 77 MMHG | RESPIRATION RATE: 23 BRPM | HEART RATE: 84 BPM

## 2017-02-03 LAB
ANION GAP SERPL CALC-SCNC: 30 MMOL/L (ref 8–16)
ARTERIAL COHB: 0.5 % (ref 0–3)
ARTERIAL FRACTION OF OXYHGB: 92.7 % (ref 93–99)
ARTERIAL METHB: 0.3 % (ref 0–1.5)
ARTERIAL PATENCY WRIST A: (no result)
ARTERIAL TOTAL HEMGLOBIN: 14.9 G/DL (ref 12–18)
BASE EXCESS BLDA CALC-SCNC: -0.1 MMOL/L (ref -3–3)
BASOPHILS # BLD AUTO: 0 10^3/UL (ref 0–0.1)
BASOPHILS NFR BLD: 0.1 % (ref 0–2)
BLOOD GAS LOW PEEP SETTING: 0 CMH2O
BLOOD GAS TIDAL VOLUME: 350 ML
BUN SERPL-MCNC: 56 MG/DL (ref 7–20)
CALCIUM SERPL-MCNC: 7.9 MG/DL (ref 8.4–10.2)
CHLORIDE SERPL-SCNC: 91 MMOL/L (ref 97–110)
CO2 SERPL-SCNC: 21 MMOL/L (ref 21–31)
CONDITION: 1
CREAT SERPL-MCNC: 8.42 MG/DL (ref 0.44–1)
EOSINOPHIL # BLD: 0 10^3/UL (ref 0–0.5)
EOSINOPHIL NFR BLD: 0.1 % (ref 0–7)
ERYTHROCYTE [DISTWIDTH] IN BLOOD BY AUTOMATED COUNT: 14.4 % (ref 11.5–14.5)
GLUCOSE SERPL-MCNC: 105 MG/DL (ref 70–220)
HCO3 BLDA-SCNC: 22.7 MMOL/L (ref 22–26)
HCT VFR BLD CALC: 31 % (ref 37–47)
HGB BLD-MCNC: 10.6 G/DL (ref 12–16)
LYMPHOCYTES # BLD AUTO: 0.4 10^3/UL (ref 0.8–2.9)
LYMPHOCYTES NFR BLD AUTO: 5.1 % (ref 15–51)
MAGNESIUM SERPL-MCNC: 2.2 MG/DL (ref 1.7–2.5)
MCH RBC QN AUTO: 29.8 PG (ref 29–33)
MCHC RBC AUTO-ENTMCNC: 34.2 G/DL (ref 32–37)
MCV RBC AUTO: 87 FL (ref 82–101)
MODE: (no result)
MONOCYTES # BLD: 0.4 10^3/UL (ref 0.3–0.9)
MONOCYTES NFR BLD: 5 % (ref 0–11)
NEUTROPHILS # BLD: 7.5 10^3/UL (ref 1.6–7.5)
NEUTROPHILS NFR BLD AUTO: 89.7 % (ref 39–77)
NRBC # BLD MANUAL: 0 10^3/UL (ref 0–0)
NRBC BLD QL: 0 /100WBC (ref 0–0)
O2 A-A PPRESDIFF RESPIRATORY: 138.5 MMHG (ref 7–24)
O2/TOTAL GAS SETTING VFR VENT: 35 %
PHOSPHATE SERPL-MCNC: 7.8 MG/DL (ref 2.5–4.9)
PLATELET # BLD: 116 10^3/UL (ref 140–440)
PMV BLD AUTO: 10.4 FL (ref 7.4–10.4)
POTASSIUM SERPL-SCNC: 4.2 MMOL/L (ref 3.5–5.1)
RBC # BLD AUTO: 3.56 10^6/UL (ref 4.2–5.4)
SODIUM SERPL-SCNC: 138 MMOL/L (ref 135–144)
WBC # BLD AUTO: 8.3 10^3/UL (ref 4.8–10.8)
WBC # BLD: 8.3 10^3/UL (ref 4.8–10.8)

## 2017-02-03 RX ADMIN — CARBOXYMETHYLCELLULOSE SODIUM SCH DROP: 10 GEL OPHTHALMIC at 21:28

## 2017-02-03 RX ADMIN — CARBOXYMETHYLCELLULOSE SODIUM SCH DROP: 10 GEL OPHTHALMIC at 13:56

## 2017-02-03 RX ADMIN — ACETAMINOPHEN PRN MG: 160 SOLUTION ORAL at 21:28

## 2017-02-03 RX ADMIN — ATORVASTATIN CALCIUM SCH MG: 20 TABLET, FILM COATED ORAL at 21:28

## 2017-02-03 RX ADMIN — PIPERACILLIN AND TAZOBACTAM SCH MLS/HR: 2; .25 INJECTION, POWDER, FOR SOLUTION INTRAVENOUS at 05:39

## 2017-02-03 RX ADMIN — DIPHENHYDRAMINE HYDROCHLORIDE SCH MG: 50 INJECTION, SOLUTION INTRAMUSCULAR; INTRAVENOUS at 10:44

## 2017-02-03 RX ADMIN — PIPERACILLIN AND TAZOBACTAM SCH MLS/HR: 2; .25 INJECTION, POWDER, FOR SOLUTION INTRAVENOUS at 13:56

## 2017-02-03 RX ADMIN — PIPERACILLIN AND TAZOBACTAM SCH MLS/HR: 2; .25 INJECTION, POWDER, FOR SOLUTION INTRAVENOUS at 21:29

## 2017-02-03 RX ADMIN — ACETAMINOPHEN PRN MG: 160 SOLUTION ORAL at 03:08

## 2017-02-03 RX ADMIN — CARBOXYMETHYLCELLULOSE SODIUM SCH DROP: 10 GEL OPHTHALMIC at 10:44

## 2017-02-03 RX ADMIN — PROPOFOL SCH MLS/HR: 10 INJECTION, EMULSION INTRAVENOUS at 10:44

## 2017-02-03 NOTE — PN
Date/Time of Note


Date/Time of Note


DATE: 2/3/17 


TIME: 11:02





Assessment/Plan


VTE Prophylaxis


VTE Prophylaxis Intervention:  SCD's





Lines/Catheters


IV Catheter Type (from Nrs):  Peripheral IV


Urinary Cath still in place:  No





Assessment/Plan


Assessment/Plan


46-year-old female:





1.  Acute respiratory failure secondary to severe encephalopathy, post fall 

with significant head trauma and now with additional chest barotrauma with 

bilateral pneumothoraces and pneumoperitoneum, s/p bilateral chest tube 

placement by CT surgery 


Appreciate all the assistance form Dr Ann overnight 


Pulmonary following, subcutaneous emphysema almost all resolved and chest tube 

in place, f/u CXR today 


CT abdo/pelvis yesterday with pronounced pneumoperitoneum seen causing 

distension of the abdominal wall, no focal area of bowel irregularity to 

suggest the point of visceral perforation, this could represent tracking of air 

secondary to intrathoracic air. Extensive subcutaneous emphysema is seen 

throughout the chest abdominal wall with pronounced pneumomediastinum and 

significant air within the diaphragmatic fat.


Clinically today no abdo distension, may start feeding by tomorrow likely 





2. Status post fall with head trauma with skull fracture including Bilateral 

acute inferior frontal hemorrhagic contusions, right greater than left, 8 mm 

right convexity subdural hematoma, mild scattered subarachnoid hemorrhage, 3 mm 

leftward midline shift of the septum pellucidum and right temporal-parietal 

scalp swelling with nondisplaced, oblique fracture through the right parietal 

and temporal skull. 


Reported Epidural hematoma on 2nd CT head 


Dr. Palomo from neurosurgery following, repeat CT head yesterday stable per 

Neurosurgery, no intervention


Unfortunately now with additional trauma, patient sometimes noted to be 

hypotensive requiring Levo and so this AM unresponsive of propofol. 





3.  Leukocytosis: Likely from demargination post trauma. Trended down to normal 

this AM


On Zosyn to cover for possible pulmo infection given acute chest trauma.


Patient remains afebrile so far 





4.  Hypertension: was hypotensive again this AM post HD 


Levophed prn. 





5.  End-stage renal disease on hemodialysis: on Monday/Wednesday/Friday 

schedule. 


HD done this AM. 


Dr. Chaney following. 





6.  Lactic acidosis: resolved as of this AM. 


No signs of acute infection but additional trauma noted .





7.  Gastroesophageal reflux disease: continue Pepcid IV





Prophylaxis: Pepcid for GI prophylaxis, SCDs for DVT prophylaxis


Disposition:  Follow up further Neurosurgery recommendations and further 

Pulmonary recs





Subjective


24 Hr Interval Summary


Free Text/Dictation


Patient seems better today with much less subcutaneous edema but unfortunately 

MS not better yet


Still on Vent with bilateral Chest tubes in place 


S/p HD this AM and SBP in  upper 80's 


Afebrile WBC back to normal  on Zosyn currently





Exam/Review of Systems


Vital Signs


Vitals





 Vital Signs








  Date Time  Temp Pulse Resp B/P Pulse Ox O2 Delivery O2 Flow Rate FiO2


 


2/3/17 10:08  79 23     


 


2/3/17 10:00    80/55  Mechanical Ventilator  


 


2/3/17 09:10     97   50


 


2/3/17 09:00 98.3       














 Intake and Output   


 


 2/2/17 2/2/17 2/3/17





 15:00 23:00 07:00


 


Intake Total 59.6 ml 148.1 ml 97.7 ml


 


Output Total 0 ml 0 ml 0 ml


 


Balance 59.6 ml 148.1 ml 97.7 ml











Exam


Constitutional:  other (intubated and sedation just turned off )


Respiratory:  other (on Vent and with coarse breath sounds bilaterally, chest 

tube in place )


Cardiovascular:  nl pulses, regular rate and rhythm


Gastrointestinal:  non-tender, other (no crepitus felt ), soft


Musculoskeletal:  nl extremities to inspection


Extremities:  normal pulses, other (no edema, clubbing or cyanosis )


Neurological:  other (intubated and just off sedation now ), unresponsive





Results


Result Diagram:  


2/3/17 0400                                                                    

            2/3/17 0400





Results 24 hrs





Laboratory Tests








Test


  2/2/17


12:00 2/2/17


12:20 2/2/17


21:00 2/3/17


04:00


 


Arterial Blood HCO3 23.8    23.0   


 


Arterial Blood Base Excess 0.1    -0.5   


 


Arterial Blood Oxygen


Saturation 94.5  L


  


  95.7  


  


 


 


Jed Test N/A    ACCEPTAB   


 


Arterial Blood Gas Puncture


Site Right Brachial


  


  Right Radial  


  


 


 


Arterial Blood


Carboxyhemoglobin 0.1  


  


  0.3  


  


 


 


Arterial Blood Date Drawn


  2/2/2017


12:00:07 PM 


  2/2/2017


8:45:55 PM 


 


 


Arterial Blood Methemoglobin 0.3    0.2   


 


Arterial Blood pCO2 (Temp


correct) 35.2  


  


  34.0  L


  


 


 


Arterial Blood pH (Temp


corrected) 7.447  


  


  7.448  


  


 


 


Arterial Blood pO2 (Temp


corrected) 79.2  L


  


  87.3  


  


 


 


Blood Gas A-a O2 Differential 237.7  H  231.0  H 


 


Blood Gas Actual Respiration


Rate 16  


  


  23  


  


 


 


Blood Gas Low PEEP Setting 0    0   


 


Blood Gas Modality VENT - AC    VENT - AC   


 


Blood Gas Notified Time


  2/2/2017


12:23:03 PM 


  2/2/2017


8:58:02 PM 


 


 


Blood Gas Notified Whom JOSHUAD    AA   


 


Blood Gas Respiration Rate 12.0    12.0   


 


Blood Gas Specimen Source


  Blood arterial


  


  Blood arterial


  


 


 


Blood Gas Temperature 37.0    37.0   


 


Blood Gas Tidal Volume 400.0    350.0   


 


FiO2 50.0    50.0   


 


Oxyhemoglobin Percent 94.1    95.2   


 


Total Hemoglobin 12.7    12.3   


 


Lactic Acid Level  1.7    


 


Blood Gas Inspiratory Pressure   27.0   


 


Anion Gap    30  H


 


Basophils #    0.0  


 


Basophils %    0.1  


 


Blood Morphology Comment      


 


Blood Urea Nitrogen    56  H


 


Calcium Level    7.9  L


 


Carbon Dioxide Level    21  


 


Chloride Level    91  L


 


Creatinine    8.42  #H


 


Eosinophils #    0.0  


 


Eosinophils %    0.1  


 


Glucose Level    105  


 


Hematocrit    31.0  L


 


Hemoglobin    10.6  #L


 


Lymphocytes #    0.4  L


 


Lymphocytes %    5.1  L


 


Magnesium Level    2.2  


 


Mean Corpuscular Hemoglobin    29.8  


 


Mean Corpuscular Hemoglobin


Concent 


  


  


  34.2  


 


 


Mean Corpuscular Volume    87.0  


 


Mean Platelet Volume    10.4  


 


Monocytes #    0.4  


 


Monocytes %    5.0  


 


Neutrophils #    7.5  


 


Neutrophils %    89.7  H


 


Nucleated Red Blood Cells #    0.0  


 


Nucleated Red Blood Cells %    0.0  


 


Phosphorus Level    7.8  H


 


Platelet Count    116  L


 


Potassium Level    4.2  


 


Red Blood Count    3.56  L


 


Red Cell Distribution Width    14.4  


 


Sodium Level    138  


 


White Blood Count    8.3  #














Test


  2/3/17


07:12 


  


  


 


 


Arterial Blood HCO3 22.7     


 


Arterial Blood Base Excess -0.1     


 


Arterial Blood Oxygen


Saturation 93.4  L


  


  


  


 


 


Jed Test ACCEPTAB     


 


Arterial Blood Gas Puncture


Site Right Radial  


  


  


  


 


 


Arterial Blood


Carboxyhemoglobin 0.5  


  


  


  


 


 


Arterial Blood Date Drawn


  2/3/2017


10:00:51 AM 


  


  


 


 


Arterial Blood Methemoglobin 0.3     


 


Arterial Blood pCO2 (Temp


correct) 32.2  L


  


  


  


 


 


Arterial Blood pH (Temp


corrected) 7.466  H


  


  


  


 


 


Arterial Blood pO2 (Temp


corrected) 73.6  L


  


  


  


 


 


Blood Gas A-a O2 Differential 138.5  H   


 


Blood Gas Actual Respiration


Rate 26  


  


  


  


 


 


Blood Gas Low PEEP Setting 0     


 


Blood Gas Modality VENT - AC     


 


Blood Gas Notified Time


  2/3/2017


10:21:40 AM 


  


  


 


 


Blood Gas Notified Whom JLD     


 


Blood Gas Respiration Rate 12.0     


 


Blood Gas Specimen Source


  Blood arterial


  


  


  


 


 


Blood Gas Temperature 37.0     


 


Blood Gas Tidal Volume 350.0     


 


FiO2 35.0     


 


Oxyhemoglobin Percent 92.7  L   


 


Total Hemoglobin 14.9     











Medications


Medications





 Current Medications


Labetalol HCl (Labetalol) 10 mg Q10M  PRN IV ELEVATED BLOOD PRESSURE;  Start 1/ 31/17 at 14:00


Ondansetron HCl (Zofran Inj) 4 mg Q6H  PRN IV NAUSEA AND/OR VOMITING Last 

administered on 1/31/17at 20:32; Admin Dose 4 MG;  Start 1/31/17 at 14:00


Acetaminophen (Tylenol Liquid) 650 mg Q6H  PRN PO PAIN LEVEL 1-3 OR FEVER Last 

administered on 2/3/17at 03:08; Admin Dose 650 MG;  Start 1/31/17 at 14:00


Acetaminophen (Tylenol Supp) 650 mg Q4H  PRN [place] PAIN LEVEL 1-3 OR FEVER;  Start 

1/31/17 at 14:00


Famotidine (Pepcid Iv) 20 mg DAILY IV  Last administered on 2/3/17at 10:44; 

Admin Dose 20 MG;  Start 1/31/17 at 15:00


Eye Lubricant (Artificial Tears Oph) 1 drop TID BOTH EYES  Last administered on 

2/3/17at 10:44; Admin Dose 1 DROP;  Start 1/31/17 at 21:00


Atorvastatin Calcium (Lipitor) 20 mg QHS PO  Last administered on 2/2/17at 20:40

; Admin Dose 20 MG;  Start 1/31/17 at 21:00


Multivit/Ca Carb/ B Cmplx/FA/Prenat ([place]-Argenis) 1 tab DAILY PO  Last 

administered on 2/3/17at 10:43; Admin Dose 1 TAB;  Start 2/1/17 at 09:00


Hydromorphone HCl 0.5 mg 0.5 mg Q4H  PRN IV PAIN Last administered on 2/2/17at 

03:10; Admin Dose 0.5 MG;  Start 1/31/17 at 21:30


Propofol 100 ml @  1.641 mls/ hr Q12H IV  Last administered on 2/3/17at 10:44; 

Admin Dose 1.641 MLS/HR;  Start 2/1/17 at 01:30


Norepinephrine 16 mg/Dextrose 500 ml @  1.87 mls/hr TITRATE IV ;  Start 2/2/17 

at 09:00


Piperacillin Sod/ Tazobactam Sod (Zosyn 2.25gm/ 50ml (Pmx)) 50 ml @  200 mls/hr 

Q8 IVPB  Last administered on 2/3/17at 05:39; Admin Dose 200 MLS/HR;  Start 2/2/ 17 at 11:00





Procedures


Procedures


PROCEDURE:   CT Chest, Abdomen and Pelvis with  contrast.


 


CLINICAL INDICATION:   Assess for pneumoperitoneum


 


TECHNIQUE:   CT scan of the chest, abdomen, and pelvis with  contrast was 

performed on a multi-slice CT scanner.  The patient was scanned following the 

uncomplicated intravenous administration of 100 ml of Omnipaque 300 intravenous 

contrast. Coronal and sagittal reformatted images were obtained from the axial 

source images. 


DLP vol 722.4 mGy


CTDI 11.3 mGy-cm


 


COMPARISON:   None. 


 


FINDINGS:


 


CT chest:


 


Bilateral chest tubes are seen within the thorax.  The right-sided chest tube 

extends into the right upper chest along the anterior margin of the right 

middle lobe.  The left-sided chest tube extends into the upper chest along the 

anterolateral margin of the left thorax.  There is pronounced diffuse 

subcutaneous emphysema is seen throughout the thoracic soft tissues of the wall 

extending into the neck along with prominent mediastinum and air tracking into 

the diaphragmatic fat.  No visible pneumothorax is present.  There is partial 

collapse the bilateral lower lobes posteriorly with atelectasis also involving 

the right upper lobe..  There is wall thickening and the airways are for most 

part patent with exception of mild occlusion at the bases.


 


Endotracheal tube is seen which extends to just above the hipolito and terminates 

1 cm above the bifurcation of the hipolito and can be readjusted.  Gastric 

catheter extends into the body of the stomach.


 


There are no enlarged lymph nodes in the mediastinum.  The heart is borderline 

enlarged.  There is no acute osseous abnormality.


 


 


 


CT abdomen and pelvis:


 


There is pronounced diffuse free air is seen within the peritoneal cavity which 

causes distension of the abdomen and pelvis and there is layering of the 

mesenteric fat and the viscera of the abdomen posteriorly with a small amount 

of air that is seen tracking within the retroperitoneum as well.  There is 

significant subcutaneous emphysema of the abdominal wall that extends 

inferiorly from the thorax.  The bladder is collapsed with no focal bowel wall 

thickening to suggest a site of bowel perforation.  There is no evidence of 

bowel obstruction and the appendix is unremarkable.  Borderline thickened 

appearance of the colonic wall is likely related to underdistension.  There is 

no free fluid.  There are no enlarged lymph nodes.


 


There is hepatomegaly of the liver with no evidence of enhancing lesion.   

There is no biliary ductal dilatation.  The portal vein is intact without 

thrombus.  The gallbladder is unremarkable without inflammation.


 


The spleen borderline enlarged. The pancreas is within normal limits without 

focal lesion or surrounding inflammation.  The adrenal glands are symmetric and 

normal. 


 


 The kidneys enhance symmetrically without evidence of focal abnormality, 

inflammation, or hydronephrosis. There is mild bilateral renal atrophy.


 


The aorta is of normal caliber.  Aortic vascular calcifications are present.   

delete the


 


Degenerative changes are seen in the lumbar spine with no acute osseous 

abnormality.


 


The pelvic organs are not seen.


 


 


IMPRESSION:


 


There is pronounced pneumoperitoneum seen causing distension of the abdominal 

wall.  There is no focal area of bowel irregularity to suggest the point of 

visceral perforation.  This could represent tracking of air secondary to 

intrathoracic air.


 


Extensive subcutaneous emphysema is seen throughout the chest abdominal wall 

with pronounced pneumomediastinum and significant air within the diaphragmatic 

fat.


 


The endotracheal tube terminates 1 cm above the hipolito and can be pulled back 

by approximately 3 cm.


 


There is partial collapse and consolidation in the bilateral lower lobe and a 

portion of the right upper lobe.  Bilateral chest tubes are in place.


 


Borderline cardiomegaly.


 


Atherosclerotic disease.


 


Mildly thickened appearance of the colonic wall diffusely could be related to 

underdistension versus a mild colitis.  There is no obstruction or appendicitis.


 


Mild splenomegaly.


 


Mild renal atrophy.


 


A call report was made to nurse Kacy  at 2/2/2017 3:32:36 PM


 


RPTAT: AA


_____________________________________________ 


.Edgard Dillard MD, MD           Date    Time 


Electronically viewed and signed by .Edgard Dillard MD, MD on 02/02/2017 15:33











CECILIO JIMENEZ Feb 3, 2017 11:15

## 2017-02-03 NOTE — CONS
Date/Time of Note


Date/Time of Note


DATE: 2/3/17 


TIME: 22:05





Assessment/Plan


Assessment/Plan


Chief Complaint/Hosp Course


s/p traumatic R > L frontal contusion, right SDH.


exam slowly improving.


Cont. ICU observation, ween sedation, hopefully extubate if medically stable.  


Repeat CT scan if neurologically changes.


Problems:  





Consultation Date/Type/Reason


Admit Date/Time


Jan 31, 2017 at 13:31


Initial Consult Date


1/31/17


Type of Consultation:  Neurosurgery


Reason for Consultation


Remains intubated.





Exam/Review of Systems


Vital Signs


Vitals





 Vital Signs








  Date Time  Temp Pulse Resp B/P Pulse Ox O2 Delivery O2 Flow Rate FiO2


 


2/3/17 21:10  89 18  98   35


 


2/3/17 20:00 101.4   115/73  Mechanical Ventilator  














 Intake and Output   


 


 2/2/17 2/2/17 2/3/17





 15:00 23:00 07:00


 


Intake Total 59.6 ml 148.1 ml 97.7 ml


 


Output Total 0 ml 0 ml 0 ml


 


Balance 59.6 ml 148.1 ml 97.7 ml











Exam


Head:  atraumatic, normocephalic


Eyes:  EOMI, PERRL


Neurological:  other (intubated. on sedation but will opens eyes to minimal 

stimulation. MORRIS grossly equall with normal strength )





Results


Result Diagram:  


2/3/17 0400                                                                    

            2/3/17 0400





Results 24 hrs





Laboratory Tests








Test


  2/3/17


04:00 2/3/17


07:12


 


Anion Gap 30  H 


 


Basophils # 0.0   


 


Basophils % 0.1   


 


Blood Morphology Comment    


 


Blood Urea Nitrogen 56  H 


 


Calcium Level 7.9  L 


 


Carbon Dioxide Level 21   


 


Chloride Level 91  L 


 


Creatinine 8.42  #H 


 


Eosinophils # 0.0   


 


Eosinophils % 0.1   


 


Glucose Level 105   


 


Hematocrit 31.0  L 


 


Hemoglobin 10.6  #L 


 


Lymphocytes # 0.4  L 


 


Lymphocytes % 5.1  L 


 


Magnesium Level 2.2   


 


Mean Corpuscular Hemoglobin 29.8   


 


Mean Corpuscular Hemoglobin


Concent 34.2  


  


 


 


Mean Corpuscular Volume 87.0   


 


Mean Platelet Volume 10.4   


 


Monocytes # 0.4   


 


Monocytes % 5.0   


 


Neutrophils # 7.5   


 


Neutrophils % 89.7  H 


 


Nucleated Red Blood Cells # 0.0   


 


Nucleated Red Blood Cells % 0.0   


 


Phosphorus Level 7.8  H 


 


Platelet Count 116  L 


 


Potassium Level 4.2   


 


Red Blood Count 3.56  L 


 


Red Cell Distribution Width 14.4   


 


Sodium Level 138   


 


White Blood Count 8.3  # 


 


Arterial Blood HCO3  22.7  


 


Arterial Blood Base Excess  -0.1  


 


Arterial Blood Oxygen


Saturation 


  93.4  L


 


 


Jed Test  ACCEPTAB  


 


Arterial Blood Gas Puncture


Site 


  Right Radial  


 


 


Arterial Blood


Carboxyhemoglobin 


  0.5  


 


 


Arterial Blood Date Drawn


  


  2/3/2017


10:00:51 AM


 


Arterial Blood Methemoglobin  0.3  


 


Arterial Blood pCO2 (Temp


correct) 


  32.2  L


 


 


Arterial Blood pH (Temp


corrected) 


  7.466  H


 


 


Arterial Blood pO2 (Temp


corrected) 


  73.6  L


 


 


Blood Gas A-a O2 Differential  138.5  H


 


Blood Gas Actual Respiration


Rate 


  26  


 


 


Blood Gas Low PEEP Setting  0  


 


Blood Gas Modality  VENT - AC  


 


Blood Gas Notified Time


  


  2/3/2017


10:21:40 AM


 


Blood Gas Notified Whom  JLD  


 


Blood Gas Respiration Rate  12.0  


 


Blood Gas Specimen Source


  


  Blood arterial


 


 


Blood Gas Temperature  37.0  


 


Blood Gas Tidal Volume  350.0  


 


FiO2  35.0  


 


Oxyhemoglobin Percent  92.7  L


 


Total Hemoglobin  14.9  











Medications


Medications





 Current Medications


Labetalol HCl (Labetalol) 10 mg Q10M  PRN IV ELEVATED BLOOD PRESSURE;  Start 1/ 31/17 at 14:00


Ondansetron HCl (Zofran Inj) 4 mg Q6H  PRN IV NAUSEA AND/OR VOMITING Last 

administered on 1/31/17at 20:32; Admin Dose 4 MG;  Start 1/31/17 at 14:00


Acetaminophen (Tylenol Liquid) 650 mg Q6H  PRN PO PAIN LEVEL 1-3 OR FEVER Last 

administered on 2/3/17at 21:28; Admin Dose 650 MG;  Start 1/31/17 at 14:00


Acetaminophen (Tylenol Supp) 650 mg Q4H  PRN [place] PAIN LEVEL 1-3 OR FEVER;  Start 

1/31/17 at 14:00


Famotidine (Pepcid Iv) 20 mg DAILY IV  Last administered on 2/3/17at 10:44; 

Admin Dose 20 MG;  Start 1/31/17 at 15:00


Eye Lubricant (Artificial Tears Oph) 1 drop TID BOTH EYES  Last administered on 

2/3/17at 21:28; Admin Dose 1 DROP;  Start 1/31/17 at 21:00


Atorvastatin Calcium (Lipitor) 20 mg QHS PO  Last administered on 2/3/17at 21:28

; Admin Dose 20 MG;  Start 1/31/17 at 21:00


Multivit/Ca Carb/ B Cmplx/FA/Prenat ([place]-Argenis) 1 tab DAILY PO  Last 

administered on 2/3/17at 10:43; Admin Dose 1 TAB;  Start 2/1/17 at 09:00


Hydromorphone HCl 0.5 mg 0.5 mg Q4H  PRN IV PAIN Last administered on 2/2/17at 

03:10; Admin Dose 0.5 MG;  Start 1/31/17 at 21:30


Propofol 100 ml @  1.641 mls/ hr Q12H IV  Last administered on 2/3/17at 10:44; 

Admin Dose 1.641 MLS/HR;  Start 2/1/17 at 01:30


Norepinephrine 16 mg/Dextrose 500 ml @  1.87 mls/hr TITRATE IV ;  Start 2/2/17 

at 09:00


Piperacillin Sod/ Tazobactam Sod (Zosyn 2.25gm/ 50ml (Pmx)) 50 ml @  200 mls/hr 

Q8 IVPB  Last administered on 2/3/17at 21:29; Admin Dose 200 MLS/HR;  Start 2/2/ 17 at 11:00











ODALIS MADISON MD Feb 3, 2017 22:10

## 2017-02-03 NOTE — CONS
Date/Time of Note


Date/Time of Note


DATE: 2/3/17 


TIME: 17:09





Assessment/Plan


Assessment/Plan


Chief Complaint/Hosp Course


IMPRESSION:


1.  The patient has acute hemorrhagic stroke./sdh/trauma


2.  Malignant hypertension.


3.  Endstage renal disease.


4.  Leukocytosis.  


5.  Respiratory failure.


6.  Incomplete database


7 hyperkalemia hx


8 SUBCUTANEOUS EPMHYSEMA


plan hd


per neuro


clk labs


Problems:  





Consultation Date/Type/Reason


Admit Date/Time


Jan 31, 2017 at 13:31


Initial Consult Date


1/31/17


Type of Consultation:  RENAL





Exam/Review of Systems


Vital Signs


Vitals





 Vital Signs








  Date Time  Temp Pulse Resp B/P Pulse Ox O2 Delivery O2 Flow Rate FiO2


 


2/3/17 16:00 98.0 102 23 113/74 96 Mechanical Ventilator  


 


2/3/17 13:10        50














 Intake and Output   


 


 2/2/17 2/2/17 2/3/17





 15:00 23:00 07:00


 


Intake Total 59.6 ml 148.1 ml 97.7 ml


 


Output Total 0 ml 0 ml 0 ml


 


Balance 59.6 ml 148.1 ml 97.7 ml











Exam


Neck:  supple


Respiratory:  clear to auscultation


Cardiovascular:  regular rate and rhythm


Gastrointestinal:  soft


Musculoskeletal:  nl extremities to inspection





Results


Result Diagram:  


2/3/17 0400                                                                    

            2/3/17 0400





Results 24 hrs





Laboratory Tests








Test


  2/2/17


21:00 2/3/17


04:00 2/3/17


07:12


 


Arterial Blood HCO3 23.0    22.7  


 


Arterial Blood Base Excess -0.5    -0.1  


 


Arterial Blood Oxygen


Saturation 95.7  


  


  93.4  L


 


 


Jed Test ACCEPTAB    ACCEPTAB  


 


Arterial Blood Gas Puncture


Site Right Radial  


  


  Right Radial  


 


 


Arterial Blood


Carboxyhemoglobin 0.3  


  


  0.5  


 


 


Arterial Blood Date Drawn


  2/2/2017


8:45:55 PM 


  2/3/2017


10:00:51 AM


 


Arterial Blood Methemoglobin 0.2    0.3  


 


Arterial Blood pCO2 (Temp


correct) 34.0  L


  


  32.2  L


 


 


Arterial Blood pH (Temp


corrected) 7.448  


  


  7.466  H


 


 


Arterial Blood pO2 (Temp


corrected) 87.3  


  


  73.6  L


 


 


Blood Gas A-a O2 Differential 231.0  H  138.5  H


 


Blood Gas Actual Respiration


Rate 23  


  


  26  


 


 


Blood Gas Inspiratory Pressure 27.0    


 


Blood Gas Low PEEP Setting 0    0  


 


Blood Gas Modality VENT - AC    VENT - AC  


 


Blood Gas Notified Time


  2/2/2017


8:58:02 PM 


  2/3/2017


10:21:40 AM


 


Blood Gas Notified Whom ANCELMO    JLD  


 


Blood Gas Respiration Rate 12.0    12.0  


 


Blood Gas Specimen Source


  Blood arterial


  


  Blood arterial


 


 


Blood Gas Temperature 37.0    37.0  


 


Blood Gas Tidal Volume 350.0    350.0  


 


FiO2 50.0    35.0  


 


Oxyhemoglobin Percent 95.2    92.7  L


 


Total Hemoglobin 12.3    14.9  


 


Anion Gap  30  H 


 


Basophils #  0.0   


 


Basophils %  0.1   


 


Blood Morphology Comment     


 


Blood Urea Nitrogen  56  H 


 


Calcium Level  7.9  L 


 


Carbon Dioxide Level  21   


 


Chloride Level  91  L 


 


Creatinine  8.42  #H 


 


Eosinophils #  0.0   


 


Eosinophils %  0.1   


 


Glucose Level  105   


 


Hematocrit  31.0  L 


 


Hemoglobin  10.6  #L 


 


Lymphocytes #  0.4  L 


 


Lymphocytes %  5.1  L 


 


Magnesium Level  2.2   


 


Mean Corpuscular Hemoglobin  29.8   


 


Mean Corpuscular Hemoglobin


Concent 


  34.2  


  


 


 


Mean Corpuscular Volume  87.0   


 


Mean Platelet Volume  10.4   


 


Monocytes #  0.4   


 


Monocytes %  5.0   


 


Neutrophils #  7.5   


 


Neutrophils %  89.7  H 


 


Nucleated Red Blood Cells #  0.0   


 


Nucleated Red Blood Cells %  0.0   


 


Phosphorus Level  7.8  H 


 


Platelet Count  116  L 


 


Potassium Level  4.2   


 


Red Blood Count  3.56  L 


 


Red Cell Distribution Width  14.4   


 


Sodium Level  138   


 


White Blood Count  8.3  # 











Medications


Medications





 Current Medications


Labetalol HCl (Labetalol) 10 mg Q10M  PRN IV ELEVATED BLOOD PRESSURE;  Start 1/ 31/17 at 14:00


Ondansetron HCl (Zofran Inj) 4 mg Q6H  PRN IV NAUSEA AND/OR VOMITING Last 

administered on 1/31/17at 20:32; Admin Dose 4 MG;  Start 1/31/17 at 14:00


Acetaminophen (Tylenol Liquid) 650 mg Q6H  PRN PO PAIN LEVEL 1-3 OR FEVER Last 

administered on 2/3/17at 03:08; Admin Dose 650 MG;  Start 1/31/17 at 14:00


Acetaminophen (Tylenol Supp) 650 mg Q4H  PRN [place] PAIN LEVEL 1-3 OR FEVER;  Start 

1/31/17 at 14:00


Famotidine (Pepcid Iv) 20 mg DAILY IV  Last administered on 2/3/17at 10:44; 

Admin Dose 20 MG;  Start 1/31/17 at 15:00


Eye Lubricant (Artificial Tears Oph) 1 drop TID BOTH EYES  Last administered on 

2/3/17at 13:56; Admin Dose 1 DROP;  Start 1/31/17 at 21:00


Atorvastatin Calcium (Lipitor) 20 mg QHS PO  Last administered on 2/2/17at 20:40

; Admin Dose 20 MG;  Start 1/31/17 at 21:00


Multivit/Ca Carb/ B Cmplx/FA/Prenat ([place]-Argenis) 1 tab DAILY PO  Last 

administered on 2/3/17at 10:43; Admin Dose 1 TAB;  Start 2/1/17 at 09:00


Hydromorphone HCl 0.5 mg 0.5 mg Q4H  PRN IV PAIN Last administered on 2/2/17at 

03:10; Admin Dose 0.5 MG;  Start 1/31/17 at 21:30


Propofol 100 ml @  1.641 mls/ hr Q12H IV  Last administered on 2/3/17at 10:44; 

Admin Dose 1.641 MLS/HR;  Start 2/1/17 at 01:30


Norepinephrine 16 mg/Dextrose 500 ml @  1.87 mls/hr TITRATE IV ;  Start 2/2/17 

at 09:00


Piperacillin Sod/ Tazobactam Sod (Zosyn 2.25gm/ 50ml (Pmx)) 50 ml @  200 mls/hr 

Q8 IVPB  Last administered on 2/3/17at 13:56; Admin Dose 200 MLS/HR;  Start 2/2/ 17 at 11:00











PRITESH MACKEY MD Feb 3, 2017 17:11

## 2017-02-03 NOTE — RADRPT
PROCEDURE:   XR Chest. 

 

CLINICAL INDICATION:   Shortness of breath.

 

TECHNIQUE:   Single frontal view. 

 

COMPARISON:   02/02/2017. 

 

FINDINGS:

The endotracheal tube, nasogastric tube, and bilateral chest tubes are in satisfactory position.  Th
e lungs are clear. 

The heart size is normal. 

There is no pleural effusion or pneumothorax.  There is extensive subcutaneous emphysema, improved. 
 There is free air in the abdomen as seen previously. 

There is no pneumothorax.   

 

IMPRESSION:

1.  Tubes and lines in satisfactory position.

2.  Improved extensive bilateral subcutaneous emphysema.

3.  Pneumoperitoneum.  

 

RPTAT: QQ

_____________________________________________ 

.Chinmay Peter MD, MD           Date    Time 

Electronically viewed and signed by .Chinmay Peter MD, MD on 02/03/2017 11:02 

 

D:  02/03/2017 11:02  T:  02/03/2017 11:02

.R/

## 2017-02-03 NOTE — PN
Date/Time of Note


Date/Time of Note


DATE: 2/3/17 


TIME: 19:13





Assessment/Plan


Lines/Catheters


IV Catheter Type (from Nrsg):  Peripheral IV


Kamara in Place (from Nrsg):  No





Assessment/Plan


Chief Complaint/Hosp Course


PTX


SP  bilateral CT placement


will continue CT sxn


Problems:  





Subjective


24 Hr Interval Summary


Constitutional:  improved


Pain Control:  mild





Exam/Review of Systems


Vital Signs


Vitals





 Vital Signs








  Date Time  Temp Pulse Resp B/P Pulse Ox O2 Delivery O2 Flow Rate FiO2


 


2/3/17 18:00  98 23 96/70 98 Mechanical Ventilator  


 


2/3/17 17:10        50


 


2/3/17 16:00 98.0       














 Intake and Output   


 


 2/2/17 2/2/17 2/3/17





 15:00 23:00 07:00


 


Intake Total 59.6 ml 148.1 ml 97.7 ml


 


Output Total 0 ml 0 ml 0 ml


 


Balance 59.6 ml 148.1 ml 97.7 ml











Exam


Neck:  non-tender, supple


Respiratory:  clear to auscultation, normal air movement


Cardiovascular:  nl pulses, regular rate and rhythm


Gastrointestinal:  nl liver, spleen, non-tender, soft





Results


Result Diagram:  


2/3/17 0400                                                                    

            2/3/17 0400














SUNITA BETANCUR MD Feb 3, 2017 19:14

## 2017-02-03 NOTE — CONS
Date/Time of Note


Date/Time of Note


DATE: 2/3/17 


TIME: 07:14





Assessment/Plan


Assessment/Plan


Additional Assessment/Plan


Assessment and recommendations; next





1.  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 

brain contusions.  Next





2.  History of hypertension next





3.  History of end-stage renal disease, currently getting hemodialysis at 

bedside.





4.  Bilateral pneumothoraces with significant clinical improvement.  Bilateral 

chest tubes are in place.  Dramatic reduction in extensive cutaneous emphysema 

involving the chest and face.





5.  Possibly some element of pneumonia.





Continue current treatment.  Obtain a blood gas on current ventilator settings.

  Hold sedation for now to assess mental status.  Chest x-ray from today is 

pending as well.  Once ABGs are obtained and the chest x-ray is obtained I will 

review it and make further recommendations regarding weaning from mechanical 

ventilation which will also depend upon adequate mental status recovery.





Consultation Date/Type/Reason


Admit Date/Time


Jan 31, 2017 at 13:31


Initial Consult Date


1/31/17


Type of Consultation:  Neurosurgery





24 HR Interval Summary


Free Text/Dictation


Patient condition remains critical.  Still requiring full ventilator support.  

However there has been dramatic reduction in extensive subcutaneous emphysema 

involving the face as well as chest wall.  





General examination; middle aged woman, orally intubated, sedated.  Currently 

in no distress.





Exam/Review of Systems


Vital Signs


Vitals





 Vital Signs








  Date Time  Temp Pulse Resp B/P Pulse Ox O2 Delivery O2 Flow Rate FiO2


 


2/3/17 07:00  81 22 108/77 100 Mechanical Ventilator  


 


2/3/17 06:30 100.1       


 


2/3/17 05:10        50














 Intake and Output   


 


 2/2/17 2/2/17 2/3/17





 15:00 23:00 07:00


 


Intake Total 59.6 ml 148.1 ml 97.7 ml


 


Output Total 0 ml 0 ml 0 ml


 


Balance 59.6 ml 148.1 ml 97.7 ml











Exam


H EENT examination; supple neck, no JVD.  Or intubated.  Pupils are midsize and 

sluggishly reactive to light bilaterally.  There is significant reduction in 

extensive facial some cutaneous emphysema.  No neck masses.  No lymphadenopathy.





Chest examination; diminished but clear breath sounds bilaterally.  Significant 

reduction in chest wall subcutaneous emphysema as well.  S1-S2 audible no 

murmurs, regular rhythm.  Bilateral chest tubes are in place.  No air leak seen 

in the Pleur-evac chambers.





Abdomen examination; soft, no organomegaly.  Bowel sounds audible.  Next





Extremity examination; no peripheral edema.  There is an AV shunt in the left 

arm.  Next





CNS examination; patient is currently sedated.





Ventilator settings AC of 12, tidal volume 350, PEEP of 0, 50% FiO2.





Results


Result Diagram:  


2/3/17 0400                                                                    

            2/3/17 0400





Results 24 hrs





Laboratory Tests








Test


  2/2/17


08:50 2/2/17


12:00 2/2/17


12:20 2/2/17


21:00


 


Arterial Blood HCO3 21.2  L 23.8    23.0  


 


Arterial Blood Base Excess 1.3   0.1    -0.5  


 


Arterial Blood Oxygen


Saturation 99.3  H


  94.5  L


  


  95.7  


 


 


Jed Test ACCEPTAB   N/A    ACCEPTAB  


 


Arterial Blood Gas Puncture


Site Right Radial  


  Right Brachial


  


  Right Radial  


 


 


Arterial Blood


Carboxyhemoglobin 0.1  


  0.1  


  


  0.3  


 


 


Arterial Blood Date Drawn


  2/2/2017


9:20:31 AM 2/2/2017


12:00:07 PM 


  2/2/2017


8:45:55 PM


 


Arterial Blood Methemoglobin 0.4   0.3    0.2  


 


Arterial Blood pCO2 (Temp


correct) 22.5  L


  35.2  


  


  34.0  L


 


 


Arterial Blood pH (Temp


corrected) 7.591  *H


  7.447  


  


  7.448  


 


 


Arterial Blood pO2 (Temp


corrected) 276.3  H


  79.2  L


  


  87.3  


 


 


Blood Gas A-a O2 Differential 414.2  H 237.7  H  231.0  H


 


Blood Gas Actual Respiration


Rate 16  


  16  


  


  23  


 


 


Blood Gas Critical Value Read


Back Y GUY RN  


  


  


  


 


 


Blood Gas Inspiratory Pressure 45.0     27.0  


 


Blood Gas Low PEEP Setting 5.0   0    0  


 


Blood Gas Modality VENT - PC   VENT - AC    VENT - AC  


 


Blood Gas Notified Time


  2/2/2017


9:40:19 AM 2/2/2017


12:23:03 PM 


  2/2/2017


8:58:02 PM


 


Blood Gas Notified Whom PRETTY AG  


 


Blood Gas Respiration Rate 16.0   12.0    12.0  


 


Blood Gas Specimen Source


  Blood arterial


  Blood arterial


  


  Blood arterial


 


 


Blood Gas Temperature 37.0   37.0    37.0  


 


FiO2 100.0   50.0    50.0  


 


Oxyhemoglobin Percent 98.8   94.1    95.2  


 


Total Hemoglobin 13.7   12.7    12.3  


 


Blood Gas Tidal Volume  400.0    350.0  


 


Lactic Acid Level   1.7   














Test


  2/3/17


04:00 


  


  


 


 


Anion Gap 30  H   


 


Basophils # 0.0     


 


Basophils % 0.1     


 


Blood Morphology Comment      


 


Blood Urea Nitrogen 56  H   


 


Calcium Level 7.9  L   


 


Carbon Dioxide Level 21     


 


Chloride Level 91  L   


 


Creatinine 8.42  #H   


 


Eosinophils # 0.0     


 


Eosinophils % 0.1     


 


Glucose Level 105     


 


Hematocrit 31.0  L   


 


Hemoglobin 10.6  #L   


 


Lymphocytes # 0.4  L   


 


Lymphocytes % 5.1  L   


 


Magnesium Level 2.2     


 


Mean Corpuscular Hemoglobin 29.8     


 


Mean Corpuscular Hemoglobin


Concent 34.2  


  


  


  


 


 


Mean Corpuscular Volume 87.0     


 


Mean Platelet Volume 10.4     


 


Monocytes # 0.4     


 


Monocytes % 5.0     


 


Neutrophils # 7.5     


 


Neutrophils % 89.7  H   


 


Nucleated Red Blood Cells # 0.0     


 


Nucleated Red Blood Cells % 0.0     


 


Phosphorus Level 7.8  H   


 


Platelet Count 116  L   


 


Potassium Level 4.2     


 


Red Blood Count 3.56  L   


 


Red Cell Distribution Width 14.4     


 


Sodium Level 138     


 


White Blood Count 8.3  #   











Medications


Medications





 Current Medications


Labetalol HCl (Labetalol) 10 mg Q10M  PRN IV ELEVATED BLOOD PRESSURE;  Start 1/ 31/17 at 14:00


Ondansetron HCl (Zofran Inj) 4 mg Q6H  PRN IV NAUSEA AND/OR VOMITING Last 

administered on 1/31/17at 20:32; Admin Dose 4 MG;  Start 1/31/17 at 14:00


Acetaminophen (Tylenol Liquid) 650 mg Q6H  PRN PO PAIN LEVEL 1-3 OR FEVER Last 

administered on 2/3/17at 03:08; Admin Dose 650 MG;  Start 1/31/17 at 14:00


Acetaminophen (Tylenol Supp) 650 mg Q4H  PRN [place] PAIN LEVEL 1-3 OR FEVER;  Start 

1/31/17 at 14:00


Famotidine (Pepcid Iv) 20 mg DAILY IV  Last administered on 2/2/17at 09:31; 

Admin Dose 20 MG;  Start 1/31/17 at 15:00


Eye Lubricant (Artificial Tears Oph) 1 drop TID BOTH EYES  Last administered on 

2/2/17at 20:41; Admin Dose 1 DROP;  Start 1/31/17 at 21:00


Atorvastatin Calcium (Lipitor) 20 mg QHS PO  Last administered on 2/2/17at 20:40

; Admin Dose 20 MG;  Start 1/31/17 at 21:00


Multivit/Ca Carb/ B Cmplx/FA/Prenat ([place]-Argenis) 1 tab DAILY PO  Last 

administered on 2/2/17at 09:31; Admin Dose 1 TAB;  Start 2/1/17 at 09:00


Hydromorphone HCl 0.5 mg 0.5 mg Q4H  PRN IV PAIN Last administered on 2/2/17at 

03:10; Admin Dose 0.5 MG;  Start 1/31/17 at 21:30


Propofol 100 ml @  1.641 mls/ hr Q12H IV  Last administered on 2/2/17at 20:41; 

Admin Dose 4.923 MLS/HR;  Start 2/1/17 at 01:30


Norepinephrine 16 mg/Dextrose 500 ml @  1.87 mls/hr TITRATE IV ;  Start 2/2/17 

at 09:00


Piperacillin Sod/ Tazobactam Sod (Zosyn 2.25gm/ 50ml (Pmx)) 50 ml @  200 mls/hr 

Q8 IVPB  Last administered on 2/3/17at 05:39; Admin Dose 200 MLS/HR;  Start 2/2/ 17 at 11:00











CHADD CORBETT Feb 3, 2017 07:22

## 2017-02-04 VITALS — SYSTOLIC BLOOD PRESSURE: 127 MMHG | RESPIRATION RATE: 23 BRPM | HEART RATE: 79 BPM | DIASTOLIC BLOOD PRESSURE: 78 MMHG

## 2017-02-04 VITALS — DIASTOLIC BLOOD PRESSURE: 84 MMHG | SYSTOLIC BLOOD PRESSURE: 137 MMHG | RESPIRATION RATE: 19 BRPM | HEART RATE: 68 BPM

## 2017-02-04 VITALS — DIASTOLIC BLOOD PRESSURE: 73 MMHG | RESPIRATION RATE: 26 BRPM | HEART RATE: 74 BPM | SYSTOLIC BLOOD PRESSURE: 122 MMHG

## 2017-02-04 VITALS — HEART RATE: 62 BPM | RESPIRATION RATE: 20 BRPM

## 2017-02-04 VITALS — SYSTOLIC BLOOD PRESSURE: 105 MMHG | DIASTOLIC BLOOD PRESSURE: 73 MMHG | RESPIRATION RATE: 21 BRPM | HEART RATE: 81 BPM

## 2017-02-04 VITALS — SYSTOLIC BLOOD PRESSURE: 114 MMHG | HEART RATE: 74 BPM | DIASTOLIC BLOOD PRESSURE: 73 MMHG | RESPIRATION RATE: 25 BRPM

## 2017-02-04 VITALS — RESPIRATION RATE: 21 BRPM | DIASTOLIC BLOOD PRESSURE: 80 MMHG | SYSTOLIC BLOOD PRESSURE: 129 MMHG | HEART RATE: 76 BPM

## 2017-02-04 VITALS — RESPIRATION RATE: 23 BRPM

## 2017-02-04 VITALS — RESPIRATION RATE: 18 BRPM | SYSTOLIC BLOOD PRESSURE: 118 MMHG | DIASTOLIC BLOOD PRESSURE: 76 MMHG | HEART RATE: 76 BPM

## 2017-02-04 VITALS — RESPIRATION RATE: 18 BRPM | HEART RATE: 79 BPM | SYSTOLIC BLOOD PRESSURE: 124 MMHG | DIASTOLIC BLOOD PRESSURE: 79 MMHG

## 2017-02-04 VITALS — HEART RATE: 79 BPM | SYSTOLIC BLOOD PRESSURE: 106 MMHG | DIASTOLIC BLOOD PRESSURE: 72 MMHG | RESPIRATION RATE: 23 BRPM

## 2017-02-04 VITALS — SYSTOLIC BLOOD PRESSURE: 114 MMHG | RESPIRATION RATE: 20 BRPM | DIASTOLIC BLOOD PRESSURE: 71 MMHG | HEART RATE: 77 BPM

## 2017-02-04 VITALS — RESPIRATION RATE: 18 BRPM | HEART RATE: 77 BPM | DIASTOLIC BLOOD PRESSURE: 69 MMHG | SYSTOLIC BLOOD PRESSURE: 110 MMHG

## 2017-02-04 VITALS — DIASTOLIC BLOOD PRESSURE: 81 MMHG | RESPIRATION RATE: 19 BRPM | SYSTOLIC BLOOD PRESSURE: 129 MMHG | HEART RATE: 77 BPM

## 2017-02-04 VITALS — RESPIRATION RATE: 23 BRPM | HEART RATE: 86 BPM | SYSTOLIC BLOOD PRESSURE: 105 MMHG | DIASTOLIC BLOOD PRESSURE: 79 MMHG

## 2017-02-04 VITALS — SYSTOLIC BLOOD PRESSURE: 118 MMHG | DIASTOLIC BLOOD PRESSURE: 77 MMHG | HEART RATE: 78 BPM | RESPIRATION RATE: 21 BRPM

## 2017-02-04 VITALS — SYSTOLIC BLOOD PRESSURE: 119 MMHG | DIASTOLIC BLOOD PRESSURE: 77 MMHG | RESPIRATION RATE: 22 BRPM | HEART RATE: 77 BPM

## 2017-02-04 VITALS — RESPIRATION RATE: 29 BRPM

## 2017-02-04 VITALS — RESPIRATION RATE: 18 BRPM

## 2017-02-04 VITALS — SYSTOLIC BLOOD PRESSURE: 135 MMHG | DIASTOLIC BLOOD PRESSURE: 83 MMHG | HEART RATE: 77 BPM | RESPIRATION RATE: 22 BRPM

## 2017-02-04 VITALS — DIASTOLIC BLOOD PRESSURE: 85 MMHG | HEART RATE: 77 BPM | SYSTOLIC BLOOD PRESSURE: 125 MMHG | RESPIRATION RATE: 20 BRPM

## 2017-02-04 VITALS — DIASTOLIC BLOOD PRESSURE: 64 MMHG | RESPIRATION RATE: 18 BRPM | SYSTOLIC BLOOD PRESSURE: 95 MMHG | HEART RATE: 85 BPM

## 2017-02-04 VITALS — HEART RATE: 75 BPM | SYSTOLIC BLOOD PRESSURE: 124 MMHG | DIASTOLIC BLOOD PRESSURE: 70 MMHG | RESPIRATION RATE: 23 BRPM

## 2017-02-04 VITALS — SYSTOLIC BLOOD PRESSURE: 133 MMHG | DIASTOLIC BLOOD PRESSURE: 80 MMHG | RESPIRATION RATE: 21 BRPM | HEART RATE: 82 BPM

## 2017-02-04 VITALS — SYSTOLIC BLOOD PRESSURE: 111 MMHG | RESPIRATION RATE: 21 BRPM | HEART RATE: 76 BPM | DIASTOLIC BLOOD PRESSURE: 69 MMHG

## 2017-02-04 VITALS — DIASTOLIC BLOOD PRESSURE: 82 MMHG | HEART RATE: 75 BPM | SYSTOLIC BLOOD PRESSURE: 122 MMHG | RESPIRATION RATE: 17 BRPM

## 2017-02-04 VITALS — SYSTOLIC BLOOD PRESSURE: 111 MMHG | DIASTOLIC BLOOD PRESSURE: 78 MMHG | RESPIRATION RATE: 25 BRPM | HEART RATE: 89 BPM

## 2017-02-04 VITALS — RESPIRATION RATE: 22 BRPM | DIASTOLIC BLOOD PRESSURE: 79 MMHG | HEART RATE: 79 BPM | SYSTOLIC BLOOD PRESSURE: 117 MMHG

## 2017-02-04 VITALS — DIASTOLIC BLOOD PRESSURE: 79 MMHG | RESPIRATION RATE: 29 BRPM | HEART RATE: 80 BPM | SYSTOLIC BLOOD PRESSURE: 143 MMHG

## 2017-02-04 VITALS — HEART RATE: 78 BPM | RESPIRATION RATE: 23 BRPM | SYSTOLIC BLOOD PRESSURE: 111 MMHG | DIASTOLIC BLOOD PRESSURE: 72 MMHG

## 2017-02-04 VITALS — DIASTOLIC BLOOD PRESSURE: 91 MMHG | RESPIRATION RATE: 22 BRPM | SYSTOLIC BLOOD PRESSURE: 132 MMHG | HEART RATE: 79 BPM

## 2017-02-04 VITALS — SYSTOLIC BLOOD PRESSURE: 97 MMHG | RESPIRATION RATE: 20 BRPM | DIASTOLIC BLOOD PRESSURE: 70 MMHG | HEART RATE: 84 BPM

## 2017-02-04 VITALS — RESPIRATION RATE: 21 BRPM | SYSTOLIC BLOOD PRESSURE: 118 MMHG | HEART RATE: 70 BPM | DIASTOLIC BLOOD PRESSURE: 74 MMHG

## 2017-02-04 VITALS — RESPIRATION RATE: 27 BRPM | SYSTOLIC BLOOD PRESSURE: 98 MMHG | HEART RATE: 75 BPM | DIASTOLIC BLOOD PRESSURE: 71 MMHG

## 2017-02-04 VITALS — RESPIRATION RATE: 21 BRPM | SYSTOLIC BLOOD PRESSURE: 132 MMHG | HEART RATE: 77 BPM | DIASTOLIC BLOOD PRESSURE: 78 MMHG

## 2017-02-04 VITALS — SYSTOLIC BLOOD PRESSURE: 138 MMHG | DIASTOLIC BLOOD PRESSURE: 80 MMHG | HEART RATE: 78 BPM | RESPIRATION RATE: 24 BRPM

## 2017-02-04 VITALS — RESPIRATION RATE: 27 BRPM | HEART RATE: 72 BPM

## 2017-02-04 VITALS — SYSTOLIC BLOOD PRESSURE: 113 MMHG | DIASTOLIC BLOOD PRESSURE: 74 MMHG | HEART RATE: 82 BPM | RESPIRATION RATE: 22 BRPM

## 2017-02-04 VITALS — RESPIRATION RATE: 25 BRPM

## 2017-02-04 VITALS — RESPIRATION RATE: 21 BRPM

## 2017-02-04 VITALS — RESPIRATION RATE: 17 BRPM

## 2017-02-04 VITALS — SYSTOLIC BLOOD PRESSURE: 124 MMHG | DIASTOLIC BLOOD PRESSURE: 105 MMHG | RESPIRATION RATE: 22 BRPM | HEART RATE: 76 BPM

## 2017-02-04 VITALS — RESPIRATION RATE: 20 BRPM | SYSTOLIC BLOOD PRESSURE: 128 MMHG | HEART RATE: 74 BPM | DIASTOLIC BLOOD PRESSURE: 82 MMHG

## 2017-02-04 VITALS — RESPIRATION RATE: 20 BRPM

## 2017-02-04 VITALS — RESPIRATION RATE: 15 BRPM

## 2017-02-04 VITALS — RESPIRATION RATE: 19 BRPM | HEART RATE: 83 BPM

## 2017-02-04 VITALS — RESPIRATION RATE: 19 BRPM

## 2017-02-04 VITALS — RESPIRATION RATE: 28 BRPM

## 2017-02-04 LAB
ANION GAP SERPL CALC-SCNC: 33 MMOL/L (ref 8–16)
BUN SERPL-MCNC: 47 MG/DL (ref 7–20)
CALCIUM SERPL-MCNC: 8.6 MG/DL (ref 8.4–10.2)
CHLORIDE SERPL-SCNC: 92 MMOL/L (ref 97–110)
CO2 SERPL-SCNC: 19 MMOL/L (ref 21–31)
CREAT SERPL-MCNC: 5.93 MG/DL (ref 0.44–1)
ERYTHROCYTE [DISTWIDTH] IN BLOOD BY AUTOMATED COUNT: 14.7 % (ref 11.5–14.5)
GLUCOSE SERPL-MCNC: 81 MG/DL (ref 70–220)
HCT VFR BLD CALC: 32.7 % (ref 37–47)
HGB BLD-MCNC: 12.3 G/DL (ref 12–16)
MAGNESIUM SERPL-MCNC: 2.4 MG/DL (ref 1.7–2.5)
MCH RBC QN AUTO: 34.6 PG (ref 29–33)
MCHC RBC AUTO-ENTMCNC: 37.6 G/DL (ref 32–37)
MCV RBC AUTO: 92.1 FL (ref 82–101)
PHOSPHATE SERPL-MCNC: 8 MG/DL (ref 2.5–4.9)
PLATELET # BLD EST: (no result) 10*3/UL
PLATELET # BLD: 153 10^3/UL (ref 140–440)
PMV BLD AUTO: 13.3 FL (ref 7.4–10.4)
POTASSIUM SERPL-SCNC: 4.7 MMOL/L (ref 3.5–5.1)
RBC # BLD AUTO: 3.55 10^6/UL (ref 4.2–5.4)
SODIUM SERPL-SCNC: 139 MMOL/L (ref 135–144)
TOTAL CELLS COUNTED PERCENT: 100 %
WBC # BLD AUTO: 8.5 10^3/UL (ref 4.8–10.8)

## 2017-02-04 RX ADMIN — Medication SCH MLS/HR: at 19:57

## 2017-02-04 RX ADMIN — PIPERACILLIN AND TAZOBACTAM SCH MLS/HR: 2; .25 INJECTION, POWDER, FOR SOLUTION INTRAVENOUS at 14:14

## 2017-02-04 RX ADMIN — ATORVASTATIN CALCIUM SCH MG: 20 TABLET, FILM COATED ORAL at 21:06

## 2017-02-04 RX ADMIN — PROPOFOL SCH MLS/HR: 10 INJECTION, EMULSION INTRAVENOUS at 00:57

## 2017-02-04 RX ADMIN — PROPOFOL SCH MLS/HR: 10 INJECTION, EMULSION INTRAVENOUS at 06:11

## 2017-02-04 RX ADMIN — CARBOXYMETHYLCELLULOSE SODIUM SCH DROP: 10 GEL OPHTHALMIC at 08:26

## 2017-02-04 RX ADMIN — PROPOFOL SCH MLS/HR: 10 INJECTION, EMULSION INTRAVENOUS at 23:38

## 2017-02-04 RX ADMIN — CARBOXYMETHYLCELLULOSE SODIUM SCH DROP: 10 GEL OPHTHALMIC at 14:14

## 2017-02-04 RX ADMIN — CARBOXYMETHYLCELLULOSE SODIUM SCH DROP: 10 GEL OPHTHALMIC at 21:06

## 2017-02-04 RX ADMIN — PIPERACILLIN AND TAZOBACTAM SCH MLS/HR: 2; .25 INJECTION, POWDER, FOR SOLUTION INTRAVENOUS at 05:29

## 2017-02-04 RX ADMIN — DIPHENHYDRAMINE HYDROCHLORIDE SCH MG: 50 INJECTION, SOLUTION INTRAMUSCULAR; INTRAVENOUS at 08:26

## 2017-02-04 RX ADMIN — PIPERACILLIN AND TAZOBACTAM SCH MLS/HR: 2; .25 INJECTION, POWDER, FOR SOLUTION INTRAVENOUS at 23:38

## 2017-02-04 NOTE — CONS
Date/Time of Note


Date/Time of Note


DATE: 2/4/17 


TIME: 11:46





Assessment/Plan


Assessment/Plan


Chief Complaint/Hosp Course


IMPRESSION:


1.  The patient has acute hemorrhagic stroke./sdh/trauma


2.  Malignant hypertension.better


3.  Endstage renal disease.


4.  Leukocytosis. better


5.  Respiratory failure.


6.  Incomplete database


7 hyperkalemia hx


8 SUBCUTANEOUS EPMHYSEMA


plan hd


per neuro


clk labs


Problems:  





Consultation Date/Type/Reason


Admit Date/Time


Jan 31, 2017 at 13:31


Initial Consult Date


1/31/17


Type of Consultation:  hemo





24 HR Interval Summary


Subjective hx not possible:  pt non-verbal


Constitutional:  other





Exam/Review of Systems


Vital Signs


Vitals





 Vital Signs








  Date Time  Temp Pulse Resp B/P Pulse Ox O2 Delivery O2 Flow Rate FiO2


 


2/4/17 10:00  76 21 129/80 98 Mechanical Ventilator  


 


2/4/17 08:00        35


 


2/4/17 08:00 99.1       














 Intake and Output   


 


 2/3/17 2/3/17 2/4/17





 15:00 23:00 07:00


 


Intake Total 1076.256 ml 144.796 ml 52.5 ml


 


Output Total 7000 ml  


 


Balance -5923.744 ml 144.796 ml 52.5 ml











Exam


Respiratory:  clear to auscultation


Cardiovascular:  regular rate and rhythm


Gastrointestinal:  bowel sounds (+), soft


Extremities:  edema (+)





Results


Result Diagram:  


2/4/17 0430                                                                    

            2/4/17 0430





Results 24 hrs





Laboratory Tests








Test


  2/4/17


04:30


 


Anion Gap 33  H


 


Blood Urea Nitrogen 47  H


 


Calcium Level 8.6  


 


Carbon Dioxide Level 19  L


 


Chloride Level 92  L


 


Creatinine 5.93  #H


 


Differential Comment AUTO w/SCAN  


 


Glucose Level 81  


 


Hematocrit 32.7  L


 


Hemoglobin 12.3  


 


Magnesium Level 2.4  


 


Mean Corpuscular Hemoglobin 34.6  H


 


Mean Corpuscular Hemoglobin


Concent 37.6  H


 


 


Mean Corpuscular Volume 92.1  


 


Mean Platelet Volume 13.3  #H


 


Phosphorus Level 8.0  H


 


Platelet Count 153  #


 


Platelet Estimate


  PLT APPEAR


ADEQUATE


 


Potassium Level 4.7  


 


Red Blood Count 3.55  L


 


Red Cell Distribution Width 14.7  H


 


Sodium Level 139  


 


White Blood Count 8.5  











Medications


Medications





 Current Medications


Labetalol HCl (Labetalol) 10 mg Q10M  PRN IV ELEVATED BLOOD PRESSURE;  Start 1/ 31/17 at 14:00


Ondansetron HCl (Zofran Inj) 4 mg Q6H  PRN IV NAUSEA AND/OR VOMITING Last 

administered on 1/31/17at 20:32; Admin Dose 4 MG;  Start 1/31/17 at 14:00


Acetaminophen (Tylenol Liquid) 650 mg Q6H  PRN PO PAIN LEVEL 1-3 OR FEVER Last 

administered on 2/3/17at 21:28; Admin Dose 650 MG;  Start 1/31/17 at 14:00


Acetaminophen (Tylenol Supp) 650 mg Q4H  PRN [place] PAIN LEVEL 1-3 OR FEVER;  Start 

1/31/17 at 14:00


Famotidine (Pepcid Iv) 20 mg DAILY IV  Last administered on 2/4/17at 08:26; 

Admin Dose 20 MG;  Start 1/31/17 at 15:00


Eye Lubricant (Artificial Tears Oph) 1 drop TID BOTH EYES  Last administered on 

2/4/17at 08:26; Admin Dose 1 DROP;  Start 1/31/17 at 21:00


Atorvastatin Calcium (Lipitor) 20 mg QHS PO  Last administered on 2/3/17at 21:28

; Admin Dose 20 MG;  Start 1/31/17 at 21:00


Multivit/Ca Carb/ B Cmplx/FA/Prenat ([place]-Argenis) 1 tab DAILY PO  Last 

administered on 2/4/17at 08:26; Admin Dose 1 TAB;  Start 2/1/17 at 09:00


Hydromorphone HCl 0.5 mg 0.5 mg Q4H  PRN IV PAIN Last administered on 2/2/17at 

03:10; Admin Dose 0.5 MG;  Start 1/31/17 at 21:30


Propofol 100 ml @  1.641 mls/ hr Q12H IV  Last administered on 2/4/17at 06:11; 

Admin Dose 3.282 MLS/HR;  Start 2/1/17 at 01:30


Norepinephrine 16 mg/Dextrose 500 ml @  1.87 mls/hr TITRATE IV ;  Start 2/2/17 

at 09:00


Piperacillin Sod/ Tazobactam Sod 50 ml @  200 mls/hr Q8 IVPB  Last administered 

on 2/4/17at 05:29; Admin Dose 200 MLS/HR;  Start 2/2/17 at 11:00


Fentanyl (Sublimaze) 100 ml @  2.5 mls/hr TITRATE IV ;  Start 2/4/17 at 11:30











PRITESH MACKEY MD Feb 4, 2017 11:47

## 2017-02-04 NOTE — PN
Date/Time of Note


Date/Time of Note


DATE: 2/4/17 


TIME: 11:44





Assessment/Plan


Lines/Catheters


IV Catheter Type (from Nrs):  Peripheral IV


Kamara in Place (from Nrs):  No





Assessment/Plan


Chief Complaint/Hosp Course


PTX


SP  bilateral CT placement


CXR,


 


IMPRESSION:


 


1.  Right upper lung zone patchy air space disease.


2.  Stable diffuse chest wall emphysema and pneumoperitoneum.


 





will continue CT sxn


Problems:  





Subjective


24 Hr Interval Summary


Constitutional:  improved


Pain Control:  mild





Exam/Review of Systems


Vital Signs


Vitals





 Vital Signs








  Date Time  Temp Pulse Resp B/P Pulse Ox O2 Delivery O2 Flow Rate FiO2


 


2/4/17 10:00  76 21 129/80 98 Mechanical Ventilator  


 


2/4/17 08:00        35


 


2/4/17 08:00 99.1       














 Intake and Output   


 


 2/3/17 2/3/17 2/4/17





 15:00 23:00 07:00


 


Intake Total 1076.256 ml 144.796 ml 52.5 ml


 


Output Total 7000 ml  


 


Balance -5923.744 ml 144.796 ml 52.5 ml











Exam


ENMT:  mucosa pink and moist, nl external ears & nose, nl lips & teeth, nl 

nasal mucosa & septum


Neck:  non-tender, supple


Respiratory:  clear to auscultation, normal air movement


Cardiovascular:  nl pulses, regular rate and rhythm


Gastrointestinal:  nl liver, spleen, non-tender, soft





Results


Result Diagram:  


2/4/17 0430                                                                    

            2/4/17 0430














SUNITA BETANCUR MD Feb 4, 2017 11:45

## 2017-02-04 NOTE — RADRPT
PROCEDURE:   XR Chest. 

 

CLINICAL INDICATION:  Shortness of breath

 

TECHNIQUE:   A single portable view of the chest was obtained. 

 

COMPARISON:   02/03/2017 

 

FINDINGS:

The endotracheal tube, bilateral chest tubes, nasogastric tube are unchanged.  The cardiomediastinal
 silhouette is within normal limits. Patchy air space disease in the right upper lung zone is seen. 
 No discrete evidence of pneumothorax is seen.  The lung volumes are low bibasilar atelectasis.  The
 soft tissues and osseous structures are unchanged with chest wall emphysema again seen.  Pneumoperi
toneum is again noted. 

 

IMPRESSION:

 

1.  Right upper lung zone patchy air space disease.

2.  Stable diffuse chest wall emphysema and pneumoperitoneum.

 

RPTAT:  HPNM

_____________________________________________ 

Physician Keny           Date    Time 

Electronically viewed and signed by Physician Keny on 02/04/2017 09:25 

 

D:  02/04/2017 09:25  T:  02/04/2017 09:25

PM/

## 2017-02-04 NOTE — CONS
Date/Time of Note


Date/Time of Note


DATE: 2/4/17 


TIME: 11:56





Consult Date/Type/Reason


Admit Date/Time


Jan 31, 2017 at 13:31


Initial Consult Date


1/31/17


Type of Consultation:  pulmonary





Subjective


Patient remains intubated sedated on mechanical ventilation


Currently remains hemodynamic stable


Neurologically unable to assess at present secondary to sedation





Objective





 Vital Signs








  Date Time  Temp Pulse Resp B/P Pulse Ox O2 Delivery O2 Flow Rate FiO2


 


2/4/17 10:00  76 21 129/80 98 Mechanical Ventilator  


 


2/4/17 08:00        35


 


2/4/17 08:00 99.1       














 Intake and Output   


 


 2/3/17 2/3/17 2/4/17





 15:00 23:00 07:00


 


Intake Total 1076.256 ml 144.796 ml 52.5 ml


 


Output Total 7000 ml  


 


Balance -5923.744 ml 144.796 ml 52.5 ml





PHYSICAL EXAMINATION


GENERAL: Well-nourished well-developed  lady intubated on mechanical 

ventilation somnolent secondary to sedation


VITAL SIGNS: see below.


HEENT: Pupils equal, round, and reactive to light. 


CARDIAC:  S1, S2, tachycardia.


CHEST:  Diminished air entry bilaterally.


ABDOMEN:  Mildly distended.  No bowel sounds.


EXTREMITIES:  No cyanosis, clubbing or edema.


NEUROLOGIC: Unable to assess





Results/Medications


Result Diagram:  


2/4/17 0430                                                                    

            2/4/17 0430





Results 24 hrs





Laboratory Tests








Test


  2/4/17


04:30


 


Anion Gap 33  H


 


Blood Urea Nitrogen 47  H


 


Calcium Level 8.6  


 


Carbon Dioxide Level 19  L


 


Chloride Level 92  L


 


Creatinine 5.93  #H


 


Differential Comment AUTO w/SCAN  


 


Glucose Level 81  


 


Hematocrit 32.7  L


 


Hemoglobin 12.3  


 


Magnesium Level 2.4  


 


Mean Corpuscular Hemoglobin 34.6  H


 


Mean Corpuscular Hemoglobin


Concent 37.6  H


 


 


Mean Corpuscular Volume 92.1  


 


Mean Platelet Volume 13.3  #H


 


Phosphorus Level 8.0  H


 


Platelet Count 153  #


 


Platelet Estimate


  PLT APPEAR


ADEQUATE


 


Potassium Level 4.7  


 


Red Blood Count 3.55  L


 


Red Cell Distribution Width 14.7  H


 


Sodium Level 139  


 


White Blood Count 8.5  








Medications





 Current Medications


Labetalol HCl (Labetalol) 10 mg Q10M  PRN IV ELEVATED BLOOD PRESSURE;  Start 1/ 31/17 at 14:00


Ondansetron HCl (Zofran Inj) 4 mg Q6H  PRN IV NAUSEA AND/OR VOMITING Last 

administered on 1/31/17at 20:32; Admin Dose 4 MG;  Start 1/31/17 at 14:00


Acetaminophen (Tylenol Liquid) 650 mg Q6H  PRN PO PAIN LEVEL 1-3 OR FEVER Last 

administered on 2/3/17at 21:28; Admin Dose 650 MG;  Start 1/31/17 at 14:00


Acetaminophen (Tylenol Supp) 650 mg Q4H  PRN [place] PAIN LEVEL 1-3 OR FEVER;  Start 

1/31/17 at 14:00


Famotidine (Pepcid Iv) 20 mg DAILY IV  Last administered on 2/4/17at 08:26; 

Admin Dose 20 MG;  Start 1/31/17 at 15:00


Eye Lubricant (Artificial Tears Oph) 1 drop TID BOTH EYES  Last administered on 

2/4/17at 08:26; Admin Dose 1 DROP;  Start 1/31/17 at 21:00


Atorvastatin Calcium (Lipitor) 20 mg QHS PO  Last administered on 2/3/17at 21:28

; Admin Dose 20 MG;  Start 1/31/17 at 21:00


Multivit/Ca Carb/ B Cmplx/FA/Prenat (Charlotte-Argenis) 1 tab DAILY PO  Last 

administered on 2/4/17at 08:26; Admin Dose 1 TAB;  Start 2/1/17 at 09:00


Hydromorphone HCl 0.5 mg 0.5 mg Q4H  PRN IV PAIN Last administered on 2/2/17at 

03:10; Admin Dose 0.5 MG;  Start 1/31/17 at 21:30


Propofol 100 ml @  1.641 mls/ hr Q12H IV  Last administered on 2/4/17at 06:11; 

Admin Dose 3.282 MLS/HR;  Start 2/1/17 at 01:30


Norepinephrine 16 mg/Dextrose 500 ml @  1.87 mls/hr TITRATE IV ;  Start 2/2/17 

at 09:00


Piperacillin Sod/ Tazobactam Sod 50 ml @  200 mls/hr Q8 IVPB  Last administered 

on 2/4/17at 05:29; Admin Dose 200 MLS/HR;  Start 2/2/17 at 11:00


Fentanyl (Sublimaze) 100 ml @  2.5 mls/hr TITRATE IV ;  Start 2/4/17 at 11:30





Assessment/Plan


Chief Complaint/Hosp Course


Assessment





1. Mechanical fall with intraparenchymal and subdural bleed


2. Iatrogenic pneumothorax with subcutaneous emphysema


3. End-stage renal failure hemodialysis


4. Resolving encephalopathy


5. Hypoxemic respiratory failure secondary to above





Plan





1. Add fentanyl drip for pain control


2. Decrease propofol as tolerated


3. Continue neurosurgical recommendations


4. Continue DVT and GI prophylaxis


5. Continue mechanical ventilation. Will attempt weaning trial tomorrow patient 

is more alert.


Problems:  











NORY PAYNE MD, [facility] Feb 4, 2017 11:59

## 2017-02-04 NOTE — PN
Date/Time of Note


Date/Time of Note


DATE: 2/4/17 


TIME: 09:17





Assessment/Plan


VTE Prophylaxis


VTE Prophylaxis Intervention:  SCD's





Lines/Catheters


IV Catheter Type (from [facility]):  Peripheral IV


Urinary Cath still in place:  No





Assessment/Plan


Assessment/Plan


[facility]/[place] [facility]





1. 46-year-old woman who is hospital day 5 s/p head trauma (mechanical fall on 

a concrete porch) with subsequent acute respiratory failure secondary to severe 

encephalopathy.  She had subsequent chest barotrauma  about 60h ago with 

bilateral pneumothorax, pneumoperitoneum, and subcutaneous emphysema. SQ 

emphysema virtually resolved, with stable pneumomediastinum this morning on 

pCXR.  No drainage from either chest tube evident this morning.  CT abd/pelvis 

two days ago showed pneumoperitoneum responsible for her abdominal distension.  

Today she had no abdominal distension on exam.


* Start NG tube feeding





2. Status post fall with head trauma, skull fracture, bilateral acute inferior 

frontal hemorrhagic contusions (right greater than left), 8 mm right convexity 

subdural hematoma, mild scattered subarachnoid hemorrhage, 3 mm leftward 

midline shift of the septum pellucidum and right temporal-parietal scalp 

swelling with nondisplaced, oblique fracture through the right parietal and 

temporal skull.  Reported Epidural hematoma on 2nd CT head.  Mental status 

improving, with good tracking and reactive pupils this morning.


* Our thanks to Dr. Palomo (neurosurgery) for following her.  No surgical 

intervention anticipated for now.





3. Hypotension resolved, now off Levophed and stable on propofol. 





4.  Leukocytosis and lactic acidosis on admission, unclear cause, now resolved 

without other indicators of infection. Patchy RUL lobe infiltrate stable on 

pCXR.


* Continue empiric Zosyn to cover for possible pulmonary infection





5.  End-stage renal disease on hemodialysis, most recently yesterday (Monday/

Wednesday/Friday)


* Our thanks to Dr. Chaney, who is following for nephrology.





6.  Gastroesophageal reflux disease


* Continue Pepcid IV





7. Prophylaxis


* Pepcid for GI prophylaxis


* SCDs for DVT prophylaxis





8. Disposition:  Follow up further Neurosurgery recommendations and ongoing 

Pulmonary recommendations.








VILMA Mitchell MD PhD


[phone]





Subjective


24 Hr Interval Summary


Free Text/Dictation


Sedated, intubated, no visitors this morning. Chest tubes in place. Respiratory 

therapist suctioning during my visit.





Exam/Review of Systems


Vital Signs


Vitals





 Vital Signs








  Date Time  Temp Pulse Resp B/P Pulse Ox O2 Delivery O2 Flow Rate FiO2


 


2/4/17 08:00 99.1 79 22 132/91 99 Mechanical Ventilator  


 


2/4/17 05:26        35














 Intake and Output   


 


 2/3/17 2/3/17 2/4/17





 15:00 23:00 07:00


 


Intake Total 1076.256 ml 144.796 ml 52.5 ml


 


Output Total 7000 ml  


 


Balance -5923.744 ml 144.796 ml 52.5 ml











Exam


Constitutional:  Intubated and sedated


Respiratory:  On vent with clear breath sounds bilaterally; chest tubes in 

place.


Cardiovascular:  nl pulses, regular rate and rhythm, no JVD or peripheral edema.


Gastrointestinal:  Not distended, responds to palpation but apparently non-

tender, normal bowel sounds, soft


Musculoskeletal:  Nl extremities to inspection with no dov arthritis.  No 

edema, clubbing or cyanosis


Neurological:  Unresponsive to voice or movement on propafol.  But toes 

downgoing, no upper motor neuron signs, pupils equal and reactive, with ability 

to make eye contact when I opened her lids, and intact conjugate gaze.





Results


Result Diagram:  


2/3/17 0400                                                                    

            2/4/17 0430





Results 24 hrs





Laboratory Tests








Test


  2/4/17


04:30


 


Anion Gap 33  H


 


Blood Urea Nitrogen 47  H


 


Calcium Level 8.6  


 


Carbon Dioxide Level 19  L


 


Chloride Level 92  L


 


Creatinine 5.93  #H


 


Glucose Level 81  


 


Magnesium Level 2.4  


 


Phosphorus Level 8.0  H


 


Potassium Level 4.7  


 


Sodium Level 139  











Medications


Medications





 Current Medications


Labetalol HCl (Labetalol) 10 mg Q10M  PRN IV ELEVATED BLOOD PRESSURE;  Start 1/ 31/17 at 14:00


Ondansetron HCl (Zofran Inj) 4 mg Q6H  PRN IV NAUSEA AND/OR VOMITING Last 

administered on 1/31/17at 20:32; Admin Dose 4 MG;  Start 1/31/17 at 14:00


Acetaminophen (Tylenol Liquid) 650 mg Q6H  PRN PO PAIN LEVEL 1-3 OR FEVER Last 

administered on 2/3/17at 21:28; Admin Dose 650 MG;  Start 1/31/17 at 14:00


Acetaminophen (Tylenol Supp) 650 mg Q4H  PRN [place] PAIN LEVEL 1-3 OR FEVER;  Start 

1/31/17 at 14:00


Famotidine (Pepcid Iv) 20 mg DAILY IV  Last administered on 2/4/17at 08:26; 

Admin Dose 20 MG;  Start 1/31/17 at 15:00


Eye Lubricant (Artificial Tears Oph) 1 drop TID BOTH EYES  Last administered on 

2/4/17at 08:26; Admin Dose 1 DROP;  Start 1/31/17 at 21:00


Atorvastatin Calcium (Lipitor) 20 mg QHS PO  Last administered on 2/3/17at 21:28

; Admin Dose 20 MG;  Start 1/31/17 at 21:00


Multivit/Ca Carb/ B Cmplx/FA/Prenat (Charlotte-Argenis) 1 tab DAILY PO  Last 

administered on 2/4/17at 08:26; Admin Dose 1 TAB;  Start 2/1/17 at 09:00


Hydromorphone HCl 0.5 mg 0.5 mg Q4H  PRN IV PAIN Last administered on 2/2/17at 

03:10; Admin Dose 0.5 MG;  Start 1/31/17 at 21:30


Propofol 100 ml @  1.641 mls/ hr Q12H IV  Last administered on 2/4/17at 06:11; 

Admin Dose 3.282 MLS/HR;  Start 2/1/17 at 01:30


Norepinephrine 16 mg/Dextrose 500 ml @  1.87 mls/hr TITRATE IV ;  Start 2/2/17 

at 09:00


Piperacillin Sod/ Tazobactam Sod (Zosyn 2.25gm/ 50ml (Pmx)) 50 ml @  200 mls/hr 

Q8 IVPB  Last administered on 2/4/17at 05:29; Admin Dose 200 MLS/HR;  Start 2/2/ 17 at 11:00











MANAV MITCHELL M.D. Feb 4, 2017 09:17

## 2017-02-04 NOTE — CONS
Date/Time of Note


Date/Time of Note


DATE: 2/4/17 


TIME: 16:51





Assessment/Plan


Assessment/Plan


Chief Complaint/Hosp Course


s/p traumatic R > L frontal contusion, right SDH.


exam much better. Given stability of multiple prior scans and improving 

neurological status, do not see role for surgical intervention at this point. 


Cont. ICU observation, ween sedation, hopefully extubate soon.   


Repeat CT scan if neurologically changes.


Problems:  





Consultation Date/Type/Reason


Admit Date/Time


Jan 31, 2017 at 13:31


Initial Consult Date


1/31/17


Type of Consultation:  Neurosurgery





24 HR Interval Summary


Free Text/Dictation


Patient awake, remains intubated.





Exam/Review of Systems


Vital Signs


Vitals





 Vital Signs








  Date Time  Temp Pulse Resp B/P Pulse Ox O2 Delivery O2 Flow Rate FiO2


 


2/4/17 16:46  82 29  97   30


 


2/4/17 13:00    129/81  Mechanical Ventilator  


 


2/4/17 08:00 99.1       














 Intake and Output   


 


 2/3/17 2/3/17 2/4/17





 15:00 23:00 07:00


 


Intake Total 1076.256 ml 144.796 ml 52.5 ml


 


Output Total 7000 ml  


 


Balance -5923.744 ml 144.796 ml 52.5 ml











Exam


Constitutional:  alert


ENMT:  intubated, other


Neurological:  other (Opens eyes to voice and spontaneously. Will follow some 

commands. Appears to have some mild LUE weakness.)





Results


Result Diagram:  


2/4/17 0430                                                                    

            2/4/17 0430





Results 24 hrs





Laboratory Tests








Test


  2/4/17


04:30


 


Anion Gap 33  H


 


Blood Urea Nitrogen 47  H


 


Calcium Level 8.6  


 


Carbon Dioxide Level 19  L


 


Chloride Level 92  L


 


Creatinine 5.93  #H


 


Differential Comment AUTO w/SCAN  


 


Glucose Level 81  


 


Hematocrit 32.7  L


 


Hemoglobin 12.3  


 


Magnesium Level 2.4  


 


Mean Corpuscular Hemoglobin 34.6  H


 


Mean Corpuscular Hemoglobin


Concent 37.6  H


 


 


Mean Corpuscular Volume 92.1  


 


Mean Platelet Volume 13.3  #H


 


Phosphorus Level 8.0  H


 


Platelet Count 153  #


 


Platelet Estimate


  PLT APPEAR


ADEQUATE


 


Potassium Level 4.7  


 


Red Blood Count 3.55  L


 


Red Cell Distribution Width 14.7  H


 


Sodium Level 139  


 


White Blood Count 8.5  











Medications


Medications





 Current Medications


Labetalol HCl (Labetalol) 10 mg Q10M  PRN IV ELEVATED BLOOD PRESSURE;  Start 1/

31/17 at 14:00


Ondansetron HCl (Zofran Inj) 4 mg Q6H  PRN IV NAUSEA AND/OR VOMITING Last 

administered on 1/31/17at 20:32; Admin Dose 4 MG;  Start 1/31/17 at 14:00


Acetaminophen (Tylenol Liquid) 650 mg Q6H  PRN PO PAIN LEVEL 1-3 OR FEVER Last 

administered on 2/3/17at 21:28; Admin Dose 650 MG;  Start 1/31/17 at 14:00


Acetaminophen (Tylenol Supp) 650 mg Q4H  PRN [place] PAIN LEVEL 1-3 OR FEVER;  Start 

1/31/17 at 14:00


Famotidine (Pepcid Iv) 20 mg DAILY IV  Last administered on 2/4/17at 08:26; 

Admin Dose 20 MG;  Start 1/31/17 at 15:00


Eye Lubricant (Artificial Tears Oph) 1 drop TID BOTH EYES  Last administered on 

2/4/17at 14:14; Admin Dose 1 DROP;  Start 1/31/17 at 21:00


Atorvastatin Calcium (Lipitor) 20 mg QHS PO  Last administered on 2/3/17at 21:28

; Admin Dose 20 MG;  Start 1/31/17 at 21:00


Multivit/Ca Carb/ B Cmplx/FA/Prenat (Charlotte-Argenis) 1 tab DAILY PO  Last 

administered on 2/4/17at 08:26; Admin Dose 1 TAB;  Start 2/1/17 at 09:00


Hydromorphone HCl 0.5 mg 0.5 mg Q4H  PRN IV PAIN Last administered on 2/2/17at 

03:10; Admin Dose 0.5 MG;  Start 1/31/17 at 21:30


Propofol 100 ml @  1.641 mls/ hr Q12H IV  Last administered on 2/4/17at 06:11; 

Admin Dose 3.282 MLS/HR;  Start 2/1/17 at 01:30


Norepinephrine 16 mg/Dextrose 500 ml @  1.87 mls/hr TITRATE IV ;  Start 2/2/17 

at 09:00


Piperacillin Sod/ Tazobactam Sod 50 ml @  200 mls/hr Q8 IVPB  Last administered 

on 2/4/17at 14:14; Admin Dose 200 MLS/HR;  Start 2/2/17 at 11:00


Fentanyl (Sublimaze) 100 ml @  2.5 mls/hr TITRATE IV ;  Start 2/4/17 at 11:30











ODALIS MADISON MD Feb 4, 2017 16:54

## 2017-02-05 VITALS — SYSTOLIC BLOOD PRESSURE: 136 MMHG | DIASTOLIC BLOOD PRESSURE: 95 MMHG | HEART RATE: 77 BPM | RESPIRATION RATE: 24 BRPM

## 2017-02-05 VITALS — HEART RATE: 80 BPM | DIASTOLIC BLOOD PRESSURE: 67 MMHG | RESPIRATION RATE: 23 BRPM | SYSTOLIC BLOOD PRESSURE: 102 MMHG

## 2017-02-05 VITALS — RESPIRATION RATE: 18 BRPM | SYSTOLIC BLOOD PRESSURE: 132 MMHG | DIASTOLIC BLOOD PRESSURE: 76 MMHG | HEART RATE: 72 BPM

## 2017-02-05 VITALS — HEART RATE: 75 BPM | RESPIRATION RATE: 24 BRPM | DIASTOLIC BLOOD PRESSURE: 89 MMHG | SYSTOLIC BLOOD PRESSURE: 136 MMHG

## 2017-02-05 VITALS — DIASTOLIC BLOOD PRESSURE: 79 MMHG | RESPIRATION RATE: 15 BRPM | SYSTOLIC BLOOD PRESSURE: 102 MMHG | HEART RATE: 72 BPM

## 2017-02-05 VITALS — DIASTOLIC BLOOD PRESSURE: 80 MMHG | SYSTOLIC BLOOD PRESSURE: 118 MMHG | RESPIRATION RATE: 21 BRPM | HEART RATE: 83 BPM

## 2017-02-05 VITALS — DIASTOLIC BLOOD PRESSURE: 89 MMHG | RESPIRATION RATE: 24 BRPM | HEART RATE: 69 BPM | SYSTOLIC BLOOD PRESSURE: 128 MMHG

## 2017-02-05 VITALS — DIASTOLIC BLOOD PRESSURE: 75 MMHG | HEART RATE: 80 BPM | SYSTOLIC BLOOD PRESSURE: 118 MMHG

## 2017-02-05 VITALS — SYSTOLIC BLOOD PRESSURE: 106 MMHG | RESPIRATION RATE: 19 BRPM | HEART RATE: 89 BPM | DIASTOLIC BLOOD PRESSURE: 69 MMHG

## 2017-02-05 VITALS — RESPIRATION RATE: 21 BRPM | DIASTOLIC BLOOD PRESSURE: 77 MMHG | SYSTOLIC BLOOD PRESSURE: 132 MMHG | HEART RATE: 72 BPM

## 2017-02-05 VITALS — RESPIRATION RATE: 19 BRPM | SYSTOLIC BLOOD PRESSURE: 105 MMHG | HEART RATE: 78 BPM | DIASTOLIC BLOOD PRESSURE: 73 MMHG

## 2017-02-05 VITALS — RESPIRATION RATE: 22 BRPM

## 2017-02-05 VITALS — DIASTOLIC BLOOD PRESSURE: 79 MMHG | RESPIRATION RATE: 21 BRPM | SYSTOLIC BLOOD PRESSURE: 117 MMHG | HEART RATE: 82 BPM

## 2017-02-05 VITALS — RESPIRATION RATE: 21 BRPM | HEART RATE: 81 BPM | SYSTOLIC BLOOD PRESSURE: 125 MMHG | DIASTOLIC BLOOD PRESSURE: 81 MMHG

## 2017-02-05 VITALS — SYSTOLIC BLOOD PRESSURE: 124 MMHG | RESPIRATION RATE: 24 BRPM | HEART RATE: 78 BPM | DIASTOLIC BLOOD PRESSURE: 87 MMHG

## 2017-02-05 VITALS — DIASTOLIC BLOOD PRESSURE: 60 MMHG | SYSTOLIC BLOOD PRESSURE: 109 MMHG | HEART RATE: 65 BPM | RESPIRATION RATE: 22 BRPM

## 2017-02-05 VITALS — DIASTOLIC BLOOD PRESSURE: 73 MMHG | RESPIRATION RATE: 19 BRPM | HEART RATE: 77 BPM | SYSTOLIC BLOOD PRESSURE: 112 MMHG

## 2017-02-05 VITALS — SYSTOLIC BLOOD PRESSURE: 138 MMHG | HEART RATE: 65 BPM | DIASTOLIC BLOOD PRESSURE: 74 MMHG | RESPIRATION RATE: 20 BRPM

## 2017-02-05 VITALS — RESPIRATION RATE: 21 BRPM | HEART RATE: 80 BPM | DIASTOLIC BLOOD PRESSURE: 79 MMHG | SYSTOLIC BLOOD PRESSURE: 113 MMHG

## 2017-02-05 VITALS — RESPIRATION RATE: 28 BRPM

## 2017-02-05 VITALS — HEART RATE: 69 BPM | SYSTOLIC BLOOD PRESSURE: 105 MMHG | RESPIRATION RATE: 24 BRPM | DIASTOLIC BLOOD PRESSURE: 66 MMHG

## 2017-02-05 VITALS — HEART RATE: 68 BPM | DIASTOLIC BLOOD PRESSURE: 83 MMHG | RESPIRATION RATE: 20 BRPM | SYSTOLIC BLOOD PRESSURE: 132 MMHG

## 2017-02-05 VITALS — HEART RATE: 69 BPM | SYSTOLIC BLOOD PRESSURE: 110 MMHG | DIASTOLIC BLOOD PRESSURE: 64 MMHG | RESPIRATION RATE: 24 BRPM

## 2017-02-05 VITALS — DIASTOLIC BLOOD PRESSURE: 77 MMHG | HEART RATE: 81 BPM | SYSTOLIC BLOOD PRESSURE: 110 MMHG | RESPIRATION RATE: 20 BRPM

## 2017-02-05 VITALS — RESPIRATION RATE: 19 BRPM | SYSTOLIC BLOOD PRESSURE: 105 MMHG | HEART RATE: 86 BPM | DIASTOLIC BLOOD PRESSURE: 72 MMHG

## 2017-02-05 VITALS — DIASTOLIC BLOOD PRESSURE: 70 MMHG | HEART RATE: 67 BPM | RESPIRATION RATE: 23 BRPM | SYSTOLIC BLOOD PRESSURE: 111 MMHG

## 2017-02-05 VITALS — HEART RATE: 72 BPM | SYSTOLIC BLOOD PRESSURE: 135 MMHG | DIASTOLIC BLOOD PRESSURE: 83 MMHG

## 2017-02-05 VITALS — SYSTOLIC BLOOD PRESSURE: 111 MMHG | HEART RATE: 64 BPM | DIASTOLIC BLOOD PRESSURE: 65 MMHG

## 2017-02-05 VITALS — DIASTOLIC BLOOD PRESSURE: 74 MMHG | RESPIRATION RATE: 21 BRPM | SYSTOLIC BLOOD PRESSURE: 120 MMHG | HEART RATE: 83 BPM

## 2017-02-05 VITALS — SYSTOLIC BLOOD PRESSURE: 121 MMHG | HEART RATE: 87 BPM | RESPIRATION RATE: 21 BRPM | DIASTOLIC BLOOD PRESSURE: 82 MMHG

## 2017-02-05 VITALS — RESPIRATION RATE: 20 BRPM | DIASTOLIC BLOOD PRESSURE: 66 MMHG | HEART RATE: 74 BPM | SYSTOLIC BLOOD PRESSURE: 113 MMHG

## 2017-02-05 VITALS — DIASTOLIC BLOOD PRESSURE: 69 MMHG | RESPIRATION RATE: 20 BRPM | HEART RATE: 75 BPM | SYSTOLIC BLOOD PRESSURE: 110 MMHG

## 2017-02-05 VITALS — RESPIRATION RATE: 21 BRPM | HEART RATE: 76 BPM | DIASTOLIC BLOOD PRESSURE: 66 MMHG | SYSTOLIC BLOOD PRESSURE: 113 MMHG

## 2017-02-05 VITALS — RESPIRATION RATE: 18 BRPM | SYSTOLIC BLOOD PRESSURE: 110 MMHG | HEART RATE: 85 BPM | DIASTOLIC BLOOD PRESSURE: 76 MMHG

## 2017-02-05 VITALS — RESPIRATION RATE: 22 BRPM | DIASTOLIC BLOOD PRESSURE: 70 MMHG | SYSTOLIC BLOOD PRESSURE: 122 MMHG | HEART RATE: 67 BPM

## 2017-02-05 VITALS — RESPIRATION RATE: 21 BRPM

## 2017-02-05 VITALS — HEART RATE: 65 BPM | SYSTOLIC BLOOD PRESSURE: 106 MMHG | RESPIRATION RATE: 24 BRPM | DIASTOLIC BLOOD PRESSURE: 68 MMHG

## 2017-02-05 VITALS — SYSTOLIC BLOOD PRESSURE: 118 MMHG | HEART RATE: 79 BPM | DIASTOLIC BLOOD PRESSURE: 77 MMHG | RESPIRATION RATE: 23 BRPM

## 2017-02-05 VITALS — DIASTOLIC BLOOD PRESSURE: 73 MMHG | HEART RATE: 85 BPM | RESPIRATION RATE: 20 BRPM | SYSTOLIC BLOOD PRESSURE: 107 MMHG

## 2017-02-05 VITALS — RESPIRATION RATE: 21 BRPM | DIASTOLIC BLOOD PRESSURE: 81 MMHG | SYSTOLIC BLOOD PRESSURE: 124 MMHG | HEART RATE: 70 BPM

## 2017-02-05 VITALS — RESPIRATION RATE: 25 BRPM | DIASTOLIC BLOOD PRESSURE: 93 MMHG | SYSTOLIC BLOOD PRESSURE: 139 MMHG | HEART RATE: 74 BPM

## 2017-02-05 VITALS — DIASTOLIC BLOOD PRESSURE: 63 MMHG | SYSTOLIC BLOOD PRESSURE: 99 MMHG | HEART RATE: 67 BPM

## 2017-02-05 VITALS — RESPIRATION RATE: 20 BRPM

## 2017-02-05 VITALS — HEART RATE: 68 BPM | RESPIRATION RATE: 21 BRPM | SYSTOLIC BLOOD PRESSURE: 130 MMHG | DIASTOLIC BLOOD PRESSURE: 73 MMHG

## 2017-02-05 VITALS — DIASTOLIC BLOOD PRESSURE: 61 MMHG | HEART RATE: 73 BPM | RESPIRATION RATE: 26 BRPM | SYSTOLIC BLOOD PRESSURE: 103 MMHG

## 2017-02-05 VITALS — HEART RATE: 78 BPM | DIASTOLIC BLOOD PRESSURE: 93 MMHG | SYSTOLIC BLOOD PRESSURE: 133 MMHG | RESPIRATION RATE: 22 BRPM

## 2017-02-05 VITALS — RESPIRATION RATE: 23 BRPM | DIASTOLIC BLOOD PRESSURE: 91 MMHG | SYSTOLIC BLOOD PRESSURE: 129 MMHG | HEART RATE: 83 BPM

## 2017-02-05 VITALS — HEART RATE: 72 BPM | DIASTOLIC BLOOD PRESSURE: 77 MMHG | SYSTOLIC BLOOD PRESSURE: 132 MMHG

## 2017-02-05 VITALS — SYSTOLIC BLOOD PRESSURE: 116 MMHG | DIASTOLIC BLOOD PRESSURE: 71 MMHG | RESPIRATION RATE: 20 BRPM | HEART RATE: 82 BPM

## 2017-02-05 VITALS — SYSTOLIC BLOOD PRESSURE: 104 MMHG | HEART RATE: 85 BPM | RESPIRATION RATE: 19 BRPM | DIASTOLIC BLOOD PRESSURE: 79 MMHG

## 2017-02-05 VITALS — HEART RATE: 79 BPM

## 2017-02-05 VITALS — HEART RATE: 68 BPM | SYSTOLIC BLOOD PRESSURE: 104 MMHG | DIASTOLIC BLOOD PRESSURE: 68 MMHG

## 2017-02-05 VITALS — RESPIRATION RATE: 14 BRPM

## 2017-02-05 VITALS — RESPIRATION RATE: 25 BRPM

## 2017-02-05 VITALS — SYSTOLIC BLOOD PRESSURE: 111 MMHG | RESPIRATION RATE: 20 BRPM | DIASTOLIC BLOOD PRESSURE: 76 MMHG | HEART RATE: 87 BPM

## 2017-02-05 VITALS — HEART RATE: 82 BPM | DIASTOLIC BLOOD PRESSURE: 72 MMHG | RESPIRATION RATE: 21 BRPM | SYSTOLIC BLOOD PRESSURE: 117 MMHG

## 2017-02-05 VITALS — DIASTOLIC BLOOD PRESSURE: 95 MMHG | SYSTOLIC BLOOD PRESSURE: 138 MMHG | HEART RATE: 78 BPM | RESPIRATION RATE: 24 BRPM

## 2017-02-05 VITALS — DIASTOLIC BLOOD PRESSURE: 78 MMHG | SYSTOLIC BLOOD PRESSURE: 111 MMHG | RESPIRATION RATE: 18 BRPM | HEART RATE: 90 BPM

## 2017-02-05 VITALS — HEART RATE: 70 BPM | SYSTOLIC BLOOD PRESSURE: 144 MMHG | DIASTOLIC BLOOD PRESSURE: 91 MMHG

## 2017-02-05 VITALS — RESPIRATION RATE: 26 BRPM

## 2017-02-05 VITALS — SYSTOLIC BLOOD PRESSURE: 106 MMHG | HEART RATE: 64 BPM | DIASTOLIC BLOOD PRESSURE: 82 MMHG | RESPIRATION RATE: 23 BRPM

## 2017-02-05 VITALS — HEART RATE: 86 BPM

## 2017-02-05 VITALS — RESPIRATION RATE: 29 BRPM

## 2017-02-05 LAB
ANION GAP SERPL CALC-SCNC: 35 MMOL/L (ref 8–16)
ANION GAP SERPL CALC-SCNC: 39 MMOL/L (ref 8–16)
ARTERIAL COHB: 0.4 % (ref 0–3)
ARTERIAL FRACTION OF OXYHGB: 94.9 % (ref 93–99)
ARTERIAL METHB: 0.2 % (ref 0–1.5)
ARTERIAL TOTAL HEMGLOBIN: 14.2 G/DL (ref 12–18)
BASE EXCESS BLDA CALC-SCNC: -2.3 MMOL/L (ref -3–3)
BASE EXCESS BLDA CALC-SCNC: -2.3 MMOL/L (ref -3–3)
BASOPHILS # BLD AUTO: 0 10^3/UL (ref 0–0.1)
BASOPHILS NFR BLD: 0.3 % (ref 0–2)
BLOOD GAS PS: 10
BLOOD GAS PS: 10
BUN SERPL-MCNC: 66 MG/DL (ref 7–20)
BUN SERPL-MCNC: 90 MG/DL (ref 7–20)
CALCIUM SERPL-MCNC: 10 MG/DL (ref 8.4–10.2)
CALCIUM SERPL-MCNC: 10.4 MG/DL (ref 8.4–10.2)
CHLORIDE SERPL-SCNC: 95 MMOL/L (ref 97–110)
CHLORIDE SERPL-SCNC: 98 MMOL/L (ref 97–110)
CO2 SERPL-SCNC: 17 MMOL/L (ref 21–31)
CO2 SERPL-SCNC: 22 MMOL/L (ref 21–31)
CONDITION: 1
CREAT SERPL-MCNC: 7.38 MG/DL (ref 0.44–1)
CREAT SERPL-MCNC: 9.41 MG/DL (ref 0.44–1)
EOSINOPHIL # BLD: 0.2 10^3/UL (ref 0–0.5)
EOSINOPHIL NFR BLD: 1.9 % (ref 0–7)
ERYTHROCYTE [DISTWIDTH] IN BLOOD BY AUTOMATED COUNT: 15.1 % (ref 11.5–14.5)
GLUCOSE SERPL-MCNC: 70 MG/DL (ref 70–220)
GLUCOSE SERPL-MCNC: 93 MG/DL (ref 70–220)
HCO3 BLDA-SCNC: 21.3 MMOL/L (ref 22–26)
HCT VFR BLD CALC: 34.5 % (ref 37–47)
HGB BLD-MCNC: 11.5 G/DL (ref 12–16)
LH ANALYZER COMMENTS: 1
LYMPHOCYTES # BLD AUTO: 0.9 10^3/UL (ref 0.8–2.9)
LYMPHOCYTES NFR BLD AUTO: 10 % (ref 15–51)
MCH RBC QN AUTO: 29 PG (ref 29–33)
MCHC RBC AUTO-ENTMCNC: 33.3 G/DL (ref 32–37)
MCV RBC AUTO: 87 FL (ref 82–101)
MODE: (no result)
MONOCYTES # BLD: 0.8 10^3/UL (ref 0.3–0.9)
MONOCYTES NFR BLD: 9.5 % (ref 0–11)
NEUTROPHILS # BLD: 6.8 10^3/UL (ref 1.6–7.5)
NEUTROPHILS NFR BLD AUTO: 78.3 % (ref 39–77)
NRBC # BLD MANUAL: 0 10^3/UL (ref 0–0)
NRBC BLD QL: 0 /100WBC (ref 0–0)
O2 A-A PPRESDIFF RESPIRATORY: 87.1 MMHG (ref 7–24)
O2/TOTAL GAS SETTING VFR VENT: 30 %
O2/TOTAL GAS SETTING VFR VENT: 30 %
PLATELET # BLD: 227 10^3/UL (ref 140–440)
PMV BLD AUTO: 10 FL (ref 7.4–10.4)
POTASSIUM SERPL-SCNC: 5.4 MMOL/L (ref 3.5–5.1)
POTASSIUM SERPL-SCNC: 5.4 MMOL/L (ref 3.5–5.1)
RBC # BLD AUTO: 3.96 10^6/UL (ref 4.2–5.4)
SODIUM SERPL-SCNC: 147 MMOL/L (ref 135–144)
SODIUM SERPL-SCNC: 149 MMOL/L (ref 135–144)
WBC # BLD AUTO: 8.7 10^3/UL (ref 4.8–10.8)
WBC # BLD: 8.7 10^3/UL (ref 4.8–10.8)

## 2017-02-05 RX ADMIN — DIPHENHYDRAMINE HYDROCHLORIDE SCH MG: 50 INJECTION, SOLUTION INTRAMUSCULAR; INTRAVENOUS at 08:44

## 2017-02-05 RX ADMIN — PIPERACILLIN AND TAZOBACTAM SCH MLS/HR: 2; .25 INJECTION, POWDER, FOR SOLUTION INTRAVENOUS at 05:50

## 2017-02-05 RX ADMIN — CARBOXYMETHYLCELLULOSE SODIUM SCH DROP: 10 GEL OPHTHALMIC at 08:42

## 2017-02-05 RX ADMIN — Medication SCH MLS/HR: at 17:44

## 2017-02-05 RX ADMIN — CARBOXYMETHYLCELLULOSE SODIUM SCH DROP: 10 GEL OPHTHALMIC at 22:26

## 2017-02-05 RX ADMIN — PIPERACILLIN AND TAZOBACTAM SCH MLS/HR: 2; .25 INJECTION, POWDER, FOR SOLUTION INTRAVENOUS at 14:37

## 2017-02-05 RX ADMIN — PROPOFOL SCH MLS/HR: 10 INJECTION, EMULSION INTRAVENOUS at 17:40

## 2017-02-05 RX ADMIN — PIPERACILLIN AND TAZOBACTAM SCH MLS/HR: 2; .25 INJECTION, POWDER, FOR SOLUTION INTRAVENOUS at 22:26

## 2017-02-05 RX ADMIN — PROPOFOL SCH MLS/HR: 10 INJECTION, EMULSION INTRAVENOUS at 13:30

## 2017-02-05 RX ADMIN — CARBOXYMETHYLCELLULOSE SODIUM SCH DROP: 10 GEL OPHTHALMIC at 14:37

## 2017-02-05 RX ADMIN — ATORVASTATIN CALCIUM SCH MG: 20 TABLET, FILM COATED ORAL at 22:26

## 2017-02-05 NOTE — PN
Date/Time of Note


Date/Time of Note


DATE: 2/5/17 


TIME: 14:07





Assessment/Plan


VTE Prophylaxis


VTE Prophylaxis Intervention:  SCD's





Lines/Catheters


IV Catheter Type (from [facility]):  Peripheral IV


Urinary Cath still in place:  Yes


Reason Cath still needed:  other (indicate) (critical illness, coma)





Assessment/Plan


Assessment/Plan


[facility]/[place] [facility] MEDICINE





1. 46-year-old woman admitted 1/31/17 (hospital day 6) s/p head trauma due to a 

mechanical fall on a concrete porch.  Skull fracture, bilateral acute inferior 

frontal hemorrhagic contusions (right greater than left), 8 mm right convexity 

subdural hematoma, mild scattered subarachnoid hemorrhage, 3 mm leftward 

midline shift of the septum pellucidum and right temporal-parietal scalp 

swelling with nondisplaced, oblique fracture through the right parietal and 

temporal skull.  Reported epidural hematoma on 2nd CT head.  Mental status 

improving off propofol this morning, with intact conjugate gaze and 

appropriately reactive pupils.


* No surgical intervention anticipated for now, per discussion with Dr. Palomo 

this afternoon.





2. Acute respiratory failure secondary to severe encephalopathy, exacerbated by 

barotrauma  with bilateral pneumothorax, pneumoperitoneum, and subcutaneous 

emphysema. CXR this morning shows slowly improved pneumomediastinum.  No 

drainage from either chest tube again this morning.  CT abd/pelvis three days 

ago showed pneumoperitoneum responsible for her abdominal distension, now 

completely resolved.


* Start NG tube feeding


* Continue with chest tubes per Dr. Medina





3. Hypotension resolved, now off pressors.





4.  Leukocytosis and lactic acidosis on admission, unclear cause, now resolved 

without other indicators of infection. Patchy RUL lobe infiltrate stable on 

pCXR unchanged today.


* Continue empiric Zosyn to cover for possible pulmonary infection





5.  End-stage renal disease on hemodialysis, most recently this morning (

usually Monday/Wednesday/Friday)


* Our thanks to Dr. Chaney, who is following for nephrology.





6.  Gastroesophageal reflux disease


* Continue Pepcid IV





7. Prophylaxis


* Pepcid for GI prophylaxis


* SCDs for DVT prophylaxis





8. Disposition:  Follow up further Neurosurgery recommendations.  Hoping for 

another trial of CPAP, per Dr. Hanks (Pulmonary) tomorrow.








VILMA Mitchell MD PhD


[phone]





Subjective


24 Hr Interval Summary


Free Text/Dictation


Intubated, sedated.  Her sister-in-law and another friend were visiting.  Eyes 

open, but not tracking.  Responds to voice and movement.





Exam/Review of Systems


Vital Signs


Vitals





 Vital Signs








  Date Time  Temp Pulse Resp B/P Pulse Ox O2 Delivery O2 Flow Rate FiO2


 


2/5/17 12:22  67 28  99   30


 


2/5/17 12:00    110/64  Mechanical Ventilator  


 


2/5/17 11:00 98.9       














 Intake and Output   


 


 2/4/17 2/4/17 2/5/17





 15:00 23:00 07:00


 


Intake Total 69.6 ml 111.0 ml 189.6 ml


 


Output Total 5 ml 5 ml 0 ml


 


Balance 64.6 ml 106.0 ml 189.6 ml











Exam


Constitutional:  Intubated, off sedation now. Awake, mostly comfortable-

appearing.


Respiratory:  On ventilator with clear breath sounds bilaterally; chest tubes 

in place bilaterally.


Cardiovascular:  Symmetric radial pulses, regular rhythm, normal rate, no JVD 

or peripheral edema.


Gastrointestinal:  Not distended, non-tender, normal bowel sounds, soft


Musculoskeletal:  No dov arthritis.  No edema, clubbing or cyanosis


Neurological:  Toes downgoing, no upper motor neuron signs, pupils equal and 

reactive, intact conjugate gaze.





Results


Result Diagram:  


2/5/17 0427                                                                    

            2/5/17 0427





Results 24 hrs





Laboratory Tests








Test


  2/5/17


04:27


 


Anion Gap 39  H


 


Basophils # 0.0  


 


Basophils % 0.3  


 


Blood Morphology Comment   


 


Blood Urea Nitrogen 90  #H


 


Calcium Level 10.0  


 


Carbon Dioxide Level 17  L


 


Chloride Level 98  


 


Creatinine 9.41  #H


 


Eosinophils # 0.2  


 


Eosinophils % 1.9  


 


Glucose Level 70  


 


Hematocrit 34.5  L


 


Hemoglobin 11.5  L


 


Lymphocytes # 0.9  


 


Lymphocytes % 10.0  L


 


Mean Corpuscular Hemoglobin 29.0  


 


Mean Corpuscular Hemoglobin


Concent 33.3  


 


 


Mean Corpuscular Volume 87.0  


 


Mean Platelet Volume 10.0  #


 


Monocytes # 0.8  


 


Monocytes % 9.5  


 


Neutrophils # 6.8  


 


Neutrophils % 78.3  H


 


Nucleated Red Blood Cells # 0.0  


 


Nucleated Red Blood Cells % 0.0  


 


Platelet Count 227  #


 


Potassium Level 5.4  H


 


Red Blood Count 3.96  L


 


Red Cell Distribution Width 15.1  H


 


Sodium Level 149  H


 


White Blood Count 8.7  











Medications


Medications





 Current Medications


Labetalol HCl (Labetalol) 10 mg Q10M  PRN IV ELEVATED BLOOD PRESSURE;  Start 1/ 31/17 at 14:00


Ondansetron HCl (Zofran Inj) 4 mg Q6H  PRN IV NAUSEA AND/OR VOMITING Last 

administered on 1/31/17at 20:32; Admin Dose 4 MG;  Start 1/31/17 at 14:00


Acetaminophen (Tylenol Liquid) 650 mg Q6H  PRN PO PAIN LEVEL 1-3 OR FEVER Last 

administered on 2/3/17at 21:28; Admin Dose 650 MG;  Start 1/31/17 at 14:00


Acetaminophen (Tylenol Supp) 650 mg Q4H  PRN [place] PAIN LEVEL 1-3 OR FEVER;  Start 

1/31/17 at 14:00


Famotidine (Pepcid Iv) 20 mg DAILY IV  Last administered on 2/5/17at 08:44; 

Admin Dose 20 MG;  Start 1/31/17 at 15:00


Eye Lubricant (Artificial Tears Oph) 1 drop TID BOTH EYES  Last administered on 

2/5/17at 08:42; Admin Dose 1 DROP;  Start 1/31/17 at 21:00


Atorvastatin Calcium (Lipitor) 20 mg QHS PO  Last administered on 2/4/17at 21:06

; Admin Dose 20 MG;  Start 1/31/17 at 21:00


Multivit/Ca Carb/ B Cmplx/FA/Prenat (Charlotte-Argenis) 1 tab DAILY PO  Last 

administered on 2/5/17at 08:44; Admin Dose 1 TAB;  Start 2/1/17 at 09:00


Hydromorphone HCl 0.5 mg 0.5 mg Q4H  PRN IV PAIN Last administered on 2/2/17at 

03:10; Admin Dose 0.5 MG;  Start 1/31/17 at 21:30


Propofol 100 ml @  1.641 mls/ hr Q12H IV  Last administered on 2/4/17at 23:38; 

Admin Dose 4.923 MLS/HR;  Start 2/1/17 at 01:30


Norepinephrine 16 mg/Dextrose 500 ml @  1.87 mls/hr TITRATE IV ;  Start 2/2/17 

at 09:00


Piperacillin Sod/ Tazobactam Sod 50 ml @  200 mls/hr Q8 IVPB  Last administered 

on 2/5/17at 05:50; Admin Dose 200 MLS/HR;  Start 2/2/17 at 11:00


Fentanyl (Sublimaze) 100 ml @  2.5 mls/hr TITRATE IV  Last administered on 2/4/ 17at 19:57; Admin Dose 2.5 MLS/HR;  Start 2/4/17 at 11:30











MANAV MITCHELL M.D. Feb 5, 2017 14:13

## 2017-02-05 NOTE — RADRPT
PROCEDURE:   XR Chest. 

 

CLINICAL INDICATION:  Shortness of breath

 

TECHNIQUE:   A single portable view of the chest was obtained. 

 

COMPARISON:   02/05/2017 from 05/30/1980 

 

FINDINGS:

Again seen is an endotracheal tube which is at the level of the hipolito.  Recommend retraction approx
imately 2-3 cm.  Bilateral chest tubes are again seen.  Nasogastric tube seen in the gastric lumen. 
 The cardiomediastinal silhouette is within normal limits. The right upper lung zone airspace diseas
e is improved. Bibasilar atelectasis is seen.  The soft tissues and osseous structures are unchanged
.  Pneumoperitoneum is again seen. 

 

IMPRESSION:

 

1.  Endotracheal tube at the level of the hipolito.  Recommend retraction approximately 2-3 cm.

2.  Nasogastric tube in the gastric lumen.

3. Improving right upper lung zone airspace disease.

4.  Pneumoperitoneum again seen.

 

RPTAT:  HPNM

 

Results were discussed with the patient's nurse Nakita Muñoz at 2/5/2017 9:30:15 PM

_____________________________________________ 

Physician Keny           Date    Time 

Electronically viewed and signed by Sha Trevino Physician on 02/05/2017 21:30 

 

D:  02/05/2017 21:30  T:  02/05/2017 21:30

PM/

## 2017-02-05 NOTE — PN
Date/Time of Note


Date/Time of Note


DATE: 2/5/17 


TIME: 10:47





Assessment/Plan


Lines/Catheters


IV Catheter Type (from Nrsg):  Peripheral IV


Kamara in Place (from Nrsg):  No





Assessment/Plan


Chief Complaint/Hosp Course


PTX


SP  bilateral CT placement


CXR,


 


IMPRESSION:


 





1.  Slightly improving right upper lung zone airspace disease.


2.  Slight decrease pneumoperitoneum.


3.  Slight increased prominence of the pulmonary vasculature.


3.  Endotracheal tube just above the level of the hipolito.  Recommend retraction 

approximately 2 cm.


 





will continue CT sxn


Problems:  





Subjective


24 Hr Interval Summary


Constitutional:  improved


Pain Control:  mild





Exam/Review of Systems


Vital Signs


Vitals





 Vital Signs








  Date Time  Temp Pulse Resp B/P Pulse Ox O2 Delivery O2 Flow Rate FiO2


 


2/5/17 08:45        35


 


2/5/17 08:15  72 22     


 


2/5/17 07:30 98.8   138/74 97 Mechanical Ventilator  














 Intake and Output   


 


 2/4/17 2/4/17 2/5/17





 15:00 23:00 07:00


 


Intake Total 69.6 ml 111.0 ml 189.6 ml


 


Output Total 5 ml 5 ml 0 ml


 


Balance 64.6 ml 106.0 ml 189.6 ml











Exam


Neck:  non-tender, supple


Respiratory:  clear to auscultation, normal air movement


Cardiovascular:  nl pulses, regular rate and rhythm


Gastrointestinal:  nl liver, spleen, non-tender, soft





Results


Result Diagram:  


2/5/17 0427                                                                    

            2/5/17 0427














SUNITA BETANCUR MD Feb 5, 2017 10:48

## 2017-02-05 NOTE — CONS
Date/Time of Note


Date/Time of Note


DATE: 2/5/17 


TIME: 12:39





Consult Date/Type/Reason


Admit Date/Time


Jan 31, 2017 at 13:31


Initial Consult Date


1/31/17


Type of Consultation:  pulmonary





Subjective


Patient remains stable on mechanical ventilation


She is awake alert and oriented having hemodialysis


She continues to remain hemodynamically stable





Objective





 Vital Signs








  Date Time  Temp Pulse Resp B/P Pulse Ox O2 Delivery O2 Flow Rate FiO2


 


2/5/17 12:00  73 24 110/64 98 Mechanical Ventilator  


 


2/5/17 11:00 98.9       


 


2/5/17 08:45        35














 Intake and Output   


 


 2/4/17 2/4/17 2/5/17





 15:00 23:00 07:00


 


Intake Total 69.6 ml 111.0 ml 189.6 ml


 


Output Total 5 ml 5 ml 0 ml


 


Balance 64.6 ml 106.0 ml 189.6 ml





PHYSICAL EXAMINATION


GENERAL: Well-nourished well-developed  lady intubated on mechanical 

ventilation 


VITAL SIGNS: see below.


HEENT: Pupils equal, round, and reactive to light. 


CARDIAC:  S1, S2, tachycardia.


CHEST:  Diminished air entry bilaterally.


ABDOMEN:  Mildly distended.  No bowel sounds.


EXTREMITIES:  No cyanosis, clubbing or edema.


NEUROLOGIC: Unable to assess





Results/Medications


Result Diagram:  


2/5/17 0427 2/5/17 0427





Results 24 hrs





Laboratory Tests








Test


  2/5/17


04:27


 


Anion Gap 39  H


 


Basophils # 0.0  


 


Basophils % 0.3  


 


Blood Morphology Comment   


 


Blood Urea Nitrogen 90  #H


 


Calcium Level 10.0  


 


Carbon Dioxide Level 17  L


 


Chloride Level 98  


 


Creatinine 9.41  #H


 


Eosinophils # 0.2  


 


Eosinophils % 1.9  


 


Glucose Level 70  


 


Hematocrit 34.5  L


 


Hemoglobin 11.5  L


 


Lymphocytes # 0.9  


 


Lymphocytes % 10.0  L


 


Mean Corpuscular Hemoglobin 29.0  


 


Mean Corpuscular Hemoglobin


Concent 33.3  


 


 


Mean Corpuscular Volume 87.0  


 


Mean Platelet Volume 10.0  #


 


Monocytes # 0.8  


 


Monocytes % 9.5  


 


Neutrophils # 6.8  


 


Neutrophils % 78.3  H


 


Nucleated Red Blood Cells # 0.0  


 


Nucleated Red Blood Cells % 0.0  


 


Platelet Count 227  #


 


Potassium Level 5.4  H


 


Red Blood Count 3.96  L


 


Red Cell Distribution Width 15.1  H


 


Sodium Level 149  H


 


White Blood Count 8.7  








Medications





 Current Medications


Labetalol HCl (Labetalol) 10 mg Q10M  PRN IV ELEVATED BLOOD PRESSURE;  Start 1/ 31/17 at 14:00


Ondansetron HCl (Zofran Inj) 4 mg Q6H  PRN IV NAUSEA AND/OR VOMITING Last 

administered on 1/31/17at 20:32; Admin Dose 4 MG;  Start 1/31/17 at 14:00


Acetaminophen (Tylenol Liquid) 650 mg Q6H  PRN PO PAIN LEVEL 1-3 OR FEVER Last 

administered on 2/3/17at 21:28; Admin Dose 650 MG;  Start 1/31/17 at 14:00


Acetaminophen (Tylenol Supp) 650 mg Q4H  PRN [place] PAIN LEVEL 1-3 OR FEVER;  Start 

1/31/17 at 14:00


Famotidine (Pepcid Iv) 20 mg DAILY IV  Last administered on 2/5/17at 08:44; 

Admin Dose 20 MG;  Start 1/31/17 at 15:00


Eye Lubricant (Artificial Tears Oph) 1 drop TID BOTH EYES  Last administered on 

2/5/17at 08:42; Admin Dose 1 DROP;  Start 1/31/17 at 21:00


Atorvastatin Calcium (Lipitor) 20 mg QHS PO  Last administered on 2/4/17at 21:06

; Admin Dose 20 MG;  Start 1/31/17 at 21:00


Multivit/Ca Carb/ B Cmplx/FA/Prenat ([place]-Argenis) 1 tab DAILY PO  Last 

administered on 2/5/17at 08:44; Admin Dose 1 TAB;  Start 2/1/17 at 09:00


Hydromorphone HCl 0.5 mg 0.5 mg Q4H  PRN IV PAIN Last administered on 2/2/17at 

03:10; Admin Dose 0.5 MG;  Start 1/31/17 at 21:30


Propofol 100 ml @  1.641 mls/ hr Q12H IV  Last administered on 2/4/17at 23:38; 

Admin Dose 4.923 MLS/HR;  Start 2/1/17 at 01:30


Norepinephrine 16 mg/Dextrose 500 ml @  1.87 mls/hr TITRATE IV ;  Start 2/2/17 

at 09:00


Piperacillin Sod/ Tazobactam Sod 50 ml @  200 mls/hr Q8 IVPB  Last administered 

on 2/5/17at 05:50; Admin Dose 200 MLS/HR;  Start 2/2/17 at 11:00


Fentanyl (Sublimaze) 100 ml @  2.5 mls/hr TITRATE IV  Last administered on 2/4/ 17at 19:57; Admin Dose 2.5 MLS/HR;  Start 2/4/17 at 11:30





Assessment/Plan


Chief Complaint/Hosp Course


Assessment





1. Mechanical fall with intraparenchymal and subdural bleed, no neurosurgical 

intervention


2. Iatrogenic pneumothorax with subcutaneous emphysema, improved with chest 

tube placement


3. End-stage renal failure hemodialysis


4. Resolving encephalopathy


5. Hypoxemic respiratory failure secondary to above





Plan





1. Add fentanyl drip for pain control


2. Decrease propofol as tolerated


3. Continue neurosurgical recommendations


4. Continue DVT and GI prophylaxis


5. Continue mechanical ventilation. CPAP weaning trial this morning


Problems:  











N[facility] APYNE [facility] Feb 5, 2017 12:40

## 2017-02-05 NOTE — CONS
Date/Time of Note


Date/Time of Note


DATE: 2/5/17 


TIME: 09:53





Assessment/Plan


Assessment/Plan


Chief Complaint/Hosp Course


s/p traumatic R > L frontal contusion, right SDH.


neurologically stable.


Still awaiting extubation.


f/u imaging prn but can follow clinical exam. SDH appeared to had diminished 

and unlikely to for chronic subdural hematoma, symptoms mostly due to contusion 

which will resolve.


Problems:  





Consultation Date/Type/Reason


Admit Date/Time


Jan 31, 2017 at 13:31


Initial Consult Date


1/31/17


Type of Consultation:  Neurosurgery





24 HR Interval Summary


Free Text/Dictation


remains intubated.





Exam/Review of Systems


Vital Signs


Vitals





 Vital Signs








  Date Time  Temp Pulse Resp B/P Pulse Ox O2 Delivery O2 Flow Rate FiO2


 


2/5/17 08:45        35


 


2/5/17 07:30 98.8 65 20 138/74 97 Mechanical Ventilator  














 Intake and Output   


 


 2/4/17 2/4/17 2/5/17





 15:00 23:00 07:00


 


Intake Total 69.6 ml 111.0 ml 189.6 ml


 


Output Total 5 ml 5 ml 0 ml


 


Balance 64.6 ml 106.0 ml 189.6 ml











Exam


Constitutional:  alert


Eyes:  EOMI, PERRL


ENMT:  other (intubated)


Neurological:  other (MORRIS purposefully and to commands though perhaps some mild 

LUE weakness. )





Results


Result Diagram:  


2/5/17 0427                                                                    

            2/5/17 0427





Results 24 hrs





Laboratory Tests








Test


  2/5/17


04:27


 


Anion Gap 39  H


 


Basophils # 0.0  


 


Basophils % 0.3  


 


Blood Morphology Comment   


 


Blood Urea Nitrogen 90  #H


 


Calcium Level 10.0  


 


Carbon Dioxide Level 17  L


 


Chloride Level 98  


 


Creatinine 9.41  #H


 


Eosinophils # 0.2  


 


Eosinophils % 1.9  


 


Glucose Level 70  


 


Hematocrit 34.5  L


 


Hemoglobin 11.5  L


 


Lymphocytes # 0.9  


 


Lymphocytes % 10.0  L


 


Mean Corpuscular Hemoglobin 29.0  


 


Mean Corpuscular Hemoglobin


Concent 33.3  


 


 


Mean Corpuscular Volume 87.0  


 


Mean Platelet Volume 10.0  #


 


Monocytes # 0.8  


 


Monocytes % 9.5  


 


Neutrophils # 6.8  


 


Neutrophils % 78.3  H


 


Nucleated Red Blood Cells # 0.0  


 


Nucleated Red Blood Cells % 0.0  


 


Platelet Count 227  #


 


Potassium Level 5.4  H


 


Red Blood Count 3.96  L


 


Red Cell Distribution Width 15.1  H


 


Sodium Level 149  H


 


White Blood Count 8.7  











Medications


Medications





 Current Medications


Labetalol HCl (Labetalol) 10 mg Q10M  PRN IV ELEVATED BLOOD PRESSURE;  Start 1/ 31/17 at 14:00


Ondansetron HCl (Zofran Inj) 4 mg Q6H  PRN IV NAUSEA AND/OR VOMITING Last 

administered on 1/31/17at 20:32; Admin Dose 4 MG;  Start 1/31/17 at 14:00


Acetaminophen (Tylenol Liquid) 650 mg Q6H  PRN PO PAIN LEVEL 1-3 OR FEVER Last 

administered on 2/3/17at 21:28; Admin Dose 650 MG;  Start 1/31/17 at 14:00


Acetaminophen (Tylenol Supp) 650 mg Q4H  PRN [place] PAIN LEVEL 1-3 OR FEVER;  Start 

1/31/17 at 14:00


Famotidine (Pepcid Iv) 20 mg DAILY IV  Last administered on 2/5/17at 08:44; 

Admin Dose 20 MG;  Start 1/31/17 at 15:00


Eye Lubricant (Artificial Tears Oph) 1 drop TID BOTH EYES  Last administered on 

2/5/17at 08:42; Admin Dose 1 DROP;  Start 1/31/17 at 21:00


Atorvastatin Calcium (Lipitor) 20 mg QHS PO  Last administered on 2/4/17at 21:06

; Admin Dose 20 MG;  Start 1/31/17 at 21:00


Multivit/Ca Carb/ B Cmplx/FA/Prenat ([place]-Argenis) 1 tab DAILY PO  Last 

administered on 2/5/17at 08:44; Admin Dose 1 TAB;  Start 2/1/17 at 09:00


Hydromorphone HCl 0.5 mg 0.5 mg Q4H  PRN IV PAIN Last administered on 2/2/17at 

03:10; Admin Dose 0.5 MG;  Start 1/31/17 at 21:30


Propofol 100 ml @  1.641 mls/ hr Q12H IV  Last administered on 2/4/17at 23:38; 

Admin Dose 4.923 MLS/HR;  Start 2/1/17 at 01:30


Norepinephrine 16 mg/Dextrose 500 ml @  1.87 mls/hr TITRATE IV ;  Start 2/2/17 

at 09:00


Piperacillin Sod/ Tazobactam Sod 50 ml @  200 mls/hr Q8 IVPB  Last administered 

on 2/5/17at 05:50; Admin Dose 200 MLS/HR;  Start 2/2/17 at 11:00


Fentanyl (Sublimaze) 100 ml @  2.5 mls/hr TITRATE IV  Last administered on 2/4/ 17at 19:57; Admin Dose 2.5 MLS/HR;  Start 2/4/17 at 11:30











ODALIS MADISON MD Feb 5, 2017 09:56

## 2017-02-05 NOTE — CONS
Date/Time of Note


Date/Time of Note


DATE: 2/5/17 


TIME: 22:30





Assessment/Plan


Assessment/Plan


Chief Complaint/Hosp Course


IMPRESSION:


1.  The patient has acute hemorrhagic stroke./sdh/trauma


2.  Malignant hypertension.better


3.  Endstage renal disease.


4.  Leukocytosis. better


5.  Respiratory failure.


6.  Incomplete database


7 hyperkalemia hx


8 SUBCUTANEOUS EPMHYSEMA/c tube


plan hd


per neuro


bmp am


Problems:  





Consultation Date/Type/Reason


Admit Date/Time


Jan 31, 2017 at 13:31


Initial Consult Date


1/31/17


Type of Consultation:  renal





24 HR Interval Summary


Subjective hx not possible:  pt non-verbal, other (on vent)





Exam/Review of Systems


Vital Signs


Vitals





 Vital Signs








  Date Time  Temp Pulse Resp B/P Pulse Ox O2 Delivery O2 Flow Rate FiO2


 


2/5/17 21:15  83 20  99   30


 


2/5/17 20:00    113/79  Mechanical Ventilator  


 


2/5/17 19:30 99.5       














 Intake and Output   


 


 2/4/17 2/4/17 2/5/17





 15:00 23:00 07:00


 


Intake Total 69.6 ml 111.0 ml 189.6 ml


 


Output Total 5 ml 5 ml 0 ml


 


Balance 64.6 ml 106.0 ml 189.6 ml











Exam


Neck:  supple


Respiratory:  diminished breath sounds


Cardiovascular:  regular rate and rhythm


Gastrointestinal:  soft


Musculoskeletal:  nl extremities to inspection


Extremities:  normal pulses





Results


Result Diagram:  


2/5/17 0427                                                                    

            2/5/17 0427





Results 24 hrs





Laboratory Tests








Test


  2/5/17


04:27 2/5/17


14:25


 


Anion Gap 39  H 


 


Basophils # 0.0   


 


Basophils % 0.3   


 


Blood Morphology Comment    


 


Blood Urea Nitrogen 90  #H 


 


Calcium Level 10.0   


 


Carbon Dioxide Level 17  L 


 


Chloride Level 98   


 


Creatinine 9.41  #H 


 


Eosinophils # 0.2   


 


Eosinophils % 1.9   


 


Glucose Level 70   


 


Hematocrit 34.5  L 


 


Hemoglobin 11.5  L 


 


Lymphocytes # 0.9   


 


Lymphocytes % 10.0  L 


 


Mean Corpuscular Hemoglobin 29.0   


 


Mean Corpuscular Hemoglobin


Concent 33.3  


  


 


 


Mean Corpuscular Volume 87.0   


 


Mean Platelet Volume 10.0  # 


 


Monocytes # 0.8   


 


Monocytes % 9.5   


 


Neutrophils # 6.8   


 


Neutrophils % 78.3  H 


 


Nucleated Red Blood Cells # 0.0   


 


Nucleated Red Blood Cells % 0.0   


 


Platelet Count 227  # 


 


Potassium Level 5.4  H 


 


Red Blood Count 3.96  L 


 


Red Cell Distribution Width 15.1  H 


 


Sodium Level 149  H 


 


White Blood Count 8.7   


 


Arterial Blood HCO3  21.3  L


 


Arterial Blood Base Excess  -2.3  


 


Arterial Blood Oxygen


Saturation 


  95.5  


 


 


Jed Test  N/A  


 


Arterial Blood Gas Puncture


Site 


  Right Brachial


 


 


Arterial Blood


Carboxyhemoglobin 


  0.4  


 


 


Arterial Blood Date Drawn


  


  2/5/2017


1:45:00 PM


 


Arterial Blood Methemoglobin  0.2  


 


Arterial Blood pCO2 (Temp


correct) 


  33.5  L


 


 


Arterial Blood pH (Temp


corrected) 


  7.422  


 


 


Arterial Blood pO2 (Temp


corrected) 


  87.4  


 


 


Blood Gas A-a O2 Differential  87.1  H


 


Blood Gas Actual Respiration


Rate 


  23  


 


 


Blood Gas Modality  VENT - CPAP  


 


Blood Gas Notified Time


  


  2/5/2017


1:52:00 PM


 


Blood Gas Notified Whom  TM  


 


Blood Gas Pressure Support  10  


 


Blood Gas Specimen Source


  


  Blood arterial


 


 


Blood Gas Temperature  37.0  


 


FiO2  30.0  


 


Oxyhemoglobin Percent  94.9  


 


Total Hemoglobin  14.2  











Medications


Medications





 Current Medications


Labetalol HCl (Labetalol) 10 mg Q10M  PRN IV ELEVATED BLOOD PRESSURE;  Start 1/ 31/17 at 14:00


Ondansetron HCl (Zofran Inj) 4 mg Q6H  PRN IV NAUSEA AND/OR VOMITING Last 

administered on 1/31/17at 20:32; Admin Dose 4 MG;  Start 1/31/17 at 14:00


Acetaminophen (Tylenol Liquid) 650 mg Q6H  PRN PO PAIN LEVEL 1-3 OR FEVER Last 

administered on 2/3/17at 21:28; Admin Dose 650 MG;  Start 1/31/17 at 14:00


Acetaminophen (Tylenol Supp) 650 mg Q4H  PRN [place] PAIN LEVEL 1-3 OR FEVER;  Start 

1/31/17 at 14:00


Famotidine (Pepcid Iv) 20 mg DAILY IV  Last administered on 2/5/17at 08:44; 

Admin Dose 20 MG;  Start 1/31/17 at 15:00


Eye Lubricant (Artificial Tears Oph) 1 drop TID BOTH EYES  Last administered on 

2/5/17at 22:26; Admin Dose 1 DROP;  Start 1/31/17 at 21:00


Atorvastatin Calcium (Lipitor) 20 mg QHS PO  Last administered on 2/5/17at 22:26

; Admin Dose 20 MG;  Start 1/31/17 at 21:00


Multivit/Ca Carb/ B Cmplx/FA/Prenat ([place]otte-Argenis) 1 tab DAILY PO  Last 

administered on 2/5/17at 08:44; Admin Dose 1 TAB;  Start 2/1/17 at 09:00


Hydromorphone HCl 0.5 mg 0.5 mg Q4H  PRN IV PAIN Last administered on 2/2/17at 

03:10; Admin Dose 0.5 MG;  Start 1/31/17 at 21:30


Propofol 100 ml @  1.641 mls/ hr Q12H IV  Last administered on 2/5/17at 17:40; 

Admin Dose 4.923 MLS/HR;  Start 2/1/17 at 01:30


Norepinephrine 16 mg/Dextrose 500 ml @  1.87 mls/hr TITRATE IV ;  Start 2/2/17 

at 09:00


Piperacillin Sod/ Tazobactam Sod 50 ml @  200 mls/hr Q8 IVPB  Last administered 

on 2/5/17at 22:26; Admin Dose 200 MLS/HR;  Start 2/2/17 at 11:00


Fentanyl (Sublimaze) 100 ml @  2.5 mls/hr TITRATE IV  Last administered on 2/5/ 17at 17:44; Admin Dose 2.5 MLS/HR;  Start 2/4/17 at 11:30











PRITESH MACKEY MD Feb 5, 2017 22:31

## 2017-02-05 NOTE — RADRPT
PROCEDURE:   XR Chest. 

 

CLINICAL INDICATION:  Shortness of breath

 

TECHNIQUE:   A single portable view of the chest was obtained. 

 

COMPARISON:   02/04/2017 

 

FINDINGS:

The endotracheal tube is just above the level of the hipolito. Recommend retraction approximately 2 cm
.  Bilateral chest tubes are again seen. The cardiomediastinal silhouette is within normal limits. T
he air space disease in the right upper lung zone is slightly improved. Prominence of the pulmonary 
vasculature is seen which is increased. No radiographic evidence of pneumothorax is seen. The soft t
issues and osseous structures are unchanged with a chest wall emphysema again seen.  Pneumoperitoneu
m is again noted and is slightly decreased. 

 

IMPRESSION:

 

1.  Slightly improving right upper lung zone airspace disease.

2.  Slight decrease pneumoperitoneum.

3.  Slight increased prominence of the pulmonary vasculature.

3.  Endotracheal tube just above the level of the hipolito.  Recommend retraction approximately 2 cm.

 

RPTAT:  HPNM

_____________________________________________ 

Physician Keny           Date    Time 

Electronically viewed and signed by Physician Keny on 02/05/2017 08:04 

 

D:  02/05/2017 08:04  T:  02/05/2017 08:04

PM/

## 2017-02-06 VITALS — RESPIRATION RATE: 20 BRPM | HEART RATE: 89 BPM | SYSTOLIC BLOOD PRESSURE: 117 MMHG | DIASTOLIC BLOOD PRESSURE: 71 MMHG

## 2017-02-06 VITALS — DIASTOLIC BLOOD PRESSURE: 85 MMHG | RESPIRATION RATE: 23 BRPM | SYSTOLIC BLOOD PRESSURE: 128 MMHG | HEART RATE: 97 BPM

## 2017-02-06 VITALS — HEART RATE: 91 BPM | DIASTOLIC BLOOD PRESSURE: 91 MMHG | SYSTOLIC BLOOD PRESSURE: 133 MMHG | RESPIRATION RATE: 15 BRPM

## 2017-02-06 VITALS — HEART RATE: 93 BPM | RESPIRATION RATE: 23 BRPM | SYSTOLIC BLOOD PRESSURE: 151 MMHG | DIASTOLIC BLOOD PRESSURE: 100 MMHG

## 2017-02-06 VITALS — SYSTOLIC BLOOD PRESSURE: 144 MMHG | DIASTOLIC BLOOD PRESSURE: 92 MMHG | RESPIRATION RATE: 16 BRPM | HEART RATE: 97 BPM

## 2017-02-06 VITALS — HEART RATE: 84 BPM | SYSTOLIC BLOOD PRESSURE: 151 MMHG | RESPIRATION RATE: 17 BRPM | DIASTOLIC BLOOD PRESSURE: 94 MMHG

## 2017-02-06 VITALS — HEART RATE: 85 BPM | DIASTOLIC BLOOD PRESSURE: 88 MMHG | SYSTOLIC BLOOD PRESSURE: 141 MMHG | RESPIRATION RATE: 21 BRPM

## 2017-02-06 VITALS — SYSTOLIC BLOOD PRESSURE: 131 MMHG | HEART RATE: 85 BPM | DIASTOLIC BLOOD PRESSURE: 82 MMHG | RESPIRATION RATE: 23 BRPM

## 2017-02-06 VITALS — HEART RATE: 104 BPM | SYSTOLIC BLOOD PRESSURE: 118 MMHG | RESPIRATION RATE: 22 BRPM | DIASTOLIC BLOOD PRESSURE: 89 MMHG

## 2017-02-06 VITALS — HEART RATE: 89 BPM | RESPIRATION RATE: 20 BRPM | DIASTOLIC BLOOD PRESSURE: 79 MMHG | SYSTOLIC BLOOD PRESSURE: 108 MMHG

## 2017-02-06 VITALS — HEART RATE: 86 BPM | SYSTOLIC BLOOD PRESSURE: 128 MMHG | DIASTOLIC BLOOD PRESSURE: 84 MMHG | RESPIRATION RATE: 19 BRPM

## 2017-02-06 VITALS — RESPIRATION RATE: 25 BRPM

## 2017-02-06 VITALS — RESPIRATION RATE: 21 BRPM | DIASTOLIC BLOOD PRESSURE: 78 MMHG | SYSTOLIC BLOOD PRESSURE: 122 MMHG | HEART RATE: 91 BPM

## 2017-02-06 VITALS — SYSTOLIC BLOOD PRESSURE: 160 MMHG | HEART RATE: 105 BPM | RESPIRATION RATE: 18 BRPM | DIASTOLIC BLOOD PRESSURE: 101 MMHG

## 2017-02-06 VITALS — SYSTOLIC BLOOD PRESSURE: 105 MMHG | HEART RATE: 82 BPM | RESPIRATION RATE: 19 BRPM | DIASTOLIC BLOOD PRESSURE: 76 MMHG

## 2017-02-06 VITALS — HEART RATE: 118 BPM | SYSTOLIC BLOOD PRESSURE: 158 MMHG | DIASTOLIC BLOOD PRESSURE: 101 MMHG

## 2017-02-06 VITALS — DIASTOLIC BLOOD PRESSURE: 83 MMHG | SYSTOLIC BLOOD PRESSURE: 115 MMHG | RESPIRATION RATE: 21 BRPM | HEART RATE: 88 BPM

## 2017-02-06 VITALS — RESPIRATION RATE: 20 BRPM | HEART RATE: 97 BPM | DIASTOLIC BLOOD PRESSURE: 89 MMHG | SYSTOLIC BLOOD PRESSURE: 141 MMHG

## 2017-02-06 VITALS — SYSTOLIC BLOOD PRESSURE: 139 MMHG | HEART RATE: 86 BPM | DIASTOLIC BLOOD PRESSURE: 91 MMHG | RESPIRATION RATE: 24 BRPM

## 2017-02-06 VITALS — SYSTOLIC BLOOD PRESSURE: 144 MMHG | HEART RATE: 102 BPM | RESPIRATION RATE: 21 BRPM | DIASTOLIC BLOOD PRESSURE: 97 MMHG

## 2017-02-06 VITALS — RESPIRATION RATE: 20 BRPM | SYSTOLIC BLOOD PRESSURE: 115 MMHG | DIASTOLIC BLOOD PRESSURE: 76 MMHG | HEART RATE: 81 BPM

## 2017-02-06 VITALS — DIASTOLIC BLOOD PRESSURE: 99 MMHG | HEART RATE: 94 BPM | SYSTOLIC BLOOD PRESSURE: 155 MMHG | RESPIRATION RATE: 19 BRPM

## 2017-02-06 VITALS — RESPIRATION RATE: 18 BRPM | HEART RATE: 90 BPM | DIASTOLIC BLOOD PRESSURE: 74 MMHG | SYSTOLIC BLOOD PRESSURE: 123 MMHG

## 2017-02-06 VITALS — SYSTOLIC BLOOD PRESSURE: 160 MMHG | DIASTOLIC BLOOD PRESSURE: 91 MMHG | RESPIRATION RATE: 16 BRPM | HEART RATE: 79 BPM

## 2017-02-06 VITALS — SYSTOLIC BLOOD PRESSURE: 134 MMHG | DIASTOLIC BLOOD PRESSURE: 112 MMHG | RESPIRATION RATE: 23 BRPM | HEART RATE: 114 BPM

## 2017-02-06 VITALS — HEART RATE: 92 BPM | RESPIRATION RATE: 21 BRPM | SYSTOLIC BLOOD PRESSURE: 121 MMHG | DIASTOLIC BLOOD PRESSURE: 82 MMHG

## 2017-02-06 VITALS — DIASTOLIC BLOOD PRESSURE: 97 MMHG | SYSTOLIC BLOOD PRESSURE: 152 MMHG | HEART RATE: 97 BPM | RESPIRATION RATE: 24 BRPM

## 2017-02-06 VITALS — RESPIRATION RATE: 18 BRPM | DIASTOLIC BLOOD PRESSURE: 77 MMHG | SYSTOLIC BLOOD PRESSURE: 116 MMHG | HEART RATE: 82 BPM

## 2017-02-06 VITALS — HEART RATE: 82 BPM | RESPIRATION RATE: 29 BRPM | DIASTOLIC BLOOD PRESSURE: 84 MMHG | SYSTOLIC BLOOD PRESSURE: 148 MMHG

## 2017-02-06 VITALS — RESPIRATION RATE: 21 BRPM

## 2017-02-06 VITALS — SYSTOLIC BLOOD PRESSURE: 147 MMHG | DIASTOLIC BLOOD PRESSURE: 92 MMHG | RESPIRATION RATE: 27 BRPM | HEART RATE: 78 BPM

## 2017-02-06 VITALS — HEART RATE: 84 BPM | RESPIRATION RATE: 17 BRPM | SYSTOLIC BLOOD PRESSURE: 118 MMHG | DIASTOLIC BLOOD PRESSURE: 82 MMHG

## 2017-02-06 VITALS — RESPIRATION RATE: 22 BRPM

## 2017-02-06 VITALS — RESPIRATION RATE: 19 BRPM

## 2017-02-06 LAB
ANION GAP SERPL CALC-SCNC: 37 MMOL/L (ref 8–16)
ARTERIAL COHB: 0.5 % (ref 0–3)
ARTERIAL COHB: 0.5 % (ref 0–3)
ARTERIAL FRACTION OF OXYHGB: 95.3 % (ref 93–99)
ARTERIAL FRACTION OF OXYHGB: 95.6 % (ref 93–99)
ARTERIAL METHB: 0.3 % (ref 0–1.5)
ARTERIAL METHB: 0.3 % (ref 0–1.5)
ARTERIAL PATENCY WRIST A: (no result)
ARTERIAL PATENCY WRIST A: (no result)
ARTERIAL TOTAL HEMGLOBIN: 13.6 G/DL (ref 12–18)
ARTERIAL TOTAL HEMGLOBIN: 14.1 G/DL (ref 12–18)
BASE EXCESS BLDA CALC-SCNC: -2.8 MMOL/L (ref -3–3)
BASOPHILS # BLD AUTO: 0 10^3/UL (ref 0–0.1)
BASOPHILS NFR BLD: 0.1 % (ref 0–2)
BLOOD GAS LOW PEEP SETTING: 0 CMH2O
BLOOD GAS LOW PEEP SETTING: 0 CMH2O
BLOOD GAS PS: 10
BLOOD GAS TIDAL VOLUME: 350 ML
BUN SERPL-MCNC: 82 MG/DL (ref 7–20)
CALCIUM SERPL-MCNC: 9.7 MG/DL (ref 8.4–10.2)
CHLORIDE SERPL-SCNC: 95 MMOL/L (ref 97–110)
CO2 SERPL-SCNC: 20 MMOL/L (ref 21–31)
CONDITION: 1
CREAT SERPL-MCNC: 7.16 MG/DL (ref 0.44–1)
EOSINOPHIL # BLD: 0.1 10^3/UL (ref 0–0.5)
EOSINOPHIL NFR BLD: 1.5 % (ref 0–7)
ERYTHROCYTE [DISTWIDTH] IN BLOOD BY AUTOMATED COUNT: 14.7 % (ref 11.5–14.5)
GLUCOSE SERPL-MCNC: 125 MG/DL (ref 70–220)
HCO3 BLDA-SCNC: 22.2 MMOL/L (ref 22–26)
HCO3 BLDA-SCNC: 22.4 MMOL/L (ref 22–26)
HCT VFR BLD CALC: 38.8 % (ref 37–47)
HGB BLD-MCNC: 13.1 G/DL (ref 12–16)
LH ANALYZER COMMENTS: 1
LYMPHOCYTES # BLD AUTO: 1.1 10^3/UL (ref 0.8–2.9)
LYMPHOCYTES NFR BLD AUTO: 12.6 % (ref 15–51)
MCH RBC QN AUTO: 29.3 PG (ref 29–33)
MCHC RBC AUTO-ENTMCNC: 33.6 G/DL (ref 32–37)
MCV RBC AUTO: 87.1 FL (ref 82–101)
MODE: (no result)
MODE: (no result)
MONOCYTES # BLD: 1.2 10^3/UL (ref 0.3–0.9)
MONOCYTES NFR BLD: 13.4 % (ref 0–11)
NEUTROPHILS # BLD: 6.3 10^3/UL (ref 1.6–7.5)
NEUTROPHILS NFR BLD AUTO: 72.4 % (ref 39–77)
NRBC # BLD MANUAL: 0 10^3/UL (ref 0–0)
NRBC BLD QL: 0 /100WBC (ref 0–0)
O2 A-A PPRESDIFF RESPIRATORY: 68.7 MMHG (ref 7–24)
O2 A-A PPRESDIFF RESPIRATORY: 72.4 MMHG (ref 7–24)
O2/TOTAL GAS SETTING VFR VENT: 30 %
PLATELET # BLD: 235 10^3/UL (ref 140–440)
PMV BLD AUTO: 9.3 FL (ref 7.4–10.4)
POTASSIUM SERPL-SCNC: 5.1 MMOL/L (ref 3.5–5.1)
RBC # BLD AUTO: 4.46 10^6/UL (ref 4.2–5.4)
SODIUM SERPL-SCNC: 147 MMOL/L (ref 135–144)
WBC # BLD AUTO: 8.7 10^3/UL (ref 4.8–10.8)
WBC # BLD: 8.7 10^3/UL (ref 4.8–10.8)

## 2017-02-06 RX ADMIN — CARBOXYMETHYLCELLULOSE SODIUM SCH DROP: 10 GEL OPHTHALMIC at 08:45

## 2017-02-06 RX ADMIN — CARBOXYMETHYLCELLULOSE SODIUM SCH DROP: 10 GEL OPHTHALMIC at 20:29

## 2017-02-06 RX ADMIN — PIPERACILLIN AND TAZOBACTAM SCH MLS/HR: 2; .25 INJECTION, POWDER, FOR SOLUTION INTRAVENOUS at 14:06

## 2017-02-06 RX ADMIN — LABETALOL HYDROCHLORIDE PRN MG: 5 INJECTION, SOLUTION INTRAVENOUS at 22:10

## 2017-02-06 RX ADMIN — DIPHENHYDRAMINE HYDROCHLORIDE SCH MG: 50 INJECTION, SOLUTION INTRAMUSCULAR; INTRAVENOUS at 08:45

## 2017-02-06 RX ADMIN — IPRATROPIUM BROMIDE AND ALBUTEROL SULFATE SCH ML: .5; 3 SOLUTION RESPIRATORY (INHALATION) at 16:59

## 2017-02-06 RX ADMIN — HYDROMORPHONE HYDROCHLORIDE PRN MG: 1 INJECTION, SOLUTION INTRAMUSCULAR; INTRAVENOUS; SUBCUTANEOUS at 14:06

## 2017-02-06 RX ADMIN — ATORVASTATIN CALCIUM SCH MG: 20 TABLET, FILM COATED ORAL at 20:29

## 2017-02-06 RX ADMIN — PIPERACILLIN AND TAZOBACTAM SCH MLS/HR: 2; .25 INJECTION, POWDER, FOR SOLUTION INTRAVENOUS at 05:16

## 2017-02-06 RX ADMIN — PIPERACILLIN AND TAZOBACTAM SCH MLS/HR: 2; .25 INJECTION, POWDER, FOR SOLUTION INTRAVENOUS at 22:10

## 2017-02-06 RX ADMIN — PROPOFOL SCH MLS/HR: 10 INJECTION, EMULSION INTRAVENOUS at 04:10

## 2017-02-06 RX ADMIN — CARBOXYMETHYLCELLULOSE SODIUM SCH DROP: 10 GEL OPHTHALMIC at 12:29

## 2017-02-06 RX ADMIN — HYDROMORPHONE HYDROCHLORIDE PRN MG: 1 INJECTION, SOLUTION INTRAMUSCULAR; INTRAVENOUS; SUBCUTANEOUS at 19:41

## 2017-02-06 RX ADMIN — LABETALOL HYDROCHLORIDE PRN MG: 5 INJECTION, SOLUTION INTRAVENOUS at 23:11

## 2017-02-06 NOTE — RADRPT
PROCEDURE:   XR Chest. 

 

CLINICAL INDICATION:   Shortness of breath  

 

TECHNIQUE:   A single AP view of the chest was obtained.

 

COMPARISON:   Chest x-ray dated 02/05/2017 

 

FINDINGS:

 

The endotracheal tube tip is at the hipolito.  The tip of the enteric tube projects over the left uppe
r quadrant. Bilateral chest tubes are in place.  

 

There are left lower lobe interstitial opacities with ill-defined interstitial opacities within the 
right upper lobe.  No pleural effusion or pneumothorax is seen.  The cardiomediastinal silhouette is
 within normal limits for size. Calcifications are seen within the aortic arch. The osseous structur
es are unremarkable.  There is persistent intraperitoneal free air. 

 

IMPRESSION:

 

 

1.  Low lung volumes with left basilar and right upper lobe atelectasis versus pneumonitis.  Overall
, no significant interval change.

2.  Persistent intraperitoneal free air.

3.   Tubes and lines, as described above. The endotracheal tube tip is at the hipolito.

 

 Findings were discussed with the patient's nurse  on 2/6/2017 8:25:02 AM.

 

RPTAT: HH

_____________________________________________ 

.Jaci Arrington MD, MD           Date    Time 

Electronically viewed and signed by .Jaci Arrington MD, MD on 02/06/2017 08:26 

 

D:  02/06/2017 08:26  T:  02/06/2017 08:26

.G/

## 2017-02-06 NOTE — PN
Date/Time of Note


Date/Time of Note


DATE: 2/6/17 


TIME: 09:14





Assessment/Plan


VTE Prophylaxis


VTE Prophylaxis Intervention:  SCD's





Lines/Catheters


IV Catheter Type (from [facility]):  Peripheral IV


Urinary Cath still in place:  No





Assessment/Plan


Assessment/Plan


46-year-old female:





1.  Acute respiratory failure secondary to severe encephalopathy, post fall 

with significant head trauma and s/p chest barotrauma with bilateral 

pneumothoraces and pneumoperitoneum, s/p bilateral chest tube placement by CT 

surgery 


Appreciate all the assistance form Dr Ann


Pulmonary following, subcutaneous emphysema resolved and chest tube in place,


CXR stable and on CPAP trial and hopefully to be extubated today  


Chest tube to be removed soon after extubation hopefully 





2. Status post fall with head trauma with skull fracture including Bilateral 

acute inferior frontal hemorrhagic contusions, right greater than left, 8 mm 

right convexity subdural hematoma, mild scattered subarachnoid hemorrhage, 3 mm 

leftward midline shift of the septum pellucidum and right temporal-parietal 

scalp swelling with nondisplaced, oblique fracture through the right parietal 

and temporal skull. 


Reported Epidural hematoma on 2nd CT head 


Dr. Palomo from neurosurgery following, no intervention and resolving SDH, MS 

better, once extubated will be able to assess better 





3.  Leukocytosis: Likely from demargination post trauma. WBC back to normal. 


On Zosyn to cover for possible pulmo infection given acute chest trauma.


Patient remains afebrile so far 





4.  Hypertension: resolved 





5.  End-stage renal disease on hemodialysis: on Monday/Wednesday/Friday 

schedule. 


HD due today 


Dr. Chaney following. 





6.  Gastroesophageal reflux disease: continue Pepcid IV











Prophylaxis: Pepcid for GI prophylaxis, SCDs for DVT prophylaxis


Disposition:  On CPAP trial for possible extubation today. Appreciate 

Neurosurgery, Nephrology, CTS and Pulmonary recs





Subjective


24 Hr Interval Summary


Free Text/Dictation


Patient off sedation and on CPAP trial today 


VSS stable and hemodynamically stable 


Due for HD today





Exam/Review of Systems


Vital Signs


Vitals





 Vital Signs








  Date Time  Temp Pulse Resp B/P Pulse Ox O2 Delivery O2 Flow Rate FiO2


 


2/6/17 08:00  97      


 


2/6/17 07:35   25  100   30


 


2/6/17 06:00    118/89  Mechanical Ventilator  


 


2/6/17 02:00 98.7       














 Intake and Output   


 


 2/5/17 2/5/17 2/6/17





 15:00 23:00 07:00


 


Intake Total 467.5 ml 229.160 ml 356.553 ml


 


Output Total 2400 ml 0 ml 


 


Balance -1932.5 ml 229.160 ml 356.553 ml











Exam


Constitutional:  alert, oriented, other (on Vent )


Respiratory:  diminished breath sounds (at bases ), other (chest tubes in place 

bilaterally )


Cardiovascular:  regular rate and rhythm


Gastrointestinal:  non-tender, soft


Musculoskeletal:  nl extremities to inspection, other (no edema, clubbing or 

cyanosis )


Extremities:  normal pulses


Neurological:  CNS II-XII intact, nl mental status (off sedation), other (

vented and with chest tube )





Results


Result Diagram:  


2/6/17 0800                                                                    

            2/6/17 0800





Results 24 hrs





Laboratory Tests








Test


  2/5/17


14:25 2/5/17


22:50 2/6/17


07:00 2/6/17


08:00


 


Arterial Blood HCO3 21.3  L  22.4   


 


Arterial Blood Base Excess -2.3    -2.3   


 


Arterial Blood Oxygen


Saturation 95.5  


  


  96.1  


  


 


 


Jed Test N/A    ACCEPTAB   


 


Arterial Blood Gas Puncture


Site Right Brachial


  


  Right Radial  


  


 


 


Arterial Blood


Carboxyhemoglobin 0.4  


  


  0.5  


  


 


 


Arterial Blood Date Drawn


  2/5/2017


1:45:00 PM 


  2/6/2017


7:20:36 AM 


 


 


Arterial Blood Methemoglobin 0.2    0.3   


 


Arterial Blood pCO2 (Temp


correct) 33.5  L


  


  38.3  


  


 


 


Arterial Blood pH (Temp


corrected) 7.422  


  


  7.385  


  


 


 


Arterial Blood pO2 (Temp


corrected) 87.4  


  


  96.5  


  


 


 


Blood Gas A-a O2 Differential 87.1  H  72.4  H 


 


Blood Gas Actual Respiration


Rate 23  


  


  25  


  


 


 


Blood Gas Modality VENT - CPAP    VENT - AC   


 


Blood Gas Notified Time


  2/5/2017


1:52:00 PM 


  2/6/2017


8:01:35 AM 


 


 


Blood Gas Notified Whom TM    JLD   


 


Blood Gas Pressure Support 10    10   


 


Blood Gas Specimen Source


  Blood arterial


  


  Blood arterial


  


 


 


Blood Gas Temperature 37.0    37.0   


 


FiO2 30.0    30.0   


 


Oxyhemoglobin Percent 94.9    95.3   


 


Total Hemoglobin 14.2    13.6   


 


Anion Gap  35  H  37  H


 


Blood Urea Nitrogen  66  H  82  H


 


Calcium Level  10.4  H  9.7  


 


Carbon Dioxide Level  22    20  L


 


Chloride Level  95  L  95  L


 


Creatinine  7.38  #H  7.16  H


 


Glucose Level  93    125  


 


Potassium Level  5.4  H  5.1  


 


Sodium Level  147  H  147  H


 


Blood Gas Low PEEP Setting   0   


 


Blood Gas Respiration Rate   12.0   


 


Blood Gas Tidal Volume   350.0   


 


Basophils #    0.0  


 


Basophils %    0.1  


 


Blood Morphology Comment      


 


Eosinophils #    0.1  


 


Eosinophils %    1.5  


 


Hematocrit    38.8  


 


Hemoglobin    13.1  


 


Lymphocytes #    1.1  


 


Lymphocytes %    12.6  L


 


Mean Corpuscular Hemoglobin    29.3  


 


Mean Corpuscular Hemoglobin


Concent 


  


  


  33.6  


 


 


Mean Corpuscular Volume    87.1  


 


Mean Platelet Volume    9.3  


 


Monocytes #    1.2  H


 


Monocytes %    13.4  H


 


Neutrophils #    6.3  


 


Neutrophils %    72.4  


 


Nucleated Red Blood Cells #    0.0  


 


Nucleated Red Blood Cells %    0.0  


 


Platelet Count    235  


 


Red Blood Count    4.46  


 


Red Cell Distribution Width    14.7  H


 


White Blood Count    8.7  











Medications


Medications





 Current Medications


Labetalol HCl (Labetalol) 10 mg Q10M  PRN IV ELEVATED BLOOD PRESSURE;  Start 1/ 31/17 at 14:00


Ondansetron HCl (Zofran Inj) 4 mg Q6H  PRN IV NAUSEA AND/OR VOMITING Last 

administered on 1/31/17at 20:32; Admin Dose 4 MG;  Start 1/31/17 at 14:00


Acetaminophen (Tylenol Liquid) 650 mg Q6H  PRN PO PAIN LEVEL 1-3 OR FEVER Last 

administered on 2/3/17at 21:28; Admin Dose 650 MG;  Start 1/31/17 at 14:00


Acetaminophen (Tylenol Supp) 650 mg Q4H  PRN TX PAIN LEVEL 1-3 OR FEVER;  Start 

1/31/17 at 14:00


Famotidine (Pepcid Iv) 20 mg DAILY IV  Last administered on 2/6/17at 08:45; 

Admin Dose 20 MG;  Start 1/31/17 at 15:00


Eye Lubricant (Artificial Tears Oph) 1 drop TID BOTH EYES  Last administered on 

2/6/17at 08:45; Admin Dose 1 DROP;  Start 1/31/17 at 21:00


Atorvastatin Calcium (Lipitor) 20 mg QHS PO  Last administered on 2/5/17at 22:26

; Admin Dose 20 MG;  Start 1/31/17 at 21:00


Multivit/Ca Carb/ B Cmplx/FA/Prenat ([place]otte-Argenis) 1 tab DAILY PO  Last 

administered on 2/6/17at 08:45; Admin Dose 1 TAB;  Start 2/1/17 at 09:00


Hydromorphone HCl 0.5 mg 0.5 mg Q4H  PRN IV PAIN Last administered on 2/2/17at 

03:10; Admin Dose 0.5 MG;  Start 1/31/17 at 21:30


Propofol 100 ml @  1.641 mls/ hr Q12H IV  Last administered on 2/6/17at 04:10; 

Admin Dose 5.579 MLS/HR;  Start 2/1/17 at 01:30


Norepinephrine 16 mg/Dextrose 500 ml @  1.87 mls/hr TITRATE IV ;  Start 2/2/17 

at 09:00


Piperacillin Sod/ Tazobactam Sod 50 ml @  200 mls/hr Q8 IVPB  Last administered 

on 2/6/17at 05:16; Admin Dose 200 MLS/HR;  Start 2/2/17 at 11:00


Fentanyl (Sublimaze) 100 ml @  2.5 mls/hr TITRATE IV  Last administered on 2/5/ 17at 17:44; Admin Dose 2.5 MLS/HR;  Start 2/4/17 at 11:30











CECILIO JIMENEZ Feb 6, 2017 09:24

## 2017-02-06 NOTE — CONS
Date/Time of Note


Date/Time of Note


DATE: 2/6/17 


TIME: 08:23





Assessment/Plan


Assessment/Plan


Chief Complaint/Hosp Course


s/p traumatic R > L frontal contusion, right SDH.


neurologically stable, no operative indications at this point with current exam

; will follow peripherally.


Still awaiting extubation.


f/u imaging prn but can follow clinical exam. SDH appeared to had diminished 

and unlikely to for chronic subdural hematoma, symptoms mostly due to contusion 

which will resolve.


Problems:  





Consultation Date/Type/Reason


Admit Date/Time


Jan 31, 2017 at 13:31


Initial Consult Date


1/31/17


Type of Consultation:  Neurosurgery





24 HR Interval Summary


Free Text/Dictation


Patient remains intubated is alert,nods and follows commands.





Exam/Review of Systems


Vital Signs


Vitals





 Vital Signs








  Date Time  Temp Pulse Resp B/P Pulse Ox O2 Delivery O2 Flow Rate FiO2


 


2/6/17 07:35  102 25  100   30


 


2/6/17 06:00    118/89  Mechanical Ventilator  


 


2/6/17 02:00 98.7       














 Intake and Output   


 


 2/5/17 2/5/17 2/6/17





 15:00 23:00 07:00


 


Intake Total 467.5 ml 229.160 ml 356.553 ml


 


Output Total 2400 ml 0 ml 


 


Balance -1932.5 ml 229.160 ml 356.553 ml











Exam


Constitutional:  alert


Neurological:  other (awake,follows commands readilyand appropriately.Some 

delay in left movement but only appears minimally weak.)





Results


Result Diagram:  


2/5/17 0427                                                                    

            2/5/17 2250





Results 24 hrs





Laboratory Tests








Test


  2/5/17


14:25 2/5/17


22:50 2/6/17


07:00


 


Arterial Blood HCO3 21.3  L  22.4  


 


Arterial Blood Base Excess -2.3    -2.3  


 


Arterial Blood Oxygen


Saturation 95.5  


  


  96.1  


 


 


Jed Test N/A    ACCEPTAB  


 


Arterial Blood Gas Puncture


Site Right Brachial


  


  Right Radial  


 


 


Arterial Blood


Carboxyhemoglobin 0.4  


  


  0.5  


 


 


Arterial Blood Date Drawn


  2/5/2017


1:45:00 PM 


  2/6/2017


7:20:36 AM


 


Arterial Blood Methemoglobin 0.2    0.3  


 


Arterial Blood pCO2 (Temp


correct) 33.5  L


  


  38.3  


 


 


Arterial Blood pH (Temp


corrected) 7.422  


  


  7.385  


 


 


Arterial Blood pO2 (Temp


corrected) 87.4  


  


  96.5  


 


 


Blood Gas A-a O2 Differential 87.1  H  72.4  H


 


Blood Gas Actual Respiration


Rate 23  


  


  25  


 


 


Blood Gas Modality VENT - CPAP    VENT - AC  


 


Blood Gas Notified Time


  2/5/2017


1:52:00 PM 


  2/6/2017


8:01:35 AM


 


Blood Gas Notified Whom TM    JLD  


 


Blood Gas Pressure Support 10    10  


 


Blood Gas Specimen Source


  Blood arterial


  


  Blood arterial


 


 


Blood Gas Temperature 37.0    37.0  


 


FiO2 30.0    30.0  


 


Oxyhemoglobin Percent 94.9    95.3  


 


Total Hemoglobin 14.2    13.6  


 


Anion Gap  35  H 


 


Blood Urea Nitrogen  66  H 


 


Calcium Level  10.4  H 


 


Carbon Dioxide Level  22   


 


Chloride Level  95  L 


 


Creatinine  7.38  #H 


 


Glucose Level  93   


 


Potassium Level  5.4  H 


 


Sodium Level  147  H 


 


Blood Gas Low PEEP Setting   0  


 


Blood Gas Respiration Rate   12.0  


 


Blood Gas Tidal Volume   350.0  











Medications


Medications





 Current Medications


Labetalol HCl (Labetalol) 10 mg Q10M  PRN IV ELEVATED BLOOD PRESSURE;  Start 1/ 31/17 at 14:00


Ondansetron HCl (Zofran Inj) 4 mg Q6H  PRN IV NAUSEA AND/OR VOMITING Last 

administered on 1/31/17at 20:32; Admin Dose 4 MG;  Start 1/31/17 at 14:00


Acetaminophen (Tylenol Liquid) 650 mg Q6H  PRN PO PAIN LEVEL 1-3 OR FEVER Last 

administered on 2/3/17at 21:28; Admin Dose 650 MG;  Start 1/31/17 at 14:00


Acetaminophen (Tylenol Supp) 650 mg Q4H  PRN [place] PAIN LEVEL 1-3 OR FEVER;  Start 

1/31/17 at 14:00


Famotidine (Pepcid Iv) 20 mg DAILY IV  Last administered on 2/5/17at 08:44; 

Admin Dose 20 MG;  Start 1/31/17 at 15:00


Eye Lubricant (Artificial Tears Oph) 1 drop TID BOTH EYES  Last administered on 

2/5/17at 22:26; Admin Dose 1 DROP;  Start 1/31/17 at 21:00


Atorvastatin Calcium (Lipitor) 20 mg QHS PO  Last administered on 2/5/17at 22:26

; Admin Dose 20 MG;  Start 1/31/17 at 21:00


Multivit/Ca Carb/ B Cmplx/FA/Prenat ([place]otte-Argenis) 1 tab DAILY PO  Last 

administered on 2/5/17at 08:44; Admin Dose 1 TAB;  Start 2/1/17 at 09:00


Hydromorphone HCl 0.5 mg 0.5 mg Q4H  PRN IV PAIN Last administered on 2/2/17at 

03:10; Admin Dose 0.5 MG;  Start 1/31/17 at 21:30


Propofol 100 ml @  1.641 mls/ hr Q12H IV  Last administered on 2/6/17at 04:10; 

Admin Dose 5.579 MLS/HR;  Start 2/1/17 at 01:30


Norepinephrine 16 mg/Dextrose 500 ml @  1.87 mls/hr TITRATE IV ;  Start 2/2/17 

at 09:00


Piperacillin Sod/ Tazobactam Sod 50 ml @  200 mls/hr Q8 IVPB  Last administered 

on 2/6/17at 05:16; Admin Dose 200 MLS/HR;  Start 2/2/17 at 11:00


Fentanyl (Sublimaze) 100 ml @  2.5 mls/hr TITRATE IV  Last administered on 2/5/ 17at 17:44; Admin Dose 2.5 MLS/HR;  Start 2/4/17 at 11:30











ODALIS MADISON MD Feb 6, 2017 08:26

## 2017-02-06 NOTE — PN
Date/Time of Note


Date/Time of Note


DATE: 2/6/17 


TIME: 13:46





Assessment/Plan


Lines/Catheters


IV Catheter Type (from Nrs):  Peripheral IV


Kamara in Place (from Nrs):  No





Assessment/Plan


Chief Complaint/Hosp Course


PTX


SP  bilateral CT placement


CXR,


 


No PTX


 





1


 





will continue CT sxn


Problems:  





Subjective


24 Hr Interval Summary


Constitutional:  improved


Pain Control:  mild





Exam/Review of Systems


Vital Signs


Vitals





 Vital Signs








  Date Time  Temp Pulse Resp B/P Pulse Ox O2 Delivery O2 Flow Rate FiO2


 


2/6/17 12:00 99.1 93 23 151/100 98 Mechanical Ventilator  


 


2/6/17 11:59       2.0 


 


2/6/17 09:00        30














 Intake and Output   


 


 2/5/17 2/5/17 2/6/17





 15:00 23:00 07:00


 


Intake Total 467.5 ml 229.160 ml 356.553 ml


 


Output Total 2400 ml 0 ml 


 


Balance -1932.5 ml 229.160 ml 356.553 ml











Exam


Neck:  non-tender, supple


Respiratory:  clear to auscultation, normal air movement


Cardiovascular:  nl pulses, regular rate and rhythm


Gastrointestinal:  nl liver, spleen, non-tender, soft





Results


Result Diagram:  


2/6/17 0800                                                                    

            2/6/17 0800














MALESUNITA HADDAD MD Feb 6, 2017 13:47

## 2017-02-06 NOTE — CONS
Date/Time of Note


Date/Time of Note


DATE: 2/6/17 


TIME: 07:18





Assessment/Plan


Assessment/Plan


Additional Assessment/Plan


Assessment and recommendations;





1.  Patient admitted with a fall resulting in subarachnoid and intraparenchymal 

contusions.  Leading to respiratory failure.  Next





2.  End-stage renal disease on hemodialysis





3.  Bilateral pneumothoraces iatrogenic.  Almost complete resolution of 

extensive subcutaneous emphysema, chest x-ray was reviewed from yesterday which 

is essentially clear.  Patient currently on AC of 12 tidal volume 350, PEEP of 

0 30% FiO2.  With adequate ABGs.  Next





4.  Marked improvement in mental status.  Next





Stop fentanyl as well as propofol drips.  When the patient is completely off 

sedative effect should be evaluated for weaning from mechanical ventilation.





Consultation Date/Type/Reason


Admit Date/Time


Jan 31, 2017 at 13:31


Initial Consult Date


1/31/17


Type of Consultation:  renal





24 HR Interval Summary


Free Text/Dictation


Patient condition remains critical but stable.  The patient is awake despite 

being on combination propofol and fentanyl drips.  And follows simple commands.

  Has remained hemodynamically stable.  





General examination; middle aged woman, or intubated, awake, alert.  Currently 

in no distress.





Exam/Review of Systems


Vital Signs


Vitals





 Vital Signs








  Date Time  Temp Pulse Resp B/P Pulse Ox O2 Delivery O2 Flow Rate FiO2


 


2/6/17 06:00  104 22 118/89 99 Mechanical Ventilator  


 


2/6/17 05:32        30


 


2/6/17 02:00 98.7       














 Intake and Output   


 


 2/5/17 2/5/17 2/6/17





 15:00 23:00 07:00


 


Intake Total 467.5 ml 229.160 ml 356.553 ml


 


Output Total 2400 ml 0 ml 


 


Balance -1932.5 ml 229.160 ml 356.553 ml











Exam


HEENT examination; supple neck, no JVD.  No lymphadenopathy.  Orally intubated.

  No subcutaneous emphysema felt in the neck or face areas.  No thyromegaly.  

Pupils are small bilaterally.





Chest examination; clear to auscultation bilaterally.  Bilateral chest tubes 

are in place.  No subcutaneous emphysema felt in the chest wall bilaterally.  S1

-S2 audible, no murmurs.  Regular rate and rhythm.





Abdomen examination; soft, nontender, no organomegaly.  Bowel sounds audible.  

Next





Extremity examination; no peripheral edema.  There is an AV shunt in the left 

arm.





CNS examination; patient is awake, alert.  Follows simple commands and moves 

all 4 extremities on command.  No obvious motor deficit seen.





Results


Result Diagram:  


2/5/17 0427                                                                    

            2/5/17 2250





Results 24 hrs





Laboratory Tests








Test


  2/5/17


14:25 2/5/17


22:50 2/6/17


07:00


 


Arterial Blood HCO3 21.3  L  21.3  L


 


Arterial Blood Base Excess -2.3    -2.3  


 


Arterial Blood Oxygen


Saturation 95.5  


  


  95.5  


 


 


Jed Test N/A    N/A  


 


Arterial Blood Gas Puncture


Site Right Brachial


  


  Right Brachial


 


 


Arterial Blood


Carboxyhemoglobin 0.4  


  


  0.4  


 


 


Arterial Blood Date Drawn


  2/5/2017


1:45:00 PM 


  2/5/2017


1:45:08 PM


 


Arterial Blood Methemoglobin 0.2    0.2  


 


Arterial Blood pCO2 (Temp


correct) 33.5  L


  


  33.5  L


 


 


Arterial Blood pH (Temp


corrected) 7.422  


  


  7.422  


 


 


Arterial Blood pO2 (Temp


corrected) 87.4  


  


  87.4  


 


 


Blood Gas A-a O2 Differential 87.1  H  87.1  H


 


Blood Gas Actual Respiration


Rate 23  


  


  23  


 


 


Blood Gas Modality VENT - CPAP    VENT - CPAP  


 


Blood Gas Notified Time


  2/5/2017


1:52:00 PM 


  2/5/2017


1:52:45 PM


 


Blood Gas Notified Whom TM    TM  


 


Blood Gas Pressure Support 10    10  


 


Blood Gas Specimen Source


  Blood arterial


  


  Blood arterial


 


 


Blood Gas Temperature 37.0    37.0  


 


FiO2 30.0    30.0  


 


Oxyhemoglobin Percent 94.9    94.9  


 


Total Hemoglobin 14.2    14.2  


 


Anion Gap  35  H 


 


Blood Urea Nitrogen  66  H 


 


Calcium Level  10.4  H 


 


Carbon Dioxide Level  22   


 


Chloride Level  95  L 


 


Creatinine  7.38  #H 


 


Glucose Level  93   


 


Potassium Level  5.4  H 


 


Sodium Level  147  H 











Medications


Medications





 Current Medications


Labetalol HCl (Labetalol) 10 mg Q10M  PRN IV ELEVATED BLOOD PRESSURE;  Start 1/ 31/17 at 14:00


Ondansetron HCl (Zofran Inj) 4 mg Q6H  PRN IV NAUSEA AND/OR VOMITING Last 

administered on 1/31/17at 20:32; Admin Dose 4 MG;  Start 1/31/17 at 14:00


Acetaminophen (Tylenol Liquid) 650 mg Q6H  PRN PO PAIN LEVEL 1-3 OR FEVER Last 

administered on 2/3/17at 21:28; Admin Dose 650 MG;  Start 1/31/17 at 14:00


Acetaminophen (Tylenol Supp) 650 mg Q4H  PRN [place] PAIN LEVEL 1-3 OR FEVER;  Start 

1/31/17 at 14:00


Famotidine (Pepcid Iv) 20 mg DAILY IV  Last administered on 2/5/17at 08:44; 

Admin Dose 20 MG;  Start 1/31/17 at 15:00


Eye Lubricant (Artificial Tears Oph) 1 drop TID BOTH EYES  Last administered on 

2/5/17at 22:26; Admin Dose 1 DROP;  Start 1/31/17 at 21:00


Atorvastatin Calcium (Lipitor) 20 mg QHS PO  Last administered on 2/5/17at 22:26

; Admin Dose 20 MG;  Start 1/31/17 at 21:00


Multivit/Ca Carb/ B Cmplx/FA/Prenat ([place]-Argenis) 1 tab DAILY PO  Last 

administered on 2/5/17at 08:44; Admin Dose 1 TAB;  Start 2/1/17 at 09:00


Hydromorphone HCl 0.5 mg 0.5 mg Q4H  PRN IV PAIN Last administered on 2/2/17at 

03:10; Admin Dose 0.5 MG;  Start 1/31/17 at 21:30


Propofol 100 ml @  1.641 mls/ hr Q12H IV  Last administered on 2/6/17at 04:10; 

Admin Dose 5.579 MLS/HR;  Start 2/1/17 at 01:30


Norepinephrine 16 mg/Dextrose 500 ml @  1.87 mls/hr TITRATE IV ;  Start 2/2/17 

at 09:00


Piperacillin Sod/ Tazobactam Sod 50 ml @  200 mls/hr Q8 IVPB  Last administered 

on 2/6/17at 05:16; Admin Dose 200 MLS/HR;  Start 2/2/17 at 11:00


Fentanyl (Sublimaze) 100 ml @  2.5 mls/hr TITRATE IV  Last administered on 2/5/ 17at 17:44; Admin Dose 2.5 MLS/HR;  Start 2/4/17 at 11:30











CHADD CORBETT Feb 6, 2017 07:21

## 2017-02-06 NOTE — CONS
Date/Time of Note


Date/Time of Note


DATE: 2/6/17 


TIME: 22:13





Assessment/Plan


Assessment/Plan


Chief Complaint/Hosp Course


IMPRESSION:


1.  The patient has acute hemorrhagic stroke./sdh/trauma


2.  Malignant hypertension.better


3.  Endstage renal disease.


4.  Leukocytosis. better


5.  Respiratory failure.


6.  Incomplete database


7 hyperkalemia hx


8 SUBCUTANEOUS EPMHYSEMA/c tube


plan hd


per neuro


bmp am


per surgery


Problems:  





Consultation Date/Type/Reason


Admit Date/Time


Jan 31, 2017 at 13:31


Initial Consult Date


1/31/17


Type of Consultation:  renal





24 HR Interval Summary


Subjective hx not possible:  pt non-verbal





Exam/Review of Systems


Vital Signs


Vitals





 Vital Signs








  Date Time  Temp Pulse Resp B/P Pulse Ox O2 Delivery O2 Flow Rate FiO2


 


2/6/17 20:39      Nasal Cannula 2.0 


 


2/6/17 20:00 98.7 97 16 144/92 99   


 


2/6/17 09:00        30














 Intake and Output   


 


 2/5/17 2/5/17 2/6/17





 15:00 23:00 07:00


 


Intake Total 467.5 ml 229.160 ml 356.553 ml


 


Output Total 2400 ml 0 ml 


 


Balance -1932.5 ml 229.160 ml 356.553 ml











Exam


Respiratory:  diminished breath sounds


Cardiovascular:  regular rate and rhythm


Gastrointestinal:  nl liver, spleen, non-tender, soft


Musculoskeletal:  nl extremities to inspection


Extremities:  normal pulses





Results


Result Diagram:  


2/6/17 0800                                                                    

            2/6/17 0800





Results 24 hrs





Laboratory Tests








Test


  2/5/17


22:50 2/6/17


07:00 2/6/17


08:00 2/6/17


09:30


 


Anion Gap 35  H  37  H 


 


Blood Urea Nitrogen 66  H  82  H 


 


Calcium Level 10.4  H  9.7   


 


Carbon Dioxide Level 22    20  L 


 


Chloride Level 95  L  95  L 


 


Creatinine 7.38  #H  7.16  H 


 


Glucose Level 93    125   


 


Potassium Level 5.4  H  5.1   


 


Sodium Level 147  H  147  H 


 


Arterial Blood HCO3  22.4    22.2  


 


Arterial Blood Base Excess  -2.3    -2.8  


 


Arterial Blood Oxygen


Saturation 


  96.1  


  


  96.4  


 


 


Jed Test  ACCEPTAB    ACCEPTAB  


 


Arterial Blood Gas Puncture


Site 


  Right Radial  


  


  Right Radial  


 


 


Arterial Blood


Carboxyhemoglobin 


  0.5  


  


  0.5  


 


 


Arterial Blood Date Drawn


  


  2/6/2017


7:20:36 AM 


  2/6/2017


9:36:03 AM


 


Arterial Blood Methemoglobin  0.3    0.3  


 


Arterial Blood pCO2 (Temp


correct) 


  38.3  


  


  39.4  


 


 


Arterial Blood pH (Temp


corrected) 


  7.385  


  


  7.369  


 


 


Arterial Blood pO2 (Temp


corrected) 


  96.5  


  


  98.9  


 


 


Blood Gas A-a O2 Differential  72.4  H  68.7  H


 


Blood Gas Actual Respiration


Rate 


  25  


  


  24  


 


 


Blood Gas Low PEEP Setting  0    0  


 


Blood Gas Modality  VENT - AC    VENT - CPAP  


 


Blood Gas Notified Time


  


  2/6/2017


8:01:35 AM 


  2/6/2017


9:47:45 AM


 


Blood Gas Notified Whom  JLD    JLD  


 


Blood Gas Pressure Support  10    10  


 


Blood Gas Respiration Rate  12.0    


 


Blood Gas Specimen Source


  


  Blood arterial


  


  Blood arterial


 


 


Blood Gas Temperature  37.0    37.0  


 


Blood Gas Tidal Volume  350.0    


 


FiO2  30.0    30.0  


 


Oxyhemoglobin Percent  95.3    95.6  


 


Total Hemoglobin  13.6    14.1  


 


Basophils #   0.0   


 


Basophils %   0.1   


 


Blood Morphology Comment      


 


Eosinophils #   0.1   


 


Eosinophils %   1.5   


 


Hematocrit   38.8   


 


Hemoglobin   13.1   


 


Lymphocytes #   1.1   


 


Lymphocytes %   12.6  L 


 


Mean Corpuscular Hemoglobin   29.3   


 


Mean Corpuscular Hemoglobin


Concent 


  


  33.6  


  


 


 


Mean Corpuscular Volume   87.1   


 


Mean Platelet Volume   9.3   


 


Monocytes #   1.2  H 


 


Monocytes %   13.4  H 


 


Neutrophils #   6.3   


 


Neutrophils %   72.4   


 


Nucleated Red Blood Cells #   0.0   


 


Nucleated Red Blood Cells %   0.0   


 


Platelet Count   235   


 


Red Blood Count   4.46   


 


Red Cell Distribution Width   14.7  H 


 


White Blood Count   8.7   











Medications


Medications





 Current Medications


Labetalol HCl (Labetalol) 10 mg Q10M  PRN IV ELEVATED BLOOD PRESSURE;  Start 1/ 31/17 at 14:00


Ondansetron HCl (Zofran Inj) 4 mg Q6H  PRN IV NAUSEA AND/OR VOMITING Last 

administered on 1/31/17at 20:32; Admin Dose 4 MG;  Start 1/31/17 at 14:00


Acetaminophen (Tylenol Liquid) 650 mg Q6H  PRN PO PAIN LEVEL 1-3 OR FEVER Last 

administered on 2/3/17at 21:28; Admin Dose 650 MG;  Start 1/31/17 at 14:00


Acetaminophen (Tylenol Supp) 650 mg Q4H  PRN [place] PAIN LEVEL 1-3 OR FEVER;  Start 

1/31/17 at 14:00


Famotidine (Pepcid Iv) 20 mg DAILY IV  Last administered on 2/6/17at 08:45; 

Admin Dose 20 MG;  Start 1/31/17 at 15:00


Eye Lubricant (Artificial Tears Oph) 1 drop TID BOTH EYES  Last administered on 

2/6/17at 20:29; Admin Dose 1 DROP;  Start 1/31/17 at 21:00


Multivit/Ca Carb/ B Cmplx/FA/Prenat ([place]-Argenis) 1 tab DAILY PO  Last 

administered on 2/6/17at 08:45; Admin Dose 1 TAB;  Start 2/1/17 at 09:00


Hydromorphone HCl 0.5 mg 0.5 mg Q4H  PRN IV PAIN Last administered on 2/6/17at 

19:41; Admin Dose 0.5 MG;  Start 1/31/17 at 21:30


Norepinephrine 16 mg/Dextrose 500 ml @  1.87 mls/hr TITRATE IV ;  Start 2/2/17 

at 09:00


Piperacillin Sod/ Tazobactam Sod (Zosyn 2.25gm/ 50ml (Pmx)) 50 ml @  200 mls/hr 

Q8 IVPB  Last administered on 2/6/17at 14:06; Admin Dose 200 MLS/HR;  Start 2/2/ 17 at 11:00


Atorvastatin Calcium (Lipitor) 20 mg QHS NGT  Last administered on 2/6/17at 20:

29; Admin Dose 20 MG;  Start 2/6/17 at 21:00











PRITESH MACKEY MD Feb 6, 2017 22:15

## 2017-02-06 NOTE — RADRPT
PROCEDURE:   Chest Radiograph.

 

CLINICAL INDICATION:    Respiratory failure.  Postop.  Removed chest tube.

 

TECHNIQUE:   Single frontal chest radiograph. 

 

COMPARISON:  Chest radiograph 02/06/2017 at 06:15 a.m.

 

FINDINGS:

 

There has been interval removal of an endotracheal tube.  A nasogastric tube remains in radiographic
ally appropriate position.  Bilateral chest tubes remain in place.  There is no pneumothorax visuali
zed .  Postoperative changes are seen in the right upper lateral lung zone with associated mild leo
cent air space disease.  the lungs are otherwise clear.    There is persistent subcutaneous gas in t
he left chest wall..  There is persistent pneumoperitoneum which may be improving.

 

IMPRESSION:

 

1.  Interval removal of endotracheal tube.

2.  Otherwise stable radiographic appearance of the chest compared to 06/17 06:15 a.m.    

 

RPTAT: KK

_____________________________________________ 

.Kyle Castellanos MD, MD           Date    Time 

Electronically viewed and signed by .Kyle Castellanos MD, MD on 02/06/2017 14:30 

 

D:  02/06/2017 14:30  T:  02/06/2017 14:30

.B/

## 2017-02-07 VITALS — DIASTOLIC BLOOD PRESSURE: 78 MMHG | SYSTOLIC BLOOD PRESSURE: 105 MMHG | HEART RATE: 85 BPM | RESPIRATION RATE: 22 BRPM

## 2017-02-07 VITALS — RESPIRATION RATE: 25 BRPM | HEART RATE: 69 BPM | DIASTOLIC BLOOD PRESSURE: 84 MMHG | SYSTOLIC BLOOD PRESSURE: 150 MMHG

## 2017-02-07 VITALS — HEART RATE: 79 BPM | DIASTOLIC BLOOD PRESSURE: 83 MMHG | RESPIRATION RATE: 20 BRPM | SYSTOLIC BLOOD PRESSURE: 127 MMHG

## 2017-02-07 VITALS — RESPIRATION RATE: 27 BRPM | SYSTOLIC BLOOD PRESSURE: 129 MMHG | DIASTOLIC BLOOD PRESSURE: 88 MMHG | HEART RATE: 88 BPM

## 2017-02-07 VITALS — RESPIRATION RATE: 21 BRPM | SYSTOLIC BLOOD PRESSURE: 134 MMHG | HEART RATE: 125 BPM | DIASTOLIC BLOOD PRESSURE: 103 MMHG

## 2017-02-07 VITALS — SYSTOLIC BLOOD PRESSURE: 124 MMHG | HEART RATE: 110 BPM | RESPIRATION RATE: 24 BRPM | DIASTOLIC BLOOD PRESSURE: 88 MMHG

## 2017-02-07 VITALS — DIASTOLIC BLOOD PRESSURE: 81 MMHG | HEART RATE: 92 BPM | SYSTOLIC BLOOD PRESSURE: 125 MMHG | RESPIRATION RATE: 21 BRPM

## 2017-02-07 VITALS — SYSTOLIC BLOOD PRESSURE: 118 MMHG | DIASTOLIC BLOOD PRESSURE: 89 MMHG | HEART RATE: 86 BPM | RESPIRATION RATE: 23 BRPM

## 2017-02-07 VITALS — DIASTOLIC BLOOD PRESSURE: 74 MMHG | SYSTOLIC BLOOD PRESSURE: 91 MMHG | HEART RATE: 87 BPM

## 2017-02-07 VITALS — RESPIRATION RATE: 17 BRPM | HEART RATE: 92 BPM | DIASTOLIC BLOOD PRESSURE: 104 MMHG | SYSTOLIC BLOOD PRESSURE: 153 MMHG

## 2017-02-07 VITALS — DIASTOLIC BLOOD PRESSURE: 105 MMHG | SYSTOLIC BLOOD PRESSURE: 164 MMHG | HEART RATE: 80 BPM

## 2017-02-07 VITALS — SYSTOLIC BLOOD PRESSURE: 119 MMHG | HEART RATE: 103 BPM | DIASTOLIC BLOOD PRESSURE: 89 MMHG | RESPIRATION RATE: 22 BRPM

## 2017-02-07 VITALS — HEART RATE: 106 BPM | DIASTOLIC BLOOD PRESSURE: 63 MMHG | RESPIRATION RATE: 18 BRPM | SYSTOLIC BLOOD PRESSURE: 95 MMHG

## 2017-02-07 VITALS — HEART RATE: 94 BPM | RESPIRATION RATE: 19 BRPM | DIASTOLIC BLOOD PRESSURE: 95 MMHG | SYSTOLIC BLOOD PRESSURE: 115 MMHG

## 2017-02-07 VITALS — HEART RATE: 107 BPM | SYSTOLIC BLOOD PRESSURE: 127 MMHG | RESPIRATION RATE: 24 BRPM | DIASTOLIC BLOOD PRESSURE: 92 MMHG

## 2017-02-07 VITALS — DIASTOLIC BLOOD PRESSURE: 66 MMHG | SYSTOLIC BLOOD PRESSURE: 94 MMHG | HEART RATE: 85 BPM

## 2017-02-07 VITALS — SYSTOLIC BLOOD PRESSURE: 113 MMHG | HEART RATE: 69 BPM | RESPIRATION RATE: 23 BRPM | DIASTOLIC BLOOD PRESSURE: 75 MMHG

## 2017-02-07 VITALS — HEART RATE: 74 BPM | DIASTOLIC BLOOD PRESSURE: 90 MMHG | SYSTOLIC BLOOD PRESSURE: 137 MMHG | RESPIRATION RATE: 19 BRPM

## 2017-02-07 VITALS — RESPIRATION RATE: 20 BRPM | HEART RATE: 78 BPM | SYSTOLIC BLOOD PRESSURE: 111 MMHG | DIASTOLIC BLOOD PRESSURE: 76 MMHG

## 2017-02-07 VITALS — HEART RATE: 72 BPM | DIASTOLIC BLOOD PRESSURE: 91 MMHG | SYSTOLIC BLOOD PRESSURE: 152 MMHG

## 2017-02-07 VITALS — DIASTOLIC BLOOD PRESSURE: 78 MMHG | RESPIRATION RATE: 16 BRPM | SYSTOLIC BLOOD PRESSURE: 150 MMHG

## 2017-02-07 VITALS — HEART RATE: 64 BPM | RESPIRATION RATE: 23 BRPM | DIASTOLIC BLOOD PRESSURE: 93 MMHG | SYSTOLIC BLOOD PRESSURE: 117 MMHG

## 2017-02-07 VITALS — HEART RATE: 116 BPM | DIASTOLIC BLOOD PRESSURE: 104 MMHG | SYSTOLIC BLOOD PRESSURE: 141 MMHG | RESPIRATION RATE: 29 BRPM

## 2017-02-07 VITALS — DIASTOLIC BLOOD PRESSURE: 96 MMHG | SYSTOLIC BLOOD PRESSURE: 144 MMHG | RESPIRATION RATE: 25 BRPM | HEART RATE: 108 BPM

## 2017-02-07 VITALS — HEART RATE: 79 BPM | SYSTOLIC BLOOD PRESSURE: 133 MMHG | RESPIRATION RATE: 17 BRPM | DIASTOLIC BLOOD PRESSURE: 81 MMHG

## 2017-02-07 VITALS — HEART RATE: 67 BPM | DIASTOLIC BLOOD PRESSURE: 89 MMHG | SYSTOLIC BLOOD PRESSURE: 140 MMHG

## 2017-02-07 VITALS — SYSTOLIC BLOOD PRESSURE: 133 MMHG | DIASTOLIC BLOOD PRESSURE: 102 MMHG | HEART RATE: 102 BPM

## 2017-02-07 VITALS — DIASTOLIC BLOOD PRESSURE: 107 MMHG | HEART RATE: 83 BPM | SYSTOLIC BLOOD PRESSURE: 140 MMHG

## 2017-02-07 VITALS — RESPIRATION RATE: 22 BRPM | SYSTOLIC BLOOD PRESSURE: 111 MMHG | DIASTOLIC BLOOD PRESSURE: 75 MMHG | HEART RATE: 68 BPM

## 2017-02-07 VITALS — RESPIRATION RATE: 18 BRPM | DIASTOLIC BLOOD PRESSURE: 75 MMHG | HEART RATE: 106 BPM | SYSTOLIC BLOOD PRESSURE: 109 MMHG

## 2017-02-07 VITALS — HEART RATE: 90 BPM | DIASTOLIC BLOOD PRESSURE: 61 MMHG | SYSTOLIC BLOOD PRESSURE: 81 MMHG | RESPIRATION RATE: 28 BRPM

## 2017-02-07 VITALS — DIASTOLIC BLOOD PRESSURE: 86 MMHG | SYSTOLIC BLOOD PRESSURE: 131 MMHG | RESPIRATION RATE: 18 BRPM | HEART RATE: 87 BPM

## 2017-02-07 VITALS — SYSTOLIC BLOOD PRESSURE: 91 MMHG | HEART RATE: 113 BPM | DIASTOLIC BLOOD PRESSURE: 68 MMHG | RESPIRATION RATE: 19 BRPM

## 2017-02-07 VITALS — DIASTOLIC BLOOD PRESSURE: 89 MMHG | HEART RATE: 97 BPM | SYSTOLIC BLOOD PRESSURE: 119 MMHG

## 2017-02-07 VITALS — DIASTOLIC BLOOD PRESSURE: 86 MMHG | HEART RATE: 69 BPM | SYSTOLIC BLOOD PRESSURE: 164 MMHG | RESPIRATION RATE: 23 BRPM

## 2017-02-07 VITALS — DIASTOLIC BLOOD PRESSURE: 87 MMHG | SYSTOLIC BLOOD PRESSURE: 143 MMHG | HEART RATE: 74 BPM | RESPIRATION RATE: 16 BRPM

## 2017-02-07 VITALS — RESPIRATION RATE: 16 BRPM | SYSTOLIC BLOOD PRESSURE: 132 MMHG | HEART RATE: 74 BPM | DIASTOLIC BLOOD PRESSURE: 85 MMHG

## 2017-02-07 VITALS — SYSTOLIC BLOOD PRESSURE: 138 MMHG | RESPIRATION RATE: 22 BRPM | DIASTOLIC BLOOD PRESSURE: 100 MMHG

## 2017-02-07 VITALS — SYSTOLIC BLOOD PRESSURE: 77 MMHG | HEART RATE: 101 BPM | DIASTOLIC BLOOD PRESSURE: 42 MMHG

## 2017-02-07 VITALS — HEART RATE: 103 BPM

## 2017-02-07 LAB
ANION GAP SERPL CALC-SCNC: 41 MMOL/L (ref 8–16)
BUN SERPL-MCNC: 110 MG/DL (ref 7–20)
CALCIUM SERPL-MCNC: 9.5 MG/DL (ref 8.4–10.2)
CHLORIDE SERPL-SCNC: 96 MMOL/L (ref 97–110)
CO2 SERPL-SCNC: 21 MMOL/L (ref 21–31)
CREAT SERPL-MCNC: 9.14 MG/DL (ref 0.44–1)
GLUCOSE SERPL-MCNC: 103 MG/DL (ref 70–220)
MAGNESIUM SERPL-MCNC: 3.1 MG/DL (ref 1.7–2.5)
PHOSPHATE SERPL-MCNC: 12.8 MG/DL (ref 2.5–4.9)
POTASSIUM SERPL-SCNC: 4.9 MMOL/L (ref 3.5–5.1)
SODIUM SERPL-SCNC: 153 MMOL/L (ref 135–144)

## 2017-02-07 RX ADMIN — LABETALOL HYDROCHLORIDE PRN MG: 5 INJECTION, SOLUTION INTRAVENOUS at 05:10

## 2017-02-07 RX ADMIN — ATORVASTATIN CALCIUM SCH MG: 20 TABLET, FILM COATED ORAL at 20:22

## 2017-02-07 RX ADMIN — PIPERACILLIN AND TAZOBACTAM SCH MLS/HR: 2; .25 INJECTION, POWDER, FOR SOLUTION INTRAVENOUS at 06:29

## 2017-02-07 RX ADMIN — SEVELAMER CARBONATE SCH GM: 2400 POWDER, FOR SUSPENSION ORAL at 17:48

## 2017-02-07 RX ADMIN — IPRATROPIUM BROMIDE AND ALBUTEROL SULFATE SCH ML: .5; 3 SOLUTION RESPIRATORY (INHALATION) at 00:22

## 2017-02-07 RX ADMIN — PIPERACILLIN AND TAZOBACTAM SCH MLS/HR: 2; .25 INJECTION, POWDER, FOR SOLUTION INTRAVENOUS at 15:45

## 2017-02-07 RX ADMIN — PIPERACILLIN AND TAZOBACTAM SCH MLS/HR: 2; .25 INJECTION, POWDER, FOR SOLUTION INTRAVENOUS at 22:32

## 2017-02-07 RX ADMIN — LEVETIRACETAM SCH MLS/HR: 100 INJECTION, SOLUTION INTRAVENOUS at 20:23

## 2017-02-07 RX ADMIN — DIPHENHYDRAMINE HYDROCHLORIDE SCH MG: 50 INJECTION, SOLUTION INTRAMUSCULAR; INTRAVENOUS at 11:56

## 2017-02-07 RX ADMIN — IPRATROPIUM BROMIDE AND ALBUTEROL SULFATE SCH ML: .5; 3 SOLUTION RESPIRATORY (INHALATION) at 15:37

## 2017-02-07 RX ADMIN — CARBOXYMETHYLCELLULOSE SODIUM SCH DROP: 10 GEL OPHTHALMIC at 12:02

## 2017-02-07 RX ADMIN — CARBOXYMETHYLCELLULOSE SODIUM SCH DROP: 10 GEL OPHTHALMIC at 20:22

## 2017-02-07 RX ADMIN — HYDROMORPHONE HYDROCHLORIDE PRN MG: 1 INJECTION, SOLUTION INTRAMUSCULAR; INTRAVENOUS; SUBCUTANEOUS at 20:22

## 2017-02-07 RX ADMIN — IPRATROPIUM BROMIDE AND ALBUTEROL SULFATE SCH ML: .5; 3 SOLUTION RESPIRATORY (INHALATION) at 08:00

## 2017-02-07 RX ADMIN — CARBOXYMETHYLCELLULOSE SODIUM SCH DROP: 10 GEL OPHTHALMIC at 11:56

## 2017-02-07 NOTE — CONS
Date/Time of Note


Date/Time of Note


DATE: 2/7/17 


TIME: 14:34





Assessment/Plan


Assessment/Plan


Chief Complaint/Hosp Course


IMPRESSION:


1.  The patient has acute hemorrhagic stroke./sdh/trauma


2.  Malignant hypertension.better


3.  Endstage renal disease.


4.  Leukocytosis. better


5.  Respiratory failure.


6.  Incomplete database


7 hyperkalemia hx


8 SUBCUTANEOUS EPMHYSEMA/c tube


9 hypernatremia


10 hyperphosphatemia


plan hd


per neuro


bmp am


per surgery


avoid hyponatremia


renvella


Problems:  





Consultation Date/Type/Reason


Admit Date/Time


Jan 31, 2017 at 13:31


Initial Consult Date


1/31/17


Type of Consultation:  renal





24 HR Interval Summary


Constitutional:  disoriented





Exam/Review of Systems


Vital Signs


Vitals





 Vital Signs








  Date Time  Temp Pulse Resp B/P Pulse Ox O2 Delivery O2 Flow Rate FiO2


 


2/7/17 14:00  86 23 118/89 100 Nasal Cannula 2.0 


 


2/7/17 12:00 98.8       


 


2/6/17 09:00        30














 Intake and Output   


 


 2/6/17 2/6/17 2/7/17





 15:00 23:00 07:00


 


Intake Total 50 ml 60 ml 100 ml


 


Output Total 0 ml 0 ml 0 ml


 


Balance 50 ml 60 ml 100 ml











Exam


Respiratory:  diminished breath sounds


Cardiovascular:  regular rate and rhythm


Gastrointestinal:  soft


Musculoskeletal:  nl extremities to inspection


Extremities:  edema (+)


Neurological:  confused





Results


Result Diagram:  


2/6/17 0800                                                                    

            2/7/17 0340





Results 24 hrs





Laboratory Tests








Test


  2/7/17


03:40


 


Anion Gap 41  H


 


Blood Urea Nitrogen 110  H


 


Calcium Level 9.5  


 


Carbon Dioxide Level 21  


 


Chloride Level 96  L


 


Creatinine 9.14  H


 


Glucose Level 103  


 


Magnesium Level 3.1  H


 


Phosphorus Level 12.8  H


 


Potassium Level 4.9  


 


Sodium Level 153  H











Medications


Medications





 Current Medications


Labetalol HCl (Labetalol) 10 mg Q10M  PRN IV ELEVATED BLOOD PRESSURE Last 

administered on 2/7/17at 05:10; Admin Dose 10 MG;  Start 1/31/17 at 14:00


Ondansetron HCl (Zofran Inj) 4 mg Q6H  PRN IV NAUSEA AND/OR VOMITING Last 

administered on 1/31/17at 20:32; Admin Dose 4 MG;  Start 1/31/17 at 14:00


Acetaminophen (Tylenol Liquid) 650 mg Q6H  PRN PO PAIN LEVEL 1-3 OR FEVER Last 

administered on 2/3/17at 21:28; Admin Dose 650 MG;  Start 1/31/17 at 14:00


Acetaminophen (Tylenol Supp) 650 mg Q4H  PRN [place] PAIN LEVEL 1-3 OR FEVER;  Start 

1/31/17 at 14:00


Famotidine (Pepcid Iv) 20 mg DAILY IV  Last administered on 2/7/17at 11:56; 

Admin Dose 20 MG;  Start 1/31/17 at 15:00


Eye Lubricant (Artificial Tears Oph) 1 drop TID BOTH EYES  Last administered on 

2/7/17at 11:56; Admin Dose 1 DROP;  Start 1/31/17 at 21:00


Multivit/Ca Carb/ B Cmplx/FA/Prenat ([place]-Argenis) 1 tab DAILY PO  Last 

administered on 2/7/17at 11:56; Admin Dose 1 TAB;  Start 2/1/17 at 09:00


Hydromorphone HCl 0.5 mg 0.5 mg Q4H  PRN IV PAIN Last administered on 2/6/17at 

19:41; Admin Dose 0.5 MG;  Start 1/31/17 at 21:30


Norepinephrine 16 mg/Dextrose 500 ml @  1.87 mls/hr TITRATE IV ;  Start 2/2/17 

at 09:00


Piperacillin Sod/ Tazobactam Sod (Zosyn 2.25gm/ 50ml (Pmx)) 50 ml @  200 mls/hr 

Q8 IVPB  Last administered on 2/7/17at 06:29; Admin Dose 200 MLS/HR;  Start 2/2/ 17 at 11:00


Atorvastatin Calcium (Lipitor) 20 mg QHS NGT  Last administered on 2/6/17at 20:

29; Admin Dose 20 MG;  Start 2/6/17 at 21:00











PRITESH MACKEY MD Feb 7, 2017 14:36

## 2017-02-07 NOTE — CONS
Date/Time of Note


Date/Time of Note


DATE: 2/7/17 


TIME: 19:14





Assessment/Plan


Assessment/Plan


Chief Complaint/Hosp Course


s/p traumatic R > L frontal contusion, right SDH.


Asked to see patient b/c reported change in MS, became unresponsive. Now awake 

and alert. Following commands. CT stable. No acute NS issue. No seizure 

activity reported.


No acute NS issues currently. PT/OT/ rehab.


Problems:  





Consultation Date/Type/Reason


Admit Date/Time


Jan 31, 2017 at 13:31


Initial Consult Date


1/31/17


Type of Consultation:  Neurosurgery





24 HR Interval Summary


Free Text/Dictation


Patient extubated. Doesn't vocalize but nods and mouths words. Reports mild 

headache.





Exam/Review of Systems


Vital Signs


Vitals





 Vital Signs








  Date Time  Temp Pulse Resp B/P Pulse Ox O2 Delivery O2 Flow Rate FiO2


 


2/7/17 18:00  88 27 129/88 100 Nasal Cannula 2.0 


 


2/7/17 17:00 98.7       


 


2/6/17 09:00        30














 Intake and Output   


 


 2/6/17 2/6/17 2/7/17





 15:00 23:00 07:00


 


Intake Total 50 ml 60 ml 100 ml


 


Output Total 0 ml 0 ml 0 ml


 


Balance 50 ml 60 ml 100 ml











Exam


Constitutional:  alert


Eyes:  EOMI, PERRL


Neurological:  nl strength, other (no clear LE weakness today. Follows commands 

readily and appropriately, MORRIS 5/5 grossly.)





Results


Result Diagram:  


2/6/17 0800                                                                    

            2/7/17 0340





Results 24 hrs





Laboratory Tests








Test


  2/7/17


03:40


 


Anion Gap 41  H


 


Blood Urea Nitrogen 110  H


 


Calcium Level 9.5  


 


Carbon Dioxide Level 21  


 


Chloride Level 96  L


 


Creatinine 9.14  H


 


Glucose Level 103  


 


Magnesium Level 3.1  H


 


Phosphorus Level 12.8  H


 


Potassium Level 4.9  


 


Sodium Level 153  H











Medications


Medications





 Current Medications


Labetalol HCl (Labetalol) 10 mg Q10M  PRN IV ELEVATED BLOOD PRESSURE Last 

administered on 2/7/17at 05:10; Admin Dose 10 MG;  Start 1/31/17 at 14:00


Ondansetron HCl (Zofran Inj) 4 mg Q6H  PRN IV NAUSEA AND/OR VOMITING Last 

administered on 1/31/17at 20:32; Admin Dose 4 MG;  Start 1/31/17 at 14:00


Acetaminophen (Tylenol Liquid) 650 mg Q6H  PRN PO PAIN LEVEL 1-3 OR FEVER Last 

administered on 2/3/17at 21:28; Admin Dose 650 MG;  Start 1/31/17 at 14:00


Acetaminophen (Tylenol Supp) 650 mg Q4H  PRN NV PAIN LEVEL 1-3 OR FEVER;  Start 

1/31/17 at 14:00


Famotidine (Pepcid Iv) 20 mg DAILY IV  Last administered on 2/7/17at 11:56; 

Admin Dose 20 MG;  Start 1/31/17 at 15:00


Eye Lubricant (Artificial Tears Oph) 1 drop TID BOTH EYES  Last administered on 

2/7/17at 11:56; Admin Dose 1 DROP;  Start 1/31/17 at 21:00


Multivit/Ca Carb/ B Cmplx/FA/Prenat ([place]-Argenis) 1 tab DAILY PO  Last 

administered on 2/7/17at 11:56; Admin Dose 1 TAB;  Start 2/1/17 at 09:00


Hydromorphone HCl 0.5 mg 0.5 mg Q4H  PRN IV PAIN Last administered on 2/6/17at 

19:41; Admin Dose 0.5 MG;  Start 1/31/17 at 21:30


Norepinephrine 16 mg/Dextrose 500 ml @  1.87 mls/hr TITRATE IV ;  Start 2/2/17 

at 09:00


Piperacillin Sod/ Tazobactam Sod (Zosyn 2.25gm/ 50ml (Pmx)) 50 ml @  200 mls/hr 

Q8 IVPB  Last administered on 2/7/17at 15:45; Admin Dose 200 MLS/HR;  Start 2/2/ 17 at 11:00


Atorvastatin Calcium (Lipitor) 20 mg QHS NGT  Last administered on 2/6/17at 20:

29; Admin Dose 20 MG;  Start 2/6/17 at 21:00





Procedures


Procedures


CT Head- Stable.











ODALIS MADISON MD Feb 7, 2017 19:19

## 2017-02-07 NOTE — RADRPT
PROCEDURE:   XR Chest. 

 

CLINICAL INDICATION:   Nasogastric tube placement 

 

TECHNIQUE:   Portable single view of the chest

 

COMPARISON:   02/06 

 

FINDINGS:

The side hole of the nasogastric tube is now likely just within the stomach.  The distal end is not 
seen but is likely in the mid stomach.  Pneumoperitoneum is again seen as well as subcutaneous emphy
sema.  Bilateral chest tubes are again seen.  No clearly visualized pneumothorax.  Cardiomegaly and 
vascular calcification.  Postoperative changes of the right upper lung field and bibasilar atelectas
is again seen.

 

IMPRESSION:

Nasogastric tube now likely in good position.  Otherwise stable exam. 

 

RPTAT: HLBE

_____________________________________________ 

Physician Ramonita           Date    Time 

Electronically viewed and signed by Physician Ramonita on 02/07/2017 02:51 

 

D:  02/07/2017 02:51  T:  02/07/2017 02:51

LE/

## 2017-02-07 NOTE — PN
Date/Time of Note


Date/Time of Note


DATE: 2/7/17 


TIME: 10:14





Assessment/Plan


VTE Prophylaxis


VTE Prophylaxis Intervention:  anti-embolic stocking





Lines/Catheters


IV Catheter Type (from Nrsg):  Peripheral IV


Urinary Cath still in place:  No





Assessment/Plan


Assessment/Plan


1. neuro: subdfural hematoma, non-operative, cont current


(b) diminished mental status, possibly related to metobolic derrangement (see 

renal)





2. renal: esrd on HD, cont current


(b) hyperNa, add free H20 flushes


(c) hyperPhos, per renal





3. pulm: bilat ptx secondary to barotrauma, anticipate d/c chest tubes in next 

24 hours, 


(b) if ok pulm, ok out of ICU





case d/w nursing





Subjective


24 Hr Interval Summary


Free Text/Dictation


no response to voice





Exam/Review of Systems


Vital Signs


Vitals





 Vital Signs








  Date Time  Temp Pulse Resp B/P Pulse Ox O2 Delivery O2 Flow Rate FiO2


 


2/7/17 10:00  90      


 


2/7/17 09:00   21 134/103 100 Nasal Cannula 2.0 


 


2/7/17 08:00 98.9       


 


2/6/17 09:00        30














 Intake and Output   


 


 2/6/17 2/6/17 2/7/17





 14:59 22:59 06:59


 


Intake Total 50 ml 60 ml 50 ml


 


Output Total 0 ml 0 ml 0 ml


 


Balance 50 ml 60 ml 50 ml











Exam


Constitutional:  non-verbal


ENMT:  intubated, other (significant OP secretions ntoed)


Respiratory:  clear to auscultation


Cardiovascular:  regular rate and rhythm


Gastrointestinal:  soft





Results


Result Diagram:  


2/6/17 0800                                                                    

            2/7/17 0340





Results 24 hrs





Laboratory Tests








Test


  2/7/17


03:40


 


Anion Gap 41  H


 


Blood Urea Nitrogen 110  H


 


Calcium Level 9.5  


 


Carbon Dioxide Level 21  


 


Chloride Level 96  L


 


Creatinine 9.14  H


 


Glucose Level 103  


 


Magnesium Level 3.1  H


 


Phosphorus Level 12.8  H


 


Potassium Level 4.9  


 


Sodium Level 153  H











Medications


Medications





 Current Medications


Labetalol HCl (Labetalol) 10 mg Q10M  PRN IV ELEVATED BLOOD PRESSURE Last 

administered on 2/7/17at 05:10; Admin Dose 10 MG;  Start 1/31/17 at 14:00


Ondansetron HCl (Zofran Inj) 4 mg Q6H  PRN IV NAUSEA AND/OR VOMITING Last 

administered on 1/31/17at 20:32; Admin Dose 4 MG;  Start 1/31/17 at 14:00


Acetaminophen (Tylenol Liquid) 650 mg Q6H  PRN PO PAIN LEVEL 1-3 OR FEVER Last 

administered on 2/3/17at 21:28; Admin Dose 650 MG;  Start 1/31/17 at 14:00


Acetaminophen (Tylenol Supp) 650 mg Q4H  PRN RI PAIN LEVEL 1-3 OR FEVER;  Start 

1/31/17 at 14:00


Famotidine (Pepcid Iv) 20 mg DAILY IV  Last administered on 2/6/17at 08:45; 

Admin Dose 20 MG;  Start 1/31/17 at 15:00


Eye Lubricant (Artificial Tears Oph) 1 drop TID BOTH EYES  Last administered on 

2/6/17at 20:29; Admin Dose 1 DROP;  Start 1/31/17 at 21:00


Multivit/Ca Carb/ B Cmplx/FA/Prenat ([place]-Argenis) 1 tab DAILY PO  Last 

administered on 2/6/17at 08:45; Admin Dose 1 TAB;  Start 2/1/17 at 09:00


Hydromorphone HCl 0.5 mg 0.5 mg Q4H  PRN IV PAIN Last administered on 2/6/17at 

19:41; Admin Dose 0.5 MG;  Start 1/31/17 at 21:30


Norepinephrine 16 mg/Dextrose 500 ml @  1.87 mls/hr TITRATE IV ;  Start 2/2/17 

at 09:00


Piperacillin Sod/ Tazobactam Sod (Zosyn 2.25gm/ 50ml (Pmx)) 50 ml @  200 mls/hr 

Q8 IVPB  Last administered on 2/7/17at 06:29; Admin Dose 200 MLS/HR;  Start 2/2/ 17 at 11:00


Atorvastatin Calcium (Lipitor) 20 mg QHS NGT  Last administered on 2/6/17at 20:

29; Admin Dose 20 MG;  Start 2/6/17 at 21:00











AZIZA DAVIS MD Feb 7, 2017 10:19

## 2017-02-07 NOTE — RADRPT
PROCEDURE:   CT Brain without contrast. 

 

CLINICAL INDICATION:    Porcine mental status. 

 

TECHNIQUE:   A CT of the brain was performed on a high-resolution CT scanner utilizing a low dose te
chnique with axial imaging from the skull base through the vertex without IV contrast.  Multiplanar 
reformatted images were made.  Images were reviewed on a PACS workstation.  The CTDIvol is 44.5 mGy 
and the DLP is 630.2 mGycm.  

 

One or more of the following dose reduction techniques were used:

- Automated exposure control.

- Adjustment of the mA and/or kV according to patient size.

Use of iterative reconstruction technique.

 

COMPARISON:   CT scan of brain 02/02/2017. 

 

FINDINGS:

There are vascular calcifications in the right left vertebral arteries. There are vascular calcifica
tions in the cavernous and supracavernous portions of the internal carotid arteries. . There is a pa
renchymal hemorrhage which is stable and the right frontal lobe with surrounding edema.  Is a small 
hemorrhage in the inferior medial left frontal lobe which is unchanged measuring about 1.1 by 0.4 cm
 in size.

 

The fourth, third lateral ventricles are normal in size configuration.  There are areas of increased
 attenuation which are symmetric and the basal ganglia and likely represent calcifications. The infa
rct of the left basal ganglia is unchanged.  There is a 2 mm area of increased attenuation in the he
ad of the left caudate nucleus identified on the prior study.  This may represent a small parenchyma
l hemorrhage.  Follow-up imaging recommended.

 

There is a 2.3 mm acute subdural hematoma adjacent to the right temporal, right parietal and right o
ccipital lobes.  This is unchanged measuring up to 6.3 mm in width at the level of the right occipit
oparietal junction.  This also extends along the ventral portion of the falx cerebi and is unchanged
. There are minimally displaced fractures involving the dorsal convexity of the right frontal bone a
nd medial convexity of the left parietal bone which are unchanged. The fracture extends into the of 
the right temporal bone to the level of the right mandibular fossa and dorsal aspect of the right mi
ddle cranial fossa.

 

There are scalp hematomas adjacent to the right parietal bone

 

The mastoid air cells and internal auditory canals are normal.  There is fluid in the sphenoid sinus
.

 

IMPRESSION:

 

1.  The acute subdural hematoma identified along the inferior aspect of the right frontal lobe, sara
g the falx and along the right temporal and parietal lobes is unchanged since the prior exam.  Subcu
taneous emphysema seen in the soft tissues around the skull has resolved.

2. Stable contusions involving the right and left frontal lobes with surrounding vasogenic edema.

3.  4 mm of subfalcine herniation of midline structures from right to left.

4.  Stable fractures involving the dorsal convexity of the right temporal, right parietal and left p
arietal bones with associated stable scalp hematoma.

 

RPTAT:AAJJ

_____________________________________________ 

Physician Jeyson           Date    Time 

Electronically viewed and signed by Bro Gutierrez Physician on 02/07/2017 11:52 

 

D:  02/07/2017 11:52  T:  02/07/2017 11:52

CHELA/

## 2017-02-07 NOTE — CONS
DATE OF ADMISSION: 01/31/2017

DATE OF CONSULTATION:  

 

 

 

TYPE OF CONSULTATION:  Neurological.

 

Thank you, Dr. Beebe, for your kind referral for evaluation of transient encephalopathy and subd
ural hematoma.

 

HISTORY OF PRESENT ILLNESS:  The patient is a 46-year-old lady with a past medical history of hypert
ension, end-stage renal disease on hemodialysis and GERD who was admitted on 01/31/2017 secondary to
 incidental fall and had blunt head trauma and subsequently had lethargy.  She had CAT scan of the b
rain on admission which shows bilateral acute frontal contusions as well as right-sided ____ subdura
l hematoma and scattered subarachnoid hemorrhage and right parietal and temporal skull nondisplaced 
fracture.  Neurosurgeon is on case.  No surgery was performed.  The patient was extubated yesterday 
and today noticed to have some episode of staring and unresponsiveness.  The patient had repeated CA
T scan today which shows stable acute subdural hematoma in the inferior aspect of right frontal lobe
 along the falx and along the right temporal and parietal lobes, is unchanged; stable contusion on t
he right and left frontal lobes with surrounding vasogenic edema, 4 mm subfalcine herniation from le
ft to right and stable right temporoparietal and left parietal fractures with scalp hematoma.  Neuro
surgeon has seen the patient today and put her on Keppra 500 twice daily.

 

MOST RECENT LABORATORIES:  Yesterday:  WBC 8.7, hemoglobin 13, hematocrit 38.8, platelets 235.  Norm
al INR.  Chemistry from today:  Sodium 153, chloride 96, , creatinine 9.1, and phosphorus was
 12 this morning.  It was before dialysis.  The patient became unresponsive following the dialysis.

 

According to the nurse, she is back to her baseline.

 

ALLERGIES:  SHE IS ALLERGIC TO MORPHINE.

 

SOCIAL HISTORY:  No alcohol, tobacco, drug use.

 

FAMILY HISTORY:  Not contributory.

 

REVIEW OF SYSTEMS:  Unable to obtain.

 

PHYSICAL EXAMINATION:

VITAL SIGNS:  Temperature 98.7, pulse 88, respirations 27, blood pressure 129/88.

GENERAL:  Not in acute distress, lying in bed.

HEAD:  Normocephalic.

NECK:  Some nuchal stiffness noticed.

LUNGS:  Clear to auscultation bilaterally.

CARDIAC:  Normal cardiac rhythm and sounds.

ABDOMEN:  Soft.

EXTREMITIES:  No cyanosis, clubbing or edema.

NEUROLOGIC:  The patient is awake, alert.  She is answering in extremely low voice, essentially whis
pering, and seemed to be able to tell her name but not clear if she was answering other questions on
 orientation properly, just hard to understand.  She is a [place] speaker as well, but the nurse int
erprets.  The patient follows complex commands with no problem.  She looks bilaterally, at present r
esponds to visual threat bilaterally.  Pupils reactive from 3 to 2 mm, maybe slightly more brisk on 
the right.  Extraocular movements intact without nystagmus.  Symmetrical face.  Present corneal refl
exes and gag.  Motor strength examination shows mild weakness, about 3+/5 in the left upper and lowe
r extremities, especially distally in the leg on the left.  She is able to lift against gravity both
 arms, but there is some down drift on the left side.  Sensory examination seemed to be grossly inta
ct to light touch.  Deep tendon reflexes 2+ upper extremities and absent in lower extremities.  No d
efinite response to plantar stimulation bilaterally.  Coordination seemed to be preserved.  No tremo
r noticed.

 

IMPRESSION:  Traumatic brain injury, skull fracture, bifrontal contusions, acute subdural hematoma, 
traumatic subarachnoid hemorrhage on admission.  

 

The patient had episode of unresponsiveness today suspicious for complex partial seizure.  I agree w
ith continuation of Keppra.  I will switch her to renal dose 500 mg daily with additional 500 mg fol
lowing hemodialysis.  Also will obtain EEG.

 

Thank you very much for this interesting consultation.

 

 

Dictated By: SHANNAN GUERRERO/BITA

DD:    02/07/2017 19:55:22

DT:    02/07/2017 22:12:26

Conf#: [id number]

DID#:  [id number]

## 2017-02-07 NOTE — CONS
Date/Time of Note


Date/Time of Note


DATE: 2/7/17 


TIME: 07:12





Assessment/Plan


Assessment/Plan


Additional Assessment/Plan


Assessment and recommendations; next





1.  Patient admitted with intracerebral parenchymal contusions as well as 

subarachnoid hemorrhage hemorrhages due to fall leading to respiratory failure.

  Next





2.  End-stage renal disease on hemodialysis next





3.  Bilateral iatrogenic pneumothoraces with marked clinical and radiological 

improvement.  Chest x-ray was reviewed from today which is essentially clear.





Continue current treatment.  Remove right-sided chest tube today.  Remove left 

side chest tube tomorrow.  Patient can be transferred to telemetry unit.





Consultation Date/Type/Reason


Admit Date/Time


Jan 31, 2017 at 13:31


Initial Consult Date


1/31/17


Type of Consultation:  pulmonary/critical care





24 HR Interval Summary


Free Text/Dictation


Patient condition is stable.  Patient was successfully extubated yesterday 

morning.  Patient is completely awake alert follows commands moves all 4 

extremities.  





General examination; young lady, currently in no distress awake and alert.





Exam/Review of Systems


Vital Signs


Vitals





 Vital Signs








  Date Time  Temp Pulse Resp B/P Pulse Ox O2 Delivery O2 Flow Rate FiO2


 


2/7/17 06:39       2.0 


 


2/7/17 06:00  74 16 143/87 100 Nasal Cannula  


 


2/7/17 04:00 98.6       


 


2/6/17 09:00        30














 Intake and Output   


 


 2/6/17 2/6/17 2/7/17





 15:00 23:00 07:00


 


Intake Total 50 ml 60 ml 100 ml


 


Output Total 0 ml 0 ml 0 ml


 


Balance 50 ml 60 ml 100 ml











Exam


HEENT examination; supple neck, no JVD.  No lymphadenopathy.  Midsize pupils 

reactive to light.  Extraocular movements are intact.





Chest examination; clear to auscultation bilaterally.  Bilateral chest tubes 

are in place.  There is been no drainage on either side and there is no air 

leak.  S1-S2 audible no murmurs regular rhythm.





Abdomen examination; soft, nondistended.  Bowel sounds audible.  Nontender.  





Extremity examination; no peripheral edema.  There is an AV shunt in the left 

arm.





CNS examination no focal deficit.





Results


Result Diagram:  


2/6/17 0800                                                                    

            2/7/17 0340





Results 24 hrs





Laboratory Tests








Test


  2/6/17


08:00 2/6/17


09:30 2/7/17


03:40


 


Anion Gap 37  H  41  H


 


Basophils # 0.0    


 


Basophils % 0.1    


 


Blood Morphology Comment     


 


Blood Urea Nitrogen 82  H  110  H


 


Calcium Level 9.7    9.5  


 


Carbon Dioxide Level 20  L  21  


 


Chloride Level 95  L  96  L


 


Creatinine 7.16  H  9.14  H


 


Eosinophils # 0.1    


 


Eosinophils % 1.5    


 


Glucose Level 125    103  


 


Hematocrit 38.8    


 


Hemoglobin 13.1    


 


Lymphocytes # 1.1    


 


Lymphocytes % 12.6  L  


 


Mean Corpuscular Hemoglobin 29.3    


 


Mean Corpuscular Hemoglobin


Concent 33.6  


  


  


 


 


Mean Corpuscular Volume 87.1    


 


Mean Platelet Volume 9.3    


 


Monocytes # 1.2  H  


 


Monocytes % 13.4  H  


 


Neutrophils # 6.3    


 


Neutrophils % 72.4    


 


Nucleated Red Blood Cells # 0.0    


 


Nucleated Red Blood Cells % 0.0    


 


Platelet Count 235    


 


Potassium Level 5.1    4.9  


 


Red Blood Count 4.46    


 


Red Cell Distribution Width 14.7  H  


 


Sodium Level 147  H  153  H


 


White Blood Count 8.7    


 


Arterial Blood HCO3  22.2   


 


Arterial Blood Base Excess  -2.8   


 


Arterial Blood Oxygen


Saturation 


  96.4  


  


 


 


Jed Test  ACCEPTAB   


 


Arterial Blood Gas Puncture


Site 


  Right Radial  


  


 


 


Arterial Blood


Carboxyhemoglobin 


  0.5  


  


 


 


Arterial Blood Date Drawn


  


  2/6/2017


9:36:03 AM 


 


 


Arterial Blood Methemoglobin  0.3   


 


Arterial Blood pCO2 (Temp


correct) 


  39.4  


  


 


 


Arterial Blood pH (Temp


corrected) 


  7.369  


  


 


 


Arterial Blood pO2 (Temp


corrected) 


  98.9  


  


 


 


Blood Gas A-a O2 Differential  68.7  H 


 


Blood Gas Actual Respiration


Rate 


  24  


  


 


 


Blood Gas Low PEEP Setting  0   


 


Blood Gas Modality  VENT - CPAP   


 


Blood Gas Notified Time


  


  2/6/2017


9:47:45 AM 


 


 


Blood Gas Notified Whom  JLD   


 


Blood Gas Pressure Support  10   


 


Blood Gas Specimen Source


  


  Blood arterial


  


 


 


Blood Gas Temperature  37.0   


 


FiO2  30.0   


 


Oxyhemoglobin Percent  95.6   


 


Total Hemoglobin  14.1   


 


Magnesium Level   3.1  H


 


Phosphorus Level   12.8  H











Medications


Medications





 Current Medications


Labetalol HCl (Labetalol) 10 mg Q10M  PRN IV ELEVATED BLOOD PRESSURE Last 

administered on 2/7/17at 05:10; Admin Dose 10 MG;  Start 1/31/17 at 14:00


Ondansetron HCl (Zofran Inj) 4 mg Q6H  PRN IV NAUSEA AND/OR VOMITING Last 

administered on 1/31/17at 20:32; Admin Dose 4 MG;  Start 1/31/17 at 14:00


Acetaminophen (Tylenol Liquid) 650 mg Q6H  PRN PO PAIN LEVEL 1-3 OR FEVER Last 

administered on 2/3/17at 21:28; Admin Dose 650 MG;  Start 1/31/17 at 14:00


Acetaminophen (Tylenol Supp) 650 mg Q4H  PRN [place] PAIN LEVEL 1-3 OR FEVER;  Start 

1/31/17 at 14:00


Famotidine (Pepcid Iv) 20 mg DAILY IV  Last administered on 2/6/17at 08:45; 

Admin Dose 20 MG;  Start 1/31/17 at 15:00


Eye Lubricant (Artificial Tears Oph) 1 drop TID BOTH EYES  Last administered on 

2/6/17at 20:29; Admin Dose 1 DROP;  Start 1/31/17 at 21:00


Multivit/Ca Carb/ B Cmplx/FA/Prenat ([place]-Argenis) 1 tab DAILY PO  Last 

administered on 2/6/17at 08:45; Admin Dose 1 TAB;  Start 2/1/17 at 09:00


Hydromorphone HCl 0.5 mg 0.5 mg Q4H  PRN IV PAIN Last administered on 2/6/17at 

19:41; Admin Dose 0.5 MG;  Start 1/31/17 at 21:30


Norepinephrine 16 mg/Dextrose 500 ml @  1.87 mls/hr TITRATE IV ;  Start 2/2/17 

at 09:00


Piperacillin Sod/ Tazobactam Sod (Zosyn 2.25gm/ 50ml (Pmx)) 50 ml @  200 mls/hr 

Q8 IVPB  Last administered on 2/7/17at 06:29; Admin Dose 200 MLS/HR;  Start 2/2/ 17 at 11:00


Atorvastatin Calcium (Lipitor) 20 mg QHS NGT  Last administered on 2/6/17at 20:

29; Admin Dose 20 MG;  Start 2/6/17 at 21:00











CHADD CORBETT Feb 7, 2017 07:17

## 2017-02-08 VITALS — RESPIRATION RATE: 23 BRPM | SYSTOLIC BLOOD PRESSURE: 126 MMHG | HEART RATE: 97 BPM | DIASTOLIC BLOOD PRESSURE: 87 MMHG

## 2017-02-08 VITALS — DIASTOLIC BLOOD PRESSURE: 98 MMHG | RESPIRATION RATE: 25 BRPM | HEART RATE: 120 BPM | SYSTOLIC BLOOD PRESSURE: 141 MMHG

## 2017-02-08 VITALS — SYSTOLIC BLOOD PRESSURE: 118 MMHG | RESPIRATION RATE: 17 BRPM | HEART RATE: 104 BPM | DIASTOLIC BLOOD PRESSURE: 79 MMHG

## 2017-02-08 VITALS — SYSTOLIC BLOOD PRESSURE: 119 MMHG | HEART RATE: 114 BPM | DIASTOLIC BLOOD PRESSURE: 100 MMHG | RESPIRATION RATE: 19 BRPM

## 2017-02-08 VITALS — RESPIRATION RATE: 20 BRPM | SYSTOLIC BLOOD PRESSURE: 129 MMHG | HEART RATE: 111 BPM | DIASTOLIC BLOOD PRESSURE: 93 MMHG

## 2017-02-08 VITALS — HEART RATE: 120 BPM | RESPIRATION RATE: 18 BRPM | SYSTOLIC BLOOD PRESSURE: 126 MMHG | DIASTOLIC BLOOD PRESSURE: 95 MMHG

## 2017-02-08 VITALS — RESPIRATION RATE: 21 BRPM | HEART RATE: 109 BPM | SYSTOLIC BLOOD PRESSURE: 136 MMHG | DIASTOLIC BLOOD PRESSURE: 94 MMHG

## 2017-02-08 VITALS — SYSTOLIC BLOOD PRESSURE: 136 MMHG | RESPIRATION RATE: 19 BRPM | DIASTOLIC BLOOD PRESSURE: 92 MMHG | HEART RATE: 94 BPM

## 2017-02-08 VITALS — HEART RATE: 112 BPM

## 2017-02-08 VITALS — HEART RATE: 94 BPM | DIASTOLIC BLOOD PRESSURE: 100 MMHG | SYSTOLIC BLOOD PRESSURE: 143 MMHG | RESPIRATION RATE: 19 BRPM

## 2017-02-08 VITALS — SYSTOLIC BLOOD PRESSURE: 118 MMHG | HEART RATE: 122 BPM | DIASTOLIC BLOOD PRESSURE: 91 MMHG | RESPIRATION RATE: 19 BRPM

## 2017-02-08 VITALS — HEART RATE: 114 BPM | RESPIRATION RATE: 19 BRPM | DIASTOLIC BLOOD PRESSURE: 101 MMHG | SYSTOLIC BLOOD PRESSURE: 137 MMHG

## 2017-02-08 VITALS — DIASTOLIC BLOOD PRESSURE: 92 MMHG | SYSTOLIC BLOOD PRESSURE: 127 MMHG | RESPIRATION RATE: 20 BRPM | HEART RATE: 116 BPM

## 2017-02-08 VITALS — DIASTOLIC BLOOD PRESSURE: 100 MMHG | RESPIRATION RATE: 21 BRPM | SYSTOLIC BLOOD PRESSURE: 130 MMHG | HEART RATE: 111 BPM

## 2017-02-08 VITALS — HEART RATE: 116 BPM | RESPIRATION RATE: 19 BRPM | SYSTOLIC BLOOD PRESSURE: 126 MMHG | DIASTOLIC BLOOD PRESSURE: 94 MMHG

## 2017-02-08 VITALS — SYSTOLIC BLOOD PRESSURE: 130 MMHG | HEART RATE: 100 BPM | DIASTOLIC BLOOD PRESSURE: 90 MMHG

## 2017-02-08 VITALS — RESPIRATION RATE: 19 BRPM | SYSTOLIC BLOOD PRESSURE: 101 MMHG | HEART RATE: 96 BPM | DIASTOLIC BLOOD PRESSURE: 71 MMHG

## 2017-02-08 VITALS — HEART RATE: 106 BPM | DIASTOLIC BLOOD PRESSURE: 95 MMHG | SYSTOLIC BLOOD PRESSURE: 138 MMHG | RESPIRATION RATE: 18 BRPM

## 2017-02-08 VITALS — RESPIRATION RATE: 23 BRPM | HEART RATE: 119 BPM | DIASTOLIC BLOOD PRESSURE: 95 MMHG | SYSTOLIC BLOOD PRESSURE: 131 MMHG

## 2017-02-08 VITALS — HEART RATE: 110 BPM | DIASTOLIC BLOOD PRESSURE: 91 MMHG | RESPIRATION RATE: 19 BRPM | SYSTOLIC BLOOD PRESSURE: 129 MMHG

## 2017-02-08 VITALS — HEART RATE: 112 BPM | RESPIRATION RATE: 15 BRPM | DIASTOLIC BLOOD PRESSURE: 83 MMHG | SYSTOLIC BLOOD PRESSURE: 112 MMHG

## 2017-02-08 VITALS — SYSTOLIC BLOOD PRESSURE: 134 MMHG | HEART RATE: 85 BPM | DIASTOLIC BLOOD PRESSURE: 91 MMHG | RESPIRATION RATE: 18 BRPM

## 2017-02-08 VITALS — DIASTOLIC BLOOD PRESSURE: 93 MMHG | HEART RATE: 110 BPM | SYSTOLIC BLOOD PRESSURE: 133 MMHG | RESPIRATION RATE: 19 BRPM

## 2017-02-08 VITALS — HEART RATE: 108 BPM

## 2017-02-08 LAB
ANION GAP SERPL CALC-SCNC: 36 MMOL/L (ref 8–16)
BASOPHILS # BLD AUTO: 0 10^3/UL (ref 0–0.1)
BASOPHILS NFR BLD: 0.2 % (ref 0–2)
BUN SERPL-MCNC: 75 MG/DL (ref 7–20)
CALCIUM SERPL-MCNC: 9.5 MG/DL (ref 8.4–10.2)
CHLORIDE SERPL-SCNC: 95 MMOL/L (ref 97–110)
CO2 SERPL-SCNC: 22 MMOL/L (ref 21–31)
CONDITION: 1
CREAT SERPL-MCNC: 6.77 MG/DL (ref 0.44–1)
EOSINOPHIL # BLD: 0.2 10^3/UL (ref 0–0.5)
EOSINOPHIL NFR BLD: 1.6 % (ref 0–7)
ERYTHROCYTE [DISTWIDTH] IN BLOOD BY AUTOMATED COUNT: 14.6 % (ref 11.5–14.5)
GLUCOSE SERPL-MCNC: 211 MG/DL (ref 70–220)
HCT VFR BLD CALC: 40.9 % (ref 37–47)
HGB BLD-MCNC: 13.7 G/DL (ref 12–16)
LH ANALYZER COMMENTS: 1
LYMPHOCYTES # BLD AUTO: 0.7 10^3/UL (ref 0.8–2.9)
LYMPHOCYTES NFR BLD AUTO: 6.8 % (ref 15–51)
MCH RBC QN AUTO: 29.1 PG (ref 29–33)
MCHC RBC AUTO-ENTMCNC: 33.5 G/DL (ref 32–37)
MCV RBC AUTO: 86.8 FL (ref 82–101)
MONOCYTES # BLD: 1 10^3/UL (ref 0.3–0.9)
MONOCYTES NFR BLD: 9.1 % (ref 0–11)
NEUTROPHILS # BLD: 8.6 10^3/UL (ref 1.6–7.5)
NEUTROPHILS NFR BLD AUTO: 82.3 % (ref 39–77)
NRBC # BLD MANUAL: 0 10^3/UL (ref 0–0)
NRBC BLD QL: 0 /100WBC (ref 0–0)
PLATELET # BLD: 244 10^3/UL (ref 140–440)
PMV BLD AUTO: 9.7 FL (ref 7.4–10.4)
POTASSIUM SERPL-SCNC: 3.9 MMOL/L (ref 3.5–5.1)
RBC # BLD AUTO: 4.72 10^6/UL (ref 4.2–5.4)
SODIUM SERPL-SCNC: 149 MMOL/L (ref 135–144)
WBC # BLD AUTO: 10.5 10^3/UL (ref 4.8–10.8)
WBC # BLD: 10.5 10^3/UL (ref 4.8–10.8)

## 2017-02-08 RX ADMIN — SEVELAMER CARBONATE SCH GM: 2400 POWDER, FOR SUSPENSION ORAL at 12:56

## 2017-02-08 RX ADMIN — ACETAMINOPHEN PRN MG: 160 SOLUTION ORAL at 00:39

## 2017-02-08 RX ADMIN — CARBOXYMETHYLCELLULOSE SODIUM SCH DROP: 10 GEL OPHTHALMIC at 08:42

## 2017-02-08 RX ADMIN — LEVETIRACETAM SCH MLS/HR: 100 INJECTION, SOLUTION INTRAVENOUS at 10:27

## 2017-02-08 RX ADMIN — CARBOXYMETHYLCELLULOSE SODIUM SCH DROP: 10 GEL OPHTHALMIC at 21:00

## 2017-02-08 RX ADMIN — IPRATROPIUM BROMIDE AND ALBUTEROL SULFATE SCH ML: .5; 3 SOLUTION RESPIRATORY (INHALATION) at 00:29

## 2017-02-08 RX ADMIN — PIPERACILLIN AND TAZOBACTAM SCH MLS/HR: 2; .25 INJECTION, POWDER, FOR SOLUTION INTRAVENOUS at 05:34

## 2017-02-08 RX ADMIN — DIPHENHYDRAMINE HYDROCHLORIDE SCH MG: 50 INJECTION, SOLUTION INTRAMUSCULAR; INTRAVENOUS at 08:41

## 2017-02-08 RX ADMIN — IPRATROPIUM BROMIDE AND ALBUTEROL SULFATE SCH ML: .5; 3 SOLUTION RESPIRATORY (INHALATION) at 16:04

## 2017-02-08 RX ADMIN — HYDROMORPHONE HYDROCHLORIDE PRN MG: 1 INJECTION, SOLUTION INTRAMUSCULAR; INTRAVENOUS; SUBCUTANEOUS at 22:41

## 2017-02-08 RX ADMIN — SEVELAMER CARBONATE SCH GM: 2400 POWDER, FOR SUSPENSION ORAL at 18:29

## 2017-02-08 RX ADMIN — SEVELAMER CARBONATE SCH GM: 2400 POWDER, FOR SUSPENSION ORAL at 07:01

## 2017-02-08 RX ADMIN — ATORVASTATIN CALCIUM SCH MG: 20 TABLET, FILM COATED ORAL at 21:00

## 2017-02-08 RX ADMIN — CARBOXYMETHYLCELLULOSE SODIUM SCH DROP: 10 GEL OPHTHALMIC at 12:56

## 2017-02-08 RX ADMIN — IPRATROPIUM BROMIDE AND ALBUTEROL SULFATE SCH ML: .5; 3 SOLUTION RESPIRATORY (INHALATION) at 08:20

## 2017-02-08 NOTE — CONS
Date/Time of Note


Date/Time of Note


DATE: 2/8/17 


TIME: 07:35





Assessment/Plan


Assessment/Plan


Additional Assessment/Plan


Assessment and recommendations; next





1.  Patient admitted with a fall resulting in subarachnoid and intraparenchymal 

contusions.  CT scan of the head from yesterday showing stable findings with 

stable skull fractures.  Next





2.  Bilateral iatrogenic pneumothorax with marked radiological improvement.  

Right-sided chest tube removed yesterday.





3.  Some element of aspiration pneumonia.





4.  Hypertension requiring nicardipine drip.





5.  End-stage renal disease on hemodialysis.





6.  Possible seizure activity.





Obtain chest x-ray.  Remove the left-sided chest tube today.  Discontinue 

Zosyn.  Continue current supportive care.





Consultation Date/Type/Reason


Admit Date/Time


Jan 31, 2017 at 13:31


Initial Consult Date


1/31/17


Type of Consultation:  Pulmonary/critical care





24 HR Interval Summary


Free Text/Dictation


Patient condition is stable.  Patient however is still exhibiting signs of 

encephalopathy but is awake and follows simple commands.  Remained 

hemodynamically stable.  Although with a slight increase in heart rate in the 

low 100s.  Next





General examination; middle aged woman awake currently in no distress.





Exam/Review of Systems


Vital Signs


Vitals





 Vital Signs








  Date Time  Temp Pulse Resp B/P Pulse Ox O2 Delivery O2 Flow Rate FiO2


 


2/8/17 07:00  120 18 126/95 100 Nasal Cannula 2.0 


 


2/8/17 04:00 99.2       


 


2/6/17 09:00        30














 Intake and Output   


 


 2/7/17 2/7/17 2/8/17





 14:59 22:59 06:59


 


Intake Total 1350 ml 845 ml 590 ml


 


Output Total 2000 ml 0 ml 0 ml


 


Balance -650 ml 845 ml 590 ml











Exam


HEENT examination; supple neck, no JVD.  Pupils are midsize reactive to light.  

No neck masses.  No thyromegaly.  No lymphadenopathy.  





Chest examination; clear to auscultation bilaterally.  There is a left-sided 

chest tube in place.  There is no air leak in the Pleur-evac chamber and no 

drainage.  No sub-cutaneous emphysema felt in the chest wall, S1-S2 audible ,no 

murmurs, regular rhythm.





Abdomen examination; soft, nontender, no organomegaly.  Bowel sounds audible.





Extremity examination; no peripheral edema.  There is an AV shunt in the left 

arm.  





CNS examination; patient is awake, alert follows simple commands and moves all 

4 extremities.





Results


Result Diagram:  


2/8/17 0415 2/8/17 0415





Results 24 hrs





Laboratory Tests








Test


  2/8/17


04:15


 


Anion Gap 36  H


 


Basophils # 0.0  


 


Basophils % 0.2  


 


Blood Morphology Comment   


 


Blood Urea Nitrogen 75  #H


 


Calcium Level 9.5  


 


Carbon Dioxide Level 22  


 


Chloride Level 95  L


 


Creatinine 6.77  #H


 


Eosinophils # 0.2  


 


Eosinophils % 1.6  


 


Glucose Level 211  #


 


Hematocrit 40.9  


 


Hemoglobin 13.7  


 


Lymphocytes # 0.7  L


 


Lymphocytes % 6.8  L


 


Mean Corpuscular Hemoglobin 29.1  


 


Mean Corpuscular Hemoglobin


Concent 33.5  


 


 


Mean Corpuscular Volume 86.8  


 


Mean Platelet Volume 9.7  


 


Monocytes # 1.0  H


 


Monocytes % 9.1  


 


Neutrophils # 8.6  H


 


Neutrophils % 82.3  H


 


Nucleated Red Blood Cells # 0.0  


 


Nucleated Red Blood Cells % 0.0  


 


Phosphorus Level 8.4  #H


 


Platelet Count 244  


 


Potassium Level 3.9  


 


Red Blood Count 4.72  


 


Red Cell Distribution Width 14.6  H


 


Sodium Level 149  H


 


White Blood Count 10.5  #











Medications


Medications





 Current Medications


Labetalol HCl (Labetalol) 10 mg Q10M  PRN IV ELEVATED BLOOD PRESSURE Last 

administered on 2/7/17at 05:10; Admin Dose 10 MG;  Start 1/31/17 at 14:00


Ondansetron HCl (Zofran Inj) 4 mg Q6H  PRN IV NAUSEA AND/OR VOMITING Last 

administered on 1/31/17at 20:32; Admin Dose 4 MG;  Start 1/31/17 at 14:00


Acetaminophen (Tylenol Liquid) 650 mg Q6H  PRN PO PAIN LEVEL 1-3 OR FEVER Last 

administered on 2/8/17at 00:39; Admin Dose 650 MG;  Start 1/31/17 at 14:00


Acetaminophen (Tylenol Supp) 650 mg Q4H  PRN ND PAIN LEVEL 1-3 OR FEVER;  Start 

1/31/17 at 14:00


Famotidine (Pepcid Iv) 20 mg DAILY IV  Last administered on 2/7/17at 11:56; 

Admin Dose 20 MG;  Start 1/31/17 at 15:00


Eye Lubricant (Artificial Tears Oph) 1 drop TID BOTH EYES  Last administered on 

2/7/17at 20:22; Admin Dose 1 DROP;  Start 1/31/17 at 21:00


Multivit/Ca Carb/ B Cmplx/FA/Prenat (Charlotte-Argenis) 1 tab DAILY PO  Last 

administered on 2/7/17at 11:56; Admin Dose 1 TAB;  Start 2/1/17 at 09:00


Hydromorphone HCl 0.5 mg 0.5 mg Q4H  PRN IV PAIN Last administered on 2/7/17at 

20:22; Admin Dose 0.5 MG;  Start 1/31/17 at 21:30


Norepinephrine 16 mg/Dextrose 500 ml @  1.87 mls/hr TITRATE IV ;  Start 2/2/17 

at 09:00


Piperacillin Sod/ Tazobactam Sod (Zosyn 2.25gm/ 50ml (Pmx)) 50 ml @  200 mls/hr 

Q8 IVPB  Last administered on 2/8/17at 05:34; Admin Dose 200 MLS/HR;  Start 2/2/ 17 at 11:00


Atorvastatin Calcium 20 mg 20 mg QHS NGT  Last administered on 2/7/17at 20:22; 

Admin Dose 20 MG;  Start 2/6/17 at 21:00


Levetiracetam/ Dextrose (Keppra Iv/D5W) 105 ml @  420 mls/hr DAILY IVPB  Last 

administered on 2/7/17at 20:23; Admin Dose 420 MLS/HR;  Start 2/7/17 at 20:00











CHADD CORBETT Feb 8, 2017 07:39

## 2017-02-08 NOTE — PN
Date/Time of Note


Date/Time of Note


DATE: 2/8/17 


TIME: 09:13





Assessment/Plan


VTE Prophylaxis


VTE Prophylaxis Intervention:  SCD's





Lines/Catheters


IV Catheter Type (from [facility]):  Saline Lock


Urinary Cath still in place:  No





Assessment/Plan


Assessment/Plan


46-year-old female:





1.  Acute respiratory failure secondary to severe encephalopathy, post fall 

with significant head trauma and s/p chest barotrauma with bilateral 

pneumothoraces and pneumoperitoneum, all resolved, left chest tube to be 

removed today per Pulmonary. 


Appreciate all the assistance from Dr Ann


Pulmonary following.


CXR stable and on NC.





2. Status post fall with head trauma with skull fracture including Bilateral 

acute inferior frontal hemorrhagic contusions, right greater than left, 8 mm 

right convexity subdural hematoma, mild scattered subarachnoid hemorrhage, 3 mm 

leftward midline shift of the septum pellucidum and right temporal-parietal 

scalp swelling with nondisplaced, oblique fracture through the right parietal 

and temporal skull. 


Reported Epidural hematoma on 2nd CT head 


Dr. Palomo from neurosurgery following, no intervention and resolving SDH, 


MS better today


? Seizures so patient on Keppra and EEG today. 





3.  Leukocytosis: Likely from demargination post trauma. WBC back to normal. 

Agree with d/c Chase 


Patient remains afebrile so far 





4.  Hypertension: resolved 





5.  End-stage renal disease on hemodialysis: on Monday/Wednesday/Friday 

schedule. 


Dr. Chaney following. 





6.  Gastroesophageal reflux disease: continue Pepcid 





7. Hypernatremia: on Free H20 until able to take po 








Prophylaxis: Pepcid for GI prophylaxis, SCDs for DVT prophylaxis


Disposition:  On NC, EEG today, transferring to Telemetry. Appreciate Neurology

, Nephrology, CTS and Pulmonary recs





Subjective


24 Hr Interval Summary


Free Text/Dictation


Patient more awake and alert 


Complaining of neck pain and HA 


CT head yesterday stable, labs stable 


Last chest tube to be removed today





Exam/Review of Systems


Vital Signs


Vitals





 Vital Signs








  Date Time  Temp Pulse Resp B/P Pulse Ox O2 Delivery O2 Flow Rate FiO2


 


2/8/17 08:20  88 20  100 Nasal Cannula 2.0 


 


2/8/17 08:00 99.4   127/92    


 


2/6/17 09:00        30














 Intake and Output   


 


 2/7/17 2/7/17 2/8/17





 15:00 23:00 07:00


 


Intake Total 1320 ml 855 ml 590 ml


 


Output Total 2000 ml 0 ml 0 ml


 


Balance -680 ml 855 ml 590 ml











Exam


Constitutional:  alert, frail, other (seems oriented x 2 )


Respiratory:  clear to auscultation, normal air movement, other (left chest 

tube )


Cardiovascular:  nl pulses, regular rate and rhythm


Gastrointestinal:  non-tender, soft


Musculoskeletal:  nl extremities to inspection


Extremities:  normal pulses, other (no edema, clubing or cyanosis )


Neurological:  CNS II-XII intact, confused (a little ), other (moving all 

extremities )





Results


Result Diagram:  


2/8/17 0415 2/8/17 0415





Results 24 hrs





Laboratory Tests








Test


  2/8/17


04:15


 


Anion Gap 36  H


 


Basophils # 0.0  


 


Basophils % 0.2  


 


Blood Morphology Comment   


 


Blood Urea Nitrogen 75  #H


 


Calcium Level 9.5  


 


Carbon Dioxide Level 22  


 


Chloride Level 95  L


 


Creatinine 6.77  #H


 


Eosinophils # 0.2  


 


Eosinophils % 1.6  


 


Glucose Level 211  #


 


Hematocrit 40.9  


 


Hemoglobin 13.7  


 


Lymphocytes # 0.7  L


 


Lymphocytes % 6.8  L


 


Mean Corpuscular Hemoglobin 29.1  


 


Mean Corpuscular Hemoglobin


Concent 33.5  


 


 


Mean Corpuscular Volume 86.8  


 


Mean Platelet Volume 9.7  


 


Monocytes # 1.0  H


 


Monocytes % 9.1  


 


Neutrophils # 8.6  H


 


Neutrophils % 82.3  H


 


Nucleated Red Blood Cells # 0.0  


 


Nucleated Red Blood Cells % 0.0  


 


Phosphorus Level 8.4  #H


 


Platelet Count 244  


 


Potassium Level 3.9  


 


Red Blood Count 4.72  


 


Red Cell Distribution Width 14.6  H


 


Sodium Level 149  H


 


White Blood Count 10.5  #











Medications


Medications





 Current Medications


Labetalol HCl (Labetalol) 10 mg Q10M  PRN IV ELEVATED BLOOD PRESSURE Last 

administered on 2/7/17at 05:10; Admin Dose 10 MG;  Start 1/31/17 at 14:00


Ondansetron HCl (Zofran Inj) 4 mg Q6H  PRN IV NAUSEA AND/OR VOMITING Last 

administered on 1/31/17at 20:32; Admin Dose 4 MG;  Start 1/31/17 at 14:00


Acetaminophen (Tylenol Liquid) 650 mg Q6H  PRN PO PAIN LEVEL 1-3 OR FEVER Last 

administered on 2/8/17at 00:39; Admin Dose 650 MG;  Start 1/31/17 at 14:00


Acetaminophen (Tylenol Supp) 650 mg Q4H  PRN MS PAIN LEVEL 1-3 OR FEVER;  Start 

1/31/17 at 14:00


Famotidine (Pepcid Iv) 20 mg DAILY IV  Last administered on 2/8/17at 08:41; 

Admin Dose 20 MG;  Start 1/31/17 at 15:00


Eye Lubricant (Artificial Tears Oph) 1 drop TID BOTH EYES  Last administered on 

2/8/17at 08:42; Admin Dose 1 DROP;  Start 1/31/17 at 21:00


Multivit/Ca Carb/ B Cmplx/FA/Prenat (Charlotte-Argenis) 1 tab DAILY PO  Last 

administered on 2/8/17at 08:41; Admin Dose 1 TAB;  Start 2/1/17 at 09:00


Hydromorphone HCl 0.5 mg 0.5 mg Q4H  PRN IV PAIN Last administered on 2/7/17at 

20:22; Admin Dose 0.5 MG;  Start 1/31/17 at 21:30


Norepinephrine/ Dextrose (Levophed/D5W) 500 ml @  1.87 mls/hr TITRATE IV ;  

Start 2/2/17 at 09:00


Atorvastatin Calcium 20 mg 20 mg QHS NGT  Last administered on 2/7/17at 20:22; 

Admin Dose 20 MG;  Start 2/6/17 at 21:00


Levetiracetam/ Dextrose (Keppra Iv/D5W) 105 ml @  420 mls/hr DAILY IVPB  Last 

administered on 2/7/17at 20:23; Admin Dose 420 MLS/HR;  Start 2/7/17 at 20:00





Procedures


Procedures


PROCEDURE:   CT Brain without contrast. 


 


CLINICAL INDICATION:    Porcine mental status. 


 


TECHNIQUE:   A CT of the brain was performed on a high-resolution CT scanner 

utilizing a low dose technique with axial imaging from the skull base through 

the vertex without IV contrast.  Multiplanar reformatted images were made.  

Images were reviewed on a PACS workstation.  The CTDIvol is 44.5 mGy and the 

DLP is 630.2 mGycm.  


 


One or more of the following dose reduction techniques were used:


- Automated exposure control.


- Adjustment of the mA and/or kV according to patient size.


Use of iterative reconstruction technique.


 


COMPARISON:   CT scan of brain 02/02/2017. 


 


FINDINGS:


There are vascular calcifications in the right left vertebral arteries. There 

are vascular calcifications in the cavernous and supracavernous portions of the 

internal carotid arteries. . There is a parenchymal hemorrhage which is stable 

and the right frontal lobe with surrounding edema.  Is a small hemorrhage in 

the inferior medial left frontal lobe which is unchanged measuring about 1.1 by 

0.4 cm in size.


 


The fourth, third lateral ventricles are normal in size configuration.  There 

are areas of increased attenuation which are symmetric and the basal ganglia 

and likely represent calcifications. The infarct of the left basal ganglia is 

unchanged.  There is a 2 mm area of increased attenuation in the head of the 

left caudate nucleus identified on the prior study.  This may represent a small 

parenchymal hemorrhage.  Follow-up imaging recommended.


 


There is a 2.3 mm acute subdural hematoma adjacent to the right temporal, right 

parietal and right occipital lobes.  This is unchanged measuring up to 6.3 mm 

in width at the level of the right occipitoparietal junction.  This also 

extends along the ventral portion of the falx cerebi and is unchanged. There 

are minimally displaced fractures involving the dorsal convexity of the right 

frontal bone and medial convexity of the left parietal bone which are 

unchanged. The fracture extends into the of the right temporal bone to the 

level of the right mandibular fossa and dorsal aspect of the right middle 

cranial fossa.


 


There are scalp hematomas adjacent to the right parietal bone


 


The mastoid air cells and internal auditory canals are normal.  There is fluid 

in the sphenoid sinus.


 


IMPRESSION:


 


1.  The acute subdural hematoma identified along the inferior aspect of the 

right frontal lobe, along the falx and along the right temporal and parietal 

lobes is unchanged since the prior exam.  Subcutaneous emphysema seen in the 

soft tissues around the skull has resolved.


2. Stable contusions involving the right and left frontal lobes with 

surrounding vasogenic edema.


3.  4 mm of subfalcine herniation of midline structures from right to left.


4.  Stable fractures involving the dorsal convexity of the right temporal, 

right parietal and left parietal bones with associated stable scalp hematoma.


 


RPTAT:AAJJ


_____________________________________________ 


Physician Jeyson           Date    Time 


Electronically viewed and signed by Physician Jeyson on 02/07/2017 11:52











CECILIO JIMENEZ Feb 8, 2017 09:23

## 2017-02-08 NOTE — CONS
Date/Time of Note


Date/Time of Note


DATE: 2/8/17 


TIME: 18:33





Assessment/Plan


Assessment/Plan


Chief Complaint/Hosp Course


IMPRESSION:


1.  The patient has acute hemorrhagic stroke./sdh/trauma


2.  Malignant hypertension.better


3.  Endstage renal disease.


4.  Leukocytosis. better


5.  Respiratory failure.


6.  Incomplete database


7 hyperkalemia hx


8 SUBCUTANEOUS EPMHYSEMA/c tube


9 hypernatremia


10 hyperphosphatemia


plan hd


per neuro


labs


per surgery


avoid hyponatremia


renvella


Problems:  





Consultation Date/Type/Reason


Admit Date/Time


Jan 31, 2017 at 13:31


Initial Consult Date


1/31/17


Type of Consultation:  renal





24 HR Interval Summary


Constitutional:  improved





Exam/Review of Systems


Vital Signs


Vitals





 Vital Signs








  Date Time  Temp Pulse Resp B/P Pulse Ox O2 Delivery O2 Flow Rate FiO2


 


2/8/17 18:32  108      


 


2/8/17 18:00   21 130/100 97 Nasal Cannula 2.0 


 


2/8/17 16:24        21


 


2/8/17 16:00 98.2       














 Intake and Output   


 


 2/7/17 2/7/17 2/8/17





 15:00 23:00 07:00


 


Intake Total 1320 ml 855 ml 590 ml


 


Output Total 2000 ml 0 ml 0 ml


 


Balance -680 ml 855 ml 590 ml











Exam


Neck:  supple


Respiratory:  diminished breath sounds


Cardiovascular:  regular rate and rhythm


Gastrointestinal:  bowel sounds (+), soft


Extremities:  No edema


Neurological:  other (awake alert)





Results


Result Diagram:  


2/8/17 0415                                                                    

            2/8/17 0415





Results 24 hrs





Laboratory Tests








Test


  2/8/17


04:15


 


Anion Gap 36  H


 


Basophils # 0.0  


 


Basophils % 0.2  


 


Blood Morphology Comment   


 


Blood Urea Nitrogen 75  #H


 


Calcium Level 9.5  


 


Carbon Dioxide Level 22  


 


Chloride Level 95  L


 


Creatinine 6.77  #H


 


Eosinophils # 0.2  


 


Eosinophils % 1.6  


 


Glucose Level 211  #


 


Hematocrit 40.9  


 


Hemoglobin 13.7  


 


Lymphocytes # 0.7  L


 


Lymphocytes % 6.8  L


 


Mean Corpuscular Hemoglobin 29.1  


 


Mean Corpuscular Hemoglobin


Concent 33.5  


 


 


Mean Corpuscular Volume 86.8  


 


Mean Platelet Volume 9.7  


 


Monocytes # 1.0  H


 


Monocytes % 9.1  


 


Neutrophils # 8.6  H


 


Neutrophils % 82.3  H


 


Nucleated Red Blood Cells # 0.0  


 


Nucleated Red Blood Cells % 0.0  


 


Phosphorus Level 8.4  #H


 


Platelet Count 244  


 


Potassium Level 3.9  


 


Red Blood Count 4.72  


 


Red Cell Distribution Width 14.6  H


 


Sodium Level 149  H


 


White Blood Count 10.5  #











Medications


Medications





 Current Medications


Labetalol HCl (Labetalol) 10 mg Q10M  PRN IV ELEVATED BLOOD PRESSURE Last 

administered on 2/7/17at 05:10; Admin Dose 10 MG;  Start 1/31/17 at 14:00


Ondansetron HCl (Zofran Inj) 4 mg Q6H  PRN IV NAUSEA AND/OR VOMITING Last 

administered on 1/31/17at 20:32; Admin Dose 4 MG;  Start 1/31/17 at 14:00


Acetaminophen (Tylenol Liquid) 650 mg Q6H  PRN PO PAIN LEVEL 1-3 OR FEVER Last 

administered on 2/8/17at 00:39; Admin Dose 650 MG;  Start 1/31/17 at 14:00


Acetaminophen (Tylenol Supp) 650 mg Q4H  PRN CO PAIN LEVEL 1-3 OR FEVER;  Start 

1/31/17 at 14:00


Famotidine (Pepcid Iv) 20 mg DAILY IV  Last administered on 2/8/17at 08:41; 

Admin Dose 20 MG;  Start 1/31/17 at 15:00


Eye Lubricant (Artificial Tears Oph) 1 drop TID BOTH EYES  Last administered on 

2/8/17at 12:56; Admin Dose 1 DROP;  Start 1/31/17 at 21:00


Multivit/Ca Carb/ B Cmplx/FA/Prenat (Charlotte-Argenis) 1 tab DAILY PO  Last 

administered on 2/8/17at 08:41; Admin Dose 1 TAB;  Start 2/1/17 at 09:00


Hydromorphone HCl (Dilaudid) 0.5 mg Q4H  PRN IV PAIN Last administered on 2/7/ 17at 20:22; Admin Dose 0.5 MG;  Start 1/31/17 at 21:30


Atorvastatin Calcium 20 mg 20 mg QHS NGT  Last administered on 2/7/17at 20:22; 

Admin Dose 20 MG;  Start 2/6/17 at 21:00


Levetiracetam/ Dextrose (Keppra Iv/D5W) 105 ml @  420 mls/hr DAILY IVPB  Last 

administered on 2/8/17at 10:27; Admin Dose 420 MLS/HR;  Start 2/7/17 at 20:00


Cyclobenzaprine HCl (Flexeril) 5 mg Q8  PRN PO MUSCLE SPASM  Last administered 

on 2/8/17at 10:27; Admin Dose 5 MG;  Start 2/8/17 at 10:24











PRITESH MACKEY MD Feb 8, 2017 18:34

## 2017-02-08 NOTE — RADRPT
PROCEDURE:   XR Chest AP portable 

 

CLINICAL INDICATION:   Pneumothorax 

 

TECHNIQUE:   An AP portable radiograph of the chest was submitted. 

 

COMPARISON:   02/07/2017 

 

FINDINGS:

 

Support Hardware: The NG tube and the right sided thoracostomy tube are stable positioning Aem rig
ht-sided thoracostomy tube has been removed.

 

Cardiovascular: The cardiovascular silhouette appears unremarkable. The aorta appears atheroscleroti
c.

 

Lung Fields: Mild discoid atelectatic change is again seen within the left lung base within the righ
t mid lung zone.

 

Pleural Spaces: There is again a very small right subpulmonic pneumothorax of less than 5% by a volu
me.  No pneumothorax is seen on the left and no effusion is evident.

 

Osseous Structures: The osseous structures appear intact.

 

Soft Tissues: Subcutaneous air is again seen within the soft tissues bilaterally.  The endovascular 
stent projects to the left medial left upper arm.

 

IMPRESSION: 

1.  Interval removal of the right thoracostomy tube while the left thoracostomy tube and the NG tube
 are stable in positioning.

2.  Persistent small subpulmonic right pneumothorax of less than 5% prior volume.  No pneumothorax i
s seen on the left.

3.  Discoid atelectasis again seen in the left lung base and the right mid lung zone.

4.  Some subcutaneous air is again seen within the bilateral lateral soft tissues and an endovascula
r stent again projects to the medial left upper arm.

_____________________________________________ 

Physician Sha           Date    Time 

Electronically viewed and signed by Physician Sha on 02/08/2017 10:08 

 

D:  02/08/2017 10:08  T:  02/08/2017 10:08

/

## 2017-02-08 NOTE — PN
Date/Time of Note


Date/Time of Note


DATE: 2/8/17 


TIME: 18:40





Assessment/Plan


Lines/Catheters


IV Catheter Type (from Nrsg):  Peripheral IV


Kamara in Place (from Nrsg):  No





Assessment/Plan


Chief Complaint/Hosp Course


PTX


SP  bilateral CT placement


CXR,


 





1


 





will continue CT sxn


Problems:  





Subjective


24 Hr Interval Summary


Constitutional:  improved


Pain Control:  mild





Exam/Review of Systems


Vital Signs


Vitals





 Vital Signs








  Date Time  Temp Pulse Resp B/P Pulse Ox O2 Delivery O2 Flow Rate FiO2


 


2/8/17 18:32  108      


 


2/8/17 18:00   21 130/100 97 Nasal Cannula 2.0 


 


2/8/17 16:24        21


 


2/8/17 16:00 98.2       














 Intake and Output   


 


 2/7/17 2/7/17 2/8/17





 15:00 23:00 07:00


 


Intake Total 1320 ml 855 ml 590 ml


 


Output Total 2000 ml 0 ml 0 ml


 


Balance -680 ml 855 ml 590 ml











Exam


ENMT:  mucosa pink and moist, nl external ears & nose, nl lips & teeth, nl 

nasal mucosa & septum


Neck:  non-tender, supple


Respiratory:  clear to auscultation, normal air movement


Cardiovascular:  nl pulses, regular rate and rhythm





Results


Result Diagram:  


2/8/17 0415                                                                    

            2/8/17 0415














SUNITA BETANCUR MD Feb 8, 2017 18:40

## 2017-02-08 NOTE — SP
DATE OF PROCEDURE:  

 

 

INDICATION:  A 46-year-old lady with bilateral frontal contusions, subdural hematoma who had episode
s of transient encephalopathy suspicious for seizures, currently on Keppra.  Also has end-stage chrissy
l disease on hemodialysis.

 

DESCRIPTION OF PROCEDURE:  Routine EEG was recorded digitally.  Scalp-to-scalp and scalp-to-ear estephania
ages were recorded and reviewed.  All impedances were measured and recorded.  Cap electrodes were pl
aced in accordance with International 10-20 system of electrode placement.

 

FINDINGS:  Symmetrically distributed background activity of low to medium amplitude ranging in frequ
ency between 9 to 11 cycles per second was seen.  There are frequent, though intermittent, few secon
ds lasting episodes of slowing of background 2 to 4 cycles per second with predominance to bifrontal
 regions.  Maybe slightly higher amplitude on the left.  No definite epileptiform transients were se
en.  No signs of ongoing electrographic seizures.  No definite response to photic stimulation.

 

IMPRESSION:  Abnormal study secondary to presence of frontal intermittent rhythmical delta activity 
or so-called FIRDA.  At times, such activity reflects underlying structural brain lesions at times s
een in encephalopathy.  No ongoing seizure activity.  I would recommend to continue antiepileptic co
verage with Keppra.

 

 

Dictated By: SHANNAN GUERRERO/BITA

DD:    02/08/2017 18:43:58

DT:    02/08/2017 18:57:58

Conf#: [id number]

DID#:  [id number]

## 2017-02-09 VITALS — DIASTOLIC BLOOD PRESSURE: 91 MMHG | RESPIRATION RATE: 16 BRPM | SYSTOLIC BLOOD PRESSURE: 141 MMHG | HEART RATE: 103 BPM

## 2017-02-09 VITALS — DIASTOLIC BLOOD PRESSURE: 67 MMHG | SYSTOLIC BLOOD PRESSURE: 116 MMHG | HEART RATE: 88 BPM

## 2017-02-09 VITALS — HEART RATE: 99 BPM | DIASTOLIC BLOOD PRESSURE: 86 MMHG | SYSTOLIC BLOOD PRESSURE: 126 MMHG

## 2017-02-09 VITALS — SYSTOLIC BLOOD PRESSURE: 136 MMHG | HEART RATE: 89 BPM | DIASTOLIC BLOOD PRESSURE: 89 MMHG

## 2017-02-09 VITALS — DIASTOLIC BLOOD PRESSURE: 93 MMHG | SYSTOLIC BLOOD PRESSURE: 135 MMHG | RESPIRATION RATE: 19 BRPM

## 2017-02-09 VITALS — SYSTOLIC BLOOD PRESSURE: 131 MMHG | RESPIRATION RATE: 20 BRPM

## 2017-02-09 VITALS — SYSTOLIC BLOOD PRESSURE: 120 MMHG | DIASTOLIC BLOOD PRESSURE: 87 MMHG | HEART RATE: 88 BPM

## 2017-02-09 VITALS — HEART RATE: 91 BPM | SYSTOLIC BLOOD PRESSURE: 147 MMHG | DIASTOLIC BLOOD PRESSURE: 103 MMHG

## 2017-02-09 VITALS — DIASTOLIC BLOOD PRESSURE: 96 MMHG | SYSTOLIC BLOOD PRESSURE: 152 MMHG | HEART RATE: 116 BPM

## 2017-02-09 VITALS — DIASTOLIC BLOOD PRESSURE: 85 MMHG | SYSTOLIC BLOOD PRESSURE: 113 MMHG | HEART RATE: 101 BPM

## 2017-02-09 VITALS — DIASTOLIC BLOOD PRESSURE: 74 MMHG | RESPIRATION RATE: 20 BRPM | SYSTOLIC BLOOD PRESSURE: 145 MMHG

## 2017-02-09 VITALS — SYSTOLIC BLOOD PRESSURE: 143 MMHG | RESPIRATION RATE: 20 BRPM | DIASTOLIC BLOOD PRESSURE: 97 MMHG

## 2017-02-09 VITALS — DIASTOLIC BLOOD PRESSURE: 102 MMHG | HEART RATE: 91 BPM | SYSTOLIC BLOOD PRESSURE: 144 MMHG

## 2017-02-09 VITALS — RESPIRATION RATE: 18 BRPM | DIASTOLIC BLOOD PRESSURE: 65 MMHG | SYSTOLIC BLOOD PRESSURE: 129 MMHG | HEART RATE: 95 BPM

## 2017-02-09 VITALS — RESPIRATION RATE: 18 BRPM | HEART RATE: 98 BPM | SYSTOLIC BLOOD PRESSURE: 131 MMHG | DIASTOLIC BLOOD PRESSURE: 99 MMHG

## 2017-02-09 VITALS — HEART RATE: 87 BPM | SYSTOLIC BLOOD PRESSURE: 133 MMHG | DIASTOLIC BLOOD PRESSURE: 85 MMHG

## 2017-02-09 VITALS — SYSTOLIC BLOOD PRESSURE: 132 MMHG | RESPIRATION RATE: 20 BRPM | DIASTOLIC BLOOD PRESSURE: 86 MMHG

## 2017-02-09 VITALS — HEART RATE: 115 BPM

## 2017-02-09 VITALS — SYSTOLIC BLOOD PRESSURE: 126 MMHG | RESPIRATION RATE: 20 BRPM | DIASTOLIC BLOOD PRESSURE: 80 MMHG

## 2017-02-09 VITALS — HEART RATE: 111 BPM

## 2017-02-09 VITALS — HEART RATE: 97 BPM

## 2017-02-09 VITALS — RESPIRATION RATE: 20 BRPM | DIASTOLIC BLOOD PRESSURE: 91 MMHG | HEART RATE: 97 BPM | SYSTOLIC BLOOD PRESSURE: 137 MMHG

## 2017-02-09 VITALS — SYSTOLIC BLOOD PRESSURE: 111 MMHG | HEART RATE: 96 BPM | DIASTOLIC BLOOD PRESSURE: 84 MMHG

## 2017-02-09 VITALS — HEART RATE: 120 BPM

## 2017-02-09 VITALS — HEART RATE: 108 BPM

## 2017-02-09 VITALS — HEART RATE: 114 BPM

## 2017-02-09 LAB
ANION GAP SERPL CALC-SCNC: 33 MMOL/L (ref 8–16)
BASOPHILS # BLD AUTO: 0 10^3/UL (ref 0–0.1)
BASOPHILS NFR BLD: 0 % (ref 0–2)
BUN SERPL-MCNC: 100 MG/DL (ref 7–20)
CALCIUM SERPL-MCNC: 9.6 MG/DL (ref 8.4–10.2)
CHLORIDE SERPL-SCNC: 99 MMOL/L (ref 97–110)
CO2 SERPL-SCNC: 21 MMOL/L (ref 21–31)
CONDITION: 1
CREAT SERPL-MCNC: 9.33 MG/DL (ref 0.44–1)
EOSINOPHIL # BLD: 0.5 10^3/UL (ref 0–0.5)
EOSINOPHIL NFR BLD: 4.7 % (ref 0–7)
ERYTHROCYTE [DISTWIDTH] IN BLOOD BY AUTOMATED COUNT: 14.9 % (ref 11.5–14.5)
GLUCOSE SERPL-MCNC: 130 MG/DL (ref 70–220)
HCT VFR BLD CALC: 39.3 % (ref 37–47)
HGB BLD-MCNC: 13.1 G/DL (ref 12–16)
LH ANALYZER COMMENTS: 1
LYMPHOCYTES # BLD AUTO: 1 10^3/UL (ref 0.8–2.9)
LYMPHOCYTES NFR BLD AUTO: 9.5 % (ref 15–51)
MAGNESIUM SERPL-MCNC: 2.8 MG/DL (ref 1.7–2.5)
MCH RBC QN AUTO: 29.1 PG (ref 29–33)
MCHC RBC AUTO-ENTMCNC: 33.2 G/DL (ref 32–37)
MCV RBC AUTO: 87.6 FL (ref 82–101)
MONOCYTES # BLD: 1 10^3/UL (ref 0.3–0.9)
MONOCYTES NFR BLD: 8.7 % (ref 0–11)
NEUTROPHILS # BLD: 8.5 10^3/UL (ref 1.6–7.5)
NEUTROPHILS NFR BLD AUTO: 77.1 % (ref 39–77)
NRBC # BLD MANUAL: 0 10^3/UL (ref 0–0)
NRBC BLD QL: 0 /100WBC (ref 0–0)
PHOSPHATE SERPL-MCNC: 5.1 MG/DL (ref 2.5–4.9)
PLATELET # BLD: 242 10^3/UL (ref 140–440)
PMV BLD AUTO: 9.8 FL (ref 7.4–10.4)
POTASSIUM SERPL-SCNC: 3.9 MMOL/L (ref 3.5–5.1)
RBC # BLD AUTO: 4.49 10^6/UL (ref 4.2–5.4)
SODIUM SERPL-SCNC: 149 MMOL/L (ref 135–144)
WBC # BLD AUTO: 11 10^3/UL (ref 4.8–10.8)
WBC # BLD: 11 10^3/UL (ref 4.8–10.8)

## 2017-02-09 RX ADMIN — HYDROMORPHONE HYDROCHLORIDE PRN MG: 1 INJECTION, SOLUTION INTRAMUSCULAR; INTRAVENOUS; SUBCUTANEOUS at 05:01

## 2017-02-09 RX ADMIN — SEVELAMER CARBONATE SCH GM: 2400 POWDER, FOR SUSPENSION ORAL at 17:36

## 2017-02-09 RX ADMIN — ATORVASTATIN CALCIUM SCH MG: 20 TABLET, FILM COATED ORAL at 21:17

## 2017-02-09 RX ADMIN — IPRATROPIUM BROMIDE AND ALBUTEROL SULFATE SCH ML: .5; 3 SOLUTION RESPIRATORY (INHALATION) at 15:34

## 2017-02-09 RX ADMIN — ACETAMINOPHEN PRN MG: 160 SOLUTION ORAL at 14:25

## 2017-02-09 RX ADMIN — LABETALOL HYDROCHLORIDE PRN MG: 5 INJECTION, SOLUTION INTRAVENOUS at 12:05

## 2017-02-09 RX ADMIN — CARBOXYMETHYLCELLULOSE SODIUM SCH DROP: 10 GEL OPHTHALMIC at 21:18

## 2017-02-09 RX ADMIN — IPRATROPIUM BROMIDE AND ALBUTEROL SULFATE SCH ML: .5; 3 SOLUTION RESPIRATORY (INHALATION) at 08:30

## 2017-02-09 RX ADMIN — DIPHENHYDRAMINE HYDROCHLORIDE SCH MG: 50 INJECTION, SOLUTION INTRAMUSCULAR; INTRAVENOUS at 09:19

## 2017-02-09 RX ADMIN — LEVETIRACETAM SCH MLS/HR: 100 INJECTION, SOLUTION INTRAVENOUS at 09:18

## 2017-02-09 RX ADMIN — IPRATROPIUM BROMIDE AND ALBUTEROL SULFATE SCH ML: .5; 3 SOLUTION RESPIRATORY (INHALATION) at 00:03

## 2017-02-09 RX ADMIN — CARBOXYMETHYLCELLULOSE SODIUM SCH DROP: 10 GEL OPHTHALMIC at 13:00

## 2017-02-09 RX ADMIN — SEVELAMER CARBONATE SCH GM: 2400 POWDER, FOR SUSPENSION ORAL at 14:25

## 2017-02-09 RX ADMIN — LABETALOL HYDROCHLORIDE PRN MG: 5 INJECTION, SOLUTION INTRAVENOUS at 00:41

## 2017-02-09 RX ADMIN — CARBOXYMETHYLCELLULOSE SODIUM SCH DROP: 10 GEL OPHTHALMIC at 14:25

## 2017-02-09 RX ADMIN — HYDROMORPHONE HYDROCHLORIDE PRN MG: 1 INJECTION, SOLUTION INTRAMUSCULAR; INTRAVENOUS; SUBCUTANEOUS at 16:15

## 2017-02-09 RX ADMIN — SEVELAMER CARBONATE SCH GM: 2400 POWDER, FOR SUSPENSION ORAL at 22:59

## 2017-02-09 RX ADMIN — SEVELAMER CARBONATE SCH GM: 2400 POWDER, FOR SUSPENSION ORAL at 09:19

## 2017-02-09 RX ADMIN — IPRATROPIUM BROMIDE AND ALBUTEROL SULFATE SCH ML: .5; 3 SOLUTION RESPIRATORY (INHALATION) at 23:25

## 2017-02-09 NOTE — PN
Date/Time of Note


Date/Time of Note


DATE: 2/9/17 


TIME: 21:54





Assessment/Plan


Lines/Catheters


IV Catheter Type (from Nrsg):  Saline Lock


Kamara in Place (from Nrsg):  No





Assessment/Plan


Chief Complaint/Hosp Course


PTX


SP  bilateral CT placement


CXR,


 





1


 





will continue CT sxn


Problems:  





Subjective


24 Hr Interval Summary


Constitutional:  improved


Pain Control:  mild





Exam/Review of Systems


Vital Signs


Vitals





 Vital Signs








  Date Time  Temp Pulse Resp B/P Pulse Ox O2 Delivery O2 Flow Rate FiO2


 


2/9/17 20:20 98.1 91 20 126/80 92   


 


2/9/17 16:21        21


 


2/9/17 12:00      Room Air  


 


2/8/17 18:00       2.0 














 Intake and Output   


 


 2/8/17 2/8/17 2/9/17





 15:00 23:00 07:00


 


Intake Total 445 ml 160 ml 


 


Output Total 0 ml 0 ml 


 


Balance 445 ml 160 ml 











Exam


Neck:  non-tender, supple


Respiratory:  clear to auscultation, normal air movement, 


   No congested cough, No crackles/rales, No diminished breath sounds, No 

intercostal retraction, No labored breathing, No other, No respirations, No 

tactile fremitus, No wheezing


Gastrointestinal:  nl liver, spleen, non-tender, soft





Results


Result Diagram:  


2/9/17 0550                                                                    

            2/9/17 0550














SUNITA BETANCUR MD Feb 9, 2017 21:55

## 2017-02-09 NOTE — CONS
Date/Time of Note


Date/Time of Note


DATE: 2/9/17 


TIME: 20:35





Consult Date/Type/Reason


Admit Date/Time


Jan 31, 2017 at 13:31


Initial Consult Date


1/31/17


Type of Consultation:  neurology





Subjective


no seizures





Objective





 Vital Signs








  Date Time  Temp Pulse Resp B/P Pulse Ox O2 Delivery O2 Flow Rate FiO2


 


2/9/17 20:20 98.1 91 20 126/80 92   


 


2/9/17 16:21        21


 


2/9/17 12:00      Room Air  


 


2/8/17 18:00       2.0 














 Intake and Output   


 


 2/8/17 2/8/17 2/9/17





 14:59 22:59 06:59


 


Intake Total 445 ml 190 ml 


 


Output Total 0 ml 0 ml 


 


Balance 445 ml 190 ml 











Results/Medications


Result Diagram:  


2/9/17 0550                                                                    

            2/9/17 0550





Results 24 hrs





Laboratory Tests








Test


  2/9/17


05:50 2/9/17


12:28


 


Anion Gap 33  H 


 


Basophils # 0.0   


 


Basophils % 0.0   


 


Blood Morphology Comment    


 


Blood Urea Nitrogen 100  H 


 


Calcium Level 9.6   


 


Carbon Dioxide Level 21   


 


Chloride Level 99   


 


Creatinine 9.33  #H 


 


Eosinophils # 0.5   


 


Eosinophils % 4.7   


 


Glucose Level 130  # 


 


Hematocrit 39.3   


 


Hemoglobin 13.1   


 


Lymphocytes # 1.0   


 


Lymphocytes % 9.5  L 


 


Magnesium Level 2.8  H 


 


Mean Corpuscular Hemoglobin 29.1   


 


Mean Corpuscular Hemoglobin


Concent 33.2  


  


 


 


Mean Corpuscular Volume 87.6   


 


Mean Platelet Volume 9.8   


 


Monocytes # 1.0  H 


 


Monocytes % 8.7   


 


Neutrophils # 8.5  H 


 


Neutrophils % 77.1  H 


 


Nucleated Red Blood Cells # 0.0   


 


Nucleated Red Blood Cells % 0.0   


 


Phosphorus Level 5.1  #H 


 


Platelet Count 242   


 


Potassium Level 3.9   


 


Red Blood Count 4.49   


 


Red Cell Distribution Width 14.9  H 


 


Sodium Level 149  H 


 


White Blood Count 11.0  H 


 


Bedside Glucose  151  








Medications





 Current Medications


Labetalol HCl (Labetalol) 10 mg Q10M  PRN IV ELEVATED BLOOD PRESSURE Last 

administered on 2/9/17at 12:05; Admin Dose 10 MG;  Start 1/31/17 at 14:00


Ondansetron HCl (Zofran Inj) 4 mg Q6H  PRN IV NAUSEA AND/OR VOMITING Last 

administered on 1/31/17at 20:32; Admin Dose 4 MG;  Start 1/31/17 at 14:00


Acetaminophen (Tylenol Liquid) 650 mg Q6H  PRN PO PAIN LEVEL 1-3 OR FEVER Last 

administered on 2/9/17at 14:25; Admin Dose 650 MG;  Start 1/31/17 at 14:00


Acetaminophen (Tylenol Supp) 650 mg Q4H  PRN [place] PAIN LEVEL 1-3 OR FEVER;  Start 

1/31/17 at 14:00


Eye Lubricant (Artificial Tears Oph) 1 drop TID BOTH EYES  Last administered on 

2/9/17at 13:00; Admin Dose 1 DROP;  Start 1/31/17 at 21:00


Multivit/Ca Carb/ B Cmplx/FA/Prenat (Charlotte-Argenis) 1 tab DAILY PO  Last 

administered on 2/9/17at 09:19; Admin Dose 1 TAB;  Start 2/1/17 at 09:00


Hydromorphone HCl (Dilaudid) 0.5 mg Q4H  PRN IV PAIN Last administered on 2/9/ 17at 16:15; Admin Dose 0.5 MG;  Start 1/31/17 at 21:30


Atorvastatin Calcium 20 mg 20 mg QHS NGT  Last administered on 2/7/17at 20:22; 

Admin Dose 20 MG;  Start 2/6/17 at 21:00


Levetiracetam/ Dextrose (Keppra Iv/D5W) 105 ml @  420 mls/hr DAILY IVPB  Last 

administered on 2/9/17at 09:18; Admin Dose 420 MLS/HR;  Start 2/7/17 at 20:00


Cyclobenzaprine HCl (Flexeril) 5 mg Q8  PRN PO MUSCLE SPASM  Last administered 

on 2/8/17at 10:27; Admin Dose 5 MG;  Start 2/8/17 at 10:24


Famotidine (Pepcid) 20 mg DAILY NGT ;  Start 2/10/17 at 09:00


Sevelamer Carbonate (Renvela) 2.4 gm Q8 PO ;  Start 2/9/17 at 22:00





Assessment/Plan


Chief Complaint/Hosp Course


PHYSICAL EXAMINATION:


GENERAL:  Not in acute distress, lying in bed.


HEAD:  Normocephalic.


NECK:  Some nuchal stiffness noticed.


LUNGS:  Clear to auscultation bilaterally.


CARDIAC:  Normal cardiac rhythm and sounds.


ABDOMEN:  Soft.


EXTREMITIES:  No cyanosis, clubbing or edema.


NEUROLOGIC:  The patient is lethargic, arousable, goes back to sleep..  She 

looks bilaterally, at present responds to visual threat bilaterally.  Pupils 

reactive from 3 to 2 mm, maybe slightly more brisk on the right.  Extraocular 

movements intact without nystagmus.  Symmetrical face.  Present corneal 

reflexes and gag.  Motor strength examination shows  weakness, about 3+/5 in 

the left upper and lower extremities, especially distally in the leg on the 

left. Sensory examination seemed to be grossly intact to pain.  Deep tendon 

reflexes 2+ upper extremities and absent in lower extremities.  No definite 

response to plantar stimulation bilaterally.  


 


IMPRESSION:  Traumatic brain injury, skull fracture, bifrontal contusions, 

acute subdural hematoma, traumatic subarachnoid hemorrhage on admission.  

Encephalopathy. 


The patient had episode of unresponsiveness in ICU suspicious for complex 

partial seizure. No seizure activity on EEG. Continue Keppra.


Problems:  











SHANNAN GRECO MD Feb 9, 2017 20:38

## 2017-02-09 NOTE — PN
Date/Time of Note


Date/Time of Note


DATE: 2/9/17 


TIME: 09:56





Assessment/Plan


VTE Prophylaxis


VTE Prophylaxis Intervention:  SCD's





Lines/Catheters


[facility] C[facility]ter Type (from [facility]):  Saline Lock


Urinary Cath still in place:  No





Assessment/Plan


Assessment/Plan


46-year-old female:





1.  Acute respiratory failure secondary to severe encephalopathy, post fall 

with significant head trauma and s/p chest barotrauma with bilateral 

pneumothoraces and pneumoperitoneum, all resolved and both chest tubes removed 

as of yesterday   


Appreciate all the assistance from Dr Ann


Pulmonary following.


CXR stable and on NC.





2. Status post fall with head trauma with skull fracture including Bilateral 

acute inferior frontal hemorrhagic contusions, right greater than left, 8 mm 

right convexity subdural hematoma, mild scattered subarachnoid hemorrhage, 3 mm 

leftward midline shift of the septum pellucidum and right temporal-parietal 

scalp swelling with nondisplaced, oblique fracture through the right parietal 

and temporal skull. 


Reported Epidural hematoma on 2nd CT head 


Dr. Palomo from neurosurgery following, no intervention and resolving SDH, 


MS better today


Continue  Keppra for now per Neurology and EEG c/w brain contusions/injury, no 

active seizures 





3.  Leukocytosis: Likely from demargination post trauma. WBC back to normal. 

Off abx.  


Patient remains afebrile so far 





4.  Hypertension: resolved 





5.  End-stage renal disease on hemodialysis: on Monday/Wednesday/Friday 

schedule. 


Dr. Chaney following. 





6.  Gastroesophageal reflux disease: continue Pepcid 





7. Hypernatremia: on Free H20 until able to take po. Per Nephrology OK to have 

Na 145 to 148 for now and avoid hyponatremia. 








Prophylaxis: Pepcid for GI prophylaxis, SCDs for DVT prophylaxis


Disposition:  On NC, on Telemetry. Appreciate Neurology, Nephrology, CTS and 

Pulmonary recs. PT and ST eval pending





Subjective


24 Hr Interval Summary


Free Text/Dictation


Patient doing Ok awake and alert but still with restraints and tube feedings on 


Fever to 101.3, chest XR pending and will resume Abx if needed 


EEG results and Neurology recommendations noted 


PT eval and ST eval pending





Exam/Review of Systems


Vital Signs


Vitals





 Vital Signs








  Date Time  Temp Pulse Resp B/P Pulse Ox O2 Delivery O2 Flow Rate FiO2


 


2/9/17 08:30  105 20  95   21


 


2/9/17 08:21 101.3   132/86    


 


2/9/17 06:00      Room Air  


 


2/8/17 18:00       2.0 














 Intake and Output   


 


 2/8/17 2/8/17 2/9/17





 15:00 23:00 07:00


 


Intake Total 445 ml 160 ml 


 


Output Total 0 ml 0 ml 


 


Balance 445 ml 160 ml 











Exam


Constitutional:  alert, oriented (x 3 )


Respiratory:  clear to auscultation, normal air movement


Cardiovascular:  nl pulses, regular rate and rhythm


Gastrointestinal:  non-tender, soft


Musculoskeletal:  nl extremities to inspection


Extremities:  normal pulses, other (no edema, clubbing or cyanosis )


Neurological:  CNS II-XII intact, confused (slightly ), other (PT eval pending )





Results


Result Diagram:  


2/9/17 0550                                                                    

            2/9/17 0550





Results 24 hrs





Laboratory Tests








Test


  2/9/17


05:50


 


Anion Gap 33  H


 


Basophils # 0.0  


 


Basophils % 0.0  


 


Blood Morphology Comment   


 


Blood Urea Nitrogen 100  H


 


Calcium Level 9.6  


 


Carbon Dioxide Level 21  


 


Chloride Level 99  


 


Creatinine 9.33  #H


 


Eosinophils # 0.5  


 


Eosinophils % 4.7  


 


Glucose Level 130  #


 


Hematocrit 39.3  


 


Hemoglobin 13.1  


 


Lymphocytes # 1.0  


 


Lymphocytes % 9.5  L


 


Magnesium Level 2.8  H


 


Mean Corpuscular Hemoglobin 29.1  


 


Mean Corpuscular Hemoglobin


Concent 33.2  


 


 


Mean Corpuscular Volume 87.6  


 


Mean Platelet Volume 9.8  


 


Monocytes # 1.0  H


 


Monocytes % 8.7  


 


Neutrophils # 8.5  H


 


Neutrophils % 77.1  H


 


Nucleated Red Blood Cells # 0.0  


 


Nucleated Red Blood Cells % 0.0  


 


Phosphorus Level 5.1  #H


 


Platelet Count 242  


 


Potassium Level 3.9  


 


Red Blood Count 4.49  


 


Red Cell Distribution Width 14.9  H


 


Sodium Level 149  H


 


White Blood Count 11.0  H











Medications


Medications





 Current Medications


Labetalol HCl (Labetalol) 10 mg Q10M  PRN IV ELEVATED BLOOD PRESSURE Last 

administered on 2/9/17at 00:41; Admin Dose 10 MG;  Start 1/31/17 at 14:00


Ondansetron HCl (Zofran Inj) 4 mg Q6H  PRN IV NAUSEA AND/OR VOMITING Last 

administered on 1/31/17at 20:32; Admin Dose 4 MG;  Start 1/31/17 at 14:00


Acetaminophen (Tylenol Liquid) 650 mg Q6H  PRN PO PAIN LEVEL 1-3 OR FEVER Last 

administered on 2/8/17at 00:39; Admin Dose 650 MG;  Start 1/31/17 at 14:00


Acetaminophen (Tylenol Supp) 650 mg Q4H  PRN IN PAIN LEVEL 1-3 OR FEVER;  Start 

1/31/17 at 14:00


Famotidine (Pepcid Iv) 20 mg DAILY IV  Last administered on 2/9/17at 09:19; 

Admin Dose 20 MG;  Start 1/31/17 at 15:00


Eye Lubricant (Artificial Tears Oph) 1 drop TID BOTH EYES  Last administered on 

2/8/17at 12:56; Admin Dose 1 DROP;  Start 1/31/17 at 21:00


Multivit/Ca Carb/ B Cmplx/FA/Prenat (Charlotte-Argenis) 1 tab DAILY PO  Last 

administered on 2/9/17at 09:19; Admin Dose 1 TAB;  Start 2/1/17 at 09:00


Hydromorphone HCl (Dilaudid) 0.5 mg Q4H  PRN IV PAIN Last administered on 2/9/ 17at 05:01; Admin Dose 0.5 MG;  Start 1/31/17 at 21:30


Atorvastatin Calcium 20 mg 20 mg QHS NGT  Last administered on 2/7/17at 20:22; 

Admin Dose 20 MG;  Start 2/6/17 at 21:00


Levetiracetam/ Dextrose (Keppra Iv/D5W) 105 ml @  420 mls/hr DAILY IVPB  Last 

administered on 2/9/17at 09:18; Admin Dose 420 MLS/HR;  Start 2/7/17 at 20:00


Cyclobenzaprine HCl (Flexeril) 5 mg Q8  PRN PO MUSCLE SPASM  Last administered 

on 2/8/17at 10:27; Admin Dose 5 MG;  Start 2/8/17 at 10:24





Procedures


Procedures


DATE OF PROCEDURE:  


 


 


INDICATION:  A 46-year-old lady with bilateral frontal contusions, subdural 

hematoma who had episodes of transient encephalopathy suspicious for seizures, 

currently on Keppra.  Also has end-stage renal disease on hemodialysis.


 


DESCRIPTION OF PROCEDURE:  Routine EEG was recorded digitally.  Scalp-to-scalp 

and scalp-to-ear montages were recorded and reviewed.  All impedances were 

measured and recorded.  Cap electrodes were placed in accordance with 

International 10-20 system of electrode placement.


 


FINDINGS:  Symmetrically distributed background activity of low to medium 

amplitude ranging in frequency between 9 to 11 cycles per second was seen.  

There are frequent, though intermittent, few seconds lasting episodes of 

slowing of background 2 to 4 cycles per second with predominance to bifrontal 

regions.  Maybe slightly higher amplitude on the left.  No definite 

epileptiform transients were seen.  No signs of ongoing electrographic 

seizures.  No definite response to photic stimulation.


 


IMPRESSION:  Abnormal study secondary to presence of frontal intermittent 

rhythmical delta activity or so-called FIRDA.  At times, such activity reflects 

underlying structural brain lesions at times seen in encephalopathy.  No 

ongoing seizure activity.  I would recommend to continue antiepileptic coverage 

with Keppra.


 


 


Dictated By: CECILIO ALFARO MD Feb 9, 2017 10:07

## 2017-02-09 NOTE — CONS
Date/Time of Note


Date/Time of Note


DATE: 2/9/17 


TIME: 17:45





Assessment/Plan


Assessment/Plan


Chief Complaint/Hosp Course


IMPRESSION:


1.  The patient has acute hemorrhagic stroke./sdh/trauma


2.  Malignant hypertension.better


3.  Endstage renal disease.


4.  Leukocytosis. better


5.  Respiratory failure.


6.  Incomplete database


7 hyperkalemia hx


8 SUBCUTANEOUS EPMHYSEMA/c tube


9 hypernatremia


10 hyperphosphatemia


plan hd


per neuro


labs


per surgery


avoid hyponatremia


renvella


continue hd


Problems:  





Consultation Date/Type/Reason


Admit Date/Time


Jan 31, 2017 at 13:31


Initial Consult Date


1/31/17


Type of Consultation:  renal





24 HR Interval Summary


Subjective hx not possible:  other (no distress)


Constitutional:  no complaints





Exam/Review of Systems


Vital Signs


Vitals





 Vital Signs








  Date Time  Temp Pulse Resp B/P Pulse Ox O2 Delivery O2 Flow Rate FiO2


 


2/9/17 16:37 99.0 102 19 135/93 95   


 


2/9/17 16:21        21


 


2/9/17 12:00      Room Air  


 


2/8/17 18:00       2.0 














 Intake and Output   


 


 2/8/17 2/8/17 2/9/17





 15:00 23:00 07:00


 


Intake Total 445 ml 160 ml 


 


Output Total 0 ml 0 ml 


 


Balance 445 ml 160 ml 











Exam


Neck:  supple


Respiratory:  clear to auscultation


Cardiovascular:  regular rate and rhythm


Gastrointestinal:  soft


Musculoskeletal:  nl extremities to inspection


Extremities:  normal pulses





Results


Result Diagram:  


2/9/17 0550                                                                    

            2/9/17 0550





Results 24 hrs





Laboratory Tests








Test


  2/9/17


05:50 2/9/17


12:28


 


Anion Gap 33  H 


 


Basophils # 0.0   


 


Basophils % 0.0   


 


Blood Morphology Comment    


 


Blood Urea Nitrogen 100  H 


 


Calcium Level 9.6   


 


Carbon Dioxide Level 21   


 


Chloride Level 99   


 


Creatinine 9.33  #H 


 


Eosinophils # 0.5   


 


Eosinophils % 4.7   


 


Glucose Level 130  # 


 


Hematocrit 39.3   


 


Hemoglobin 13.1   


 


Lymphocytes # 1.0   


 


Lymphocytes % 9.5  L 


 


Magnesium Level 2.8  H 


 


Mean Corpuscular Hemoglobin 29.1   


 


Mean Corpuscular Hemoglobin


Concent 33.2  


  


 


 


Mean Corpuscular Volume 87.6   


 


Mean Platelet Volume 9.8   


 


Monocytes # 1.0  H 


 


Monocytes % 8.7   


 


Neutrophils # 8.5  H 


 


Neutrophils % 77.1  H 


 


Nucleated Red Blood Cells # 0.0   


 


Nucleated Red Blood Cells % 0.0   


 


Phosphorus Level 5.1  #H 


 


Platelet Count 242   


 


Potassium Level 3.9   


 


Red Blood Count 4.49   


 


Red Cell Distribution Width 14.9  H 


 


Sodium Level 149  H 


 


White Blood Count 11.0  H 


 


Bedside Glucose  151  











Medications


Medications





 Current Medications


Labetalol HCl (Labetalol) 10 mg Q10M  PRN IV ELEVATED BLOOD PRESSURE Last 

administered on 2/9/17at 12:05; Admin Dose 10 MG;  Start 1/31/17 at 14:00


Ondansetron HCl (Zofran Inj) 4 mg Q6H  PRN IV NAUSEA AND/OR VOMITING Last 

administered on 1/31/17at 20:32; Admin Dose 4 MG;  Start 1/31/17 at 14:00


Acetaminophen (Tylenol Liquid) 650 mg Q6H  PRN PO PAIN LEVEL 1-3 OR FEVER Last 

administered on 2/9/17at 14:25; Admin Dose 650 MG;  Start 1/31/17 at 14:00


Acetaminophen (Tylenol Supp) 650 mg Q4H  PRN AR PAIN LEVEL 1-3 OR FEVER;  Start 

1/31/17 at 14:00


Eye Lubricant (Artificial Tears Oph) 1 drop TID BOTH EYES  Last administered on 

2/9/17at 13:00; Admin Dose 1 DROP;  Start 1/31/17 at 21:00


Multivit/Ca Carb/ B Cmplx/FA/Prenat (Charlotte-Argenis) 1 tab DAILY PO  Last 

administered on 2/9/17at 09:19; Admin Dose 1 TAB;  Start 2/1/17 at 09:00


Hydromorphone HCl (Dilaudid) 0.5 mg Q4H  PRN IV PAIN Last administered on 2/9/ 17at 16:15; Admin Dose 0.5 MG;  Start 1/31/17 at 21:30


Atorvastatin Calcium 20 mg 20 mg QHS NGT  Last administered on 2/7/17at 20:22; 

Admin Dose 20 MG;  Start 2/6/17 at 21:00


Levetiracetam/ Dextrose (Keppra Iv/D5W) 105 ml @  420 mls/hr DAILY IVPB  Last 

administered on 2/9/17at 09:18; Admin Dose 420 MLS/HR;  Start 2/7/17 at 20:00


Cyclobenzaprine HCl (Flexeril) 5 mg Q8  PRN PO MUSCLE SPASM  Last administered 

on 2/8/17at 10:27; Admin Dose 5 MG;  Start 2/8/17 at 10:24


Famotidine (Pepcid) 20 mg DAILY NGT ;  Start 2/10/17 at 09:00


Sevelamer Carbonate (Renvela) 2.4 gm Q8 PO ;  Start 2/9/17 at 22:00











PRITESH MACKEY MD Feb 9, 2017 17:46

## 2017-02-09 NOTE — RADRPT
PROCEDURE:   CHEST - 1 VIEW  

 

CLINICAL INDICATION:   46-year-old female for nasogastric tube placement. 

 

TECHNIQUE:   A single frontal AP supine view of the chest was performed portably.  The images were r
eviewed on a PACS workstation. 

 

COMPARISON:   Chest x-ray February 8, 2017; CT chest abdomen/pelvis February 2, 2017.  

 

FINDINGS:

 

There is a nasogastric tube identified into the left upper quadrant stomach region.  The cardiomedia
stinal silhouette is within normal limits without significant interval change.  There is a shallow i
nspiration with elevation right hemidiaphragm. There is increasing right mid/lower lung zone atelect
asis.  There is left basilar subsegmental atelectasis. There is no definite pneumothorax.  There is 
extensive right-sided subcutaneous emphysema.  There is mild left lateral chest wall subcutaneous em
physema.  There is air identified along both sides of the bowel within the left upper quadrant (Rigl
ar's sign) consistent with pneumoperitoneum.

 

IMPRESSION:

 

1.  Nasogastric tube in place.

2.  Increasing right mid/lower lung zone atelectasis. 

3.  Persistent mild left basilar atelectasis.

4.  Subcutaneous emphysema.

5.  Persistent pneumoperitoneum.

_____________________________________________ 

.Martin Javier MD, MD           Date    Time 

Electronically viewed and signed by .Martin Javier MD, MD on 02/09/2017 01:19 

 

D:  02/09/2017 01:19  T:  02/09/2017 01:19

.MARIA ESTHER/

## 2017-02-10 VITALS — SYSTOLIC BLOOD PRESSURE: 126 MMHG | RESPIRATION RATE: 18 BRPM | DIASTOLIC BLOOD PRESSURE: 92 MMHG

## 2017-02-10 VITALS — SYSTOLIC BLOOD PRESSURE: 131 MMHG | RESPIRATION RATE: 18 BRPM | HEART RATE: 95 BPM | DIASTOLIC BLOOD PRESSURE: 65 MMHG

## 2017-02-10 VITALS — HEART RATE: 112 BPM

## 2017-02-10 VITALS — SYSTOLIC BLOOD PRESSURE: 132 MMHG | DIASTOLIC BLOOD PRESSURE: 88 MMHG | RESPIRATION RATE: 20 BRPM

## 2017-02-10 VITALS — DIASTOLIC BLOOD PRESSURE: 86 MMHG | RESPIRATION RATE: 20 BRPM | SYSTOLIC BLOOD PRESSURE: 126 MMHG

## 2017-02-10 VITALS — DIASTOLIC BLOOD PRESSURE: 90 MMHG | RESPIRATION RATE: 19 BRPM | SYSTOLIC BLOOD PRESSURE: 132 MMHG

## 2017-02-10 VITALS — HEART RATE: 135 BPM

## 2017-02-10 VITALS — SYSTOLIC BLOOD PRESSURE: 121 MMHG | RESPIRATION RATE: 20 BRPM | DIASTOLIC BLOOD PRESSURE: 84 MMHG

## 2017-02-10 VITALS — RESPIRATION RATE: 19 BRPM | DIASTOLIC BLOOD PRESSURE: 85 MMHG | SYSTOLIC BLOOD PRESSURE: 125 MMHG

## 2017-02-10 VITALS — HEART RATE: 102 BPM

## 2017-02-10 VITALS — DIASTOLIC BLOOD PRESSURE: 63 MMHG | HEART RATE: 100 BPM | SYSTOLIC BLOOD PRESSURE: 128 MMHG | RESPIRATION RATE: 18 BRPM

## 2017-02-10 VITALS — HEART RATE: 117 BPM

## 2017-02-10 VITALS — HEART RATE: 119 BPM

## 2017-02-10 LAB
ADD SCAN DIFF: NO
ANION GAP SERPL CALC-SCNC: 30 MMOL/L (ref 8–16)
BASOPHILS # BLD AUTO: 0 10^3/UL (ref 0–0.1)
BASOPHILS NFR BLD: 0.1 % (ref 0–2)
BUN SERPL-MCNC: 69 MG/DL (ref 7–20)
CALCIUM SERPL-MCNC: 10.1 MG/DL (ref 8.4–10.2)
CHLORIDE SERPL-SCNC: 87 MMOL/L (ref 97–110)
CO2 SERPL-SCNC: 26 MMOL/L (ref 21–31)
CREAT SERPL-MCNC: 6.55 MG/DL (ref 0.44–1)
EOSINOPHIL # BLD: 0.1 10^3/UL (ref 0–0.5)
EOSINOPHIL NFR BLD: 0.7 % (ref 0–7)
ERYTHROCYTE [DISTWIDTH] IN BLOOD BY AUTOMATED COUNT: 14.6 % (ref 11.5–14.5)
GLUCOSE SERPL-MCNC: 181 MG/DL (ref 70–220)
HCT VFR BLD CALC: 43.4 % (ref 37–47)
HGB BLD-MCNC: 13.9 G/DL (ref 12–16)
LYMPHOCYTES # BLD AUTO: 0.9 10^3/UL (ref 0.8–2.9)
LYMPHOCYTES NFR BLD AUTO: 6.1 % (ref 15–51)
MAGNESIUM SERPL-MCNC: 2.5 MG/DL (ref 1.7–2.5)
MCH RBC QN AUTO: 28.2 PG (ref 29–33)
MCHC RBC AUTO-ENTMCNC: 32 G/DL (ref 32–37)
MCV RBC AUTO: 88 FL (ref 82–101)
MONOCYTES # BLD: 0.8 10^3/UL (ref 0.3–0.9)
MONOCYTES NFR BLD: 5 % (ref 0–11)
NEUTROPHILS # BLD: 13 10^3/UL (ref 1.6–7.5)
NEUTROPHILS NFR BLD AUTO: 87 % (ref 39–77)
NRBC # BLD MANUAL: 0 10^3/UL (ref 0–0)
NRBC BLD QL: 0 /100WBC (ref 0–0)
PHOSPHATE SERPL-MCNC: 4.1 MG/DL (ref 2.5–4.9)
PLATELET # BLD: 260 10^3/UL (ref 140–415)
PMV BLD AUTO: 11.4 FL (ref 7.4–10.4)
POTASSIUM SERPL-SCNC: 3.6 MMOL/L (ref 3.5–5.1)
RBC # BLD AUTO: 4.93 10^6/UL (ref 4.2–5.4)
SODIUM SERPL-SCNC: 139 MMOL/L (ref 135–144)
WBC # BLD AUTO: 14.9 10^3/UL (ref 4.8–10.8)

## 2017-02-10 RX ADMIN — IPRATROPIUM BROMIDE AND ALBUTEROL SULFATE SCH ML: .5; 3 SOLUTION RESPIRATORY (INHALATION) at 16:48

## 2017-02-10 RX ADMIN — PIPERACILLIN AND TAZOBACTAM SCH MLS/HR: 2; .25 INJECTION, POWDER, FOR SOLUTION INTRAVENOUS at 15:10

## 2017-02-10 RX ADMIN — LEVETIRACETAM SCH MLS/HR: 100 INJECTION, SOLUTION INTRAVENOUS at 09:52

## 2017-02-10 RX ADMIN — SEVELAMER CARBONATE SCH GM: 2400 POWDER, FOR SUSPENSION ORAL at 21:53

## 2017-02-10 RX ADMIN — SEVELAMER CARBONATE SCH GM: 2400 POWDER, FOR SUSPENSION ORAL at 06:02

## 2017-02-10 RX ADMIN — PIPERACILLIN AND TAZOBACTAM SCH MLS/HR: 2; .25 INJECTION, POWDER, FOR SOLUTION INTRAVENOUS at 23:53

## 2017-02-10 RX ADMIN — CARBOXYMETHYLCELLULOSE SODIUM SCH DROP: 10 GEL OPHTHALMIC at 09:52

## 2017-02-10 RX ADMIN — ATORVASTATIN CALCIUM SCH MG: 20 TABLET, FILM COATED ORAL at 21:53

## 2017-02-10 RX ADMIN — SEVELAMER CARBONATE SCH GM: 2400 POWDER, FOR SUSPENSION ORAL at 14:06

## 2017-02-10 RX ADMIN — CARBOXYMETHYLCELLULOSE SODIUM SCH DROP: 10 GEL OPHTHALMIC at 14:09

## 2017-02-10 RX ADMIN — IPRATROPIUM BROMIDE AND ALBUTEROL SULFATE SCH ML: .5; 3 SOLUTION RESPIRATORY (INHALATION) at 08:29

## 2017-02-10 RX ADMIN — ACETAMINOPHEN PRN MG: 160 SOLUTION ORAL at 21:53

## 2017-02-10 RX ADMIN — CARBOXYMETHYLCELLULOSE SODIUM SCH DROP: 10 GEL OPHTHALMIC at 21:19

## 2017-02-10 RX ADMIN — FAMOTIDINE SCH MG: 20 TABLET ORAL at 09:51

## 2017-02-10 NOTE — PN
Date/Time of Note


Date/Time of Note


DATE: 2/10/17 


TIME: 12:53





Assessment/Plan


VTE Prophylaxis


VTE Prophylaxis Intervention:  SCD's





Lines/Catheters


IV Catheter Type (from [facility]):  Saline Lock


Urinary Cath still in place:  No





Assessment/Plan


Assessment/Plan


46-year-old female:





1.  Acute respiratory failure secondary to severe encephalopathy, post fall 

with significant head trauma and s/p chest barotrauma with bilateral 

pneumothoraces and pneumoperitoneum, all resolved and both chest tubes removed 

as of Wednesday   


Appreciate all the assistance from Dr Ann


Pulmonary following.


CXR stable and on NC.





2. Status post fall with head trauma with skull fracture including Bilateral 

acute inferior frontal hemorrhagic contusions, right greater than left, 8 mm 

right convexity subdural hematoma, mild scattered subarachnoid hemorrhage, 3 mm 

leftward midline shift of the septum pellucidum and right temporal-parietal 

scalp swelling with nondisplaced, oblique fracture through the right parietal 

and temporal skull. 


Reported Epidural hematoma on 2nd CT head 


Dr. Palomo from neurosurgery following, no intervention and resolving SDH, 


MS Ok but still confused and failed Speech eval overnight


Continue  Keppra for now per Neurology and EEG c/w brain contusions/injury, no 

active seizures 





3.  Leukocytosis: Likely from demargination post trauma. WBC back up with 

vomiting this AM 


Concerns for aspiration still


Resume Zosyn  


Patient remains afebrile mostly today  





4.  Hypertension: resolved 





5.  End-stage renal disease on hemodialysis: on Monday/Wednesday/Friday 

schedule. 


Dr. Chaney following. 





6.  Gastroesophageal reflux disease: continue Pepcid 





7. Hypernatremia: resolved and decrease Free H2O to 100 cc q8 


Per Nephrology OK to have Na 145 to 148 for now and avoid hyponatremia. 








Prophylaxis: Pepcid for GI prophylaxis, SCDs for DVT prophylaxis


Disposition:  On NC, on Telemetry. CXR in AM and resuming abx 


Appreciate Neurology, Nephrology, CTS and Pulmonary recs. PT and ST eval pending





Subjective


24 Hr Interval Summary


Free Text/Dictation


Patient failed swallow eval 


She is not really following commands but answers yes or no to simple questions 


WBC up today and fevers yesterday 


Still in restraints





Exam/Review of Systems


Vital Signs


Vitals





 Vital Signs








  Date Time  Temp Pulse Resp B/P Pulse Ox O2 Delivery O2 Flow Rate FiO2


 


2/10/17 12:13 98.8 108 18 126/92 98   


 


2/10/17 08:29        21


 


2/10/17 02:00      Room Air  


 


2/8/17 18:00       2.0 














 Intake and Output   


 


 2/9/17 2/9/17 2/10/17





 15:00 23:00 07:00


 


Intake Total  800 ml 


 


Output Total  800 ml 


 


Balance  0 ml 











Exam


Constitutional:  alert, oriented (x1 or 2)


Psych:  confusion


Respiratory:  clear to auscultation, normal air movement


Cardiovascular:  nl pulses, regular rate and rhythm


Gastrointestinal:  non-tender, soft


Musculoskeletal:  nl extremities to inspection


Extremities:  normal pulses, other (no edema, clubbing or cyanosis )


Neurological:  CNS II-XII intact, confused, other (not following much commands )


Additional Comments


NGT in place





Results


Result Diagram:  


2/10/17 0640                                                                   

             2/10/17 0640





Results 24 hrs





Laboratory Tests








Test


  2/10/17


06:40


 


Anion Gap 30  H


 


Basophils # 0.0  


 


Basophils % 0.1  


 


Blood Urea Nitrogen 69  #H


 


Calcium Level 10.1  


 


Carbon Dioxide Level 26  


 


Chloride Level 87  #L


 


Creatinine 6.55  #H


 


Eosinophils # 0.1  


 


Eosinophils % 0.7  


 


Glucose Level 181  


 


Hematocrit 43.4  


 


Hemoglobin 13.9  


 


Lymphocytes # 0.9  


 


Lymphocytes % 6.1  L


 


Magnesium Level 2.5  


 


Mean Corpuscular Hemoglobin 28.2  L


 


Mean Corpuscular Hemoglobin


Concent 32.0  


 


 


Mean Corpuscular Volume 88.0  


 


Mean Platelet Volume 11.4  H


 


Monocytes # 0.8  


 


Monocytes % 5.0  


 


Neutrophils # 13.0  H


 


Neutrophils % 87.0  H


 


Nucleated Red Blood Cells # 0.0  


 


Nucleated Red Blood Cells % 0.0  


 


Phosphorus Level 4.1  


 


Platelet Count 260  


 


Potassium Level 3.6  


 


Red Blood Count 4.93  


 


Red Cell Distribution Width 14.6  H


 


Sodium Level 139  


 


White Blood Count 14.9  #H











Medications


Medications





 Current Medications


Labetalol HCl (Labetalol) 10 mg Q10M  PRN IV ELEVATED BLOOD PRESSURE Last 

administered on 2/9/17at 12:05; Admin Dose 10 MG;  Start 1/31/17 at 14:00


Ondansetron HCl (Zofran Inj) 4 mg Q6H  PRN IV NAUSEA AND/OR VOMITING Last 

administered on 1/31/17at 20:32; Admin Dose 4 MG;  Start 1/31/17 at 14:00


Acetaminophen (Tylenol Liquid) 650 mg Q6H  PRN PO PAIN LEVEL 1-3 OR FEVER Last 

administered on 2/9/17at 14:25; Admin Dose 650 MG;  Start 1/31/17 at 14:00


Acetaminophen (Tylenol Supp) 650 mg Q4H  PRN ME PAIN LEVEL 1-3 OR FEVER;  Start 

1/31/17 at 14:00


Eye Lubricant (Artificial Tears Oph) 1 drop TID BOTH EYES  Last administered on 

2/10/17at 09:52; Admin Dose 1 DROP;  Start 1/31/17 at 21:00


Multivit/Ca Carb/ B Cmplx/FA/Prenat (Charlotte-Argenis) 1 tab DAILY PO  Last 

administered on 2/10/17at 09:51; Admin Dose 1 TAB;  Start 2/1/17 at 09:00


Hydromorphone HCl (Dilaudid) 0.5 mg Q4H  PRN IV PAIN Last administered on 2/9/ 17at 16:15; Admin Dose 0.5 MG;  Start 1/31/17 at 21:30


Atorvastatin Calcium 20 mg 20 mg QHS NGT  Last administered on 2/9/17at 21:17; 

Admin Dose 20 MG;  Start 2/6/17 at 21:00


Levetiracetam/ Dextrose (Keppra Iv/D5W) 105 ml @  420 mls/hr DAILY IVPB  Last 

administered on 2/10/17at 09:52; Admin Dose 420 MLS/HR;  Start 2/7/17 at 20:00


Cyclobenzaprine HCl (Flexeril) 5 mg Q8  PRN PO MUSCLE SPASM  Last administered 

on 2/8/17at 10:27; Admin Dose 5 MG;  Start 2/8/17 at 10:24


Famotidine (Pepcid) 20 mg DAILY NGT  Last administered on 2/10/17at 09:51; 

Admin Dose 20 MG;  Start 2/10/17 at 09:00


Sevelamer Carbonate (Renvela) 2.4 gm Q8 PO  Last administered on 2/10/17at 06:02

; Admin Dose 2.4 GM;  Start 2/9/17 at 22:00











CECILIO JIMENEZ Feb 10, 2017 13:03

## 2017-02-10 NOTE — PN
DATE:  02/10/2017

 

 

SUBJECTIVE:  The patient remains stable overnight. No new event.

 

PHYSICAL EXAMINATION:

VITAL SIGNS:  Temperature 98, pulse is 112, blood pressure 125/85, O2 saturation 96% on 2 liters.

NECK:  Supple.  No JVD or lymphadenopathy.

CARDIAC:  S1, S2, no added sounds or murmurs.

CHEST:  Diminished air entry bilaterally.

ABDOMEN:  Soft, nontender.  No guarding or rebound.

EXTREMITIES:  No cyanosis, clubbing, edema.

NEUROLOGIC:  Generalized weakness.

 

LABORATORY DATA:  White count 14.9, hemoglobin 13.9, platelets of 260, BUN 69, creatinine 6.55.

 

IMPRESSION AND PLAN:

1.  Mechanical fall with subarachnoid hemorrhage.

2.  Iatrogenic pneumothorax status post chest tube placement.

3.  End-stage renal failure on hemodialysis.  

 

PLAN:  

1.  Continue antibiotics per primary team.

2.  Neuro recommendations.  No seizure activity at present. 

3.  Continue cardiothoracic surgery.  Chest tube in place, hopefully remove soon.

 

 

Dictated By: NORY CARREON/BITA

DD:    02/10/2017 12:10:37

DT:    02/10/2017 12:43:39

Conf#: [id number]

DID#:  [id number]

## 2017-02-11 VITALS — HEART RATE: 111 BPM

## 2017-02-11 VITALS — HEART RATE: 110 BPM

## 2017-02-11 VITALS — HEART RATE: 96 BPM | RESPIRATION RATE: 16 BRPM | DIASTOLIC BLOOD PRESSURE: 70 MMHG | SYSTOLIC BLOOD PRESSURE: 134 MMHG

## 2017-02-11 VITALS — HEART RATE: 123 BPM

## 2017-02-11 VITALS — RESPIRATION RATE: 12 BRPM | DIASTOLIC BLOOD PRESSURE: 76 MMHG | SYSTOLIC BLOOD PRESSURE: 164 MMHG

## 2017-02-11 VITALS — HEART RATE: 108 BPM | SYSTOLIC BLOOD PRESSURE: 126 MMHG | RESPIRATION RATE: 18 BRPM | DIASTOLIC BLOOD PRESSURE: 63 MMHG

## 2017-02-11 VITALS — HEART RATE: 99 BPM | DIASTOLIC BLOOD PRESSURE: 76 MMHG | RESPIRATION RATE: 18 BRPM | SYSTOLIC BLOOD PRESSURE: 120 MMHG

## 2017-02-11 VITALS — SYSTOLIC BLOOD PRESSURE: 120 MMHG | HEART RATE: 99 BPM | RESPIRATION RATE: 18 BRPM | DIASTOLIC BLOOD PRESSURE: 80 MMHG

## 2017-02-11 VITALS — SYSTOLIC BLOOD PRESSURE: 159 MMHG | RESPIRATION RATE: 16 BRPM | DIASTOLIC BLOOD PRESSURE: 71 MMHG

## 2017-02-11 VITALS — SYSTOLIC BLOOD PRESSURE: 177 MMHG | RESPIRATION RATE: 18 BRPM | DIASTOLIC BLOOD PRESSURE: 96 MMHG

## 2017-02-11 VITALS — DIASTOLIC BLOOD PRESSURE: 76 MMHG | RESPIRATION RATE: 20 BRPM | SYSTOLIC BLOOD PRESSURE: 118 MMHG

## 2017-02-11 VITALS — SYSTOLIC BLOOD PRESSURE: 124 MMHG | DIASTOLIC BLOOD PRESSURE: 83 MMHG | HEART RATE: 113 BPM | RESPIRATION RATE: 18 BRPM

## 2017-02-11 VITALS — DIASTOLIC BLOOD PRESSURE: 86 MMHG | SYSTOLIC BLOOD PRESSURE: 131 MMHG | RESPIRATION RATE: 20 BRPM

## 2017-02-11 VITALS — HEART RATE: 103 BPM

## 2017-02-11 VITALS — RESPIRATION RATE: 20 BRPM | DIASTOLIC BLOOD PRESSURE: 81 MMHG | SYSTOLIC BLOOD PRESSURE: 117 MMHG

## 2017-02-11 VITALS — RESPIRATION RATE: 16 BRPM | SYSTOLIC BLOOD PRESSURE: 137 MMHG | DIASTOLIC BLOOD PRESSURE: 64 MMHG

## 2017-02-11 VITALS — HEART RATE: 98 BPM | DIASTOLIC BLOOD PRESSURE: 67 MMHG | SYSTOLIC BLOOD PRESSURE: 130 MMHG | RESPIRATION RATE: 18 BRPM

## 2017-02-11 VITALS — HEART RATE: 107 BPM

## 2017-02-11 VITALS — HEART RATE: 96 BPM

## 2017-02-11 LAB
ADD SCAN DIFF: NO
ALBUMIN SERPL-MCNC: 4.1 G/DL (ref 3.3–4.9)
ALBUMIN/GLOB SERPL: 1.24 {RATIO}
ALP SERPL-CCNC: 174 IU/L (ref 42–121)
ALT SERPL-CCNC: 24 IU/L (ref 13–69)
ANION GAP SERPL CALC-SCNC: 31 MMOL/L (ref 8–16)
AST SERPL-CCNC: 21 IU/L (ref 15–46)
BASOPHILS # BLD AUTO: 0 10^3/UL (ref 0–0.1)
BASOPHILS NFR BLD: 0 % (ref 0–2)
BILIRUB DIRECT SERPL-MCNC: 0 MG/DL (ref 0–0.2)
BILIRUB SERPL-MCNC: 0 MG/DL (ref 0.2–1.3)
CALCIUM SERPL-MCNC: 9.7 MG/DL (ref 8.4–10.2)
CHLORIDE SERPL-SCNC: 85 MMOL/L (ref 97–110)
CO2 SERPL-SCNC: 24 MMOL/L (ref 21–31)
CREAT SERPL-MCNC: 8.66 MG/DL (ref 0.44–1)
EOSINOPHIL # BLD: 0.3 10^3/UL (ref 0–0.5)
EOSINOPHIL NFR BLD: 2.1 % (ref 0–7)
ERYTHROCYTE [DISTWIDTH] IN BLOOD BY AUTOMATED COUNT: 14.4 % (ref 11.5–14.5)
GLOBULIN SER-MCNC: 3.3 G/DL (ref 1.3–3.2)
GLUCOSE SERPL-MCNC: 158 MG/DL (ref 70–220)
HCT VFR BLD CALC: 37.4 % (ref 37–47)
HGB BLD-MCNC: 12.5 G/DL (ref 12–16)
LYMPHOCYTES # BLD AUTO: 0.8 10^3/UL (ref 0.8–2.9)
LYMPHOCYTES NFR BLD AUTO: 5.8 % (ref 15–51)
MAGNESIUM SERPL-MCNC: 3.1 MG/DL (ref 1.7–2.5)
MCH RBC QN AUTO: 29.1 PG (ref 29–33)
MCHC RBC AUTO-ENTMCNC: 33.5 G/DL (ref 32–37)
MCV RBC AUTO: 86.8 FL (ref 82–101)
MONOCYTES # BLD: 0.9 10^3/UL (ref 0.3–0.9)
MONOCYTES NFR BLD: 6.4 % (ref 0–11)
NEUTROPHILS # BLD: 12.5 10^3/UL (ref 1.6–7.5)
NEUTROPHILS NFR BLD AUTO: 85.7 % (ref 39–77)
NRBC # BLD MANUAL: 0 10^3/UL (ref 0–0)
NRBC BLD QL: 0 /100WBC (ref 0–0)
PHOSPHATE SERPL-MCNC: 5.8 MG/DL (ref 2.5–4.9)
PLATELET # BLD: 229 10^3/UL (ref 140–440)
PMV BLD AUTO: 9.9 FL (ref 7.4–10.4)
POTASSIUM SERPL-SCNC: 3.9 MMOL/L (ref 3.5–5.1)
PROT SERPL-MCNC: 7.4 G/DL (ref 6.1–8.1)
RBC # BLD AUTO: 4.3 10^6/UL (ref 4.2–5.4)
SODIUM SERPL-SCNC: 136 MMOL/L (ref 135–144)
WBC # BLD AUTO: 14.6 10^3/UL (ref 4.8–10.8)

## 2017-02-11 RX ADMIN — SEVELAMER CARBONATE SCH GM: 2400 POWDER, FOR SUSPENSION ORAL at 12:35

## 2017-02-11 RX ADMIN — CARBOXYMETHYLCELLULOSE SODIUM SCH DROP: 10 GEL OPHTHALMIC at 08:42

## 2017-02-11 RX ADMIN — IPRATROPIUM BROMIDE AND ALBUTEROL SULFATE SCH ML: .5; 3 SOLUTION RESPIRATORY (INHALATION) at 16:13

## 2017-02-11 RX ADMIN — ATORVASTATIN CALCIUM SCH MG: 20 TABLET, FILM COATED ORAL at 20:52

## 2017-02-11 RX ADMIN — SEVELAMER CARBONATE SCH GM: 2400 POWDER, FOR SUSPENSION ORAL at 21:01

## 2017-02-11 RX ADMIN — LEVETIRACETAM SCH MLS/HR: 100 INJECTION, SOLUTION INTRAVENOUS at 09:04

## 2017-02-11 RX ADMIN — IPRATROPIUM BROMIDE AND ALBUTEROL SULFATE SCH ML: .5; 3 SOLUTION RESPIRATORY (INHALATION) at 09:06

## 2017-02-11 RX ADMIN — PIPERACILLIN AND TAZOBACTAM SCH MLS/HR: 2; .25 INJECTION, POWDER, FOR SOLUTION INTRAVENOUS at 20:52

## 2017-02-11 RX ADMIN — IPRATROPIUM BROMIDE AND ALBUTEROL SULFATE SCH ML: .5; 3 SOLUTION RESPIRATORY (INHALATION) at 00:26

## 2017-02-11 RX ADMIN — SEVELAMER CARBONATE SCH GM: 2400 POWDER, FOR SUSPENSION ORAL at 06:35

## 2017-02-11 RX ADMIN — CARBOXYMETHYLCELLULOSE SODIUM SCH DROP: 10 GEL OPHTHALMIC at 11:48

## 2017-02-11 RX ADMIN — FAMOTIDINE SCH MG: 20 TABLET ORAL at 08:41

## 2017-02-11 RX ADMIN — PIPERACILLIN AND TAZOBACTAM SCH MLS/HR: 2; .25 INJECTION, POWDER, FOR SOLUTION INTRAVENOUS at 08:41

## 2017-02-11 RX ADMIN — CARBOXYMETHYLCELLULOSE SODIUM SCH DROP: 10 GEL OPHTHALMIC at 20:52

## 2017-02-11 NOTE — RADRPT
PROCEDURE:   XR Chest AP portable 

 

CLINICAL INDICATION:   Subdural hematoma, rule out aspiration pneumonia 

 

TECHNIQUE:   An AP portable radiograph of the chest was submitted. 

 

COMPARISON:   02/09/2017 

 

FINDINGS:

 

Support Hardware: And NG tube remain satisfactorily positioned.

 

Cardiovascular: The cardiovascular silhouette appears unremarkable.

 

Lung Fields: Foci of discoid atelectasis are again seen within the right mid lung zone with possible
 air trapping. Discoid atelectasis has improved at the left lung base.

 

Pleural Spaces: A linear demarcation is seen at the right pulmonary apex suspicious for a small apic
al pneumothorax.  No effusion is identified.

 

Osseous Structures: The osseous elements appear rarefied.

 

Soft Tissues: Subcutaneous air is again seen within the right lateral soft tissues. There is increas
ed lucency at the superior abdominal midline suspicious for persistent pneumoperitoneum. An endovasc
ular stent again projects to the left axilla.

 

IMPRESSION: 

1.  NG tube remains in place.

2.  Discoid atelectasis is again seen at the right mid lung zone with possible air trapping.  Discoi
d atelectasis has improved at the left lung base.

3. Linear demarcation seen in right subapical region suspicious for a small right apical pneumothora
x of approximately 10-15%.  This was not clearly seen than on the previous study possibly due to jaren
hnique.

4.  Increased lucency at the superior abdomen suspicious for pneumoperitoneum, less prominent than s
een previously.

5.  Subcutaneous air is again seen in the right lateral soft tissues.

 

Findings were telephoned by Anup Luna MD to Dr. Gan on 02/11/2017 at 1240 hours.

_____________________________________________ 

Physician Sha           Date    Time 

Electronically viewed and signed by Physician Sha on 02/11/2017 12:44 

 

D:  02/11/2017 12:44  T:  02/11/2017 12:44

RH/

## 2017-02-11 NOTE — PN
Date/Time of Note


Date/Time of Note


DATE: 2/11/17 


TIME: 18:16





Assessment/Plan


Lines/Catheters


IV Catheter Type (from [facility]):  Peripheral IV


Kamara in Place (from [facility]):  No





Assessment/Plan


Chief Complaint/Hosp Course


PTX


SP  bilateral CT placement


CXR,


 





1


 





will continue CT sxn


Problems:  





Exam/Review of Systems


Vital Signs


Vitals





 Vital Signs








  Date Time  Temp Pulse Resp B/P Pulse Ox O2 Delivery O2 Flow Rate FiO2


 


2/11/17 18:00 98.4 96 16 134/70 99   


 


2/11/17 16:14        21


 


2/11/17 10:00      Room Air  


 


2/8/17 18:00       2.0 














 Intake and Output   


 


 2/10/17 2/10/17 2/11/17





 15:00 23:00 07:00


 


Intake Total  680 ml 610 ml


 


Output Total   0 ml


 


Balance  680 ml 610 ml











Results


Result Diagram:  


2/11/17 0710                                                                   

             2/11/17 0710














SUNITA BETANCUR MD Feb 11, 2017 18:17

## 2017-02-11 NOTE — PN
Date/Time of Note


Date/Time of Note


DATE: 2/11/17 


TIME: 18:51





Assessment/Plan


VTE Prophylaxis


VTE Prophylaxis Intervention:  other





Assessment/Plan


Assessment/Plan


Chief Complaint/Hosp Course


IMPRESSION:


1.  The patient has acute hemorrhagic stroke./sdh/trauma


2.  Malignant hypertension.better


3.  Endstage renal disease.


4.  Leukocytosis. better


5.  Respiratory failure.


6.  Incomplete database


7 hyperkalemia hx


8 SUBCUTANEOUS EPMHYSEMA/c tube


9 hypernatremia


10 hyperphosphatemia


[id number]    hd  per plan


                neuro f/u





Subjective


24 Hr Interval Summary


Subjective hx not possible:  pt non-verbal





Exam/Review of Systems


Vital Signs


Vitals





 Vital Signs








  Date Time  Temp Pulse Resp B/P Pulse Ox O2 Delivery O2 Flow Rate FiO2


 


2/11/17 18:00 98.4 96 16 134/70 99   


 


2/11/17 16:14        21


 


2/11/17 10:00      Room Air  


 


2/8/17 18:00       2.0 














 Intake and Output   


 


 2/10/17 2/10/17 2/11/17





 15:00 23:00 07:00


 


Intake Total  680 ml 610 ml


 


Output Total   0 ml


 


Balance  680 ml 610 ml











Exam


Head:  normocephalic


Neck:  supple


Respiratory:  clear to auscultation


Cardiovascular:  regular rate and rhythm


Gastrointestinal:  soft


Extremities:  normal pulses





Results


Result Diagram:  


2/11/17 0710                                                                   

             2/11/17 0710





Results 24 hrs





Laboratory Tests








Test


  2/11/17


07:10


 


Alanine Aminotransferase


(ALT/SGPT) 24  


 


 


Albumin 4.1  


 


Albumin/Globulin Ratio 1.24  


 


Alkaline Phosphatase 174  H


 


Anion Gap 31  H


 


Aspartate Amino Transf


(AST/SGOT) 21  


 


 


Basophils # 0.0  


 


Basophils % 0.0  


 


Blood Urea Nitrogen   


 


Calcium Level 9.7  


 


Carbon Dioxide Level 24  


 


Chloride Level 85  L


 


Creatinine 8.66  #H


 


Direct Bilirubin 0.00  


 


Eosinophils # 0.3  


 


Eosinophils % 2.1  


 


Globulin 3.30  H


 


Glucose Level 158  


 


Hematocrit 37.4  


 


Hemoglobin 12.5  


 


Indirect Bilirubin 0.0  


 


Lymphocytes # 0.8  


 


Lymphocytes % 5.8  L


 


Magnesium Level 3.1  H


 


Mean Corpuscular Hemoglobin 29.1  


 


Mean Corpuscular Hemoglobin


Concent 33.5  


 


 


Mean Corpuscular Volume 86.8  


 


Mean Platelet Volume 9.9  


 


Monocytes # 0.9  


 


Monocytes % 6.4  


 


Neutrophils # 12.5  H


 


Neutrophils % 85.7  H


 


Nucleated Red Blood Cells # 0.0  


 


Nucleated Red Blood Cells % 0.0  


 


Phosphorus Level 5.8  H


 


Platelet Count 229  


 


Potassium Level 3.9  


 


Red Blood Count 4.30  


 


Red Cell Distribution Width 14.4  


 


Sodium Level 136  


 


Total Bilirubin 0.0  L


 


Total Protein 7.4  


 


White Blood Count 14.6  H











Medications


Medications





 Current Medications


Labetalol HCl (Labetalol) 10 mg Q10M  PRN IV ELEVATED BLOOD PRESSURE Last 

administered on 2/9/17at 12:05; Admin Dose 10 MG;  Start 1/31/17 at 14:00


Ondansetron HCl (Zofran Inj) 4 mg Q6H  PRN IV NAUSEA AND/OR VOMITING Last 

administered on 1/31/17at 20:32; Admin Dose 4 MG;  Start 1/31/17 at 14:00


Acetaminophen (Tylenol Supp) 650 mg Q4H  PRN [place] PAIN LEVEL 1-3 OR FEVER;  Start 

1/31/17 at 14:00


Eye Lubricant (Artificial Tears Oph) 1 drop TID BOTH EYES  Last administered on 

2/11/17at 08:42; Admin Dose 1 DROP;  Start 1/31/17 at 21:00


Multivit/Ca Carb/ B Cmplx/FA/Prenat ([place]-Argenis) 1 tab DAILY PO  Last 

administered on 2/11/17at 08:41; Admin Dose 1 TAB;  Start 2/1/17 at 09:00


Hydromorphone HCl (Dilaudid) 0.5 mg Q4H  PRN IV PAIN Last administered on 2/9/ 17at 16:15; Admin Dose 0.5 MG;  Start 1/31/17 at 21:30


Atorvastatin Calcium 20 mg 20 mg QHS NGT  Last administered on 2/10/17at 21:53; 

Admin Dose 20 MG;  Start 2/6/17 at 21:00


Levetiracetam/ Dextrose (Keppra Iv/D5W) 105 ml @  420 mls/hr DAILY IVPB  Last 

administered on 2/11/17at 09:04; Admin Dose 420 MLS/HR;  Start 2/7/17 at 20:00


Cyclobenzaprine HCl (Flexeril) 5 mg Q8  PRN PO MUSCLE SPASM  Last administered 

on 2/8/17at 10:27; Admin Dose 5 MG;  Start 2/8/17 at 10:24


Famotidine 20 mg 20 mg DAILY NGT  Last administered on 2/11/17at 08:41; Admin 

Dose 20 MG;  Start 2/10/17 at 09:00


Piperacillin Sod/ Tazobactam Sod (Zosyn 2.25gm/ 50ml (Pmx)) 50 ml @  100 mls/hr 

Q12 IVPB  Last administered on 2/11/17at 08:41; Admin Dose 100 MLS/HR;  Start 2/

10/17 at 14:00


Acetaminophen (Tylenol Liquid) 650 mg Q6H  PRN NGT PAIN AND OR ELEVATED TEMP 

Last administered on 2/10/17at 21:53; Admin Dose 650 MG;  Start 2/10/17 at 21:30


Sevelamer Carbonate (Renvela) 2.4 gm Q8 NGT  Last administered on 2/11/17at 06:

35; Admin Dose 2.4 GM;  Start 2/10/17 at 22:00











ELMER KHALIL MD Feb 11, 2017 18:54

## 2017-02-11 NOTE — PN
Date/Time of Note


Date/Time of Note


DATE: 2/11/17 


TIME: 12:46





Assessment/Plan


VTE Prophylaxis


VTE Prophylaxis Intervention:  SCD's





Lines/Catheters


IV Catheter Type (from Nrs):  Peripheral IV


Urinary Cath still in place:  No





Assessment/Plan


Assessment/Plan


46-year-old female:





1.  Acute respiratory failure secondary to severe encephalopathy, post fall 

with significant head trauma and s/p chest barotrauma with bilateral 

pneumothoraces and pneumoperitoneum, all resolved and both chest tubes removed 

as of Wednesday   


Appreciate all the assistance from Dr Ann


Pulmonary following.


CXR today with ? right apical PTX, ? air trapping per radiology, CT chest non 

contrast today 





2. Status post fall with head trauma with skull fracture including Bilateral 

acute inferior frontal hemorrhagic contusions, right greater than left, 8 mm 

right convexity subdural hematoma, mild scattered subarachnoid hemorrhage, 3 mm 

leftward midline shift of the septum pellucidum and right temporal-parietal 

scalp swelling with nondisplaced, oblique fracture through the right parietal 

and temporal skull. 


Reported Epidural hematoma on 2nd CT head 


Dr. Palomo from neurosurgery following, no intervention and resolving SDH, 


MS Ok but still confused and failed Speech eval yesterday, re eval pending 


Continue  Keppra for now per Neurology and EEG c/w brain contusions/injury, no 

active seizures 





3.  Leukocytosis: Likely from demargination post trauma. WBC back up with 

vomiting this AM again. Continue Zosyn  


Still concerns for aspiration. 


Patient remains afebrile x 24 hrs 





4.  Hypertension: controlled 





5.  End-stage renal disease on hemodialysis: on Monday/Wednesday/Friday 

schedule. HD today 


Dr. Chaney following. 





6.  Gastroesophageal reflux disease: continue Pepcid 





7. Hypernatremia: resolved and decrease Free H2O to 100 cc q8 


Per Nephrology OK to have Na 145 to 148 for now and avoid hyponatremia. 








Prophylaxis: Pepcid for GI prophylaxis, SCDs for DVT prophylaxis


Disposition:  On NC, on Telemetry. CXR in AM and resuming abx 


Appreciate Neurology, Nephrology, CTS and Pulmonary recs. PT and ST eval 

pending. CT chest pending





Subjective


24 Hr Interval Summary


Free Text/Dictation


Patient with Nausea/vomiting again


Holding tube feedings for now 


Abnormal CXR this AM with ? right apical recurring small PTX, CT chest pending. 

Respiratory status stable  


Afebrile, on Abx and WBC unchanged





Exam/Review of Systems


Vital Signs


Vitals





 Vital Signs








  Date Time  Temp Pulse Resp B/P Pulse Ox O2 Delivery O2 Flow Rate FiO2


 


2/11/17 12:10 98.2 107 16 159/71 96   


 


2/11/17 10:00      Room Air  


 


2/11/17 00:26        21


 


2/8/17 18:00       2.0 














 Intake and Output   


 


 2/10/17 2/10/17 2/11/17





 15:00 23:00 07:00


 


Intake Total  680 ml 610 ml


 


Output Total   0 ml


 


Balance  680 ml 610 ml











Exam


Constitutional:  alert, oriented (x 1 to 2), other (moving all ext )


Respiratory:  diminished breath sounds (bases bilaterally ), normal air movement


Cardiovascular:  nl pulses, regular rate and rhythm


Gastrointestinal:  non-tender, soft


Musculoskeletal:  nl extremities to inspection


Extremities:  normal pulses, other (no edema, clubbing or cyanosis)


Neurological:  CNS II-XII intact, confused, other (moving all ext)





Results


Result Diagram:  


2/11/17 0710                                                                   

             2/11/17 0710





Results 24 hrs





Laboratory Tests








Test


  2/11/17


07:10


 


Alanine Aminotransferase


(ALT/SGPT) 24  


 


 


Albumin 4.1  


 


Albumin/Globulin Ratio 1.24  


 


Alkaline Phosphatase 174  H


 


Anion Gap 31  H


 


Aspartate Amino Transf


(AST/SGOT) 21  


 


 


Basophils # 0.0  


 


Basophils % 0.0  


 


Blood Urea Nitrogen   


 


Calcium Level 9.7  


 


Carbon Dioxide Level 24  


 


Chloride Level 85  L


 


Creatinine 8.66  #H


 


Direct Bilirubin 0.00  


 


Eosinophils # 0.3  


 


Eosinophils % 2.1  


 


Globulin 3.30  H


 


Glucose Level 158  


 


Hematocrit 37.4  


 


Hemoglobin 12.5  


 


Indirect Bilirubin 0.0  


 


Lymphocytes # 0.8  


 


Lymphocytes % 5.8  L


 


Magnesium Level 3.1  H


 


Mean Corpuscular Hemoglobin 29.1  


 


Mean Corpuscular Hemoglobin


Concent 33.5  


 


 


Mean Corpuscular Volume 86.8  


 


Mean Platelet Volume 9.9  


 


Monocytes # 0.9  


 


Monocytes % 6.4  


 


Neutrophils # 12.5  H


 


Neutrophils % 85.7  H


 


Nucleated Red Blood Cells # 0.0  


 


Nucleated Red Blood Cells % 0.0  


 


Phosphorus Level 5.8  H


 


Platelet Count 229  


 


Potassium Level 3.9  


 


Red Blood Count 4.30  


 


Red Cell Distribution Width 14.4  


 


Sodium Level 136  


 


Total Bilirubin 0.0  L


 


Total Protein 7.4  


 


White Blood Count 14.6  H











Medications


Medications





 Current Medications


Labetalol HCl (Labetalol) 10 mg Q10M  PRN IV ELEVATED BLOOD PRESSURE Last 

administered on 2/9/17at 12:05; Admin Dose 10 MG;  Start 1/31/17 at 14:00


Ondansetron HCl (Zofran Inj) 4 mg Q6H  PRN IV NAUSEA AND/OR VOMITING Last 

administered on 1/31/17at 20:32; Admin Dose 4 MG;  Start 1/31/17 at 14:00


Acetaminophen (Tylenol Supp) 650 mg Q4H  PRN [place] PAIN LEVEL 1-3 OR FEVER;  Start 

1/31/17 at 14:00


Eye Lubricant (Artificial Tears Oph) 1 drop TID BOTH EYES  Last administered on 

2/11/17at 08:42; Admin Dose 1 DROP;  Start 1/31/17 at 21:00


Multivit/Ca Carb/ B Cmplx/FA/Prenat ([place]-Argenis) 1 tab DAILY PO  Last 

administered on 2/11/17at 08:41; Admin Dose 1 TAB;  Start 2/1/17 at 09:00


Hydromorphone HCl (Dilaudid) 0.5 mg Q4H  PRN IV PAIN Last administered on 2/9/ 17at 16:15; Admin Dose 0.5 MG;  Start 1/31/17 at 21:30


Atorvastatin Calcium 20 mg 20 mg QHS NGT  Last administered on 2/10/17at 21:53; 

Admin Dose 20 MG;  Start 2/6/17 at 21:00


Levetiracetam/ Dextrose (Keppra Iv/D5W) 105 ml @  420 mls/hr DAILY IVPB  Last 

administered on 2/11/17at 09:04; Admin Dose 420 MLS/HR;  Start 2/7/17 at 20:00


Cyclobenzaprine HCl (Flexeril) 5 mg Q8  PRN PO MUSCLE SPASM  Last administered 

on 2/8/17at 10:27; Admin Dose 5 MG;  Start 2/8/17 at 10:24


Famotidine 20 mg 20 mg DAILY NGT  Last administered on 2/11/17at 08:41; Admin 

Dose 20 MG;  Start 2/10/17 at 09:00


Piperacillin Sod/ Tazobactam Sod (Zosyn 2.25gm/ 50ml (Pmx)) 50 ml @  100 mls/hr 

Q12 IVPB  Last administered on 2/11/17at 08:41; Admin Dose 100 MLS/HR;  Start 2/

10/17 at 14:00


Acetaminophen (Tylenol Liquid) 650 mg Q6H  PRN NGT PAIN AND OR ELEVATED TEMP 

Last administered on 2/10/17at 21:53; Admin Dose 650 MG;  Start 2/10/17 at 21:30


Sevelamer Carbonate (Renvela) 2.4 gm Q8 NGT  Last administered on 2/11/17at 06:

35; Admin Dose 2.4 GM;  Start 2/10/17 at 22:00











CECILIO JIMENEZ Feb 11, 2017 12:56

## 2017-02-11 NOTE — CONS
Date/Time of Note


Date/Time of Note


DATE: 2/11/17 


TIME: 17:36





Consult Date/Type/Reason


Admit Date/Time


Jan 31, 2017 at 13:31


Initial Consult Date


1/31/17


Type of Consultation:  Pulm





Objective





 Vital Signs








  Date Time  Temp Pulse Resp B/P Pulse Ox O2 Delivery O2 Flow Rate FiO2


 


2/11/17 16:14  102 18  93   21


 


2/11/17 16:10 98.3   164/76    


 


2/11/17 10:00      Room Air  


 


2/8/17 18:00       2.0 














 Intake and Output   


 


 2/10/17 2/10/17 2/11/17





 15:00 23:00 07:00


 


Intake Total  680 ml 610 ml


 


Output Total   0 ml


 


Balance  680 ml 610 ml





HEENT: Neck supple; no JVD; no LAD


CVS: RRR, S1 and S2


CHEST: Clear


ABD: Soft, NT, + BS


EXT: No c/c/e





Results/Medications


Result Diagram:  


2/11/17 0710                                                                   

             2/11/17 0710





Results 24 hrs





Laboratory Tests








Test


  2/11/17


07:10


 


Alanine Aminotransferase


(ALT/SGPT) 24  


 


 


Albumin 4.1  


 


Albumin/Globulin Ratio 1.24  


 


Alkaline Phosphatase 174  H


 


Anion Gap 31  H


 


Aspartate Amino Transf


(AST/SGOT) 21  


 


 


Basophils # 0.0  


 


Basophils % 0.0  


 


Blood Urea Nitrogen   


 


Calcium Level 9.7  


 


Carbon Dioxide Level 24  


 


Chloride Level 85  L


 


Creatinine 8.66  #H


 


Direct Bilirubin 0.00  


 


Eosinophils # 0.3  


 


Eosinophils % 2.1  


 


Globulin 3.30  H


 


Glucose Level 158  


 


Hematocrit 37.4  


 


Hemoglobin 12.5  


 


Indirect Bilirubin 0.0  


 


Lymphocytes # 0.8  


 


Lymphocytes % 5.8  L


 


Magnesium Level 3.1  H


 


Mean Corpuscular Hemoglobin 29.1  


 


Mean Corpuscular Hemoglobin


Concent 33.5  


 


 


Mean Corpuscular Volume 86.8  


 


Mean Platelet Volume 9.9  


 


Monocytes # 0.9  


 


Monocytes % 6.4  


 


Neutrophils # 12.5  H


 


Neutrophils % 85.7  H


 


Nucleated Red Blood Cells # 0.0  


 


Nucleated Red Blood Cells % 0.0  


 


Phosphorus Level 5.8  H


 


Platelet Count 229  


 


Potassium Level 3.9  


 


Red Blood Count 4.30  


 


Red Cell Distribution Width 14.4  


 


Sodium Level 136  


 


Total Bilirubin 0.0  L


 


Total Protein 7.4  


 


White Blood Count 14.6  H








Medications





 Current Medications


Labetalol HCl (Labetalol) 10 mg Q10M  PRN IV ELEVATED BLOOD PRESSURE Last 

administered on 2/9/17at 12:05; Admin Dose 10 MG;  Start 1/31/17 at 14:00


Ondansetron HCl (Zofran Inj) 4 mg Q6H  PRN IV NAUSEA AND/OR VOMITING Last 

administered on 1/31/17at 20:32; Admin Dose 4 MG;  Start 1/31/17 at 14:00


Acetaminophen (Tylenol Supp) 650 mg Q4H  PRN [place] PAIN LEVEL 1-3 OR FEVER;  Start 

1/31/17 at 14:00


Eye Lubricant (Artificial Tears Oph) 1 drop TID BOTH EYES  Last administered on 

2/11/17at 08:42; Admin Dose 1 DROP;  Start 1/31/17 at 21:00


Multivit/Ca Carb/ B Cmplx/FA/Prenat ([place]-Argenis) 1 tab DAILY PO  Last 

administered on 2/11/17at 08:41; Admin Dose 1 TAB;  Start 2/1/17 at 09:00


Hydromorphone HCl (Dilaudid) 0.5 mg Q4H  PRN IV PAIN Last administered on 2/9/ 17at 16:15; Admin Dose 0.5 MG;  Start 1/31/17 at 21:30


Atorvastatin Calcium 20 mg 20 mg QHS NGT  Last administered on 2/10/17at 21:53; 

Admin Dose 20 MG;  Start 2/6/17 at 21:00


Levetiracetam/ Dextrose (Keppra Iv/D5W) 105 ml @  420 mls/hr DAILY IVPB  Last 

administered on 2/11/17at 09:04; Admin Dose 420 MLS/HR;  Start 2/7/17 at 20:00


Cyclobenzaprine HCl (Flexeril) 5 mg Q8  PRN PO MUSCLE SPASM  Last administered 

on 2/8/17at 10:27; Admin Dose 5 MG;  Start 2/8/17 at 10:24


Famotidine 20 mg 20 mg DAILY NGT  Last administered on 2/11/17at 08:41; Admin 

Dose 20 MG;  Start 2/10/17 at 09:00


Piperacillin Sod/ Tazobactam Sod (Zosyn 2.25gm/ 50ml (Pmx)) 50 ml @  100 mls/hr 

Q12 IVPB  Last administered on 2/11/17at 08:41; Admin Dose 100 MLS/HR;  Start 2/

10/17 at 14:00


Acetaminophen (Tylenol Liquid) 650 mg Q6H  PRN NGT PAIN AND OR ELEVATED TEMP 

Last administered on 2/10/17at 21:53; Admin Dose 650 MG;  Start 2/10/17 at 21:30


Sevelamer Carbonate (Renvela) 2.4 gm Q8 NGT  Last administered on 2/11/17at 06:

35; Admin Dose 2.4 GM;  Start 2/10/17 at 22:00





Assessment/Plan


Additional Assessment/Plan





IMPRESSION AND PLAN:


1.  Mechanical fall with subarachnoid hemorrhage.


2.  Iatrogenic pneumothorax status post chest tube placement.


3.  End-stage renal failure on hemodialysis.  


 


RECS:  


1.  Continue antibiotics per primary team.


2.  Neuro recommendations.  No seizure activity at present. 


3.  CXR looks good.











KATE ALEXANDER MD Feb 11, 2017 17:37

## 2017-02-12 VITALS — HEART RATE: 96 BPM | SYSTOLIC BLOOD PRESSURE: 138 MMHG | RESPIRATION RATE: 18 BRPM | DIASTOLIC BLOOD PRESSURE: 74 MMHG

## 2017-02-12 VITALS — HEART RATE: 101 BPM

## 2017-02-12 VITALS — HEART RATE: 170 BPM

## 2017-02-12 VITALS — RESPIRATION RATE: 18 BRPM | SYSTOLIC BLOOD PRESSURE: 145 MMHG | DIASTOLIC BLOOD PRESSURE: 97 MMHG

## 2017-02-12 VITALS — SYSTOLIC BLOOD PRESSURE: 162 MMHG | DIASTOLIC BLOOD PRESSURE: 95 MMHG | HEART RATE: 87 BPM

## 2017-02-12 VITALS — SYSTOLIC BLOOD PRESSURE: 156 MMHG | HEART RATE: 106 BPM | DIASTOLIC BLOOD PRESSURE: 106 MMHG

## 2017-02-12 VITALS — DIASTOLIC BLOOD PRESSURE: 68 MMHG | SYSTOLIC BLOOD PRESSURE: 136 MMHG | HEART RATE: 98 BPM | RESPIRATION RATE: 18 BRPM

## 2017-02-12 VITALS — SYSTOLIC BLOOD PRESSURE: 129 MMHG | RESPIRATION RATE: 18 BRPM | DIASTOLIC BLOOD PRESSURE: 97 MMHG

## 2017-02-12 VITALS — DIASTOLIC BLOOD PRESSURE: 73 MMHG | SYSTOLIC BLOOD PRESSURE: 132 MMHG | RESPIRATION RATE: 16 BRPM | HEART RATE: 107 BPM

## 2017-02-12 VITALS — SYSTOLIC BLOOD PRESSURE: 124 MMHG | RESPIRATION RATE: 18 BRPM | HEART RATE: 100 BPM | DIASTOLIC BLOOD PRESSURE: 86 MMHG

## 2017-02-12 VITALS — HEART RATE: 114 BPM | SYSTOLIC BLOOD PRESSURE: 126 MMHG | DIASTOLIC BLOOD PRESSURE: 86 MMHG

## 2017-02-12 VITALS — SYSTOLIC BLOOD PRESSURE: 131 MMHG | RESPIRATION RATE: 18 BRPM | HEART RATE: 102 BPM | DIASTOLIC BLOOD PRESSURE: 67 MMHG

## 2017-02-12 VITALS — RESPIRATION RATE: 18 BRPM | SYSTOLIC BLOOD PRESSURE: 136 MMHG | DIASTOLIC BLOOD PRESSURE: 68 MMHG | HEART RATE: 94 BPM

## 2017-02-12 VITALS — DIASTOLIC BLOOD PRESSURE: 86 MMHG | SYSTOLIC BLOOD PRESSURE: 154 MMHG | RESPIRATION RATE: 18 BRPM

## 2017-02-12 VITALS — SYSTOLIC BLOOD PRESSURE: 124 MMHG | RESPIRATION RATE: 18 BRPM | DIASTOLIC BLOOD PRESSURE: 84 MMHG

## 2017-02-12 VITALS — DIASTOLIC BLOOD PRESSURE: 97 MMHG | SYSTOLIC BLOOD PRESSURE: 131 MMHG | RESPIRATION RATE: 18 BRPM

## 2017-02-12 VITALS — HEART RATE: 106 BPM | SYSTOLIC BLOOD PRESSURE: 154 MMHG | DIASTOLIC BLOOD PRESSURE: 86 MMHG

## 2017-02-12 VITALS — SYSTOLIC BLOOD PRESSURE: 121 MMHG | HEART RATE: 107 BPM | RESPIRATION RATE: 18 BRPM | DIASTOLIC BLOOD PRESSURE: 82 MMHG

## 2017-02-12 VITALS — SYSTOLIC BLOOD PRESSURE: 132 MMHG | DIASTOLIC BLOOD PRESSURE: 73 MMHG | RESPIRATION RATE: 18 BRPM

## 2017-02-12 VITALS — HEART RATE: 97 BPM | SYSTOLIC BLOOD PRESSURE: 134 MMHG | DIASTOLIC BLOOD PRESSURE: 70 MMHG | RESPIRATION RATE: 16 BRPM

## 2017-02-12 VITALS — DIASTOLIC BLOOD PRESSURE: 78 MMHG | SYSTOLIC BLOOD PRESSURE: 133 MMHG | RESPIRATION RATE: 18 BRPM

## 2017-02-12 VITALS — SYSTOLIC BLOOD PRESSURE: 121 MMHG | RESPIRATION RATE: 18 BRPM | DIASTOLIC BLOOD PRESSURE: 82 MMHG

## 2017-02-12 VITALS — HEART RATE: 107 BPM

## 2017-02-12 VITALS — SYSTOLIC BLOOD PRESSURE: 122 MMHG | RESPIRATION RATE: 18 BRPM | DIASTOLIC BLOOD PRESSURE: 80 MMHG

## 2017-02-12 VITALS — HEART RATE: 116 BPM

## 2017-02-12 VITALS — HEART RATE: 102 BPM

## 2017-02-12 LAB
ALBUMIN SERPL-MCNC: 3.8 G/DL (ref 3.3–4.9)
ALBUMIN/GLOB SERPL: 1.15 {RATIO}
ALP SERPL-CCNC: 174 IU/L (ref 42–121)
ALT SERPL-CCNC: 31 IU/L (ref 13–69)
ANION GAP SERPL CALC-SCNC: 24 MMOL/L (ref 8–16)
AST SERPL-CCNC: 23 IU/L (ref 15–46)
BASOPHILS # BLD AUTO: 0 10^3/UL (ref 0–0.1)
BASOPHILS NFR BLD: 0 % (ref 0–2)
BILIRUB DIRECT SERPL-MCNC: 0 MG/DL (ref 0–0.2)
BILIRUB SERPL-MCNC: 0.1 MG/DL (ref 0.2–1.3)
BUN SERPL-MCNC: 88 MG/DL (ref 7–20)
CALCIUM SERPL-MCNC: 9.9 MG/DL (ref 8.4–10.2)
CHLORIDE SERPL-SCNC: 90 MMOL/L (ref 97–110)
CO2 SERPL-SCNC: 26 MMOL/L (ref 21–31)
CONDITION: 1
CREAT SERPL-MCNC: 7.48 MG/DL (ref 0.44–1)
EOSINOPHIL # BLD: 0.2 10^3/UL (ref 0–0.5)
EOSINOPHIL NFR BLD: 1.4 % (ref 0–7)
ERYTHROCYTE [DISTWIDTH] IN BLOOD BY AUTOMATED COUNT: 14.6 % (ref 11.5–14.5)
GLOBULIN SER-MCNC: 3.3 G/DL (ref 1.3–3.2)
GLUCOSE SERPL-MCNC: 120 MG/DL (ref 70–220)
HCT VFR BLD CALC: 35.6 % (ref 37–47)
HGB BLD-MCNC: 12.1 G/DL (ref 12–16)
LH ANALYZER COMMENTS: 1
LYMPHOCYTES # BLD AUTO: 0.6 10^3/UL (ref 0.8–2.9)
LYMPHOCYTES NFR BLD AUTO: 5 % (ref 15–51)
MCH RBC QN AUTO: 29.1 PG (ref 29–33)
MCHC RBC AUTO-ENTMCNC: 33.9 G/DL (ref 32–37)
MCV RBC AUTO: 85.7 FL (ref 82–101)
MONOCYTES # BLD: 0.8 10^3/UL (ref 0.3–0.9)
MONOCYTES NFR BLD: 6.4 % (ref 0–11)
NEUTROPHILS # BLD: 11.2 10^3/UL (ref 1.6–7.5)
NEUTROPHILS NFR BLD AUTO: 87.2 % (ref 39–77)
NRBC # BLD MANUAL: 0 10^3/UL (ref 0–0)
NRBC BLD QL: 0 /100WBC (ref 0–0)
PLATELET # BLD: 219 10^3/UL (ref 140–440)
PMV BLD AUTO: 10.5 FL (ref 7.4–10.4)
POTASSIUM SERPL-SCNC: 4 MMOL/L (ref 3.5–5.1)
PROT SERPL-MCNC: 7.1 G/DL (ref 6.1–8.1)
RBC # BLD AUTO: 4.16 10^6/UL (ref 4.2–5.4)
SODIUM SERPL-SCNC: 136 MMOL/L (ref 135–144)
WBC # BLD AUTO: 12.9 10^3/UL (ref 4.8–10.8)
WBC # BLD: 12.9 10^3/UL (ref 4.8–10.8)

## 2017-02-12 RX ADMIN — IPRATROPIUM BROMIDE AND ALBUTEROL SULFATE SCH ML: .5; 3 SOLUTION RESPIRATORY (INHALATION) at 00:00

## 2017-02-12 RX ADMIN — CARBOXYMETHYLCELLULOSE SODIUM SCH DROP: 10 GEL OPHTHALMIC at 09:35

## 2017-02-12 RX ADMIN — CARBOXYMETHYLCELLULOSE SODIUM SCH DROP: 10 GEL OPHTHALMIC at 21:52

## 2017-02-12 RX ADMIN — DEXAMETHASONE SODIUM PHOSPHATE PRN MG: 10 INJECTION, SOLUTION INTRAMUSCULAR; INTRAVENOUS at 01:04

## 2017-02-12 RX ADMIN — PIPERACILLIN AND TAZOBACTAM SCH MLS/HR: 2; .25 INJECTION, POWDER, FOR SOLUTION INTRAVENOUS at 21:53

## 2017-02-12 RX ADMIN — IPRATROPIUM BROMIDE AND ALBUTEROL SULFATE SCH ML: .5; 3 SOLUTION RESPIRATORY (INHALATION) at 07:13

## 2017-02-12 RX ADMIN — IPRATROPIUM BROMIDE AND ALBUTEROL SULFATE SCH ML: .5; 3 SOLUTION RESPIRATORY (INHALATION) at 15:13

## 2017-02-12 RX ADMIN — FAMOTIDINE SCH MG: 20 TABLET ORAL at 09:33

## 2017-02-12 RX ADMIN — LEVETIRACETAM SCH MLS/HR: 100 INJECTION, SOLUTION INTRAVENOUS at 09:47

## 2017-02-12 RX ADMIN — SEVELAMER CARBONATE SCH GM: 2400 POWDER, FOR SUSPENSION ORAL at 05:55

## 2017-02-12 RX ADMIN — CARBOXYMETHYLCELLULOSE SODIUM SCH DROP: 10 GEL OPHTHALMIC at 15:51

## 2017-02-12 RX ADMIN — SEVELAMER CARBONATE SCH GM: 2400 POWDER, FOR SUSPENSION ORAL at 15:51

## 2017-02-12 RX ADMIN — PIPERACILLIN AND TAZOBACTAM SCH MLS/HR: 2; .25 INJECTION, POWDER, FOR SOLUTION INTRAVENOUS at 09:40

## 2017-02-12 RX ADMIN — HYDROMORPHONE HYDROCHLORIDE PRN MG: 1 INJECTION, SOLUTION INTRAMUSCULAR; INTRAVENOUS; SUBCUTANEOUS at 01:07

## 2017-02-12 NOTE — PN
Date/Time of Note


Date/Time of Note


DATE: 2/12/17 


TIME: 13:14





Assessment/Plan


VTE Prophylaxis


VTE Prophylaxis Intervention:  SCD's





Lines/Catheters


IV Catheter Type (from Nrsg):  Mid Line


Central line still needed:  Yes (for IV access )





Assessment/Plan


Assessment/Plan


46-year-old female:





1.  Acute respiratory failure secondary to severe encephalopathy, post fall 

with significant head trauma and s/p chest barotrauma with bilateral 

pneumothoraces and pneumoperitoneum, all resolved and both chest tubes removed 

as of Wednesday   


Appreciate all the assistance from Dr Ann


Pulmonary following.


CXR yesterday with ? right apical PTX, ? air trapping per radiology, 


Follow up CT chest non contrast with loculated collection of air and fluid in 

the right anterior lung. On RA and stable respiratory status, continue to 

monitor





2. Status post fall with head trauma with skull fracture including Bilateral 

acute inferior frontal hemorrhagic contusions, right greater than left, 8 mm 

right convexity subdural hematoma, mild scattered subarachnoid hemorrhage, 3 mm 

leftward midline shift of the septum pellucidum and right temporal-parietal 

scalp swelling with nondisplaced, oblique fracture through the right parietal 

and temporal skull. 


Reported Epidural hematoma on 2nd CT head 


Dr. Palomo from neurosurgery following, no intervention and resolving SDH, 


MS Ok but still confused and failed Speech eval yesterday, re eval pending 


Continue  Keppra for now per Neurology and EEG c/w brain contusions/injury, no 

active seizures 





3.  Leukocytosis: Likely from demargination post trauma. WBC trending down. 

Continue Zosyn  


Still concerns for aspiration. 


Patient remains afebrile x 48 hrs 


KUB  in AM 





4.  Hypertension: controlled 





5.  End-stage renal disease on hemodialysis: on Monday/Wednesday/Friday 

schedule. HD today 


Dr. Chaney following. 





6.  Gastroesophageal reflux disease: continue Pepcid 





7. Hypernatremia: resolved and decrease Free H2O to 100 cc q8 


Per Nephrology OK to have Na 145 to 148 for now and avoid hyponatremia. 








Prophylaxis: Pepcid for GI prophylaxis, SCDs for DVT prophylaxis


Disposition:  On NC, on Telemetry. AXR in AM 


Appreciate Neurology, Nephrology, CTS and Pulmonary recs. PT and ST eval 

pending.





Subjective


24 Hr Interval Summary


Free Text/Dictation


Patient with ? small residual PTX asymptomatic currently on RA 


Afebrile and WBC trending down


PT/OT/ST





Exam/Review of Systems


Vital Signs


Vitals





 Vital Signs








  Date Time  Temp Pulse Resp B/P Pulse Ox O2 Delivery O2 Flow Rate FiO2


 


2/12/17 12:00  112      


 


2/12/17 10:00 98.3  18 136/68 98 Room Air  


 


2/12/17 07:21        21


 


2/8/17 18:00       2.0 














 Intake and Output   


 


 2/11/17 2/11/17 2/12/17





 15:00 23:00 07:00


 


Intake Total  400 ml 560 ml


 


Output Total   500 ml


 


Balance  400 ml 60 ml











Exam


Constitutional:  alert, oriented (x2)


Respiratory:  clear to auscultation, normal air movement


Cardiovascular:  nl pulses, regular rate and rhythm


Gastrointestinal:  non-tender, soft


Musculoskeletal:  nl extremities to inspection


Extremities:  normal pulses, other (no edema, clubbing or cyanosis )


Neurological:  CNS II-XII intact, confused, other ([id number] all 4 est)





Results


Result Diagram:  


2/12/17 0628 2/12/17 0628





Results 24 hrs





Laboratory Tests








Test


  2/12/17


01:29 2/12/17


06:28


 


Bedside Glucose 144   


 


Alanine Aminotransferase


(ALT/SGPT) 


  31  


 


 


Albumin  3.8  


 


Albumin/Globulin Ratio  1.15  


 


Alkaline Phosphatase  174  H


 


Anion Gap  24  #H


 


Aspartate Amino Transf


(AST/SGOT) 


  23  


 


 


Basophils #  0.0  


 


Basophils %  0.0  


 


Blood Morphology Comment    


 


Blood Urea Nitrogen  88  H


 


Calcium Level  9.9  


 


Carbon Dioxide Level  26  


 


Chloride Level  90  L


 


Creatinine  7.48  H


 


Direct Bilirubin  0.00  


 


Eosinophils #  0.2  


 


Eosinophils %  1.4  


 


Globulin  3.30  H


 


Glucose Level  120  


 


Hematocrit  35.6  L


 


Hemoglobin  12.1  


 


Indirect Bilirubin  0.1  


 


Lymphocytes #  0.6  L


 


Lymphocytes %  5.0  L


 


Mean Corpuscular Hemoglobin  29.1  


 


Mean Corpuscular Hemoglobin


Concent 


  33.9  


 


 


Mean Corpuscular Volume  85.7  


 


Mean Platelet Volume  10.5  H


 


Monocytes #  0.8  


 


Monocytes %  6.4  


 


Neutrophils #  11.2  H


 


Neutrophils %  87.2  H


 


Nucleated Red Blood Cells #  0.0  


 


Nucleated Red Blood Cells %  0.0  


 


Phosphorus Level  5.4  H


 


Platelet Count  219  


 


Potassium Level  4.0  


 


Red Blood Count  4.16  L


 


Red Cell Distribution Width  14.6  H


 


Sodium Level  136  


 


Total Bilirubin  0.1  L


 


Total Protein  7.1  


 


White Blood Count  12.9  H











Medications


Medications





 Current Medications


Labetalol HCl (Labetalol) 10 mg Q10M  PRN IV ELEVATED BLOOD PRESSURE Last 

administered on 2/9/17at 12:05; Admin Dose 10 MG;  Start 1/31/17 at 14:00


Ondansetron HCl (Zofran Inj) 4 mg Q6H  PRN IV NAUSEA AND/OR VOMITING Last 

administered on 2/12/17at 01:04; Admin Dose 4 MG;  Start 1/31/17 at 14:00


Acetaminophen (Tylenol Supp) 650 mg Q4H  PRN [place] PAIN LEVEL 1-3 OR FEVER;  Start 

1/31/17 at 14:00


Eye Lubricant (Artificial Tears Oph) 1 drop TID BOTH EYES  Last administered on 

2/12/17at 09:35; Admin Dose 1 DROP;  Start 1/31/17 at 21:00


Multivit/Ca Carb/ B Cmplx/FA/Prenat ([place]-Argenis) 1 tab DAILY PO  Last 

administered on 2/12/17at 09:34; Admin Dose 1 TAB;  Start 2/1/17 at 09:00


Hydromorphone HCl (Dilaudid) 0.5 mg Q4H  PRN IV PAIN Last administered on 2/12/ 17at 01:07; Admin Dose 0.5 MG;  Start 1/31/17 at 21:30


Atorvastatin Calcium 20 mg 20 mg QHS NGT  Last administered on 2/11/17at 20:52; 

Admin Dose 20 MG;  Start 2/6/17 at 21:00


Levetiracetam/ Dextrose (Keppra Iv/D5W) 105 ml @  420 mls/hr DAILY IVPB  Last 

administered on 2/12/17at 09:47; Admin Dose 420 MLS/HR;  Start 2/7/17 at 20:00


Cyclobenzaprine HCl (Flexeril) 5 mg Q8  PRN PO MUSCLE SPASM  Last administered 

on 2/8/17at 10:27; Admin Dose 5 MG;  Start 2/8/17 at 10:24


Famotidine 20 mg 20 mg DAILY NGT  Last administered on 2/12/17at 09:33; Admin 

Dose 20 MG;  Start 2/10/17 at 09:00


Piperacillin Sod/ Tazobactam Sod (Zosyn 2.25gm/ 50ml (Pmx)) 50 ml @  100 mls/hr 

Q12 IVPB  Last administered on 2/12/17at 09:40; Admin Dose 100 MLS/HR;  Start 2/

10/17 at 14:00


Acetaminophen (Tylenol Liquid) 650 mg Q6H  PRN NGT PAIN AND OR ELEVATED TEMP 

Last administered on 2/10/17at 21:53; Admin Dose 650 MG;  Start 2/10/17 at 21:30


Sevelamer Carbonate (Renvela) 2.4 gm Q8 NGT  Last administered on 2/12/17at 05:

55; Admin Dose 2.4 GM;  Start 2/10/17 at 22:00





Procedures


Procedures


PROCEDURE:   CT Chest. 


 


CLINICAL INDICATION:  Dyspnea and shortness of breath


 


TECHNIQUE:   CT scan of the chest without contrast was performed on the [employer] 

volumetric 64 slice CT scanner without contrast.  Coronal and sagittal 

reformatted images were obtained from the axial source images. The CTDI vol is 

5.62 mGy and the DLP is 182.4 mGy-cm. 


 


COMPARISON:   None. 


 


FINDINGS:


A loculated collection of air with fluid is seen in the right anterior lung 

measuring approximately 11.1 x 8 x 5.6 cm in size.  Postsurgical changes in the 

right apex is seen with scarring.  Scattered air space disease in the lung 

bases is also seen which may represent atelectasis.  The right anterior chest 

wall emphysema is seen.  The mediastinum and hilum are unremarkable without 

evidence for mass or lymphadenopathy.  Atherosclerotic vascular disease is 

seen. The vascular structures of the mediastinum are otherwise unremarkable in 

course and caliber.  The heart size is normal without evidence for pericardial 

thickening or effusion.  The axillary regions, subpectoral regions, and 

supraclavicular regions are all unremarkable. Pneumoperitoneum is seen in the 

upper abdomen. A nasogastric tube is seen in the gastric lumen, the tip of 

which is not visualized. The osseous structures are intact.  No osteolytic or 

osteoblastic lesion is detected. 


  


 


IMPRESSION:


 


1.  Pneumoperitoneum in the upper abdomen which may represent a perforated 

viscus.  Further evaluation with CT of the abdomen and pelvis is recommended.  


2.  Loculated collection of air and fluid in the right anterior lung.


3.  Postsurgical changes in the right apex.


4.  Nasogastric tube identified.


 


RPTAT: HPNM


 


Results were discussed with the patient's nurse Ishan Landon at 2/12/2017 8:30:

05 AM


_____________________________________________ 


Sha Trevino, Physician           Date    Time 


Electronically viewed and signed by Sha Trevino, Physician on 02/12/2017 08

:30 


 


D:  02/12/2017 08:30  T:  02/12/2017 08:30


PM/











CECILIO JIMENEZ Feb 12, 2017 13:23

## 2017-02-12 NOTE — RADRPT
PROCEDURE:   CT Chest. 

 

CLINICAL INDICATION:  Dyspnea and shortness of breath

 

TECHNIQUE:   CT scan of the chest without contrast was performed on the [employer] volumetric 64 slice CT [facility] without contrast.  Coronal and sagittal reformatted images were obtained from the axial source
 images. The CTDI vol is 5.62 mGy and the DLP is 182.4 mGy-cm. 

 

COMPARISON:   None. 

 

FINDINGS:

A loculated collection of air with fluid is seen in the right anterior lung measuring approximately 
11.1 x 8 x 5.6 cm in size.  Postsurgical changes in the right apex is seen with scarring.  Scattered
 air space disease in the lung bases is also seen which may represent atelectasis.  The right anteri
or chest wall emphysema is seen.  The mediastinum and hilum are unremarkable without evidence for ma
ss or lymphadenopathy.  Atherosclerotic vascular disease is seen. The vascular structures of the med
iastinum are otherwise unremarkable in course and caliber.  The heart size is normal without evidenc
e for pericardial thickening or effusion.  The axillary regions, subpectoral regions, and supraclavi
cular regions are all unremarkable. Pneumoperitoneum is seen in the upper abdomen. A nasogastric tub
e is seen in the gastric lumen, the tip of which is not visualized. The osseous structures are intac
t.  No osteolytic or osteoblastic lesion is detected. 

  

 

IMPRESSION:

 

1.  Pneumoperitoneum in the upper abdomen which may represent a perforated viscus.  Further evaluati
on with CT of the abdomen and pelvis is recommended.  

2.  Loculated collection of air and fluid in the right anterior lung.

3.  Postsurgical changes in the right apex.

4.  Nasogastric tube identified.

 

RPTAT: HPNM

 

Results were discussed with the patient's nurse Ishan Landon at 2/12/2017 8:30:05 AM

_____________________________________________ 

Physician Keny           Date    Time 

Electronically viewed and signed by Physician Keny on 02/12/2017 08:30 

 

D:  02/12/2017 08:30  T:  02/12/2017 08:30

PM/

## 2017-02-12 NOTE — CONS
Date/Time of Note


Date/Time of Note


DATE: 2/12/17 


TIME: 14:27





Consult Date/Type/Reason


Admit Date/Time


Jan 31, 2017 at 13:31


Initial Consult Date


1/31/17


Type of Consultation:  Pulm





Subjective


No events. CT chest reviewed. Residual subQ air and loculated right anterior 

hydropneumothorax





Objective





 Vital Signs








  Date Time  Temp Pulse Resp B/P Pulse Ox O2 Delivery O2 Flow Rate FiO2


 


2/12/17 12:30 97.0 68 18 133/78 98   


 


2/12/17 12:00      Room Air  


 


2/12/17 07:21        21


 


2/8/17 18:00       2.0 














 Intake and Output   


 


 2/11/17 2/11/17 2/12/17





 15:00 23:00 07:00


 


Intake Total  400 ml 560 ml


 


Output Total   500 ml


 


Balance  400 ml 60 ml





HEENT: Neck supple; no JVD; no LAD


CVS: RRR, S1 and S2


CHEST: Clear


ABD: Soft, NT, + BS


EXT: No c/c/e





Results/Medications


Result Diagram:  


2/12/17 0628                                                                   

             2/12/17 0628





Results 24 hrs





Laboratory Tests








Test


  2/12/17


01:29 2/12/17


06:28


 


Bedside Glucose 144   


 


Alanine Aminotransferase


(ALT/SGPT) 


  31  


 


 


Albumin  3.8  


 


Albumin/Globulin Ratio  1.15  


 


Alkaline Phosphatase  174  H


 


Anion Gap  24  #H


 


Aspartate Amino Transf


(AST/SGOT) 


  23  


 


 


Basophils #  0.0  


 


Basophils %  0.0  


 


Blood Morphology Comment    


 


Blood Urea Nitrogen  88  H


 


Calcium Level  9.9  


 


Carbon Dioxide Level  26  


 


Chloride Level  90  L


 


Creatinine  7.48  H


 


Direct Bilirubin  0.00  


 


Eosinophils #  0.2  


 


Eosinophils %  1.4  


 


Globulin  3.30  H


 


Glucose Level  120  


 


Hematocrit  35.6  L


 


Hemoglobin  12.1  


 


Indirect Bilirubin  0.1  


 


Lymphocytes #  0.6  L


 


Lymphocytes %  5.0  L


 


Mean Corpuscular Hemoglobin  29.1  


 


Mean Corpuscular Hemoglobin


Concent 


  33.9  


 


 


Mean Corpuscular Volume  85.7  


 


Mean Platelet Volume  10.5  H


 


Monocytes #  0.8  


 


Monocytes %  6.4  


 


Neutrophils #  11.2  H


 


Neutrophils %  87.2  H


 


Nucleated Red Blood Cells #  0.0  


 


Nucleated Red Blood Cells %  0.0  


 


Phosphorus Level  5.4  H


 


Platelet Count  219  


 


Potassium Level  4.0  


 


Red Blood Count  4.16  L


 


Red Cell Distribution Width  14.6  H


 


Sodium Level  136  


 


Total Bilirubin  0.1  L


 


Total Protein  7.1  


 


White Blood Count  12.9  H








Medications





 Current Medications


Labetalol HCl (Labetalol) 10 mg Q10M  PRN IV ELEVATED BLOOD PRESSURE Last 

administered on 2/9/17at 12:05; Admin Dose 10 MG;  Start 1/31/17 at 14:00


Ondansetron HCl (Zofran Inj) 4 mg Q6H  PRN IV NAUSEA AND/OR VOMITING Last 

administered on 2/12/17at 01:04; Admin Dose 4 MG;  Start 1/31/17 at 14:00


Acetaminophen (Tylenol Supp) 650 mg Q4H  PRN [place] PAIN LEVEL 1-3 OR FEVER;  Start 

1/31/17 at 14:00


Eye Lubricant (Artificial Tears Oph) 1 drop TID BOTH EYES  Last administered on 

2/12/17at 09:35; Admin Dose 1 DROP;  Start 1/31/17 at 21:00


Multivit/Ca Carb/ B Cmplx/FA/Prenat ([place]-Argenis) 1 tab DAILY PO  Last 

administered on 2/12/17at 09:34; Admin Dose 1 TAB;  Start 2/1/17 at 09:00


Hydromorphone HCl (Dilaudid) 0.5 mg Q4H  PRN IV PAIN Last administered on 2/12/ 17at 01:07; Admin Dose 0.5 MG;  Start 1/31/17 at 21:30


Atorvastatin Calcium 20 mg 20 mg QHS NGT  Last administered on 2/11/17at 20:52; 

Admin Dose 20 MG;  Start 2/6/17 at 21:00


Levetiracetam/ Dextrose (Keppra Iv/D5W) 105 ml @  420 mls/hr DAILY IVPB  Last 

administered on 2/12/17at 09:47; Admin Dose 420 MLS/HR;  Start 2/7/17 at 20:00


Cyclobenzaprine HCl (Flexeril) 5 mg Q8  PRN PO MUSCLE SPASM  Last administered 

on 2/8/17at 10:27; Admin Dose 5 MG;  Start 2/8/17 at 10:24


Famotidine 20 mg 20 mg DAILY NGT  Last administered on 2/12/17at 09:33; Admin 

Dose 20 MG;  Start 2/10/17 at 09:00


Piperacillin Sod/ Tazobactam Sod (Zosyn 2.25gm/ 50ml (Pmx)) 50 ml @  100 mls/hr 

Q12 IVPB  Last administered on 2/12/17at 09:40; Admin Dose 100 MLS/HR;  Start 2/

10/17 at 14:00


Acetaminophen (Tylenol Liquid) 650 mg Q6H  PRN NGT PAIN AND OR ELEVATED TEMP 

Last administered on 2/10/17at 21:53; Admin Dose 650 MG;  Start 2/10/17 at 21:30


Sevelamer Carbonate (Renvela) 2.4 gm Q8 NGT  Last administered on 2/12/17at 05:

55; Admin Dose 2.4 GM;  Start 2/10/17 at 22:00





Assessment/Plan


Additional Assessment/Plan


IMPRESSION:


1.  Right upper lung loculated hydroPTX


2.  Iatrogenic pneumothorax status post chest tube placement.


3.  End-stage renal failure on hemodialysis.  


 


RECS:  


1.  Observe loculated hydroPTX--no intervention required at this time











KATE ALEXANDER MD Feb 12, 2017 14:30

## 2017-02-12 NOTE — PN
Date/Time of Note


Date/Time of Note


DATE: 2/12/17 


TIME: 19:31





Assessment/Plan


VTE Prophylaxis


VTE Prophylaxis Intervention:  other





Assessment/Plan


Chief Complaint/Hosp Course


hief Complaint/Hosp Course


IMPRESSION:


1.  The patient has acute hemorrhagic stroke./sdh/trauma


2.  Malignant hypertension.better


3.  Endstage renal disease.


4.  Leukocytosis. better


5.  Respiratory failure.


6.  Incomplete database


7 hyperkalemia hx


8 SUBCUTANEOUS EPMHYSEMA/c tube


9 hypernatremia


10 hyperphosphatemia


[id number]    hd  per plan


                neuro f/u


[id number]   tired appearing 


               hd plan


Problems:  





Exam/Review of Systems


Vital Signs


Vitals





 Vital Signs








  Date Time  Temp Pulse Resp B/P Pulse Ox O2 Delivery O2 Flow Rate FiO2


 


2/12/17 18:00 98.2 96 18 138/74 98 Room Air  


 


2/12/17 15:17        21


 


2/8/17 18:00       2.0 














 Intake and Output   


 


 2/11/17 2/11/17 2/12/17





 15:00 23:00 07:00


 


Intake Total  400 ml 560 ml


 


Output Total   500 ml


 


Balance  400 ml 60 ml











Exam


Neck:  supple


Respiratory:  clear to auscultation, diminished breath sounds


Cardiovascular:  regular rate and rhythm


Gastrointestinal:  soft


Musculoskeletal:  nl extremities to inspection


Extremities:  normal pulses


Neurological:  CNS II-XII intact


Skin:  nl turgor





Results


Result Diagram:  


2/12/17 0628                                                                   

             2/12/17 0628





Results 24 hrs





Laboratory Tests








Test


  2/12/17


01:29 2/12/17


06:28


 


Bedside Glucose 144   


 


Alanine Aminotransferase


(ALT/SGPT) 


  31  


 


 


Albumin  3.8  


 


Albumin/Globulin Ratio  1.15  


 


Alkaline Phosphatase  174  H


 


Anion Gap  24  #H


 


Aspartate Amino Transf


(AST/SGOT) 


  23  


 


 


Basophils #  0.0  


 


Basophils %  0.0  


 


Blood Morphology Comment    


 


Blood Urea Nitrogen  88  H


 


Calcium Level  9.9  


 


Carbon Dioxide Level  26  


 


Chloride Level  90  L


 


Creatinine  7.48  H


 


Direct Bilirubin  0.00  


 


Eosinophils #  0.2  


 


Eosinophils %  1.4  


 


Globulin  3.30  H


 


Glucose Level  120  


 


Hematocrit  35.6  L


 


Hemoglobin  12.1  


 


Indirect Bilirubin  0.1  


 


Lymphocytes #  0.6  L


 


Lymphocytes %  5.0  L


 


Mean Corpuscular Hemoglobin  29.1  


 


Mean Corpuscular Hemoglobin


Concent 


  33.9  


 


 


Mean Corpuscular Volume  85.7  


 


Mean Platelet Volume  10.5  H


 


Monocytes #  0.8  


 


Monocytes %  6.4  


 


Neutrophils #  11.2  H


 


Neutrophils %  87.2  H


 


Nucleated Red Blood Cells #  0.0  


 


Nucleated Red Blood Cells %  0.0  


 


Phosphorus Level  5.4  H


 


Platelet Count  219  


 


Potassium Level  4.0  


 


Red Blood Count  4.16  L


 


Red Cell Distribution Width  14.6  H


 


Sodium Level  136  


 


Total Bilirubin  0.1  L


 


Total Protein  7.1  


 


White Blood Count  12.9  H











Medications


Medications





 Current Medications


Labetalol HCl (Labetalol) 10 mg Q10M  PRN IV ELEVATED BLOOD PRESSURE Last 

administered on 2/9/17at 12:05; Admin Dose 10 MG;  Start 1/31/17 at 14:00


Ondansetron HCl (Zofran Inj) 4 mg Q6H  PRN IV NAUSEA AND/OR VOMITING Last 

administered on 2/12/17at 01:04; Admin Dose 4 MG;  Start 1/31/17 at 14:00


Acetaminophen (Tylenol Supp) 650 mg Q4H  PRN [place] PAIN LEVEL 1-3 OR FEVER;  Start 

1/31/17 at 14:00


Eye Lubricant (Artificial Tears Oph) 1 drop TID BOTH EYES  Last administered on 

2/12/17at 15:51; Admin Dose 1 DROP;  Start 1/31/17 at 21:00


Multivit/Ca Carb/ B Cmplx/FA/Prenat ([place]-Argenis) 1 tab DAILY PO  Last 

administered on 2/12/17at 09:34; Admin Dose 1 TAB;  Start 2/1/17 at 09:00


Hydromorphone HCl (Dilaudid) 0.5 mg Q4H  PRN IV PAIN Last administered on 2/12/ 17at 01:07; Admin Dose 0.5 MG;  Start 1/31/17 at 21:30


Atorvastatin Calcium 20 mg 20 mg QHS NGT  Last administered on 2/11/17at 20:52; 

Admin Dose 20 MG;  Start 2/6/17 at 21:00


Levetiracetam/ Dextrose (Keppra Iv/D5W) 105 ml @  420 mls/hr DAILY IVPB  Last 

administered on 2/12/17at 09:47; Admin Dose 420 MLS/HR;  Start 2/7/17 at 20:00


Cyclobenzaprine HCl (Flexeril) 5 mg Q8  PRN PO MUSCLE SPASM  Last administered 

on 2/8/17at 10:27; Admin Dose 5 MG;  Start 2/8/17 at 10:24


Famotidine 20 mg 20 mg DAILY NGT  Last administered on 2/12/17at 09:33; Admin 

Dose 20 MG;  Start 2/10/17 at 09:00


Piperacillin Sod/ Tazobactam Sod (Zosyn 2.25gm/ 50ml (Pmx)) 50 ml @  100 mls/hr 

Q12 IVPB  Last administered on 2/12/17at 09:40; Admin Dose 100 MLS/HR;  Start 2/

10/17 at 14:00


Acetaminophen (Tylenol Liquid) 650 mg Q6H  PRN NGT PAIN AND OR ELEVATED TEMP 

Last administered on 2/10/17at 21:53; Admin Dose 650 MG;  Start 2/10/17 at 21:30


Sevelamer Carbonate (Renvela) 2.4 gm Q8 NGT  Last administered on 2/12/17at 15:

51; Admin Dose 2.4 GM;  Start 2/10/17 at 22:00











ELMER KHALIL MD Feb 12, 2017 19:34

## 2017-02-12 NOTE — CONS
Date/Time of Note


Date/Time of Note


DATE: 2/12/17 


TIME: 12:26





Consult Date/Type/Reason


Admit Date/Time


Jan 31, 2017 at 13:31


Initial Consult Date


1/31/17


Type of Consultation:  neurology





Subjective


no seizures





Objective





 Vital Signs








  Date Time  Temp Pulse Resp B/P Pulse Ox O2 Delivery O2 Flow Rate FiO2


 


2/12/17 12:00  112      


 


2/12/17 10:00 98.3  18 136/68 98 Room Air  


 


2/12/17 07:21        21


 


2/8/17 18:00       2.0 














 Intake and Output   


 


 2/11/17 2/11/17 2/12/17





 15:00 23:00 07:00


 


Intake Total  400 ml 560 ml


 


Output Total   500 ml


 


Balance  400 ml 60 ml











Results/Medications


Result Diagram:  


2/12/17 0628 2/12/17 0628





Results 24 hrs





Laboratory Tests








Test


  2/12/17


01:29 2/12/17


06:28


 


Bedside Glucose 144   


 


Alanine Aminotransferase


(ALT/SGPT) 


  31  


 


 


Albumin  3.8  


 


Albumin/Globulin Ratio  1.15  


 


Alkaline Phosphatase  174  H


 


Anion Gap  24  #H


 


Aspartate Amino Transf


(AST/SGOT) 


  23  


 


 


Basophils #  0.0  


 


Basophils %  0.0  


 


Blood Morphology Comment    


 


Blood Urea Nitrogen  88  H


 


Calcium Level  9.9  


 


Carbon Dioxide Level  26  


 


Chloride Level  90  L


 


Creatinine  7.48  H


 


Direct Bilirubin  0.00  


 


Eosinophils #  0.2  


 


Eosinophils %  1.4  


 


Globulin  3.30  H


 


Glucose Level  120  


 


Hematocrit  35.6  L


 


Hemoglobin  12.1  


 


Indirect Bilirubin  0.1  


 


Lymphocytes #  0.6  L


 


Lymphocytes %  5.0  L


 


Mean Corpuscular Hemoglobin  29.1  


 


Mean Corpuscular Hemoglobin


Concent 


  33.9  


 


 


Mean Corpuscular Volume  85.7  


 


Mean Platelet Volume  10.5  H


 


Monocytes #  0.8  


 


Monocytes %  6.4  


 


Neutrophils #  11.2  H


 


Neutrophils %  87.2  H


 


Nucleated Red Blood Cells #  0.0  


 


Nucleated Red Blood Cells %  0.0  


 


Phosphorus Level  5.4  H


 


Platelet Count  219  


 


Potassium Level  4.0  


 


Red Blood Count  4.16  L


 


Red Cell Distribution Width  14.6  H


 


Sodium Level  136  


 


Total Bilirubin  0.1  L


 


Total Protein  7.1  


 


White Blood Count  12.9  H








Medications





 Current Medications


Labetalol HCl (Labetalol) 10 mg Q10M  PRN IV ELEVATED BLOOD PRESSURE Last 

administered on 2/9/17at 12:05; Admin Dose 10 MG;  Start 1/31/17 at 14:00


Ondansetron HCl (Zofran Inj) 4 mg Q6H  PRN IV NAUSEA AND/OR VOMITING Last 

administered on 2/12/17at 01:04; Admin Dose 4 MG;  Start 1/31/17 at 14:00


Acetaminophen (Tylenol Supp) 650 mg Q4H  PRN [place] PAIN LEVEL 1-3 OR FEVER;  Start 

1/31/17 at 14:00


Eye Lubricant (Artificial Tears Oph) 1 drop TID BOTH EYES  Last administered on 

2/12/17at 09:35; Admin Dose 1 DROP;  Start 1/31/17 at 21:00


Multivit/Ca Carb/ B Cmplx/FA/Prenat ([place]-Argenis) 1 tab DAILY PO  Last 

administered on 2/12/17at 09:34; Admin Dose 1 TAB;  Start 2/1/17 at 09:00


Hydromorphone HCl (Dilaudid) 0.5 mg Q4H  PRN IV PAIN Last administered on 2/12/ 17at 01:07; Admin Dose 0.5 MG;  Start 1/31/17 at 21:30


Atorvastatin Calcium 20 mg 20 mg QHS NGT  Last administered on 2/11/17at 20:52; 

Admin Dose 20 MG;  Start 2/6/17 at 21:00


Levetiracetam/ Dextrose (Keppra Iv/D5W) 105 ml @  420 mls/hr DAILY IVPB  Last 

administered on 2/12/17at 09:47; Admin Dose 420 MLS/HR;  Start 2/7/17 at 20:00


Cyclobenzaprine HCl (Flexeril) 5 mg Q8  PRN PO MUSCLE SPASM  Last administered 

on 2/8/17at 10:27; Admin Dose 5 MG;  Start 2/8/17 at 10:24


Famotidine 20 mg 20 mg DAILY NGT  Last administered on 2/12/17at 09:33; Admin 

Dose 20 MG;  Start 2/10/17 at 09:00


Piperacillin Sod/ Tazobactam Sod (Zosyn 2.25gm/ 50ml (Pmx)) 50 ml @  100 mls/hr 

Q12 IVPB  Last administered on 2/12/17at 09:40; Admin Dose 100 MLS/HR;  Start 2/

10/17 at 14:00


Acetaminophen (Tylenol Liquid) 650 mg Q6H  PRN NGT PAIN AND OR ELEVATED TEMP 

Last administered on 2/10/17at 21:53; Admin Dose 650 MG;  Start 2/10/17 at 21:30


Sevelamer Carbonate (Renvela) 2.4 gm Q8 NGT  Last administered on 2/12/17at 05:

55; Admin Dose 2.4 GM;  Start 2/10/17 at 22:00





Assessment/Plan


Chief Complaint/Hosp Course


PHYSICAL EXAMINATION:


GENERAL:  Not in acute distress, lying in bed.


HEAD:  Normocephalic.


NECK:  Some nuchal stiffness noticed.


LUNGS:  Clear to auscultation bilaterally.


CARDIAC:  Normal cardiac rhythm and sounds.


ABDOMEN:  Soft.


EXTREMITIES:  No cyanosis, clubbing or edema.


NEUROLOGIC:  The patient is awake, Ox2..  She looks bilaterally, at present 

responds to visual threat bilaterally.  Pupils reactive from 3 to 2 mm, maybe 

slightly more brisk on the right.  Extraocular movements intact without 

nystagmus.  Symmetrical face.  Present corneal reflexes and gag.  Motor 

strength examination shows  weakness, about 3+/5 in the left upper and lower 

extremities, especially distally in the leg on the left. Sensory examination 

seemed to be grossly intact to pain/touch.  Deep tendon reflexes 2+ upper 

extremities and absent in lower extremities.  No definite response to plantar 

stimulation bilaterally.  


 


IMPRESSION:  Traumatic brain injury, skull fracture, bifrontal contusions, 

acute subdural hematoma, traumatic subarachnoid hemorrhage on admission.  

Encephalopathy. 


The patient had episode of unresponsiveness in ICU suspicious for complex 

partial seizure. No seizure activity on EEG. Continue Keppra.


Problems:  











SHANNAN GRECO MD Feb 12, 2017 12:28

## 2017-02-13 VITALS — DIASTOLIC BLOOD PRESSURE: 75 MMHG | SYSTOLIC BLOOD PRESSURE: 150 MMHG | RESPIRATION RATE: 18 BRPM

## 2017-02-13 VITALS — HEART RATE: 104 BPM | SYSTOLIC BLOOD PRESSURE: 138 MMHG | DIASTOLIC BLOOD PRESSURE: 84 MMHG | RESPIRATION RATE: 18 BRPM

## 2017-02-13 VITALS — SYSTOLIC BLOOD PRESSURE: 122 MMHG | RESPIRATION RATE: 18 BRPM | HEART RATE: 103 BPM | DIASTOLIC BLOOD PRESSURE: 80 MMHG

## 2017-02-13 VITALS — SYSTOLIC BLOOD PRESSURE: 133 MMHG | RESPIRATION RATE: 18 BRPM | DIASTOLIC BLOOD PRESSURE: 73 MMHG | HEART RATE: 103 BPM

## 2017-02-13 VITALS — SYSTOLIC BLOOD PRESSURE: 148 MMHG | HEART RATE: 88 BPM | DIASTOLIC BLOOD PRESSURE: 76 MMHG | RESPIRATION RATE: 16 BRPM

## 2017-02-13 VITALS — SYSTOLIC BLOOD PRESSURE: 143 MMHG | RESPIRATION RATE: 18 BRPM | HEART RATE: 110 BPM | DIASTOLIC BLOOD PRESSURE: 92 MMHG

## 2017-02-13 VITALS — SYSTOLIC BLOOD PRESSURE: 156 MMHG | DIASTOLIC BLOOD PRESSURE: 98 MMHG | RESPIRATION RATE: 18 BRPM

## 2017-02-13 VITALS — DIASTOLIC BLOOD PRESSURE: 64 MMHG | SYSTOLIC BLOOD PRESSURE: 135 MMHG | RESPIRATION RATE: 18 BRPM

## 2017-02-13 VITALS — SYSTOLIC BLOOD PRESSURE: 155 MMHG | DIASTOLIC BLOOD PRESSURE: 94 MMHG | HEART RATE: 77 BPM

## 2017-02-13 VITALS — HEART RATE: 100 BPM | SYSTOLIC BLOOD PRESSURE: 138 MMHG | DIASTOLIC BLOOD PRESSURE: 87 MMHG

## 2017-02-13 VITALS — RESPIRATION RATE: 18 BRPM | SYSTOLIC BLOOD PRESSURE: 120 MMHG | HEART RATE: 99 BPM | DIASTOLIC BLOOD PRESSURE: 80 MMHG

## 2017-02-13 VITALS — DIASTOLIC BLOOD PRESSURE: 87 MMHG | SYSTOLIC BLOOD PRESSURE: 142 MMHG | HEART RATE: 108 BPM | RESPIRATION RATE: 17 BRPM

## 2017-02-13 VITALS — SYSTOLIC BLOOD PRESSURE: 150 MMHG | DIASTOLIC BLOOD PRESSURE: 70 MMHG | HEART RATE: 78 BPM | RESPIRATION RATE: 18 BRPM

## 2017-02-13 VITALS — HEART RATE: 92 BPM | SYSTOLIC BLOOD PRESSURE: 138 MMHG | DIASTOLIC BLOOD PRESSURE: 84 MMHG

## 2017-02-13 VITALS — HEART RATE: 102 BPM

## 2017-02-13 VITALS — DIASTOLIC BLOOD PRESSURE: 97 MMHG | HEART RATE: 96 BPM | SYSTOLIC BLOOD PRESSURE: 158 MMHG | RESPIRATION RATE: 16 BRPM

## 2017-02-13 VITALS — DIASTOLIC BLOOD PRESSURE: 104 MMHG | RESPIRATION RATE: 18 BRPM | SYSTOLIC BLOOD PRESSURE: 161 MMHG

## 2017-02-13 VITALS — HEART RATE: 118 BPM | SYSTOLIC BLOOD PRESSURE: 161 MMHG | RESPIRATION RATE: 18 BRPM | DIASTOLIC BLOOD PRESSURE: 104 MMHG

## 2017-02-13 VITALS — SYSTOLIC BLOOD PRESSURE: 156 MMHG | HEART RATE: 74 BPM | DIASTOLIC BLOOD PRESSURE: 87 MMHG

## 2017-02-13 VITALS — RESPIRATION RATE: 18 BRPM | SYSTOLIC BLOOD PRESSURE: 135 MMHG | DIASTOLIC BLOOD PRESSURE: 74 MMHG | HEART RATE: 113 BPM

## 2017-02-13 VITALS — SYSTOLIC BLOOD PRESSURE: 154 MMHG | HEART RATE: 94 BPM | DIASTOLIC BLOOD PRESSURE: 87 MMHG

## 2017-02-13 VITALS — SYSTOLIC BLOOD PRESSURE: 130 MMHG | DIASTOLIC BLOOD PRESSURE: 82 MMHG | HEART RATE: 105 BPM | RESPIRATION RATE: 17 BRPM

## 2017-02-13 VITALS — HEART RATE: 106 BPM

## 2017-02-13 VITALS — HEART RATE: 95 BPM

## 2017-02-13 VITALS — SYSTOLIC BLOOD PRESSURE: 143 MMHG | RESPIRATION RATE: 18 BRPM | DIASTOLIC BLOOD PRESSURE: 92 MMHG

## 2017-02-13 VITALS — DIASTOLIC BLOOD PRESSURE: 99 MMHG | RESPIRATION RATE: 18 BRPM | SYSTOLIC BLOOD PRESSURE: 160 MMHG

## 2017-02-13 VITALS — HEART RATE: 67 BPM

## 2017-02-13 VITALS — HEART RATE: 101 BPM | DIASTOLIC BLOOD PRESSURE: 86 MMHG | SYSTOLIC BLOOD PRESSURE: 132 MMHG

## 2017-02-13 VITALS — RESPIRATION RATE: 16 BRPM | DIASTOLIC BLOOD PRESSURE: 84 MMHG | SYSTOLIC BLOOD PRESSURE: 138 MMHG

## 2017-02-13 LAB
ANION GAP SERPL CALC-SCNC: 30 MMOL/L (ref 8–16)
BASOPHILS # BLD AUTO: 0 10^3/UL (ref 0–0.1)
BASOPHILS NFR BLD: 0.1 % (ref 0–2)
BUN SERPL-MCNC: 114 MG/DL (ref 7–20)
CALCIUM SERPL-MCNC: 9.8 MG/DL (ref 8.4–10.2)
CHLORIDE SERPL-SCNC: 90 MMOL/L (ref 97–110)
CO2 SERPL-SCNC: 22 MMOL/L (ref 21–31)
CONDITION: 1
CREAT SERPL-MCNC: 9.03 MG/DL (ref 0.44–1)
EOSINOPHIL # BLD: 0.2 10^3/UL (ref 0–0.5)
EOSINOPHIL NFR BLD: 2.2 % (ref 0–7)
ERYTHROCYTE [DISTWIDTH] IN BLOOD BY AUTOMATED COUNT: 15.3 % (ref 11.5–14.5)
GLUCOSE SERPL-MCNC: 83 MG/DL (ref 70–220)
HCT VFR BLD CALC: 34.5 % (ref 37–47)
HGB BLD-MCNC: 11.6 G/DL (ref 12–16)
LH ANALYZER COMMENTS: 1
LYMPHOCYTES # BLD AUTO: 0.7 10^3/UL (ref 0.8–2.9)
LYMPHOCYTES NFR BLD AUTO: 7 % (ref 15–51)
MAGNESIUM SERPL-MCNC: 3.1 MG/DL (ref 1.7–2.5)
MCH RBC QN AUTO: 29.3 PG (ref 29–33)
MCHC RBC AUTO-ENTMCNC: 33.7 G/DL (ref 32–37)
MCV RBC AUTO: 86.8 FL (ref 82–101)
MONOCYTES # BLD: 0.9 10^3/UL (ref 0.3–0.9)
MONOCYTES NFR BLD: 8.5 % (ref 0–11)
NEUTROPHILS # BLD: 8.5 10^3/UL (ref 1.6–7.5)
NEUTROPHILS NFR BLD AUTO: 82.2 % (ref 39–77)
NRBC # BLD MANUAL: 0 10^3/UL (ref 0–0)
NRBC BLD QL: 0 /100WBC (ref 0–0)
PHOSPHATE SERPL-MCNC: 7.2 MG/DL (ref 2.5–4.9)
PLATELET # BLD: 212 10^3/UL (ref 140–440)
PMV BLD AUTO: 10.3 FL (ref 7.4–10.4)
POTASSIUM SERPL-SCNC: 4.2 MMOL/L (ref 3.5–5.1)
RBC # BLD AUTO: 3.97 10^6/UL (ref 4.2–5.4)
SODIUM SERPL-SCNC: 138 MMOL/L (ref 135–144)
WBC # BLD AUTO: 10.3 10^3/UL (ref 4.8–10.8)
WBC # BLD: 10.3 10^3/UL (ref 4.8–10.8)

## 2017-02-13 RX ADMIN — PIPERACILLIN AND TAZOBACTAM SCH MLS/HR: 2; .25 INJECTION, POWDER, FOR SOLUTION INTRAVENOUS at 20:11

## 2017-02-13 RX ADMIN — SEVELAMER CARBONATE SCH GM: 2400 POWDER, FOR SUSPENSION ORAL at 00:43

## 2017-02-13 RX ADMIN — POTASSIUM CHLORIDE SCH MLS/HR: 2 INJECTION, SOLUTION, CONCENTRATE INTRAVENOUS at 18:06

## 2017-02-13 RX ADMIN — CARBOXYMETHYLCELLULOSE SODIUM SCH DROP: 10 GEL OPHTHALMIC at 13:00

## 2017-02-13 RX ADMIN — IPRATROPIUM BROMIDE AND ALBUTEROL SULFATE SCH ML: .5; 3 SOLUTION RESPIRATORY (INHALATION) at 08:00

## 2017-02-13 RX ADMIN — DEXAMETHASONE SODIUM PHOSPHATE PRN MG: 10 INJECTION, SOLUTION INTRAMUSCULAR; INTRAVENOUS at 16:43

## 2017-02-13 RX ADMIN — CARBOXYMETHYLCELLULOSE SODIUM SCH DROP: 10 GEL OPHTHALMIC at 20:11

## 2017-02-13 RX ADMIN — SEVELAMER CARBONATE SCH GM: 2400 POWDER, FOR SUSPENSION ORAL at 05:36

## 2017-02-13 RX ADMIN — FAMOTIDINE SCH MG: 20 TABLET ORAL at 09:25

## 2017-02-13 RX ADMIN — ATORVASTATIN CALCIUM SCH MG: 20 TABLET, FILM COATED ORAL at 20:12

## 2017-02-13 RX ADMIN — SEVELAMER CARBONATE SCH GM: 2400 POWDER, FOR SUSPENSION ORAL at 20:12

## 2017-02-13 RX ADMIN — LEVETIRACETAM SCH MLS/HR: 100 INJECTION, SOLUTION INTRAVENOUS at 09:25

## 2017-02-13 RX ADMIN — HYDROMORPHONE HYDROCHLORIDE PRN MG: 1 INJECTION, SOLUTION INTRAMUSCULAR; INTRAVENOUS; SUBCUTANEOUS at 15:52

## 2017-02-13 RX ADMIN — IPRATROPIUM BROMIDE AND ALBUTEROL SULFATE SCH ML: .5; 3 SOLUTION RESPIRATORY (INHALATION) at 16:00

## 2017-02-13 RX ADMIN — CARBOXYMETHYLCELLULOSE SODIUM SCH DROP: 10 GEL OPHTHALMIC at 09:25

## 2017-02-13 RX ADMIN — IPRATROPIUM BROMIDE AND ALBUTEROL SULFATE SCH ML: .5; 3 SOLUTION RESPIRATORY (INHALATION) at 01:12

## 2017-02-13 RX ADMIN — SEVELAMER CARBONATE SCH GM: 2400 POWDER, FOR SUSPENSION ORAL at 14:00

## 2017-02-13 RX ADMIN — PIPERACILLIN AND TAZOBACTAM SCH MLS/HR: 2; .25 INJECTION, POWDER, FOR SOLUTION INTRAVENOUS at 09:25

## 2017-02-13 RX ADMIN — ATORVASTATIN CALCIUM SCH MG: 20 TABLET, FILM COATED ORAL at 00:43

## 2017-02-13 NOTE — PN
DATE:  02/13/2017

 

 

REASON FOR FOLLOWUP:  Pneumothorax. 

 

SUBJECTIVE:  Patient Aidan remains stable this morning.  Denies any shortness of breath.

 

PHYSICAL EXAMINATION:

VITAL SIGNS:  Temperature 98, pulse 78, blood pressure 150/75, O2 saturation 96% on room air.

NECK:  Supple.  No JVD or lymphadenopathy.

CARDIAC:  S1, S2, no added sounds or murmurs.

CHEST:  Diminished air entry bilaterally.

ABDOMEN:  Soft, nontender.  No guarding or rebound.

EXTREMITIES:  No cyanosis, clubbing, edema.

NEUROLOGIC:  Grossly intact.  No focal deficits.

 

LABORATORIES:  White count 7.3, hemoglobin 11.6, platelets of 212.  , creatinine 9.03.

 

IMPRESSION AND PLAN:

1.  Stable loculated hydropneumothorax.

2.  End-stage renal failure, on hemodialysis.

3.  Resolving leukocytosis.

4.  History of mechanical fall with skull fracture.  

 

The patient is stable for discharge planning from pulmonary standpoint.  Continue hemodialysis as to
lerated.

 

 

Dictated By: NORY CARREON/BITA

DD:    02/13/2017 10:54:29

DT:    02/13/2017 12:25:56

Conf#: [id number]

DID#:  [id number]

## 2017-02-13 NOTE — PN
Date/Time of Note


Date/Time of Note


DATE: 2/13/17 


TIME: 08:18





Assessment/Plan


VTE Prophylaxis


VTE Prophylaxis Intervention:  SCD's





Lines/Catheters


IV Catheter Type (from Nrs):  Saline Lock





Assessment/Plan


Assessment/Plan


46-year-old female:





1.  Status post acute respiratory failure secondary to severe encephalopathy, 

post fall with significant head trauma and s/p chest barotrauma with bilateral 

pneumothoraces and pneumoperitoneum, all resolved and both chest tubes removed 

as of Wednesday   


Appreciate all the assistance from Dr Ann


Pulmonary following.


CXR yesterday with ? right apical PTX, ? air trapping per radiology, 


Follow up CT chest non contrast with loculated collection of air and fluid in 

the right anterior lung unchanged. 


On RA and stable respiratory status, continue to monitor





2. Status post fall with head trauma with skull fracture including Bilateral 

acute inferior frontal hemorrhagic contusions, right greater than left, 8 mm 

right convexity subdural hematoma, mild scattered subarachnoid hemorrhage, 3 mm 

leftward midline shift of the septum pellucidum and right temporal-parietal 

scalp swelling with nondisplaced, oblique fracture through the right parietal 

and temporal skull. 


Reported Epidural hematoma on 2nd CT head 


Dr. Palomo from neurosurgery following, no intervention and resolving SDH, 


MS Ok but still confused and failed Speech eval yesterday, re eval pending 


Continue  Keppra for now per Neurology and EEG c/w brain contusions/injury, no 

active seizures 





3.  Leukocytosis: Likely from demargination post trauma. WBC trending down. 

Continue Zosyn  


Still concerns for aspiration. 


Patient remains afebrile x 48 hrs 


CT abdo/pelvis today if needed 





4.  Hypertension: controlled 





5.  End-stage renal disease on hemodialysis: on Monday/Wednesday/Friday 

schedule. HD today 


Dr. Chaney following. 





6.  Gastroesophageal reflux disease: continue Pepcid 





7. Hypernatremia: resolved and decrease Free H2O to 100 cc q8 


Per Nephrology OK to have Na 145 to 148 for now and avoid hyponatremia. 








Prophylaxis: Pepcid for GI prophylaxis, SCDs for DVT prophylaxis


Disposition:  On NC, on Telemetry.


Appreciate Neurology, Nephrology, CTS and Pulmonary recs. PT and ST eval 

pending.





Subjective


24 Hr Interval Summary


Free Text/Dictation


Patient afebrile and WBC back to normal 


Patient's XR reviewed and unchanged from CT chest 


ST and PT re eval pending





Exam/Review of Systems


Vital Signs


Vitals





 Vital Signs








  Date Time  Temp Pulse Resp B/P Pulse Ox O2 Delivery O2 Flow Rate FiO2


 


2/13/17 08:00  100      


 


2/13/17 07:44 98.6  18 156/98 97   


 


2/13/17 06:00      Room Air  


 


2/13/17 01:12        21














 Intake and Output   


 


 2/12/17 2/12/17 2/13/17





 15:00 23:00 07:00


 


Intake Total  280 ml 330 ml


 


Balance  280 ml 330 ml











Exam


Constitutional:  alert, oriented (x2)


Respiratory:  clear to auscultation, normal air movement


Cardiovascular:  nl pulses, regular rate and rhythm


Gastrointestinal:  non-tender, soft


Musculoskeletal:  nl extremities to inspection


Extremities:  normal pulses


Neurological:  CNS II-XII intact, confused





Results


Result Diagram:  


2/13/17 0556                                                                   

             2/13/17 0556





Results 24 hrs





Laboratory Tests








Test


  2/13/17


05:56


 


Anion Gap 30  H


 


Basophils # 0.0  


 


Basophils % 0.1  


 


Blood Morphology Comment   


 


Blood Urea Nitrogen 114  H


 


Calcium Level 9.8  


 


Carbon Dioxide Level 22  


 


Chloride Level 90  L


 


Creatinine 9.03  H


 


Eosinophils # 0.2  


 


Eosinophils % 2.2  


 


Glucose Level 83  


 


Hematocrit 34.5  L


 


Hemoglobin 11.6  L


 


Lymphocytes # 0.7  L


 


Lymphocytes % 7.0  L


 


Mean Corpuscular Hemoglobin 29.3  


 


Mean Corpuscular Hemoglobin


Concent 33.7  


 


 


Mean Corpuscular Volume 86.8  


 


Mean Platelet Volume 10.3  


 


Monocytes # 0.9  


 


Monocytes % 8.5  


 


Neutrophils # 8.5  H


 


Neutrophils % 82.2  H


 


Nucleated Red Blood Cells # 0.0  


 


Nucleated Red Blood Cells % 0.0  


 


Platelet Count 212  


 


Potassium Level 4.2  


 


Red Blood Count 3.97  L


 


Red Cell Distribution Width 15.3  H


 


Sodium Level 138  


 


White Blood Count 10.3  #











Medications


Medications





 Current Medications


Labetalol HCl (Labetalol) 10 mg Q10M  PRN IV ELEVATED BLOOD PRESSURE Last 

administered on 2/9/17at 12:05; Admin Dose 10 MG;  Start 1/31/17 at 14:00


Ondansetron HCl (Zofran Inj) 4 mg Q6H  PRN IV NAUSEA AND/OR VOMITING Last 

administered on 2/12/17at 01:04; Admin Dose 4 MG;  Start 1/31/17 at 14:00


Acetaminophen (Tylenol Supp) 650 mg Q4H  PRN [place] PAIN LEVEL 1-3 OR FEVER;  Start 

1/31/17 at 14:00


Eye Lubricant (Artificial Tears Oph) 1 drop TID BOTH EYES  Last administered on 

2/12/17at 21:52; Admin Dose 1 DROP;  Start 1/31/17 at 21:00


Multivit/Ca Carb/ B Cmplx/FA/Prenat ([place]-Argenis) 1 tab DAILY PO  Last 

administered on 2/12/17at 09:34; Admin Dose 1 TAB;  Start 2/1/17 at 09:00


Hydromorphone HCl (Dilaudid) 0.5 mg Q4H  PRN IV PAIN Last administered on 2/12/ 17at 01:07; Admin Dose 0.5 MG;  Start 1/31/17 at 21:30


Atorvastatin Calcium 20 mg 20 mg QHS NGT  Last administered on 2/13/17at 00:43; 

Admin Dose 20 MG;  Start 2/6/17 at 21:00


Levetiracetam/ Dextrose (Keppra Iv/D5W) 105 ml @  420 mls/hr DAILY IVPB  Last 

administered on 2/12/17at 09:47; Admin Dose 420 MLS/HR;  Start 2/7/17 at 20:00


Cyclobenzaprine HCl (Flexeril) 5 mg Q8  PRN PO MUSCLE SPASM  Last administered 

on 2/8/17at 10:27; Admin Dose 5 MG;  Start 2/8/17 at 10:24


Famotidine 20 mg 20 mg DAILY NGT  Last administered on 2/12/17at 09:33; Admin 

Dose 20 MG;  Start 2/10/17 at 09:00


Piperacillin Sod/ Tazobactam Sod (Zosyn 2.25gm/ 50ml (Pmx)) 50 ml @  100 mls/hr 

Q12 IVPB  Last administered on 2/12/17at 21:53; Admin Dose 100 MLS/HR;  Start 2/

10/17 at 14:00


Acetaminophen (Tylenol Liquid) 650 mg Q6H  PRN NGT PAIN AND OR ELEVATED TEMP 

Last administered on 2/10/17at 21:53; Admin Dose 650 MG;  Start 2/10/17 at 21:30


Sevelamer Carbonate (Renvela) 2.4 gm Q8 NGT  Last administered on 2/13/17at 05:

36; Admin Dose 2.4 GM;  Start 2/10/17 at 22:00











CECILIO JIMENEZ Feb 13, 2017 08:26

## 2017-02-13 NOTE — RADRPT
PROCEDURE:   XR Chest 1 view. 

 

CLINICAL INDICATION:   Status post nasogastric tube placement

 

TECHNIQUE:   AP views of the chest were obtained. 

 

COMPARISON:   Yesterday and CT February 11, 2017 

 

FINDINGS:

The heart is large.  Calcified atherosclerosis is noted in the aorta. Nasogastric tube has its dista
l end in the expected location of the stomach.  The lungs are hypoinflated.  Elevation right hemidia
phragm is observed.  Atelectasis is noted at the left lung base.  Loculated appearing hydropneumotho
rax over the anterior aspect of the right middle lobe is unchanged.  Subcutaneous emphysema over the
 right chest appears stable. Small amount of pneumoperitoneum in the upper abdomen is unchanged.  Th
e osseous structures are unchanged. 

 

IMPRESSION:

Cardiomegaly with calcified atherosclerosis in the aorta. 

 

Nasogastric tube with its distal end in the expected location of the stomach.

 

Hypoinflated lungs with mild elevation of the right hemidiaphragm.

 

Atelectasis at the left lung base.

 

Stable loculated hydropneumothorax over the anterior aspect of the right middle lobe.

 

Stable , subtle pneumoperitoneum in the upper abdomen.

 

Stable subcutaneous emphysema over the right chest.

 

 

RPTAT: AA

_____________________________________________ 

.Roshan Garber MD, MD           Date    Time 

Electronically viewed and signed by .Roshan Garber MD, MD on 02/13/2017 10:12 

 

D:  02/13/2017 10:12  T:  02/13/2017 10:12

.P/

## 2017-02-13 NOTE — RADRPT
PROCEDURE:   XR Chest. 

 

CLINICAL INDICATION:   The nasogastric tube placement 

 

TECHNIQUE:   Anterior chest x-ray. 

 

COMPARISON:   02/12/2017 at 2200 hours 

 

FINDINGS:

 

Interval placement nasogastric tube which terminates below the inferior margin of the exam, likely i
n the body of the stomach.

4 cm cavitary mass in the right mid lung zone is unchanged from previous exam.

Trace free air under right hemidiaphragm is unchanged from previous exam.

Subcutaneous emphysema in bilateral chest wall is unchanged.

There is no evidence of pneumothorax.

The cardiomediastinal silhouette is unremarkable.  

The soft tissues are normal.

Osseous structures are unremarkable.

 

IMPRESSION:      

 

1.  Interval placement nasogastric tube with satisfactory position.

2.  Small free air under right hemidiaphragm, suggesting bowel perforation or recent surgery, unchan
ged.  

3.  4 cm cavitary mass in the right mid lung zone, unchanged.

4.  Subcutaneous emphysema in bilateral chest wall, unchanged.

 

RPTAT: [id number]

_____________________________________________ 

.Deep Abrams MD, MD           Date    Time 

Electronically viewed and signed by .Deep Abrams MD, MD on 02/13/2017 00:21 

 

D:  02/13/2017 00:21  T:  02/13/2017 00:21

.M/

## 2017-02-13 NOTE — PN
Date/Time of Note


Date/Time of Note


DATE: 2/13/17 


TIME: 13:17





Assessment/Plan


VTE Prophylaxis


VTE Prophylaxis Intervention:  other





Assessment/Plan


Chief Complaint/Hosp Course


hief Complaint/Hosp Course


IMPRESSION:


1.  The patient has acute hemorrhagic stroke./sdh/trauma


2.  Malignant hypertension.better


3.  Endstage renal disease.


4.  Leukocytosis. better


5.  Respiratory failure.


6.  Incomplete database


7 hyperkalemia hx


8 SUBCUTANEOUS EPMHYSEMA/c tube


9 hypernatremia


10 hyperphosphatemia


[id number]    hd  per plan


                neuro f/u


[id number]   tired appearing 


               hd plan


[id number] pulled ngt


          st eval  if passes start diet otherwise start ivf


Problems:  





Exam/Review of Systems


Vital Signs


Vitals





 Vital Signs








  Date Time  Temp Pulse Resp B/P Pulse Ox O2 Delivery O2 Flow Rate FiO2


 


2/13/17 11:47 97.8 81 18 160/99 97   


 


2/13/17 10:00      Room Air  


 


2/13/17 09:10        21














 Intake and Output   


 


 2/12/17 2/12/17 2/13/17





 15:00 23:00 07:00


 


Intake Total  280 ml 330 ml


 


Balance  280 ml 330 ml











Exam


Constitutional:  alert


Neck:  supple


Respiratory:  clear to auscultation


Cardiovascular:  regular rate and rhythm


Gastrointestinal:  soft


Musculoskeletal:  nl extremities to inspection





Results


Result Diagram:  


2/13/17 0556                                                                   

             2/13/17 0556





Results 24 hrs





Laboratory Tests








Test


  2/13/17


05:56


 


Anion Gap 30  H


 


Basophils # 0.0  


 


Basophils % 0.1  


 


Blood Morphology Comment   


 


Blood Urea Nitrogen 114  H


 


Calcium Level 9.8  


 


Carbon Dioxide Level 22  


 


Chloride Level 90  L


 


Creatinine 9.03  H


 


Eosinophils # 0.2  


 


Eosinophils % 2.2  


 


Glucose Level 83  


 


Hematocrit 34.5  L


 


Hemoglobin 11.6  L


 


Lymphocytes # 0.7  L


 


Lymphocytes % 7.0  L


 


Magnesium Level 3.1  H


 


Mean Corpuscular Hemoglobin 29.3  


 


Mean Corpuscular Hemoglobin


Concent 33.7  


 


 


Mean Corpuscular Volume 86.8  


 


Mean Platelet Volume 10.3  


 


Monocytes # 0.9  


 


Monocytes % 8.5  


 


Neutrophils # 8.5  H


 


Neutrophils % 82.2  H


 


Nucleated Red Blood Cells # 0.0  


 


Nucleated Red Blood Cells % 0.0  


 


Phosphorus Level 7.2  H


 


Platelet Count 212  


 


Potassium Level 4.2  


 


Red Blood Count 3.97  L


 


Red Cell Distribution Width 15.3  H


 


Sodium Level 138  


 


White Blood Count 10.3  #











Medications


Medications





 Current Medications


Labetalol HCl (Labetalol) 10 mg Q10M  PRN IV ELEVATED BLOOD PRESSURE Last 

administered on 2/9/17at 12:05; Admin Dose 10 MG;  Start 1/31/17 at 14:00


Ondansetron HCl (Zofran Inj) 4 mg Q6H  PRN IV NAUSEA AND/OR VOMITING Last 

administered on 2/12/17at 01:04; Admin Dose 4 MG;  Start 1/31/17 at 14:00


Acetaminophen (Tylenol Supp) 650 mg Q4H  PRN [place] PAIN LEVEL 1-3 OR FEVER;  Start 

1/31/17 at 14:00


Eye Lubricant (Artificial Tears Oph) 1 drop TID BOTH EYES  Last administered on 

2/13/17at 09:25; Admin Dose 1 DROP;  Start 1/31/17 at 21:00


Multivit/Ca Carb/ B Cmplx/FA/Prenat ([place]-Argenis) 1 tab DAILY PO  Last 

administered on 2/13/17at 09:25; Admin Dose 1 TAB;  Start 2/1/17 at 09:00


Hydromorphone HCl (Dilaudid) 0.5 mg Q4H  PRN IV PAIN Last administered on 2/12/ 17at 01:07; Admin Dose 0.5 MG;  Start 1/31/17 at 21:30


Atorvastatin Calcium 20 mg 20 mg QHS NGT  Last administered on 2/13/17at 00:43; 

Admin Dose 20 MG;  Start 2/6/17 at 21:00


Levetiracetam/ Dextrose (Keppra Iv/D5W) 105 ml @  420 mls/hr DAILY IVPB  Last 

administered on 2/13/17at 09:25; Admin Dose 420 MLS/HR;  Start 2/7/17 at 20:00


Cyclobenzaprine HCl (Flexeril) 5 mg Q8  PRN PO MUSCLE SPASM  Last administered 

on 2/8/17at 10:27; Admin Dose 5 MG;  Start 2/8/17 at 10:24


Famotidine 20 mg 20 mg DAILY NGT  Last administered on 2/13/17at 09:25; Admin 

Dose 20 MG;  Start 2/10/17 at 09:00


Piperacillin Sod/ Tazobactam Sod (Zosyn 2.25gm/ 50ml (Pmx)) 50 ml @  100 mls/hr 

Q12 IVPB  Last administered on 2/13/17at 09:25; Admin Dose 100 MLS/HR;  Start 2/

10/17 at 14:00


Acetaminophen (Tylenol Liquid) 650 mg Q6H  PRN NGT PAIN AND OR ELEVATED TEMP 

Last administered on 2/10/17at 21:53; Admin Dose 650 MG;  Start 2/10/17 at 21:30


Sevelamer Carbonate (Renvela) 2.4 gm Q8 NGT  Last administered on 2/13/17at 05:

36; Admin Dose 2.4 GM;  Start 2/10/17 at 22:00











ELMER KHALIL MD Feb 13, 2017 13:19

## 2017-02-13 NOTE — RADRPT
PROCEDURE:   XR Abdomen. 

 

CLINICAL INDICATION:   Ileus.

 

TECHNIQUE:   AP abdomen x-ray. 

 

COMPARISON:   No. 

 

FINDINGS:

There is plate-like atelectasis in the right lower lobe.  NG tube is noted distal to the GE junction
.  There is scattered air and fecal material in the colon but no mechanical bowel obstruction.  No a
bnormal intra-abdominal mass or calcification is identified. The left ventricle is mildly enlarged.

 

IMPRESSION:

1. Satisfactory positioning of an NG tube distal third to junction.

2.  Plate-like atelectasis in the right lower lobe with elevation of the right diaphragm.

3.  Normal bowel gas pattern. 

4.  Cardiomegaly.

 

RPTAT:AAJJ

_____________________________________________ 

Physician Jeyson           Date    Time 

Electronically viewed and signed by Bro Gutierrez Physician on 02/13/2017 13:54 

 

D:  02/13/2017 13:54  T:  02/13/2017 13:54

CHELA/

## 2017-02-14 VITALS — RESPIRATION RATE: 19 BRPM | SYSTOLIC BLOOD PRESSURE: 136 MMHG | DIASTOLIC BLOOD PRESSURE: 88 MMHG

## 2017-02-14 VITALS — SYSTOLIC BLOOD PRESSURE: 135 MMHG | DIASTOLIC BLOOD PRESSURE: 91 MMHG | RESPIRATION RATE: 16 BRPM

## 2017-02-14 VITALS — SYSTOLIC BLOOD PRESSURE: 142 MMHG | RESPIRATION RATE: 17 BRPM | DIASTOLIC BLOOD PRESSURE: 92 MMHG | HEART RATE: 108 BPM

## 2017-02-14 VITALS — HEART RATE: 97 BPM | SYSTOLIC BLOOD PRESSURE: 135 MMHG | RESPIRATION RATE: 18 BRPM | DIASTOLIC BLOOD PRESSURE: 85 MMHG

## 2017-02-14 VITALS — DIASTOLIC BLOOD PRESSURE: 78 MMHG | RESPIRATION RATE: 18 BRPM | SYSTOLIC BLOOD PRESSURE: 140 MMHG

## 2017-02-14 VITALS — DIASTOLIC BLOOD PRESSURE: 84 MMHG | RESPIRATION RATE: 18 BRPM | HEART RATE: 100 BPM | SYSTOLIC BLOOD PRESSURE: 126 MMHG

## 2017-02-14 VITALS — HEART RATE: 100 BPM | DIASTOLIC BLOOD PRESSURE: 80 MMHG | RESPIRATION RATE: 18 BRPM | SYSTOLIC BLOOD PRESSURE: 122 MMHG

## 2017-02-14 VITALS — HEART RATE: 108 BPM

## 2017-02-14 VITALS — HEART RATE: 118 BPM | RESPIRATION RATE: 16 BRPM | SYSTOLIC BLOOD PRESSURE: 133 MMHG | DIASTOLIC BLOOD PRESSURE: 82 MMHG

## 2017-02-14 VITALS — SYSTOLIC BLOOD PRESSURE: 133 MMHG | RESPIRATION RATE: 16 BRPM | DIASTOLIC BLOOD PRESSURE: 82 MMHG

## 2017-02-14 VITALS — RESPIRATION RATE: 18 BRPM | DIASTOLIC BLOOD PRESSURE: 81 MMHG | SYSTOLIC BLOOD PRESSURE: 130 MMHG

## 2017-02-14 VITALS — HEART RATE: 104 BPM | RESPIRATION RATE: 20 BRPM | DIASTOLIC BLOOD PRESSURE: 88 MMHG | SYSTOLIC BLOOD PRESSURE: 129 MMHG

## 2017-02-14 VITALS — HEART RATE: 104 BPM

## 2017-02-14 VITALS — SYSTOLIC BLOOD PRESSURE: 135 MMHG | DIASTOLIC BLOOD PRESSURE: 85 MMHG | RESPIRATION RATE: 17 BRPM

## 2017-02-14 VITALS — HEART RATE: 106 BPM

## 2017-02-14 LAB
ANION GAP SERPL CALC-SCNC: 24 MMOL/L (ref 8–16)
BASOPHILS # BLD AUTO: 0 10^3/UL (ref 0–0.1)
BASOPHILS NFR BLD: 0.1 % (ref 0–2)
BUN SERPL-MCNC: 63 MG/DL (ref 7–20)
CALCIUM SERPL-MCNC: 9.2 MG/DL (ref 8.4–10.2)
CHLORIDE SERPL-SCNC: 91 MMOL/L (ref 97–110)
CO2 SERPL-SCNC: 26 MMOL/L (ref 21–31)
CONDITION: 1
CREAT SERPL-MCNC: 6.59 MG/DL (ref 0.44–1)
EOSINOPHIL # BLD: 0.1 10^3/UL (ref 0–0.5)
EOSINOPHIL NFR BLD: 1.3 % (ref 0–7)
ERYTHROCYTE [DISTWIDTH] IN BLOOD BY AUTOMATED COUNT: 14.5 % (ref 11.5–14.5)
GLUCOSE SERPL-MCNC: 112 MG/DL (ref 70–220)
HCT VFR BLD CALC: 32.4 % (ref 37–47)
HGB BLD-MCNC: 11 G/DL (ref 12–16)
LH ANALYZER COMMENTS: 1
LYMPHOCYTES # BLD AUTO: 0.6 10^3/UL (ref 0.8–2.9)
LYMPHOCYTES NFR BLD AUTO: 6.6 % (ref 15–51)
MAGNESIUM SERPL-MCNC: 2.3 MG/DL (ref 1.7–2.5)
MCH RBC QN AUTO: 29.2 PG (ref 29–33)
MCHC RBC AUTO-ENTMCNC: 33.8 G/DL (ref 32–37)
MCV RBC AUTO: 86.4 FL (ref 82–101)
MONOCYTES # BLD: 0.9 10^3/UL (ref 0.3–0.9)
MONOCYTES NFR BLD: 10.4 % (ref 0–11)
NEUTROPHILS # BLD: 7.1 10^3/UL (ref 1.6–7.5)
NEUTROPHILS NFR BLD AUTO: 81.6 % (ref 39–77)
NRBC # BLD MANUAL: 0 10^3/UL (ref 0–0)
NRBC BLD QL: 0 /100WBC (ref 0–0)
PHOSPHATE SERPL-MCNC: 5.3 MG/DL (ref 2.5–4.9)
PLATELET # BLD: 220 10^3/UL (ref 140–440)
PMV BLD AUTO: 10.3 FL (ref 7.4–10.4)
POTASSIUM SERPL-SCNC: 4 MMOL/L (ref 3.5–5.1)
RBC # BLD AUTO: 3.75 10^6/UL (ref 4.2–5.4)
SODIUM SERPL-SCNC: 137 MMOL/L (ref 135–144)
WBC # BLD AUTO: 8.7 10^3/UL (ref 4.8–10.8)
WBC # BLD: 8.7 10^3/UL (ref 4.8–10.8)

## 2017-02-14 RX ADMIN — PIPERACILLIN AND TAZOBACTAM SCH MLS/HR: 2; .25 INJECTION, POWDER, FOR SOLUTION INTRAVENOUS at 20:56

## 2017-02-14 RX ADMIN — CARBOXYMETHYLCELLULOSE SODIUM SCH DROP: 10 GEL OPHTHALMIC at 09:09

## 2017-02-14 RX ADMIN — CARBOXYMETHYLCELLULOSE SODIUM SCH DROP: 10 GEL OPHTHALMIC at 20:56

## 2017-02-14 RX ADMIN — IPRATROPIUM BROMIDE AND ALBUTEROL SULFATE SCH ML: .5; 3 SOLUTION RESPIRATORY (INHALATION) at 00:27

## 2017-02-14 RX ADMIN — PIPERACILLIN AND TAZOBACTAM SCH MLS/HR: 2; .25 INJECTION, POWDER, FOR SOLUTION INTRAVENOUS at 09:08

## 2017-02-14 RX ADMIN — HYDROMORPHONE HYDROCHLORIDE PRN MG: 1 INJECTION, SOLUTION INTRAMUSCULAR; INTRAVENOUS; SUBCUTANEOUS at 11:44

## 2017-02-14 RX ADMIN — POTASSIUM CHLORIDE SCH MLS/HR: 2 INJECTION, SOLUTION, CONCENTRATE INTRAVENOUS at 07:20

## 2017-02-14 RX ADMIN — SEVELAMER CARBONATE SCH GM: 2400 POWDER, FOR SUSPENSION ORAL at 12:30

## 2017-02-14 RX ADMIN — FAMOTIDINE SCH MG: 20 TABLET ORAL at 09:00

## 2017-02-14 RX ADMIN — POTASSIUM CHLORIDE SCH MLS/HR: 2 INJECTION, SOLUTION, CONCENTRATE INTRAVENOUS at 20:40

## 2017-02-14 RX ADMIN — IPRATROPIUM BROMIDE AND ALBUTEROL SULFATE SCH ML: .5; 3 SOLUTION RESPIRATORY (INHALATION) at 08:09

## 2017-02-14 RX ADMIN — IPRATROPIUM BROMIDE AND ALBUTEROL SULFATE SCH ML: .5; 3 SOLUTION RESPIRATORY (INHALATION) at 16:52

## 2017-02-14 RX ADMIN — LEVETIRACETAM SCH MLS/HR: 100 INJECTION, SOLUTION INTRAVENOUS at 09:09

## 2017-02-14 RX ADMIN — ATORVASTATIN CALCIUM SCH MG: 20 TABLET, FILM COATED ORAL at 20:57

## 2017-02-14 RX ADMIN — SEVELAMER CARBONATE SCH GM: 2400 POWDER, FOR SUSPENSION ORAL at 06:00

## 2017-02-14 RX ADMIN — CARBOXYMETHYLCELLULOSE SODIUM SCH DROP: 10 GEL OPHTHALMIC at 12:30

## 2017-02-14 RX ADMIN — SEVELAMER CARBONATE SCH GM: 2400 POWDER, FOR SUSPENSION ORAL at 22:09

## 2017-02-14 NOTE — PN
Date/Time of Note


Date/Time of Note


DATE: 2/14/17 


TIME: 14:13





Assessment/Plan


VTE Prophylaxis


VTE Prophylaxis Intervention:  SCD's





Assessment/Plan


Assessment/Plan


46-year-old female:





1.  Status post acute respiratory failure secondary to severe encephalopathy, 

post fall with significant head trauma and s/p chest barotrauma with bilateral 

pneumothoraces and pneumoperitoneum, all resolved and both chest tubes removed 

last week    


Appreciate all the assistance from Dr Ann


Pulmonary following.


CXR yesterday with ? right apical PTX, ? air trapping per radiology, 


Follow up CT chest non contrast with loculated collection of air and fluid in 

the right anterior lung unchanged. 


On RA and stable respiratory status, continue to monitor





2. Status post fall with head trauma with skull fracture including Bilateral 

acute inferior frontal hemorrhagic contusions, right greater than left, 8 mm 

right convexity subdural hematoma, mild scattered subarachnoid hemorrhage, 3 mm 

leftward midline shift of the septum pellucidum and right temporal-parietal 

scalp swelling with nondisplaced, oblique fracture through the right parietal 

and temporal skull. 


Reported Epidural hematoma on 2nd CT head 


Dr. Palomo from neurosurgery following, no intervention and resolving SDH, 


MS Ok but still confused and failed Speech eval yesterday, MBS today 


Continue  Keppra for now per Neurology and EEG c/w brain contusions/injury, no 

active seizures 





3.  Leukocytosis: resolved. Continue Zosyn for likely aspiration pneumonia. 


Patient remains afebrile.





4.  Hypertension: controlled 





5.  End-stage renal disease on hemodialysis: on Monday/Wednesday/Friday 

schedule.


Dr. Chaney following. 





6.  Gastroesophageal reflux disease: continue Pepcid 





7. Hypernatremia: resolved and decrease Free H2O to 100 cc q8 


Per Nephrology OK to have Na 145 to 148 for now and avoid hyponatremia. 








Prophylaxis: Pepcid for GI prophylaxis, SCDs for DVT prophylaxis


Disposition:  On NC, on Telemetry.


Appreciate Neurology, Nephrology, CTS and Pulmonary recs. PT and ST eval 

pending. MBS study pending today.





Subjective


24 Hr Interval Summary


Free Text/Dictation


Patient remains stable


MBS study today 


WBC wnl and afebrile





Exam/Review of Systems


Vital Signs


Vitals





 Vital Signs








  Date Time  Temp Pulse Resp B/P Pulse Ox O2 Delivery O2 Flow Rate FiO2


 


2/14/17 12:16  104      


 


2/14/17 12:00 98.0  16 135/91 97   


 


2/14/17 08:09        21


 


2/14/17 06:00      Room Air  














 Intake and Output   


 


 2/13/17 2/13/17 2/14/17





 15:00 23:00 07:00


 


Intake Total  280 ml 900 ml


 


Balance  280 ml 900 ml











Exam


Constitutional:  alert, oriented (x2)


Respiratory:  clear to auscultation, normal air movement


Cardiovascular:  nl pulses, regular rate and rhythm


Gastrointestinal:  non-tender, soft


Musculoskeletal:  nl extremities to inspection, other (no edema )





Results


Result Diagram:  


2/14/17 0630                                                                   

             2/14/17 0630





Results 24 hrs





Laboratory Tests








Test


  2/14/17


06:30


 


Anion Gap 24  H


 


Basophils # 0.0  


 


Basophils % 0.1  


 


Blood Morphology Comment   


 


Blood Urea Nitrogen 63  #H


 


Calcium Level 9.2  


 


Carbon Dioxide Level 26  


 


Chloride Level 91  L


 


Creatinine 6.59  #H


 


Eosinophils # 0.1  


 


Eosinophils % 1.3  


 


Glucose Level 112  


 


Hematocrit 32.4  L


 


Hemoglobin 11.0  L


 


Lymphocytes # 0.6  L


 


Lymphocytes % 6.6  L


 


Magnesium Level 2.3  


 


Mean Corpuscular Hemoglobin 29.2  


 


Mean Corpuscular Hemoglobin


Concent 33.8  


 


 


Mean Corpuscular Volume 86.4  


 


Mean Platelet Volume 10.3  


 


Monocytes # 0.9  


 


Monocytes % 10.4  


 


Neutrophils # 7.1  


 


Neutrophils % 81.6  H


 


Nucleated Red Blood Cells # 0.0  


 


Nucleated Red Blood Cells % 0.0  


 


Phosphorus Level 5.3  H


 


Platelet Count 220  


 


Potassium Level 4.0  


 


Red Blood Count 3.75  L


 


Red Cell Distribution Width 14.5  


 


Sodium Level 137  


 


White Blood Count 8.7  











Medications


Medications





 Current Medications


Labetalol HCl (Labetalol) 10 mg Q10M  PRN IV ELEVATED BLOOD PRESSURE Last 

administered on 2/9/17at 12:05; Admin Dose 10 MG;  Start 1/31/17 at 14:00


Ondansetron HCl (Zofran Inj) 4 mg Q6H  PRN IV NAUSEA AND/OR VOMITING Last 

administered on 2/13/17at 16:43; Admin Dose 4 MG;  Start 1/31/17 at 14:00


Acetaminophen (Tylenol Supp) 650 mg Q4H  PRN [place] PAIN LEVEL 1-3 OR FEVER;  Start 

1/31/17 at 14:00


Eye Lubricant (Artificial Tears Oph) 1 drop TID BOTH EYES  Last administered on 

2/14/17at 12:30; Admin Dose 1 DROP;  Start 1/31/17 at 21:00


Multivit/Ca Carb/ B Cmplx/FA/Prenat ([place]-Argenis) 1 tab DAILY PO  Last 

administered on 2/13/17at 09:25; Admin Dose 1 TAB;  Start 2/1/17 at 09:00


Hydromorphone HCl (Dilaudid) 0.5 mg Q4H  PRN IV PAIN Last administered on 2/14/ 17at 11:44; Admin Dose 0.5 MG;  Start 1/31/17 at 21:30


Atorvastatin Calcium 20 mg 20 mg QHS NGT  Last administered on 2/13/17at 00:43; 

Admin Dose 20 MG;  Start 2/6/17 at 21:00


Levetiracetam/ Dextrose (Keppra Iv/D5W) 105 ml @  420 mls/hr DAILY IVPB  Last 

administered on 2/14/17at 09:09; Admin Dose 420 MLS/HR;  Start 2/7/17 at 20:00


Cyclobenzaprine HCl (Flexeril) 5 mg Q8  PRN PO MUSCLE SPASM  Last administered 

on 2/8/17at 10:27; Admin Dose 5 MG;  Start 2/8/17 at 10:24


Famotidine 20 mg 20 mg DAILY NGT  Last administered on 2/13/17at 09:25; Admin 

Dose 20 MG;  Start 2/10/17 at 09:00


Piperacillin Sod/ Tazobactam Sod (Zosyn 2.25gm/ 50ml (Pmx)) 50 ml @  100 mls/hr 

Q12 IVPB  Last administered on 2/14/17at 09:08; Admin Dose 100 MLS/HR;  Start 2/

10/17 at 14:00


Acetaminophen (Tylenol Liquid) 650 mg Q6H  PRN NGT PAIN AND OR ELEVATED TEMP 

Last administered on 2/10/17at 21:53; Admin Dose 650 MG;  Start 2/10/17 at 21:30


Sevelamer Carbonate 2.4 gm 2.4 gm Q8 NGT  Last administered on 2/13/17at 05:36; 

Admin Dose 2.4 GM;  Start 2/10/17 at 22:00


Dextrose (D5W) 1,000 ml @  75 mls/hr [id number] IV  Last administered on 2/13/17at 

18:06; Admin Dose 75 MLS/HR;  Start 2/13/17 at 18:00











CECILIO JIMENEZ Feb 14, 2017 14:17

## 2017-02-14 NOTE — PN
DATE:  02/14/2017

 

 

REASON FOR FOLLOWUP:  Shortness of breath.

 

SUBJECTIVE:  The patient remains stable this afternoon.. 

 

PHYSICAL EXAMINATION:

VITAL SIGNS:  Temperature 98, pulse is 100, blood pressure 135/90, O2 saturation 96% on room air.

NECK:  Supple.  No JVD or lymphadenopathy.

CARDIAC:  S1, S2, no added sounds or murmurs.

CHEST:  Diminished air entry bilaterally.

ABDOMEN:  Soft, nontender.  No guarding or rebound.

EXTREMITIES:  No cyanosis, clubbing, edema.

NEUROLOGIC:  Generalized weakness.

 

LABORATORY DATA:  White count 8.7, hemoglobin 11, platelets of 220.  Chemistry:  BUN 63, creatinine 
6.59.  No ABG was drawn today.

 

IMPRESSION AND PLAN:

1.  Status post respiratory failure with bilateral pneumothoraces, currently remains stable.  No fur
ther intervention necessary for loculated hydropneumothorax.

2.  History of fall with subsequent head injury, subarachnoid hemorrhage, currently stable.  No neur
osurgical intervention.

3.  Resolved leukocytosis, consider de-escalation of antibiotics.

4.  End-stage renal failure on hemodialysis.  Continue hemodialysis as tolerated.

5.  Discharge planning okay from pulmonary standpoint.

 

 

Dictated By: NORY CARREON/BITA

DD:    02/14/2017 14:40:11

DT:    02/14/2017 15:02:36

Conf#: [id number]

DID#:  [id number]

## 2017-02-14 NOTE — PN
Date/Time of Note


Date/Time of Note


DATE: 2/14/17 


TIME: 14:04





Assessment/Plan


VTE Prophylaxis


VTE Prophylaxis Intervention:  other





Assessment/Plan


Chief Complaint/Hosp Course


hief Complaint/Hosp Course


IMPRESSION:


1.  The patient has acute hemorrhagic stroke./sdh/trauma


2.  Malignant hypertension.better


3.  Endstage renal disease.


4.  Leukocytosis. better


5.  Respiratory failure.


6.  Incomplete database


7 hyperkalemia hx


8 SUBCUTANEOUS EPMHYSEMA/c tube


9 hypernatremia


10 hyperphosphatemia


[id number]    hd  per plan


                neuro f/u


[id number]   tired appearing 


               hd plan


[id number] pulled ngt


          st eval  if passes start diet otherwise start ivf


[id number]  cont care 


           hd per plan


          neuro f/u


Problems:  





Exam/Review of Systems


Vital Signs


Vitals





 Vital Signs








  Date Time  Temp Pulse Resp B/P Pulse Ox O2 Delivery O2 Flow Rate FiO2


 


2/14/17 12:16  104      


 


2/14/17 12:00 98.0  16 135/91 97   


 


2/14/17 08:09        21


 


2/14/17 06:00      Room Air  














 Intake and Output   


 


 2/13/17 2/13/17 2/14/17





 15:00 23:00 07:00


 


Intake Total  280 ml 900 ml


 


Balance  280 ml 900 ml











Exam


Head:  normocephalic


ENMT:  nl external ears & nose


Neck:  supple


Respiratory:  clear to auscultation


Cardiovascular:  regular rate and rhythm


Gastrointestinal:  soft


Musculoskeletal:  nl extremities to inspection





Results


Result Diagram:  


2/14/17 0630                                                                   

             2/14/17 0630





Results 24 hrs





Laboratory Tests








Test


  2/14/17


06:30


 


Anion Gap 24  H


 


Basophils # 0.0  


 


Basophils % 0.1  


 


Blood Morphology Comment   


 


Blood Urea Nitrogen 63  #H


 


Calcium Level 9.2  


 


Carbon Dioxide Level 26  


 


Chloride Level 91  L


 


Creatinine 6.59  #H


 


Eosinophils # 0.1  


 


Eosinophils % 1.3  


 


Glucose Level 112  


 


Hematocrit 32.4  L


 


Hemoglobin 11.0  L


 


Lymphocytes # 0.6  L


 


Lymphocytes % 6.6  L


 


Magnesium Level 2.3  


 


Mean Corpuscular Hemoglobin 29.2  


 


Mean Corpuscular Hemoglobin


Concent 33.8  


 


 


Mean Corpuscular Volume 86.4  


 


Mean Platelet Volume 10.3  


 


Monocytes # 0.9  


 


Monocytes % 10.4  


 


Neutrophils # 7.1  


 


Neutrophils % 81.6  H


 


Nucleated Red Blood Cells # 0.0  


 


Nucleated Red Blood Cells % 0.0  


 


Phosphorus Level 5.3  H


 


Platelet Count 220  


 


Potassium Level 4.0  


 


Red Blood Count 3.75  L


 


Red Cell Distribution Width 14.5  


 


Sodium Level 137  


 


White Blood Count 8.7  











Medications


Medications





 Current Medications


Labetalol HCl (Labetalol) 10 mg Q10M  PRN IV ELEVATED BLOOD PRESSURE Last 

administered on 2/9/17at 12:05; Admin Dose 10 MG;  Start 1/31/17 at 14:00


Ondansetron HCl (Zofran Inj) 4 mg Q6H  PRN IV NAUSEA AND/OR VOMITING Last 

administered on 2/13/17at 16:43; Admin Dose 4 MG;  Start 1/31/17 at 14:00


Acetaminophen (Tylenol Supp) 650 mg Q4H  PRN [place] PAIN LEVEL 1-3 OR FEVER;  Start 

1/31/17 at 14:00


Eye Lubricant (Artificial Tears Oph) 1 drop TID BOTH EYES  Last administered on 

2/14/17at 12:30; Admin Dose 1 DROP;  Start 1/31/17 at 21:00


Multivit/Ca Carb/ B Cmplx/FA/Prenat ([place]-Argenis) 1 tab DAILY PO  Last 

administered on 2/13/17at 09:25; Admin Dose 1 TAB;  Start 2/1/17 at 09:00


Hydromorphone HCl (Dilaudid) 0.5 mg Q4H  PRN IV PAIN Last administered on 2/14/ 17at 11:44; Admin Dose 0.5 MG;  Start 1/31/17 at 21:30


Atorvastatin Calcium 20 mg 20 mg QHS NGT  Last administered on 2/13/17at 00:43; 

Admin Dose 20 MG;  Start 2/6/17 at 21:00


Levetiracetam/ Dextrose (Keppra Iv/D5W) 105 ml @  420 mls/hr DAILY IVPB  Last 

administered on 2/14/17at 09:09; Admin Dose 420 MLS/HR;  Start 2/7/17 at 20:00


Cyclobenzaprine HCl (Flexeril) 5 mg Q8  PRN PO MUSCLE SPASM  Last administered 

on 2/8/17at 10:27; Admin Dose 5 MG;  Start 2/8/17 at 10:24


Famotidine 20 mg 20 mg DAILY NGT  Last administered on 2/13/17at 09:25; Admin 

Dose 20 MG;  Start 2/10/17 at 09:00


Piperacillin Sod/ Tazobactam Sod (Zosyn 2.25gm/ 50ml (Pmx)) 50 ml @  100 mls/hr 

Q12 IVPB  Last administered on 2/14/17at 09:08; Admin Dose 100 MLS/HR;  Start 2/

10/17 at 14:00


Acetaminophen (Tylenol Liquid) 650 mg Q6H  PRN NGT PAIN AND OR ELEVATED TEMP 

Last administered on 2/10/17at 21:53; Admin Dose 650 MG;  Start 2/10/17 at 21:30


Sevelamer Carbonate 2.4 gm 2.4 gm Q8 NGT  Last administered on 2/13/17at 05:36; 

Admin Dose 2.4 GM;  Start 2/10/17 at 22:00


Dextrose (D5W) 1,000 ml @  75 mls/hr [id number] IV  Last administered on 2/13/17at 

18:06; Admin Dose 75 MLS/HR;  Start 2/13/17 at 18:00











ELMER KHALIL MD Feb 14, 2017 14:06

## 2017-02-15 VITALS — DIASTOLIC BLOOD PRESSURE: 56 MMHG | SYSTOLIC BLOOD PRESSURE: 114 MMHG | HEART RATE: 112 BPM

## 2017-02-15 VITALS — DIASTOLIC BLOOD PRESSURE: 52 MMHG | HEART RATE: 140 BPM | SYSTOLIC BLOOD PRESSURE: 103 MMHG

## 2017-02-15 VITALS — DIASTOLIC BLOOD PRESSURE: 88 MMHG | SYSTOLIC BLOOD PRESSURE: 136 MMHG | HEART RATE: 108 BPM | RESPIRATION RATE: 19 BRPM

## 2017-02-15 VITALS — DIASTOLIC BLOOD PRESSURE: 85 MMHG | SYSTOLIC BLOOD PRESSURE: 160 MMHG | HEART RATE: 112 BPM | RESPIRATION RATE: 19 BRPM

## 2017-02-15 VITALS — DIASTOLIC BLOOD PRESSURE: 87 MMHG | RESPIRATION RATE: 19 BRPM | SYSTOLIC BLOOD PRESSURE: 143 MMHG | HEART RATE: 110 BPM

## 2017-02-15 VITALS — SYSTOLIC BLOOD PRESSURE: 141 MMHG | HEART RATE: 102 BPM | DIASTOLIC BLOOD PRESSURE: 70 MMHG

## 2017-02-15 VITALS — SYSTOLIC BLOOD PRESSURE: 145 MMHG | HEART RATE: 110 BPM | DIASTOLIC BLOOD PRESSURE: 88 MMHG | RESPIRATION RATE: 19 BRPM

## 2017-02-15 VITALS — DIASTOLIC BLOOD PRESSURE: 54 MMHG | SYSTOLIC BLOOD PRESSURE: 126 MMHG | HEART RATE: 104 BPM

## 2017-02-15 VITALS — SYSTOLIC BLOOD PRESSURE: 98 MMHG | HEART RATE: 127 BPM | DIASTOLIC BLOOD PRESSURE: 56 MMHG

## 2017-02-15 VITALS — DIASTOLIC BLOOD PRESSURE: 68 MMHG | HEART RATE: 89 BPM | SYSTOLIC BLOOD PRESSURE: 143 MMHG

## 2017-02-15 VITALS — SYSTOLIC BLOOD PRESSURE: 135 MMHG | DIASTOLIC BLOOD PRESSURE: 94 MMHG | RESPIRATION RATE: 16 BRPM

## 2017-02-15 VITALS — RESPIRATION RATE: 18 BRPM | SYSTOLIC BLOOD PRESSURE: 127 MMHG

## 2017-02-15 LAB
ANION GAP SERPL CALC-SCNC: 24 MMOL/L (ref 8–16)
BUN SERPL-MCNC: 77 MG/DL (ref 7–20)
CALCIUM SERPL-MCNC: 9.2 MG/DL (ref 8.4–10.2)
CHLORIDE SERPL-SCNC: 88 MMOL/L (ref 97–110)
CO2 SERPL-SCNC: 24 MMOL/L (ref 21–31)
CREAT SERPL-MCNC: 7.84 MG/DL (ref 0.44–1)
GLUCOSE SERPL-MCNC: 89 MG/DL (ref 70–220)
MAGNESIUM SERPL-MCNC: 2.3 MG/DL (ref 1.7–2.5)
PHOSPHATE SERPL-MCNC: 6.2 MG/DL (ref 2.5–4.9)
POTASSIUM SERPL-SCNC: 4.4 MMOL/L (ref 3.5–5.1)
SODIUM SERPL-SCNC: 132 MMOL/L (ref 135–144)

## 2017-02-15 RX ADMIN — POTASSIUM CHLORIDE SCH MLS/HR: 2 INJECTION, SOLUTION, CONCENTRATE INTRAVENOUS at 23:20

## 2017-02-15 RX ADMIN — SEVELAMER CARBONATE SCH GM: 2400 POWDER, FOR SUSPENSION ORAL at 06:30

## 2017-02-15 RX ADMIN — ATORVASTATIN CALCIUM SCH MG: 20 TABLET, FILM COATED ORAL at 20:25

## 2017-02-15 RX ADMIN — PIPERACILLIN AND TAZOBACTAM SCH MLS/HR: 2; .25 INJECTION, POWDER, FOR SOLUTION INTRAVENOUS at 11:36

## 2017-02-15 RX ADMIN — PIPERACILLIN AND TAZOBACTAM SCH MLS/HR: 2; .25 INJECTION, POWDER, FOR SOLUTION INTRAVENOUS at 20:25

## 2017-02-15 RX ADMIN — IPRATROPIUM BROMIDE AND ALBUTEROL SULFATE SCH ML: .5; 3 SOLUTION RESPIRATORY (INHALATION) at 19:34

## 2017-02-15 RX ADMIN — IPRATROPIUM BROMIDE AND ALBUTEROL SULFATE SCH ML: .5; 3 SOLUTION RESPIRATORY (INHALATION) at 08:19

## 2017-02-15 RX ADMIN — CARBOXYMETHYLCELLULOSE SODIUM SCH DROP: 10 GEL OPHTHALMIC at 10:28

## 2017-02-15 RX ADMIN — CARBOXYMETHYLCELLULOSE SODIUM SCH DROP: 10 GEL OPHTHALMIC at 20:25

## 2017-02-15 RX ADMIN — FAMOTIDINE SCH MG: 20 TABLET ORAL at 10:28

## 2017-02-15 RX ADMIN — SEVELAMER CARBONATE SCH GM: 2400 POWDER, FOR SUSPENSION ORAL at 17:29

## 2017-02-15 RX ADMIN — LEVETIRACETAM SCH MLS/HR: 100 INJECTION, SOLUTION INTRAVENOUS at 10:28

## 2017-02-15 RX ADMIN — CARBOXYMETHYLCELLULOSE SODIUM SCH DROP: 10 GEL OPHTHALMIC at 17:29

## 2017-02-15 RX ADMIN — POTASSIUM CHLORIDE SCH MLS/HR: 2 INJECTION, SOLUTION, CONCENTRATE INTRAVENOUS at 11:07

## 2017-02-15 RX ADMIN — IPRATROPIUM BROMIDE AND ALBUTEROL SULFATE SCH ML: .5; 3 SOLUTION RESPIRATORY (INHALATION) at 01:35

## 2017-02-15 RX ADMIN — SEVELAMER CARBONATE SCH GM: 2400 POWDER, FOR SUSPENSION ORAL at 21:13

## 2017-02-15 RX ADMIN — ACETAMINOPHEN PRN MG: 160 SOLUTION ORAL at 12:04

## 2017-02-15 RX ADMIN — ACETAMINOPHEN PRN MG: 160 SOLUTION ORAL at 21:10

## 2017-02-15 RX ADMIN — SEVELAMER CARBONATE SCH GM: 2400 POWDER, FOR SUSPENSION ORAL at 10:28

## 2017-02-15 RX ADMIN — DEXAMETHASONE SODIUM PHOSPHATE PRN MG: 10 INJECTION, SOLUTION INTRAMUSCULAR; INTRAVENOUS at 21:10

## 2017-02-15 RX ADMIN — IPRATROPIUM BROMIDE AND ALBUTEROL SULFATE SCH ML: .5; 3 SOLUTION RESPIRATORY (INHALATION) at 16:09

## 2017-02-15 NOTE — PN
Date/Time of Note


Date/Time of Note


DATE: 2/15/17 


TIME: 19:16





Assessment/Plan


VTE Prophylaxis


VTE Prophylaxis Intervention:  other





Assessment/Plan


Chief Complaint/Hosp Course


hief Complaint/Hosp Course


IMPRESSION:


1.  The patient has acute hemorrhagic stroke./sdh/trauma


2.  Malignant hypertension.better


3.  Endstage renal disease.


4.  Leukocytosis. better


5.  Respiratory failure.


6.  Incomplete database


7 hyperkalemia hx


8 SUBCUTANEOUS EPMHYSEMA/c tube


9 hypernatremia


10 hyperphosphatemia


[id number]    hd  per plan


                neuro f/u


[id number]   tired appearing 


               hd plan


[id number] pulled ngt


          st eval  if passes start diet otherwise start ivf


[id number]  cont care 


           hd per plan


          neuro f/u


[id number]    more alert today


               pt eval


               hd  per plan


Problems:  





Exam/Review of Systems


Vital Signs


Vitals





 Vital Signs








  Date Time  Temp Pulse Resp B/P Pulse Ox O2 Delivery O2 Flow Rate FiO2


 


2/15/17 16:11  96 20  98   21


 


2/15/17 07:51 98.5   135/94    


 


2/15/17 06:05      Room Air  














 Intake and Output   


 


 2/14/17 2/14/17 2/15/17





 15:00 23:00 07:00


 


Intake Total  1175 ml 100 ml


 


Balance  1175 ml 100 ml











Exam


Constitutional:  other (awake)


Head:  normocephalic


ENMT:  nl external ears & nose


Neck:  supple


Respiratory:  clear to auscultation


Cardiovascular:  regular rate and rhythm


Gastrointestinal:  soft





Results


Result Diagram:  


2/14/17 0630                                                                   

             2/15/17 0531





Results 24 hrs





Laboratory Tests








Test


  2/15/17


05:30 2/15/17


05:31


 


Magnesium Level 2.3   


 


Phosphorus Level 6.2  H 


 


Anion Gap  24  H


 


Blood Urea Nitrogen  77  H


 


Calcium Level  9.2  


 


Carbon Dioxide Level  24  


 


Chloride Level  88  L


 


Creatinine  7.84  H


 


Glucose Level  89  


 


Potassium Level  4.4  


 


Sodium Level  132  L











Medications


Medications





 Current Medications


Labetalol HCl (Labetalol) 10 mg Q10M  PRN IV ELEVATED BLOOD PRESSURE Last 

administered on 2/9/17at 12:05; Admin Dose 10 MG;  Start 1/31/17 at 14:00


Ondansetron HCl (Zofran Inj) 4 mg Q6H  PRN IV NAUSEA AND/OR VOMITING Last 

administered on 2/13/17at 16:43; Admin Dose 4 MG;  Start 1/31/17 at 14:00


Acetaminophen (Tylenol Supp) 650 mg Q4H  PRN [place] PAIN LEVEL 1-3 OR FEVER;  Start 

1/31/17 at 14:00


Eye Lubricant (Artificial Tears Oph) 1 drop TID BOTH EYES  Last administered on 

2/15/17at 17:29; Admin Dose 1 DROP;  Start 1/31/17 at 21:00


Multivit/Ca Carb/ B Cmplx/FA/Prenat ([place]-Argenis) 1 tab DAILY PO  Last 

administered on 2/15/17at 10:28; Admin Dose 1 TAB;  Start 2/1/17 at 09:00


Hydromorphone HCl (Dilaudid) 0.5 mg Q4H  PRN IV PAIN Last administered on 2/14/ 17at 11:44; Admin Dose 0.5 MG;  Start 1/31/17 at 21:30


Atorvastatin Calcium 20 mg 20 mg QHS NGT  Last administered on 2/14/17at 20:57; 

Admin Dose 20 MG;  Start 2/6/17 at 21:00


Levetiracetam/ Dextrose (Keppra Iv/D5W) 105 ml @  420 mls/hr DAILY IVPB  Last 

administered on 2/15/17at 10:28; Admin Dose 420 MLS/HR;  Start 2/7/17 at 20:00


Cyclobenzaprine HCl (Flexeril) 5 mg Q8  PRN PO MUSCLE SPASM  Last administered 

on 2/8/17at 10:27; Admin Dose 5 MG;  Start 2/8/17 at 10:24


Famotidine 20 mg 20 mg DAILY NGT  Last administered on 2/15/17at 10:28; Admin 

Dose 20 MG;  Start 2/10/17 at 09:00


Piperacillin Sod/ Tazobactam Sod (Zosyn 2.25gm/ 50ml (Pmx)) 50 ml @  100 mls/hr 

Q12 IVPB  Last administered on 2/15/17at 11:36; Admin Dose 100 MLS/HR;  Start 2/

10/17 at 14:00


Acetaminophen (Tylenol Liquid) 650 mg Q6H  PRN NGT PAIN AND OR ELEVATED TEMP 

Last administered on 2/15/17at 12:04; Admin Dose 650 MG;  Start 2/10/17 at 21:30


Sevelamer Carbonate 2.4 gm 2.4 gm Q8 NGT  Last administered on 2/15/17at 17:29; 

Admin Dose 2.4 GM;  Start 2/10/17 at 22:00


Dextrose (D5W) 1,000 ml @  75 mls/hr [id number] IV  Last administered on 2/15/17at 

11:07; Admin Dose 75 MLS/HR;  Start 2/13/17 at 18:00











ELMER KHALIL MD Feb 15, 2017 19:18

## 2017-02-15 NOTE — PN
Date/Time of Note


Date/Time of Note


DATE: 2/15/17 


TIME: 10:44





Assessment/Plan


VTE Prophylaxis


VTE Prophylaxis Intervention:  SCD's





Lines/Catheters


IV Catheter Type (from Nrs):  Saline Lock





Assessment/Plan


Assessment/Plan


46-year-old female:





1.  Status post acute respiratory failure secondary to severe encephalopathy, 

post fall with significant head trauma and s/p chest barotrauma with bilateral 

pneumothoraces and pneumoperitoneum, all resolved and both chest tubes removed 

last week    


Appreciate all the assistance from Dr Ann


Pulmonary following.


CXR yesterday with ? right apical PTX, ? air trapping per radiology, 


Follow up CT chest non contrast with loculated collection of air and fluid in 

the right anterior lung unchanged. 


On RA and stable respiratory status. 





2. Status post fall with head trauma with skull fracture including Bilateral 

acute inferior frontal hemorrhagic contusions, right greater than left, 8 mm 

right convexity subdural hematoma, mild scattered subarachnoid hemorrhage, 3 mm 

leftward midline shift of the septum pellucidum and right temporal-parietal 

scalp swelling with nondisplaced, oblique fracture through the right parietal 

and temporal skull. 


Reported Epidural hematoma on 2nd CT head 


Dr. Palomo from neurosurgery following, no intervention and resolving SDH, 


MS Ok and passed MBS study so now on Puree diet but poor appetite 


Continue  Keppra, EEG c/w brain contusions/injury, no active seizures 





3.  Leukocytosis: resolved. Continue Zosyn for likely aspiration pneumonia 

until discharge. 


Patient remains afebrile.





4.  Hypertension: controlled 





5.  End-stage renal disease on hemodialysis: on Monday/Wednesday/Friday 

schedule.


Dr. Chaney following.





6.  Gastroesophageal reflux disease: continue Pepcid 





7. Hypernatremia: resolved. 





Prophylaxis: Pepcid for GI prophylaxis, SCDs for DVT prophylaxis


Disposition:  On NC, on Telemetry.


Appreciate Neurology, Nephrology and Pulmonary recs. 


PT and ST and needs placement SNF vs ARU, f/u PT recommendations.





Subjective


24 Hr Interval Summary


Free Text/Dictation


Patient remains stable and OFF restraints now


On going HA and poor appetite per patient, otherwise remeians stable and will 

placement to SNF s ARU





Exam/Review of Systems


Vital Signs


Vitals





 Vital Signs








  Date Time  Temp Pulse Resp B/P Pulse Ox O2 Delivery O2 Flow Rate FiO2


 


2/15/17 08:19  103 18  99   21


 


2/15/17 07:51 98.5   135/94    


 


2/15/17 06:05      Room Air  














 Intake and Output   


 


 2/14/17 2/14/17 2/15/17





 15:00 23:00 07:00


 


Intake Total  1175 ml 100 ml


 


Balance  1175 ml 100 ml











Exam


Constitutional:  alert, oriented, other (complaining of HA ), well developed


Respiratory:  clear to auscultation, normal air movement


Cardiovascular:  nl pulses, regular rate and rhythm


Gastrointestinal:  non-tender, soft


Musculoskeletal:  nl extremities to inspection


Extremities:  normal pulses, other (no edema, clubbing or cyanosis )


Neurological:  CNS II-XII intact, nl mental status, nl speech, other (stronger 

but with HA )





Results


Result Diagram:  


2/14/17 0630                                                                   

             2/15/17 0531





Results 24 hrs





Laboratory Tests








Test


  2/15/17


05:30 2/15/17


05:31


 


Magnesium Level 2.3   


 


Phosphorus Level 6.2  H 


 


Anion Gap  24  H


 


Blood Urea Nitrogen  77  H


 


Calcium Level  9.2  


 


Carbon Dioxide Level  24  


 


Chloride Level  88  L


 


Creatinine  7.84  H


 


Glucose Level  89  


 


Potassium Level  4.4  


 


Sodium Level  132  L











Medications


Medications





 Current Medications


Labetalol HCl (Labetalol) 10 mg Q10M  PRN IV ELEVATED BLOOD PRESSURE Last 

administered on 2/9/17at 12:05; Admin Dose 10 MG;  Start 1/31/17 at 14:00


Ondansetron HCl (Zofran Inj) 4 mg Q6H  PRN IV NAUSEA AND/OR VOMITING Last 

administered on 2/13/17at 16:43; Admin Dose 4 MG;  Start 1/31/17 at 14:00


Acetaminophen (Tylenol Supp) 650 mg Q4H  PRN [place] PAIN LEVEL 1-3 OR FEVER;  Start 

1/31/17 at 14:00


Eye Lubricant (Artificial Tears Oph) 1 drop TID BOTH EYES  Last administered on 

2/15/17at 10:28; Admin Dose 1 DROP;  Start 1/31/17 at 21:00


Multivit/Ca Carb/ B Cmplx/FA/Prenat ([place]-Argenis) 1 tab DAILY PO  Last 

administered on 2/15/17at 10:28; Admin Dose 1 TAB;  Start 2/1/17 at 09:00


Hydromorphone HCl (Dilaudid) 0.5 mg Q4H  PRN IV PAIN Last administered on 2/14/ 17at 11:44; Admin Dose 0.5 MG;  Start 1/31/17 at 21:30


Atorvastatin Calcium 20 mg 20 mg QHS NGT  Last administered on 2/14/17at 20:57; 

Admin Dose 20 MG;  Start 2/6/17 at 21:00


Levetiracetam/ Dextrose (Keppra Iv/D5W) 105 ml @  420 mls/hr DAILY IVPB  Last 

administered on 2/15/17at 10:28; Admin Dose 420 MLS/HR;  Start 2/7/17 at 20:00


Cyclobenzaprine HCl (Flexeril) 5 mg Q8  PRN PO MUSCLE SPASM  Last administered 

on 2/8/17at 10:27; Admin Dose 5 MG;  Start 2/8/17 at 10:24


Famotidine 20 mg 20 mg DAILY NGT  Last administered on 2/15/17at 10:28; Admin 

Dose 20 MG;  Start 2/10/17 at 09:00


Piperacillin Sod/ Tazobactam Sod (Zosyn 2.25gm/ 50ml (Pmx)) 50 ml @  100 mls/hr 

Q12 IVPB  Last administered on 2/14/17at 20:56; Admin Dose 100 MLS/HR;  Start 2/

10/17 at 14:00


Acetaminophen (Tylenol Liquid) 650 mg Q6H  PRN NGT PAIN AND OR ELEVATED TEMP 

Last administered on 2/10/17at 21:53; Admin Dose 650 MG;  Start 2/10/17 at 21:30


Sevelamer Carbonate 2.4 gm 2.4 gm Q8 NGT  Last administered on 2/15/17at 06:30; 

Admin Dose 2.4 GM;  Start 2/10/17 at 22:00


Dextrose (D5W) 1,000 ml @  75 mls/hr [id number] IV  Last administered on 2/13/17at 

18:06; Admin Dose 75 MLS/HR;  Start 2/13/17 at 18:00











CECILIO JIMENEZ Feb 15, 2017 10:49

## 2017-02-16 ENCOUNTER — HOSPITAL ENCOUNTER (INPATIENT)
Dept: HOSPITAL 10 - VRC | Age: 47
LOS: 10 days | Discharge: ANOTHER HOSPITAL | DRG: 945 | End: 2017-02-26
Attending: INTERNAL MEDICINE | Admitting: INTERNAL MEDICINE
Payer: MEDICARE

## 2017-02-16 VITALS
HEIGHT: 59 IN | BODY MASS INDEX: 24.31 KG/M2 | HEIGHT: 59 IN | WEIGHT: 120.59 LBS | WEIGHT: 120.59 LBS | BODY MASS INDEX: 24.31 KG/M2

## 2017-02-16 VITALS — RESPIRATION RATE: 20 BRPM | SYSTOLIC BLOOD PRESSURE: 138 MMHG | DIASTOLIC BLOOD PRESSURE: 86 MMHG

## 2017-02-16 VITALS — RESPIRATION RATE: 20 BRPM | SYSTOLIC BLOOD PRESSURE: 118 MMHG | DIASTOLIC BLOOD PRESSURE: 76 MMHG

## 2017-02-16 VITALS — DIASTOLIC BLOOD PRESSURE: 74 MMHG | RESPIRATION RATE: 18 BRPM | HEART RATE: 103 BPM | SYSTOLIC BLOOD PRESSURE: 117 MMHG

## 2017-02-16 DIAGNOSIS — F33.8: ICD-10-CM

## 2017-02-16 DIAGNOSIS — S06.2X0D: Primary | ICD-10-CM

## 2017-02-16 DIAGNOSIS — R13.10: ICD-10-CM

## 2017-02-16 DIAGNOSIS — N18.6: ICD-10-CM

## 2017-02-16 DIAGNOSIS — S02.80XD: ICD-10-CM

## 2017-02-16 DIAGNOSIS — E87.5: ICD-10-CM

## 2017-02-16 DIAGNOSIS — E78.5: ICD-10-CM
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
## 2017-02-16 DIAGNOSIS — S00.81XD: ICD-10-CM

## 2017-02-16 DIAGNOSIS — I12.0: ICD-10-CM

## 2017-02-16 DIAGNOSIS — S06.5X0D: ICD-10-CM

## 2017-02-16 DIAGNOSIS — J96.90: ICD-10-CM

## 2017-02-16 DIAGNOSIS — K21.9: ICD-10-CM

## 2017-02-16 LAB
ANION GAP SERPL CALC-SCNC: 21 MMOL/L (ref 8–16)
BUN SERPL-MCNC: 43 MG/DL (ref 7–20)
CALCIUM SERPL-MCNC: 8.7 MG/DL (ref 8.4–10.2)
CHLORIDE SERPL-SCNC: 91 MMOL/L (ref 97–110)
CO2 SERPL-SCNC: 28 MMOL/L (ref 21–31)
CREAT SERPL-MCNC: 5.96 MG/DL (ref 0.44–1)
GLUCOSE SERPL-MCNC: 106 MG/DL (ref 70–220)
MAGNESIUM SERPL-MCNC: 2.1 MG/DL (ref 1.7–2.5)
PHOSPHATE SERPL-MCNC: 4.9 MG/DL (ref 2.5–4.9)
POTASSIUM SERPL-SCNC: 3.6 MMOL/L (ref 3.5–5.1)
SODIUM SERPL-SCNC: 136 MMOL/L (ref 135–144)

## 2017-02-16 PROCEDURE — 92507 TX SP LANG VOICE COMM INDIV: CPT

## 2017-02-16 PROCEDURE — 85025 COMPLETE CBC W/AUTO DIFF WBC: CPT

## 2017-02-16 PROCEDURE — 97163 PT EVAL HIGH COMPLEX 45 MIN: CPT

## 2017-02-16 PROCEDURE — 84100 ASSAY OF PHOSPHORUS: CPT

## 2017-02-16 PROCEDURE — 97110 THERAPEUTIC EXERCISES: CPT

## 2017-02-16 PROCEDURE — 92526 ORAL FUNCTION THERAPY: CPT

## 2017-02-16 PROCEDURE — 90686 IIV4 VACC NO PRSV 0.5 ML IM: CPT

## 2017-02-16 PROCEDURE — 97530 THERAPEUTIC ACTIVITIES: CPT

## 2017-02-16 PROCEDURE — F08Z2ZZ GROOMING/PERSONAL HYGIENE TREATMENT: ICD-10-PCS | Performed by: PHYSICAL MEDICINE & REHABILITATION

## 2017-02-16 PROCEDURE — 94640 AIRWAY INHALATION TREATMENT: CPT

## 2017-02-16 PROCEDURE — 70450 CT HEAD/BRAIN W/O DYE: CPT

## 2017-02-16 PROCEDURE — 92523 SPEECH SOUND LANG COMPREHEN: CPT

## 2017-02-16 PROCEDURE — 70551 MRI BRAIN STEM W/O DYE: CPT

## 2017-02-16 PROCEDURE — 97150 GROUP THERAPEUTIC PROCEDURES: CPT

## 2017-02-16 PROCEDURE — 80048 BASIC METABOLIC PNL TOTAL CA: CPT

## 2017-02-16 PROCEDURE — 90935 HEMODIALYSIS ONE EVALUATION: CPT

## 2017-02-16 PROCEDURE — 92610 EVALUATE SWALLOWING FUNCTION: CPT

## 2017-02-16 PROCEDURE — 97112 NEUROMUSCULAR REEDUCATION: CPT

## 2017-02-16 PROCEDURE — 97116 GAIT TRAINING THERAPY: CPT

## 2017-02-16 PROCEDURE — 94664 DEMO&/EVAL PT USE INHALER: CPT

## 2017-02-16 PROCEDURE — 95852 RANGE OF MOTION MEASUREMENTS: CPT

## 2017-02-16 PROCEDURE — 87081 CULTURE SCREEN ONLY: CPT

## 2017-02-16 PROCEDURE — 97535 SELF CARE MNGMENT TRAINING: CPT

## 2017-02-16 PROCEDURE — 80053 COMPREHEN METABOLIC PANEL: CPT

## 2017-02-16 PROCEDURE — F06ZDZZ SWALLOWING DYSFUNCTION TREATMENT: ICD-10-PCS | Performed by: PHYSICAL MEDICINE & REHABILITATION

## 2017-02-16 PROCEDURE — 97166 OT EVAL MOD COMPLEX 45 MIN: CPT

## 2017-02-16 PROCEDURE — F07Z5ZZ BED MOBILITY TREATMENT: ICD-10-PCS | Performed by: PHYSICAL MEDICINE & REHABILITATION

## 2017-02-16 RX ADMIN — PIPERACILLIN AND TAZOBACTAM SCH MLS/HR: 2; .25 INJECTION, POWDER, FOR SOLUTION INTRAVENOUS at 20:56

## 2017-02-16 RX ADMIN — CARBOXYMETHYLCELLULOSE SODIUM SCH DROP: 10 GEL OPHTHALMIC at 13:18

## 2017-02-16 RX ADMIN — IPRATROPIUM BROMIDE AND ALBUTEROL SULFATE SCH ML: .5; 3 SOLUTION RESPIRATORY (INHALATION) at 23:26

## 2017-02-16 RX ADMIN — FAMOTIDINE SCH MG: 20 TABLET ORAL at 08:27

## 2017-02-16 RX ADMIN — ATORVASTATIN CALCIUM SCH MG: 20 TABLET, FILM COATED ORAL at 20:55

## 2017-02-16 RX ADMIN — POTASSIUM CHLORIDE SCH MLS/HR: 2 INJECTION, SOLUTION, CONCENTRATE INTRAVENOUS at 01:49

## 2017-02-16 RX ADMIN — IPRATROPIUM BROMIDE AND ALBUTEROL SULFATE SCH ML: .5; 3 SOLUTION RESPIRATORY (INHALATION) at 09:13

## 2017-02-16 RX ADMIN — LEVETIRACETAM SCH MLS/HR: 100 INJECTION, SOLUTION INTRAVENOUS at 08:27

## 2017-02-16 RX ADMIN — PIPERACILLIN AND TAZOBACTAM SCH MLS/HR: 2; .25 INJECTION, POWDER, FOR SOLUTION INTRAVENOUS at 09:09

## 2017-02-16 RX ADMIN — SENNOSIDES SCH TAB: 8.6 TABLET, FILM COATED ORAL at 20:55

## 2017-02-16 RX ADMIN — SEVELAMER CARBONATE SCH GM: 2400 POWDER, FOR SUSPENSION ORAL at 05:45

## 2017-02-16 RX ADMIN — SEVELAMER CARBONATE SCH GM: 2400 POWDER, FOR SUSPENSION ORAL at 22:13

## 2017-02-16 RX ADMIN — SEVELAMER CARBONATE SCH GM: 2400 POWDER, FOR SUSPENSION ORAL at 13:18

## 2017-02-16 RX ADMIN — DOCUSATE SODIUM SCH MG: 100 CAPSULE, LIQUID FILLED ORAL at 20:55

## 2017-02-16 RX ADMIN — METOPROLOL TARTRATE SCH MG: 50 TABLET, FILM COATED ORAL at 20:56

## 2017-02-16 RX ADMIN — CARBOXYMETHYLCELLULOSE SODIUM SCH DROP: 10 GEL OPHTHALMIC at 08:27

## 2017-02-16 NOTE — PN
Date/Time of Note


Date/Time of Note


DATE: 2/16/17 


TIME: 11:20





Assessment/Plan


VTE Prophylaxis


VTE Prophylaxis Intervention:  ambulation, SCD's





Lines/Catheters


IV Catheter Type (from Nrs):  Peripheral IV


Urinary Cath still in place:  No





Assessment/Plan


Assessment/Plan


46-year-old female:





1.  Status post acute respiratory failure secondary to severe encephalopathy, 

post fall with significant head trauma and s/p chest barotrauma with bilateral 

pneumothoraces and pneumoperitoneum, all resolved and both chest tubes removed 

last week    


Appreciate all the assistance from Dr Ann


Pulmonary following.


CXR yesterday with ? right apical PTX, ? air trapping per radiology, 


Follow up CT chest non contrast with loculated collection of air and fluid in 

the right anterior lung unchanged. 


On RA and stable respiratory status. 





2. Status post fall with head trauma with skull fracture including Bilateral 

acute inferior frontal hemorrhagic contusions, right greater than left, 8 mm 

right convexity subdural hematoma, mild scattered subarachnoid hemorrhage, 3 mm 

leftward midline shift of the septum pellucidum and right temporal-parietal 

scalp swelling with nondisplaced, oblique fracture through the right parietal 

and temporal skull. 


Reported Epidural hematoma on 2nd CT head 


Dr. Palomo from neurosurgery following, no intervention and resolving SDH, 


MS Ok and passed MBS study so now on Puree diet but poor appetite 


Continue  Keppra, EEG c/w brain contusions/injury, no active seizures 





3.  Leukocytosis: resolved. Continue Zosyn for likely aspiration pneumonia 

until discharge. 


Patient remains afebrile.





4.  Hypertension: controlled 





5.  End-stage renal disease on hemodialysis: on Monday/Wednesday/Friday 

schedule.


Dr. Chaney following.





6.  Gastroesophageal reflux disease: continue Pepcid 





7. Hypernatremia: resolved. 





Prophylaxis: Pepcid for GI prophylaxis, SCDs for DVT prophylaxis


Disposition:  On RA and med surg, accepted to ARU and Dr Brink to take over 

care. Appreciate Neurology, Nephrology and Pulmonary recs. ARU today.





Subjective


24 Hr Interval Summary


Free Text/Dictation


Patient doing better and remains stable and accepted at ARU today, Dr Brink 

to take over care while in ARU 


Afebrile 


On Zosyn





Exam/Review of Systems


Vital Signs


Vitals





 Vital Signs








  Date Time  Temp Pulse Resp B/P Pulse Ox O2 Delivery O2 Flow Rate FiO2


 


2/16/17 09:14  108 18  98   21


 


2/16/17 07:45 98.8   138/86    


 


2/15/17 06:05      Room Air  














 Intake and Output   


 


 2/15/17 2/15/17 2/16/17





 15:00 23:00 07:00


 


Intake Total 1105 ml 140 ml 875 ml


 


Output Total 1500 ml  0 ml


 


Balance -395 ml 140 ml 875 ml











Exam


Constitutional:  alert, oriented (x3)


Respiratory:  clear to auscultation, normal air movement


Gastrointestinal:  non-tender, soft


Musculoskeletal:  nl extremities to inspection


Extremities:  normal pulses


Neurological:  CNS II-XII intact, nl mental status, nl speech, other (much 

stronger )





Results


Result Diagram:  


2/14/17 0630                                                                   

             2/16/17 0410





Results 24 hrs





Laboratory Tests








Test


  2/16/17


04:10


 


Anion Gap 21  H


 


Blood Urea Nitrogen 43  #H


 


Calcium Level 8.7  


 


Carbon Dioxide Level 28  


 


Chloride Level 91  L


 


Creatinine 5.96  H


 


Glucose Level 106  


 


Magnesium Level 2.1  


 


Phosphorus Level 4.9  


 


Potassium Level 3.6  


 


Sodium Level 136  











Medications


Medications





 Current Medications


Labetalol HCl (Labetalol) 10 mg Q10M  PRN IV ELEVATED BLOOD PRESSURE Last 

administered on 2/9/17at 12:05; Admin Dose 10 MG;  Start 1/31/17 at 14:00


Ondansetron HCl (Zofran Inj) 4 mg Q6H  PRN IV NAUSEA AND/OR VOMITING Last 

administered on 2/15/17at 21:10; Admin Dose 4 MG;  Start 1/31/17 at 14:00


Acetaminophen (Tylenol Supp) 650 mg Q4H  PRN IN PAIN LEVEL 1-3 OR FEVER;  Start 

1/31/17 at 14:00


Eye Lubricant (Artificial Tears Oph) 1 drop TID BOTH EYES  Last administered on 

2/16/17at 08:27; Admin Dose 1 DROP;  Start 1/31/17 at 21:00


Multivit/Ca Carb/ B Cmplx/FA/Prenat (Charlotte-Argenis) 1 tab DAILY PO  Last 

administered on 2/16/17at 08:27; Admin Dose 1 TAB;  Start 2/1/17 at 09:00


Hydromorphone HCl (Dilaudid) 0.5 mg Q4H  PRN IV PAIN Last administered on 2/14/ 17at 11:44; Admin Dose 0.5 MG;  Start 1/31/17 at 21:30


Atorvastatin Calcium 20 mg 20 mg QHS NGT  Last administered on 2/15/17at 20:25; 

Admin Dose 20 MG;  Start 2/6/17 at 21:00


Levetiracetam/ Dextrose (Keppra Iv/D5W) 105 ml @  420 mls/hr DAILY IVPB  Last 

administered on 2/16/17at 08:27; Admin Dose 420 MLS/HR;  Start 2/7/17 at 20:00


Cyclobenzaprine HCl (Flexeril) 5 mg Q8  PRN PO MUSCLE SPASM  Last administered 

on 2/8/17at 10:27; Admin Dose 5 MG;  Start 2/8/17 at 10:24


Famotidine 20 mg 20 mg DAILY NGT  Last administered on 2/16/17at 08:27; Admin 

Dose 20 MG;  Start 2/10/17 at 09:00


Piperacillin Sod/ Tazobactam Sod (Zosyn 2.25gm/ 50ml (Pmx)) 50 ml @  100 mls/hr 

Q12 IVPB  Last administered on 2/16/17at 09:09; Admin Dose 100 MLS/HR;  Start 2/

10/17 at 14:00


Acetaminophen (Tylenol Liquid) 650 mg Q6H  PRN NGT PAIN AND OR ELEVATED TEMP 

Last administered on 2/15/17at 21:10; Admin Dose 650 MG;  Start 2/10/17 at 21:30


Sevelamer Carbonate 2.4 gm 2.4 gm Q8 NGT  Last administered on 2/16/17at 05:45; 

Admin Dose 2.4 GM;  Start 2/10/17 at 22:00


Dextrose (D5W) 1,000 ml @  75 mls/hr [id number] IV  Last administered on 2/16/17at 

01:49; Admin Dose 75 MLS/HR;  Start 2/13/17 at 18:00











CECILIO JIMENEZ Feb 16, 2017 11:20

## 2017-02-16 NOTE — PDOCDIS
Discharge Instructions


CONDITION


Patient Condition:  Stable





HOME CARE INSTRUCTIONS:


Diet Instructions:  pureed,renalSpecial Diet:  pureed,renal





ACTIVITY:








Activity Restrictions:   Slowly Increase Activity





 Rest between Activity





Activity Restrictions Comment:  fall precaution





FOLLOW UP/APPOINTMENTS


Appointments


Follow up with Nephrology for HD 


Dr Brink to take over care while in [facility].











CECILIO JIMENEZ Feb 16, 2017 11:36

## 2017-02-16 NOTE — CONS
Date/Time of Note


Date/Time of Note


DATE: 2/16/17 


TIME: 13:38





Assessment/Plan


Assessment/Plan


Chief Complaint/Hosp Course


IMPRESSION:


1.  The patient has acute hemorrhagic stroke./sdh/trauma


2.  Malignant hypertension.better


3.  Endstage renal disease.


4.  Leukocytosis. better


5.  Respiratory failure.


6.  Incomplete database


7 hyperkalemia hx


8  s/pSUBCUTANEOUS EPMHYSEMA/


9 hypernatremia better


10 hyperphosphatemia better


plan hd


per neuro


continue hd


Problems:  





Consultation Date/Type/Reason


Admit Date/Time


Jan 31, 2017 at 13:31


Initial Consult Date


1/31/17


Type of Consultation:  renal





24 HR Interval Summary


Constitutional:  no complaints





Exam/Review of Systems


Vital Signs


Vitals





 Vital Signs








  Date Time  Temp Pulse Resp B/P Pulse Ox O2 Delivery O2 Flow Rate FiO2


 


2/16/17 09:14  108 18  98   21


 


2/16/17 07:45 98.8   138/86    


 


2/15/17 06:05      Room Air  














 Intake and Output   


 


 2/15/17 2/15/17 2/16/17





 15:00 23:00 07:00


 


Intake Total 1105 ml 140 ml 875 ml


 


Output Total 1500 ml  0 ml


 


Balance -395 ml 140 ml 875 ml











Exam


Neck:  supple


Respiratory:  clear to auscultation


Cardiovascular:  regular rate and rhythm


Gastrointestinal:  soft


Neurological:  other (awake alert )





Results


Result Diagram:  


2/14/17 0630                                                                   

             2/16/17 0410





Results 24 hrs





Laboratory Tests








Test


  2/16/17


04:10


 


Anion Gap 21  H


 


Blood Urea Nitrogen 43  #H


 


Calcium Level 8.7  


 


Carbon Dioxide Level 28  


 


Chloride Level 91  L


 


Creatinine 5.96  H


 


Glucose Level 106  


 


Magnesium Level 2.1  


 


Phosphorus Level 4.9  


 


Potassium Level 3.6  


 


Sodium Level 136  











Medications


Medications





 Current Medications


Labetalol HCl (Labetalol) 10 mg Q10M  PRN IV ELEVATED BLOOD PRESSURE Last 

administered on 2/9/17at 12:05; Admin Dose 10 MG;  Start 1/31/17 at 14:00


Ondansetron HCl (Zofran Inj) 4 mg Q6H  PRN IV NAUSEA AND/OR VOMITING Last 

administered on 2/15/17at 21:10; Admin Dose 4 MG;  Start 1/31/17 at 14:00


Acetaminophen (Tylenol Supp) 650 mg Q4H  PRN [place] PAIN LEVEL 1-3 OR FEVER;  Start 

1/31/17 at 14:00


Eye Lubricant (Artificial Tears Oph) 1 drop TID BOTH EYES  Last administered on 

2/16/17at 13:18; Admin Dose 1 DROP;  Start 1/31/17 at 21:00


Multivit/Ca Carb/ B Cmplx/FA/Prenat (Charlotte-Argenis) 1 tab DAILY PO  Last 

administered on 2/16/17at 08:27; Admin Dose 1 TAB;  Start 2/1/17 at 09:00


Hydromorphone HCl (Dilaudid) 0.5 mg Q4H  PRN IV PAIN Last administered on 2/14/ 17at 11:44; Admin Dose 0.5 MG;  Start 1/31/17 at 21:30


Atorvastatin Calcium 20 mg 20 mg QHS NGT  Last administered on 2/15/17at 20:25; 

Admin Dose 20 MG;  Start 2/6/17 at 21:00


Levetiracetam/ Dextrose (Keppra Iv/D5W) 105 ml @  420 mls/hr DAILY IVPB  Last 

administered on 2/16/17at 08:27; Admin Dose 420 MLS/HR;  Start 2/7/17 at 20:00


Cyclobenzaprine HCl (Flexeril) 5 mg Q8  PRN PO MUSCLE SPASM  Last administered 

on 2/8/17at 10:27; Admin Dose 5 MG;  Start 2/8/17 at 10:24


Famotidine 20 mg 20 mg DAILY NGT  Last administered on 2/16/17at 08:27; Admin 

Dose 20 MG;  Start 2/10/17 at 09:00


Piperacillin Sod/ Tazobactam Sod (Zosyn 2.25gm/ 50ml (Pmx)) 50 ml @  100 mls/hr 

Q12 IVPB  Last administered on 2/16/17at 09:09; Admin Dose 100 MLS/HR;  Start 2/

10/17 at 14:00


Acetaminophen (Tylenol Liquid) 650 mg Q6H  PRN NGT PAIN AND OR ELEVATED TEMP 

Last administered on 2/15/17at 21:10; Admin Dose 650 MG;  Start 2/10/17 at 21:30


Sevelamer Carbonate 2.4 gm 2.4 gm Q8 NGT  Last administered on 2/16/17at 13:18; 

Admin Dose 2.4 GM;  Start 2/10/17 at 22:00


Dextrose (D5W) 1,000 ml @  75 mls/hr [id number] IV  Last administered on 2/16/17at 

01:49; Admin Dose 75 MLS/HR;  Start 2/13/17 at 18:00











PRITESH MACKEY MD Feb 16, 2017 13:40

## 2017-02-17 VITALS — HEART RATE: 125 BPM | SYSTOLIC BLOOD PRESSURE: 116 MMHG | DIASTOLIC BLOOD PRESSURE: 71 MMHG

## 2017-02-17 VITALS — DIASTOLIC BLOOD PRESSURE: 88 MMHG | SYSTOLIC BLOOD PRESSURE: 122 MMHG | HEART RATE: 100 BPM

## 2017-02-17 VITALS — DIASTOLIC BLOOD PRESSURE: 82 MMHG | SYSTOLIC BLOOD PRESSURE: 115 MMHG | HEART RATE: 97 BPM

## 2017-02-17 VITALS — HEART RATE: 110 BPM | DIASTOLIC BLOOD PRESSURE: 58 MMHG | SYSTOLIC BLOOD PRESSURE: 110 MMHG

## 2017-02-17 VITALS — DIASTOLIC BLOOD PRESSURE: 72 MMHG | SYSTOLIC BLOOD PRESSURE: 111 MMHG | RESPIRATION RATE: 18 BRPM

## 2017-02-17 VITALS — DIASTOLIC BLOOD PRESSURE: 76 MMHG | SYSTOLIC BLOOD PRESSURE: 105 MMHG | HEART RATE: 106 BPM

## 2017-02-17 VITALS — SYSTOLIC BLOOD PRESSURE: 117 MMHG | DIASTOLIC BLOOD PRESSURE: 84 MMHG | HEART RATE: 109 BPM

## 2017-02-17 VITALS — DIASTOLIC BLOOD PRESSURE: 82 MMHG | HEART RATE: 93 BPM | SYSTOLIC BLOOD PRESSURE: 115 MMHG

## 2017-02-17 VITALS — DIASTOLIC BLOOD PRESSURE: 82 MMHG | HEART RATE: 100 BPM | SYSTOLIC BLOOD PRESSURE: 119 MMHG

## 2017-02-17 VITALS — HEART RATE: 93 BPM | DIASTOLIC BLOOD PRESSURE: 82 MMHG | SYSTOLIC BLOOD PRESSURE: 115 MMHG

## 2017-02-17 VITALS — HEART RATE: 85 BPM | RESPIRATION RATE: 16 BRPM | DIASTOLIC BLOOD PRESSURE: 73 MMHG | SYSTOLIC BLOOD PRESSURE: 120 MMHG

## 2017-02-17 VITALS — HEART RATE: 125 BPM | SYSTOLIC BLOOD PRESSURE: 108 MMHG | DIASTOLIC BLOOD PRESSURE: 65 MMHG

## 2017-02-17 VITALS — DIASTOLIC BLOOD PRESSURE: 84 MMHG | SYSTOLIC BLOOD PRESSURE: 120 MMHG | HEART RATE: 102 BPM

## 2017-02-17 LAB
ALBUMIN SERPL-MCNC: 3.4 G/DL (ref 3.3–4.9)
ALBUMIN/GLOB SERPL: 1.09 {RATIO}
ALP SERPL-CCNC: 136 IU/L (ref 42–121)
ALT SERPL-CCNC: 32 IU/L (ref 13–69)
ANION GAP SERPL CALC-SCNC: 25 MMOL/L (ref 8–16)
AST SERPL-CCNC: 24 IU/L (ref 15–46)
BASOPHILS # BLD AUTO: 0 10^3/UL (ref 0–0.1)
BASOPHILS NFR BLD: 0.2 % (ref 0–2)
BILIRUB DIRECT SERPL-MCNC: 0 MG/DL (ref 0–0.2)
BILIRUB SERPL-MCNC: 0.5 MG/DL (ref 0.2–1.3)
BUN SERPL-MCNC: 59 MG/DL (ref 7–20)
CALCIUM SERPL-MCNC: 8.7 MG/DL (ref 8.4–10.2)
CHLORIDE SERPL-SCNC: 90 MMOL/L (ref 97–110)
CO2 SERPL-SCNC: 23 MMOL/L (ref 21–31)
CONDITION: 1
CREAT SERPL-MCNC: 7.79 MG/DL (ref 0.44–1)
EOSINOPHIL # BLD: 0.2 10^3/UL (ref 0–0.5)
EOSINOPHIL NFR BLD: 2.9 % (ref 0–7)
ERYTHROCYTE [DISTWIDTH] IN BLOOD BY AUTOMATED COUNT: 14.6 % (ref 11.5–14.5)
GLOBULIN SER-MCNC: 3.1 G/DL (ref 1.3–3.2)
GLUCOSE SERPL-MCNC: 75 MG/DL (ref 70–220)
HCT VFR BLD CALC: 27.4 % (ref 37–47)
HGB BLD-MCNC: 9.2 G/DL (ref 12–16)
LH ANALYZER COMMENTS: 1
LYMPHOCYTES # BLD AUTO: 0.7 10^3/UL (ref 0.8–2.9)
LYMPHOCYTES NFR BLD AUTO: 14.1 % (ref 15–51)
MCH RBC QN AUTO: 29.1 PG (ref 29–33)
MCHC RBC AUTO-ENTMCNC: 33.6 G/DL (ref 32–37)
MCV RBC AUTO: 86.4 FL (ref 82–101)
MONOCYTES # BLD: 0.4 10^3/UL (ref 0.3–0.9)
MONOCYTES NFR BLD: 6.9 % (ref 0–11)
NEUTROPHILS # BLD: 4 10^3/UL (ref 1.6–7.5)
NEUTROPHILS NFR BLD AUTO: 75.9 % (ref 39–77)
NRBC # BLD MANUAL: 0 10^3/UL (ref 0–0)
NRBC BLD QL: 0 /100WBC (ref 0–0)
PLATELET # BLD: 197 10^3/UL (ref 140–440)
PMV BLD AUTO: 9.5 FL (ref 7.4–10.4)
POTASSIUM SERPL-SCNC: 4.2 MMOL/L (ref 3.5–5.1)
PROT SERPL-MCNC: 6.5 G/DL (ref 6.1–8.1)
RBC # BLD AUTO: 3.17 10^6/UL (ref 4.2–5.4)
SODIUM SERPL-SCNC: 134 MMOL/L (ref 135–144)
WBC # BLD AUTO: 5.3 10^3/UL (ref 4.8–10.8)
WBC # BLD: 5.3 10^3/UL (ref 4.8–10.8)

## 2017-02-17 RX ADMIN — SEVELAMER CARBONATE SCH GM: 2400 POWDER, FOR SUSPENSION ORAL at 14:26

## 2017-02-17 RX ADMIN — SEVELAMER CARBONATE SCH GM: 2400 POWDER, FOR SUSPENSION ORAL at 06:04

## 2017-02-17 RX ADMIN — DOCUSATE SODIUM SCH MG: 100 CAPSULE, LIQUID FILLED ORAL at 23:37

## 2017-02-17 RX ADMIN — IPRATROPIUM BROMIDE AND ALBUTEROL SULFATE SCH ML: .5; 3 SOLUTION RESPIRATORY (INHALATION) at 10:01

## 2017-02-17 RX ADMIN — METOPROLOL TARTRATE SCH MG: 50 TABLET, FILM COATED ORAL at 08:34

## 2017-02-17 RX ADMIN — PIPERACILLIN AND TAZOBACTAM SCH MLS/HR: 2; .25 INJECTION, POWDER, FOR SOLUTION INTRAVENOUS at 08:33

## 2017-02-17 RX ADMIN — LEVETIRACETAM SCH MG: 500 TABLET, FILM COATED ORAL at 08:33

## 2017-02-17 RX ADMIN — PIPERACILLIN AND TAZOBACTAM SCH MLS/HR: 2; .25 INJECTION, POWDER, FOR SOLUTION INTRAVENOUS at 23:45

## 2017-02-17 RX ADMIN — LOSARTAN POTASSIUM SCH MG: 50 TABLET, FILM COATED ORAL at 08:34

## 2017-02-17 RX ADMIN — SENNOSIDES SCH TAB: 8.6 TABLET, FILM COATED ORAL at 23:37

## 2017-02-17 RX ADMIN — FAMOTIDINE SCH MG: 20 TABLET ORAL at 08:33

## 2017-02-17 RX ADMIN — METOPROLOL TARTRATE SCH MG: 50 TABLET, FILM COATED ORAL at 23:40

## 2017-02-17 RX ADMIN — IPRATROPIUM BROMIDE AND ALBUTEROL SULFATE SCH ML: .5; 3 SOLUTION RESPIRATORY (INHALATION) at 17:43

## 2017-02-17 RX ADMIN — ATORVASTATIN CALCIUM SCH MG: 20 TABLET, FILM COATED ORAL at 23:37

## 2017-02-17 RX ADMIN — DOCUSATE SODIUM SCH MG: 100 CAPSULE, LIQUID FILLED ORAL at 08:33

## 2017-02-17 RX ADMIN — SEVELAMER CARBONATE SCH GM: 2400 POWDER, FOR SUSPENSION ORAL at 23:48

## 2017-02-17 NOTE — HP
DATE OF ADMISSION: 02/16/2017

 

HISTORY OF PRESENT ILLNESS:  The patient is an unfortunate 46-year-old female with end-stage renal d
isease on hemodialysis 3 days per week, followed by Dr. Chaney from nephrology standpoint.  The patie
nt also with history of hypertension and GERD.  The patient sustained a ground level fall and with t
rauma to the head, and developed right subdural hematoma and brain contusion and skull fracture.  Jyoti quinn was admitted to [facility] by Dr. Gan and was followed by Dr. Palomo in neuros
urgery consultation.  There is no surgical intervention indicated.  The patient also had iatrogenic 
pneumothorax and had right chest tube placement and subsequent right chest tube removal.  The patien
t also with respiratory failure, was intubated and subsequently extubated.  The patient was also wit
h acute encephalopathy.  The patient was also followed by Dr. Hanks in pulmonology consultation.  
The patient was admitted to acute rehabilitation center for further evaluation and care, and physica
l and occupational therapy for impairment in mobility and cognition.

 

PAST MEDICAL HISTORY:  Per HPI.

 

PAST SURGICAL HISTORY:  Status post left upper extremity arteriovenous fistula, status post C-sectio
n in the past.

 

FAMILY HISTORY:  Per medical records, the patient's mother passed away at age of 71.  History of mely
betes and end-stage renal disease in patient's mother.

 

SOCIAL HISTORY:  The patient lives at home with her family.  The patient denies any tobacco use, den
ies any alcohol use, denies any illicit drug use.

 

ALLERGIES:  THE PATIENT IS ALLERGIC TO MORPHINE.

 

MEDICATIONS ON ADMISSION:

1.  Albuterol inhaler.

2.  Lipitor.

3.  Flexeril

4.  Pepcid.

5.  Renvela. 

6.  Cozaar

7.  Keppra.

 

REVIEW OF SYSTEMS:  A 12-point review of systems is negative unless what mentioned in the HPI.

 

PHYSICAL  ASSESSMENT

GENERAL:  Well-developed, fragile female in no acute distress.

VITAL SIGNS:  Temperature is 98.4, pulse is 87, blood pressure is 111/72, respiratory rate 18, oxyge
n saturation 97% on room air.

HEENT:  Head is atraumatic, normocephalic.  Pupils equal, round, reactive to light and accommodation
.  Oral mucosa is pink, moist.

NECK:  Supple, no cervical lymphadenopathy, no thyromegaly.

CHEST:  Lungs clear bilaterally, slightly diminished at the bases.  There is no rhonchi, wheezes, ra
les noted.

CARDIOVASCULAR:  Normal S1, S2.  No murmurs, gallops, clicks, rubs noted.

ABDOMEN:  Round, soft, nondistended, nontender.  Bowel sounds present.  There is no guarding, no bradford
ound tenderness.

EXTREMITIES:  There is no edema, clubbing, cyanosis.  Pulses equal bilaterally 2+.  The patient has 
a left upper extremity arteriovenous fistula with a palpable thrill and audible bruit.

SKIN:  There is no rash, petechiae noted.  The patient has a right chest healing scar from the right
 chest tube.

NEUROLOGICAL:  The patient is awake, alert and oriented to name.  Motor strength is 5/5 in upper ext
remities and 3/4 in lower extremities.

 

LABORATORY DATA:  On admission, CBC:  White blood cells 5.3, hemoglobin 9.2, hematocrit 27.4, platel
ets 197.  Chemistry:  Sodium is 134, potassium 4.2, chloride 90, carbon dioxide 23, anion gap 25, BU
N is 59, creatinine 7.79, glucose 75.  AST is 24, ALT is 32, alkaline phosphatase is 136.

 

ASSESSMENT AND PLAN:

1.  Status post right subdural hematoma and brain contusion after traumatic injury.

2.  Status post acute encephalopathy.

3.  Status post respiratory failure with pneumothorax, status post chest tube placement and removal.

4.  End-stage renal disease on hemodialysis.  The patient will be followed by Dr. Chaney from nephrol
ogy standpoint and will undergo hemodialysis 3 times per week.

5.  Hypertension.  Continue patient on Cozaar.

6.  Continue the patient on Keppra.

7.  Hyperlipidemia.  Continue Lipitor.

8.  Dysphagia.  The patient stated that she wants to eat a regular diet.  We will get reevaluation b
y speech therapist for swallow evaluation.

9.  Gastroesophageal reflux disease.  Continue Pepcid.  

10.  Continue physical and occupational therapy.  Further recommendations based on clinical course. 
 

 

Plan of care discussed with Dr. Mahmood.

 

 

Dictated By: IRAJ TINOCO NP for JASON MAHMOOD MD, SR/NTS

DD:    02/17/2017 14:20:39

DT:    02/17/2017 15:31:35

Conf#: [id number]

DID#:  [id number]

## 2017-02-17 NOTE — CONS
DATE OF ADMISSION: 02/16/2017

DATE OF CONSULTATION:  02/17/2017

 

 

 

REHABILITATION IMPAIRMENT CATEGORY:  Traumatic brain injury with bilateral frontal contusions, acute
 subdural hematoma, traumatic subarachnoid hemorrhage, and skull fracture. 

 

ACTIVE COMORBIDITIES:

1.  Right-sided abrasions.

2.  Dysphagia.

3.  End-stage renal disease on dialysis.

4.  Status post respiratory failure.

5.  Hypertension.

6.  Gastroesophageal reflux disease.

7.  Impairments in self-care, mobility and cognition.

 

HISTORY OF PRESENT ILLNESS:  The  patient is a 46-year-old female with a history of multiple medical
 comorbidities, who is status post a mechanical fall at home with resultant traumatic brain injury w
ith notable bifrontal contusions, acute subdural hematoma and traumatic subarachnoid hemorrhage in a
ddition to skull fracture and truncal abrasions.  The patient's hospital course has also been notabl
e for dysphagia in addition to leukocytosis, hypertension, and significant impairments in self-care 
and mobility as compared to baseline.  The [facility] now [facility].  The patient has been cleared by georgia stanley and currently is not felt to warrant neurosurgical intervention.

 

FUNCTIONAL HISTORY:  Prior to recent events, she was independent in self-care tasks and mobility.  C
urrently, she requires maximal assist for self-care and mobility tasks.

 

I have reviewed the preadmission screen and the patient's current functional status is consistent wi
th the preadmission screen.

 

SOCIAL HISTORY:  The patient lives at home with family and hopes to return home upon discharge.

 

PAST MEDICAL HISTORY:

1.  End-stage renal disease on hemodialysis.

2.  Gastroesophageal reflux disease.

3.  Hypertension.

 

CURRENT MEDICATIONS:

1.  Albuterol inhaler.

2.  Lipitor 20 mg p.o. daily.

3.  Flexeril p.r.n.

4.  Pepcid 20 mg daily.

5.  Renvela q. 8 hours.

6.  Losartan 50 mg p.o. daily.

7.  Keppra 500 mg daily.

 

ALLERGIES:  MORPHINE.

 

PHYSICAL EXAMINATION:

VITAL SIGNS:  The patient is currently afebrile with stable vital signs.

HEENT:  The extraocular motions appear intact.  Oropharynx is clear.

NECK:  Supple.

LUNGS:  Clear anteriorly.

CARDIAC:  S1, S2.

ABDOMEN:  Soft, nontender, positive bowel sounds.

NEUROLOGIC:  She is awake and alert.  She is oriented to person.  She will follow simple 1-step comm
ands.  She does demonstrate impaired short-term memory.  She demonstrates antigravity strength in bi
lateral upper extremity and lower extremity.

 

PLAN:  The patient has been admitted for comprehensive interdisciplinary acute rehab and is anticipa
aliyah to tolerate 3 hours of daily therapy in divided doses for at least 5/7 days a week.  Treatment p
baltazar will include:

1.  Physical therapy to focus on bed mobility, transfers, and household ambulation with the goal of 
having the patient reach a standby assist level.

2.  Occupational therapy to focus on hygiene, grooming, dressing, bathing, and toileting activities 
with goal of having the patient reach standby assist level.

3.  Rehabilitation speech therapy for full cognitive assessment and retraining in addition to dyspha
gurwinder management with the goal of having the patient return to baseline cognition and meet nutritional
 needs by mouth.

4.  Rehabilitation nursing for carryover of therapeutic interventions, the goal of continent of chikis
l and bladder, and the goal of patient and family education with regard to the aforementioned issues
.

 

ESTIMATED LENGTH OF STAY:  14 days.

 

DISPOSITION GOAL:  Home with family.

 

REHABILITATION BARRIER:  Cognition.

 

INTERVENTION FOR BARRIER:  Speech therapy.

 

I acknowledge that I performed a full physical examination on this patient within 24 hours of admiss
ion to the rehabilitation unit.  I believe the patient is a good candidate for comprehensive interdi
sciplinary rehab care and is anticipated to make reasonable goals in a reasonable period of time as 
outlined above.

 

 

Dictated By: SURAJ TREJO/BITA

DD:    02/17/2017 12:01:34

DT:    02/17/2017 12:32:37

Conf#: [id number]

DID#:  [id number]

## 2017-02-17 NOTE — CONS
Date/Time of Note


Date/Time of Note


DATE: 2/17/17 


TIME: 13:39





Assessment/Plan


Assessment/Plan


Chief Complaint/Hosp Course


1.  The patient has acute hemorrhagic stroke./sdh/trauma


2.  Malignant hypertension.better


3.  Endstage renal disease.


4.  Leukocytosis. better


5.  Respiratory failure.


6.  ashd


7 hyperkalemia hx


8  s/pSUBCUTANEOUS EPMHYSEMA/s/p chest tube


9 hypernatremia better


10 hyperphosphatemia better


plan continue hd


Problems:  





Consultation Date/Type/Reason


Admit Date/Time


Feb 16, 2017 at 14:40


Initial Consult Date





Type of Consultation:  renal





24 HR Interval Summary


Constitutional:  no complaints





Exam/Review of Systems


Vital Signs


Vitals





 Vital Signs








  Date Time  Temp Pulse Resp B/P Pulse Ox O2 Delivery O2 Flow Rate FiO2


 


2/17/17 10:04  88 20  96   21


 


2/17/17 07:30 98.4   111/72    


 


2/17/17 07:13      Room Air  














 Intake and Output   


 


 2/16/17 2/16/17 2/17/17





 15:00 23:00 07:00


 


Intake Total  290 ml 200 ml


 


Balance  290 ml 200 ml











Exam


Neck:  supple


Respiratory:  clear to auscultation


Cardiovascular:  regular rate and rhythm


Gastrointestinal:  soft


Musculoskeletal:  nl extremities to inspection


Extremities:  normal pulses





Results


Result Diagram:  


2/17/17 0559                                                                   

             2/17/17 0559





Results 24 hrs





Laboratory Tests








Test


  2/17/17


05:59


 


Alanine Aminotransferase


(ALT/SGPT) 32  


 


 


Albumin 3.4  


 


Albumin/Globulin Ratio 1.09  


 


Alkaline Phosphatase 136  H


 


Anion Gap 25  H


 


Aspartate Amino Transf


(AST/SGOT) 24  


 


 


Basophils # 0.0  


 


Basophils % 0.2  


 


Blood Morphology Comment   


 


Blood Urea Nitrogen 59  H


 


Calcium Level 8.7  


 


Carbon Dioxide Level 23  


 


Chloride Level 90  L


 


Creatinine 7.79  H


 


Direct Bilirubin 0.00  


 


Eosinophils # 0.2  


 


Eosinophils % 2.9  


 


Globulin 3.10  


 


Glucose Level 75  


 


Hematocrit 27.4  L


 


Hemoglobin 9.2  L


 


Indirect Bilirubin 0.5  


 


Lymphocytes # 0.7  L


 


Lymphocytes % 14.1  L


 


Mean Corpuscular Hemoglobin 29.1  


 


Mean Corpuscular Hemoglobin


Concent 33.6  


 


 


Mean Corpuscular Volume 86.4  


 


Mean Platelet Volume 9.5  


 


Monocytes # 0.4  


 


Monocytes % 6.9  


 


Neutrophils # 4.0  


 


Neutrophils % 75.9  


 


Nucleated Red Blood Cells # 0.0  


 


Nucleated Red Blood Cells % 0.0  


 


Platelet Count 197  


 


Potassium Level 4.2  


 


Red Blood Count 3.17  L


 


Red Cell Distribution Width 14.6  H


 


Sodium Level 134  L


 


Total Bilirubin 0.5  


 


Total Protein 6.5  


 


White Blood Count 5.3  #











Medications


Medications





 Current Medications


Docusate Sodium (Colace) 100 mg BID PO  Last administered on 2/17/17at 08:33; 

Admin Dose 100 MG;  Start 2/16/17 at 21:00


Senna (Senokot) 1 tab QHS PO  Last administered on 2/16/17at 20:55; Admin Dose 

1 TAB;  Start 2/16/17 at 21:00


Acetaminophen (Tylenol Tab) 650 mg Q4H  PRN PO PAIN Last administered on 2/16/ 17at 20:55; Admin Dose 650 MG;  Start 2/16/17 at 16:00


Bisacodyl (Dulcolax Supp) 10 mg DAILY  PRN VT CONSTIPATION;  Start 2/16/17 at 16

:00


Magnesium Hydroxide (Milk Of Mag) 30 ml BID  PRN PO CONSTIPATION;  Start 2/16/ 17 at 16:00


Lactulose (Enulose) 20 gm DAILY  PRN PO CONSTIPATION;  Start 2/16/17 at 16:00


Atorvastatin Calcium (Lipitor) 20 mg QHS PO  Last administered on 2/16/17at 20:

55; Admin Dose 20 MG;  Start 2/16/17 at 21:00


Cyclobenzaprine HCl (Flexeril) 5 mg Q8  PRN PO MUSCLE SPASM ;  Start 2/16/17 at 

16:00


Famotidine (Pepcid) 20 mg DAILY PO  Last administered on 2/17/17at 08:33; Admin 

Dose 20 MG;  Start 2/17/17 at 09:00


Acetaminophen (Tylenol Liquid) 650 mg Q6H  PRN PO PAIN AND OR ELEVATED TEMP;  

Start 2/16/17 at 16:00


Sevelamer Carbonate (Renvela) 2.4 gm Q8 PO  Last administered on 2/17/17at 06:04

; Admin Dose 2.4 GM;  Start 2/16/17 at 22:00


Losartan Potassium (Cozaar) 50 mg DAILY PO  Last administered on 2/17/17at 08:34

; Admin Dose 50 MG;  Start 2/17/17 at 09:00


Ondansetron HCl (Zofran Tab) 4 mg Q8H  PRN PO NAUSEA AND/OR VOMITING;  Start 2/ 16/17 at 16:30


Metoprolol Tartrate (Lopressor) 50 mg BID PO  Last administered on 2/17/17at 08:

34; Admin Dose 50 MG;  Start 2/16/17 at 21:00


Multivit/Ca Carb/ B Cmplx/FA/Prenat 1 tab 1 tab DAILY PO  Last administered on 2 /17/17at 08:33; Admin Dose 1 TAB;  Start 2/17/17 at 09:00


Piperacillin Sod/ Tazobactam Sod (Zosyn 2.25gm/ 50ml (Pmx)) 50 ml @  100 mls/hr 

Q12 IVPB  Last administered on 2/17/17at 08:33; Admin Dose 100 MLS/HR;  Start 2/ 16/17 at 21:00


Levetiracetam (Keppra) 500 mg DAILY PO  Last administered on 2/17/17at 08:33; 

Admin Dose 500 MG;  Start 2/17/17 at 09:00











PRITESH MACKEY MD Feb 17, 2017 13:40

## 2017-02-18 VITALS — SYSTOLIC BLOOD PRESSURE: 99 MMHG | HEART RATE: 102 BPM | DIASTOLIC BLOOD PRESSURE: 65 MMHG | RESPIRATION RATE: 18 BRPM

## 2017-02-18 VITALS — RESPIRATION RATE: 20 BRPM | SYSTOLIC BLOOD PRESSURE: 100 MMHG | DIASTOLIC BLOOD PRESSURE: 63 MMHG

## 2017-02-18 VITALS — DIASTOLIC BLOOD PRESSURE: 67 MMHG | RESPIRATION RATE: 20 BRPM | SYSTOLIC BLOOD PRESSURE: 99 MMHG

## 2017-02-18 VITALS — HEART RATE: 107 BPM | SYSTOLIC BLOOD PRESSURE: 105 MMHG | DIASTOLIC BLOOD PRESSURE: 64 MMHG

## 2017-02-18 VITALS — SYSTOLIC BLOOD PRESSURE: 96 MMHG | RESPIRATION RATE: 16 BRPM | HEART RATE: 102 BPM | DIASTOLIC BLOOD PRESSURE: 60 MMHG

## 2017-02-18 RX ADMIN — PIPERACILLIN AND TAZOBACTAM SCH MLS/HR: 2; .25 INJECTION, POWDER, FOR SOLUTION INTRAVENOUS at 20:58

## 2017-02-18 RX ADMIN — SENNOSIDES SCH TAB: 8.6 TABLET, FILM COATED ORAL at 20:58

## 2017-02-18 RX ADMIN — SEVELAMER CARBONATE SCH GM: 2400 POWDER, FOR SUSPENSION ORAL at 06:28

## 2017-02-18 RX ADMIN — METOPROLOL TARTRATE SCH MG: 50 TABLET, FILM COATED ORAL at 08:41

## 2017-02-18 RX ADMIN — METOPROLOL TARTRATE SCH MG: 50 TABLET, FILM COATED ORAL at 21:00

## 2017-02-18 RX ADMIN — SEVELAMER CARBONATE SCH GM: 2400 POWDER, FOR SUSPENSION ORAL at 14:04

## 2017-02-18 RX ADMIN — IPRATROPIUM BROMIDE AND ALBUTEROL SULFATE SCH ML: .5; 3 SOLUTION RESPIRATORY (INHALATION) at 08:08

## 2017-02-18 RX ADMIN — LEVETIRACETAM SCH MG: 500 TABLET, FILM COATED ORAL at 08:38

## 2017-02-18 RX ADMIN — FAMOTIDINE SCH MG: 20 TABLET ORAL at 08:37

## 2017-02-18 RX ADMIN — DOCUSATE SODIUM SCH MG: 100 CAPSULE, LIQUID FILLED ORAL at 08:41

## 2017-02-18 RX ADMIN — IPRATROPIUM BROMIDE AND ALBUTEROL SULFATE SCH ML: .5; 3 SOLUTION RESPIRATORY (INHALATION) at 00:10

## 2017-02-18 RX ADMIN — ATORVASTATIN CALCIUM SCH MG: 20 TABLET, FILM COATED ORAL at 20:58

## 2017-02-18 RX ADMIN — SEVELAMER CARBONATE SCH GM: 2400 POWDER, FOR SUSPENSION ORAL at 21:01

## 2017-02-18 RX ADMIN — LOSARTAN POTASSIUM SCH MG: 50 TABLET, FILM COATED ORAL at 08:41

## 2017-02-18 RX ADMIN — PIPERACILLIN AND TAZOBACTAM SCH MLS/HR: 2; .25 INJECTION, POWDER, FOR SOLUTION INTRAVENOUS at 08:37

## 2017-02-18 RX ADMIN — DOCUSATE SODIUM SCH MG: 100 CAPSULE, LIQUID FILLED ORAL at 20:58

## 2017-02-18 RX ADMIN — IPRATROPIUM BROMIDE AND ALBUTEROL SULFATE SCH ML: .5; 3 SOLUTION RESPIRATORY (INHALATION) at 15:13

## 2017-02-18 NOTE — CONS
Date/Time of Note


Date/Time of Note


DATE: 2/18/17 


TIME: 12:33





Assessment/Plan


Assessment/Plan


Chief Complaint/Hosp Course


1.  The patient has acute hemorrhagic stroke./sdh/trauma better


2.  Malignant hypertension.better


3.  Endstage renal disease.


4.  Leukocytosis. better


5.  Respiratory failure.off vent


6.  ashd


7 hyperkalemia hx


8  s/pSUBCUTANEOUS EPMHYSEMA/s/p chest tube


9 hypernatremia better


10 hyperphosphatemia better


plan continue hd


Problems:  





Consultation Date/Type/Reason


Admit Date/Time


Feb 16, 2017 at 14:40


Type of Consultation:  renal





24 HR Interval Summary


Constitutional:  no complaints





Exam/Review of Systems


Vital Signs


Vitals





 Vital Signs








  Date Time  Temp Pulse Resp B/P Pulse Ox O2 Delivery O2 Flow Rate FiO2


 


2/18/17 08:47  102 16 96/60 97 Room Air  


 


2/18/17 08:08        21


 


2/18/17 08:00 97.5       














 Intake and Output   


 


 2/17/17 2/17/17 2/18/17





 15:00 23:00 07:00


 


Intake Total 50 ml 600 ml 


 


Output Total  2100 ml 


 


Balance 50 ml -1500 ml 











Exam


Respiratory:  clear to auscultation


Cardiovascular:  regular rate and rhythm


Gastrointestinal:  soft


Musculoskeletal:  nl extremities to inspection





Results


Result Diagram:  


2/17/17 0559                                                                   

             2/17/17 0559








Medications


Medications





 Current Medications


Docusate Sodium (Colace) 100 mg BID PO  Last administered on 2/18/17at 08:41; 

Admin Dose 100 MG;  Start 2/16/17 at 21:00


Senna (Senokot) 1 tab QHS PO  Last administered on 2/17/17at 23:37; Admin Dose 

1 TAB;  Start 2/16/17 at 21:00


Acetaminophen (Tylenol Tab) 650 mg Q4H  PRN PO PAIN Last administered on 2/18/ 17at 07:45; Admin Dose 650 MG;  Start 2/16/17 at 16:00


Bisacodyl (Dulcolax Supp) 10 mg DAILY  PRN [place] CONSTIPATION;  Start 2/16/17 at 16

:00


Magnesium Hydroxide (Milk Of Mag) 30 ml BID  PRN PO CONSTIPATION;  Start 2/16/ 17 at 16:00


Lactulose (Enulose) 20 gm DAILY  PRN PO CONSTIPATION;  Start 2/16/17 at 16:00


Atorvastatin Calcium (Lipitor) 20 mg QHS PO  Last administered on 2/17/17at 23:

37; Admin Dose 20 MG;  Start 2/16/17 at 21:00


Cyclobenzaprine HCl (Flexeril) 5 mg Q8  PRN PO MUSCLE SPASM ;  Start 2/16/17 at 

16:00


Famotidine (Pepcid) 20 mg DAILY PO  Last administered on 2/18/17at 08:37; Admin 

Dose 20 MG;  Start 2/17/17 at 09:00


Acetaminophen (Tylenol Liquid) 650 mg Q6H  PRN PO PAIN AND OR ELEVATED TEMP 

Last administered on 2/17/17at 21:49; Admin Dose 650 MG;  Start 2/16/17 at 16:00


Sevelamer Carbonate (Renvela) 2.4 gm Q8 PO  Last administered on 2/18/17at 06:28

; Admin Dose 2.4 GM;  Start 2/16/17 at 22:00


Losartan Potassium (Cozaar) 50 mg DAILY PO  Last administered on 2/17/17at 08:34

; Admin Dose 50 MG;  Start 2/17/17 at 09:00


Ondansetron HCl (Zofran Tab) 4 mg Q8H  PRN PO NAUSEA AND/OR VOMITING;  Start 2/ 16/17 at 16:30


Metoprolol Tartrate (Lopressor) 50 mg BID PO  Last administered on 2/17/17at 23:

40; Admin Dose 50 MG;  Start 2/16/17 at 21:00


Multivit/Ca Carb/ B Cmplx/FA/Prenat 1 tab 1 tab DAILY PO  Last administered on 2 /18/17at 08:42; Admin Dose 1 TAB;  Start 2/17/17 at 09:00


Piperacillin Sod/ Tazobactam Sod (Zosyn 2.25gm/ 50ml (Pmx)) 50 ml @  100 mls/hr 

Q12 IVPB  Last administered on 2/18/17at 08:37; Admin Dose 100 MLS/HR;  Start 2/ 16/17 at 21:00


Levetiracetam (Keppra) 500 mg DAILY PO  Last administered on 2/18/17at 08:38; 

Admin Dose 500 MG;  Start 2/17/17 at 09:00











PRITESH MACKEY MD Feb 18, 2017 12:34

## 2017-02-18 NOTE — PN
Date/Time of Note


Date/Time of Note


DATE: 2/18/17 


TIME: 14:53





Assessment/Plan


VTE Prophylaxis


VTE Prophylaxis Intervention:  SCD's





Lines/Catheters


IV Catheter Type (from Nrsg):  Saline Lock


Urinary Cath still in place:  No





Assessment/Plan


Assessment/Plan


1. S/p Fall with traumatic brain injury with acute subdural hematoma, bilateral 

frontal lobe contusions, skull fractures, treated conservatively per NSGY, with 

impaired mobility/gait/ADLs/cognition. Continue PT/OT/ST. Mod assist for 

transfers, gait max assist 10ft with FWW. On keppra per NSGY. Monitor for any 

new neurological changes.


2.  Dysphagia. Continue modified diet per ST. Swallowing training with ST.


3.  End-stage renal disease on dialysis per nephrology.


4.  Status post respiratory failure with pneumothorax requiring chest tube 

placement. Pulmonary status stable, monitor.


5.  Hypertension. Low BP this morning, internal medicine and nephrology aware, 

BP meds held this morning.


6.  Gastroesophageal reflux disease. Continue pepcid.





Subjective


24 Hr Interval Summary


Free Text/Dictation


Rehab progress note





Subjective: Nursing reports BP low this morning. Patient reports no dizziness. 

Reports headache this morning, which she reports has been present 

intermittently during hospitalization, denies acute changes. No other 

associated symptoms.





ROS: Denies new weakness or new paresthesias, no new visual changes, no chest 

pain, no shortness of breath, no abdominal pain.





Exam/Review of Systems


Vital Signs


Vitals





 Vital Signs








  Date Time  Temp Pulse Resp B/P Pulse Ox O2 Delivery O2 Flow Rate FiO2


 


2/18/17 08:47  102 16 96/60 97 Room Air  


 


2/18/17 08:08        21


 


2/18/17 08:00 97.5       














 Intake and Output   


 


 2/17/17 2/17/17 2/18/17





 15:00 23:00 07:00


 


Intake Total 50 ml 600 ml 


 


Output Total  2100 ml 


 


Balance 50 ml -1500 ml 











Exam


General: Awake, alert, no acute distress


CV: Regular rate, s1s2


Lungs: Respirations are nonlabored, no wheezing


Abdomen soft, nontender


Extremities without cyanosis, no new swelling


Neuro: Antigravity strength present. Denies new sensory changes.





Results


Result Diagram:  


2/17/17 0559                                                                   

             2/17/17 0559








Medications


Medications





 Current Medications


Docusate Sodium (Colace) 100 mg BID PO  Last administered on 2/18/17at 08:41; 

Admin Dose 100 MG;  Start 2/16/17 at 21:00


Senna (Senokot) 1 tab QHS PO  Last administered on 2/17/17at 23:37; Admin Dose 

1 TAB;  Start 2/16/17 at 21:00


Acetaminophen (Tylenol Tab) 650 mg Q4H  PRN PO PAIN Last administered on 2/18/ 17at 07:45; Admin Dose 650 MG;  Start 2/16/17 at 16:00


Bisacodyl (Dulcolax Supp) 10 mg DAILY  PRN [place] CONSTIPATION;  Start 2/16/17 at 16

:00


Magnesium Hydroxide (Milk Of Mag) 30 ml BID  PRN PO CONSTIPATION;  Start 2/16/ 17 at 16:00


Lactulose (Enulose) 20 gm DAILY  PRN PO CONSTIPATION;  Start 2/16/17 at 16:00


Atorvastatin Calcium (Lipitor) 20 mg QHS PO  Last administered on 2/17/17at 23:

37; Admin Dose 20 MG;  Start 2/16/17 at 21:00


Cyclobenzaprine HCl (Flexeril) 5 mg Q8  PRN PO MUSCLE SPASM ;  Start 2/16/17 at 

16:00


Famotidine (Pepcid) 20 mg DAILY PO  Last administered on 2/18/17at 08:37; Admin 

Dose 20 MG;  Start 2/17/17 at 09:00


Acetaminophen (Tylenol Liquid) 650 mg Q6H  PRN PO PAIN AND OR ELEVATED TEMP 

Last administered on 2/17/17at 21:49; Admin Dose 650 MG;  Start 2/16/17 at 16:00


Sevelamer Carbonate (Renvela) 2.4 gm Q8 PO  Last administered on 2/18/17at 14:04

; Admin Dose 2.4 GM;  Start 2/16/17 at 22:00


Losartan Potassium (Cozaar) 50 mg DAILY PO  Last administered on 2/17/17at 08:34

; Admin Dose 50 MG;  Start 2/17/17 at 09:00


Ondansetron HCl (Zofran Tab) 4 mg Q8H  PRN PO NAUSEA AND/OR VOMITING;  Start 2/ 16/17 at 16:30


Metoprolol Tartrate (Lopressor) 50 mg BID PO  Last administered on 2/17/17at 23:

40; Admin Dose 50 MG;  Start 2/16/17 at 21:00


Multivit/Ca Carb/ B Cmplx/FA/Prenat 1 tab 1 tab DAILY PO  Last administered on 2 /18/17at 08:42; Admin Dose 1 TAB;  Start 2/17/17 at 09:00


Piperacillin Sod/ Tazobactam Sod (Zosyn 2.25gm/ 50ml (Pmx)) 50 ml @  100 mls/hr 

Q12 IVPB  Last administered on 2/18/17at 08:37; Admin Dose 100 MLS/HR;  Start 2/ 16/17 at 21:00


Levetiracetam (Keppra) 500 mg DAILY PO  Last administered on 2/18/17at 08:38; 

Admin Dose 500 MG;  Start 2/17/17 at 09:00











APSEN AVILA Feb 18, 2017 14:58

## 2017-02-18 NOTE — PN
Date/Time of Note


Date/Time of Note


DATE: 2/18/17 


TIME: 10:27





Assessment/Plan


VTE Prophylaxis


VTE Prophylaxis Intervention:  other





Lines/Catheters


IV C[facility]ter Type (from [facility]):  Saline Lock


Urinary Cath still in place:  No





Assessment/Plan


Chief Complaint/Hosp Course


1.  Status post right subdural hematoma and brain contusion after traumatic 

injury.


2.  Status post acute encephalopathy.


3.  Status post respiratory failure with pneumothorax, status post chest tube 

placement and removal.


4.  End-stage renal disease on hemodialysis.  The patient will be followed by 

Dr. Chaney from nephrology standpoint and will undergo hemodialysis 3 times per 

week.


5.  Hypertension.  Continue patient on Cozaar.


6.  Continue the patient on Keppra.


7.  Hyperlipidemia.  Continue Lipitor.


8.  Dysphagia.  The patient stated that she wants to eat a regular diet.  We 

will get reevaluation by speech therapist for swallow evaluation.


9.  Gastroesophageal reflux disease.  Continue Pepcid.  


10.  Continue physical and occupational therapy.  Further recommendations based 

on clinical course.


Problems:  





Subjective


24 Hr Interval Summary


Free Text/Dictation


Patient has no complaints, nurses notices low blood pressure





Exam/Review of Systems


Vital Signs


Vitals





 Vital Signs








  Date Time  Temp Pulse Resp B/P Pulse Ox O2 Delivery O2 Flow Rate FiO2


 


2/18/17 08:47  102 16 96/60 97 Room Air  


 


2/18/17 08:08        21


 


2/18/17 08:00 97.5       














 Intake and Output   


 


 2/17/17 2/17/17 2/18/17





 15:00 23:00 07:00


 


Intake Total 50 ml 600 ml 


 


Output Total  2100 ml 


 


Balance 50 ml -1500 ml 











Exam


Constitutional:  well developed


Head:  atraumatic, normocephalic


Neck:  supple


Respiratory:  diminished breath sounds


Cardiovascular:  regular rate and rhythm


Gastrointestinal:  non-tender, soft





Results


Result Diagram:  


2/17/17 0559                                                                   

             2/17/17 0559








Medications


Medications





 Current Medications


Docusate Sodium (Colace) 100 mg BID PO  Last administered on 2/18/17at 08:41; 

Admin Dose 100 MG;  Start 2/16/17 at 21:00


Senna (Senokot) 1 tab QHS PO  Last administered on 2/17/17at 23:37; Admin Dose 

1 TAB;  Start 2/16/17 at 21:00


Acetaminophen (Tylenol Tab) 650 mg Q4H  PRN PO PAIN Last administered on 2/18/ 17at 07:45; Admin Dose 650 MG;  Start 2/16/17 at 16:00


Bisacodyl (Dulcolax Supp) 10 mg DAILY  PRN [place] CONSTIPATION;  Start 2/16/17 at 16

:00


Magnesium Hydroxide (Milk Of Mag) 30 ml BID  PRN PO CONSTIPATION;  Start 2/16/ 17 at 16:00


Lactulose (Enulose) 20 gm DAILY  PRN PO CONSTIPATION;  Start 2/16/17 at 16:00


Atorvastatin Calcium (Lipitor) 20 mg QHS PO  Last administered on 2/17/17at 23:

37; Admin Dose 20 MG;  Start 2/16/17 at 21:00


Cyclobenzaprine HCl (Flexeril) 5 mg Q8  PRN PO MUSCLE SPASM ;  Start 2/16/17 at 

16:00


Famotidine (Pepcid) 20 mg DAILY PO  Last administered on 2/18/17at 08:37; Admin 

Dose 20 MG;  Start 2/17/17 at 09:00


Acetaminophen (Tylenol Liquid) 650 mg Q6H  PRN PO PAIN AND OR ELEVATED TEMP 

Last administered on 2/17/17at 21:49; Admin Dose 650 MG;  Start 2/16/17 at 16:00


Sevelamer Carbonate (Renvela) 2.4 gm Q8 PO  Last administered on 2/18/17at 06:28

; Admin Dose 2.4 GM;  Start 2/16/17 at 22:00


Losartan Potassium (Cozaar) 50 mg DAILY PO  Last administered on 2/17/17at 08:34

; Admin Dose 50 MG;  Start 2/17/17 at 09:00


Ondansetron HCl (Zofran Tab) 4 mg Q8H  PRN PO NAUSEA AND/OR VOMITING;  Start 2/ 16/17 at 16:30


Metoprolol Tartrate (Lopressor) 50 mg BID PO  Last administered on 2/17/17at 23:

40; Admin Dose 50 MG;  Start 2/16/17 at 21:00


Multivit/Ca Carb/ B Cmplx/FA/Prenat 1 tab 1 tab DAILY PO  Last administered on 2 /18/17at 08:42; Admin Dose 1 TAB;  Start 2/17/17 at 09:00


Piperacillin Sod/ Tazobactam Sod (Zosyn 2.25gm/ 50ml (Pmx)) 50 ml @  100 mls/hr 

Q12 IVPB  Last administered on 2/18/17at 08:37; Admin Dose 100 MLS/HR;  Start 2/ 16/17 at 21:00


Levetiracetam (Keppra) 500 mg DAILY PO  Last administered on 2/18/17at 08:38; 

Admin Dose 500 MG;  Start 2/17/17 at 09:00











HOWIE COLORADO Feb 18, 2017 10:27

## 2017-02-19 VITALS — DIASTOLIC BLOOD PRESSURE: 60 MMHG | SYSTOLIC BLOOD PRESSURE: 99 MMHG | RESPIRATION RATE: 20 BRPM

## 2017-02-19 VITALS — SYSTOLIC BLOOD PRESSURE: 114 MMHG | RESPIRATION RATE: 18 BRPM | DIASTOLIC BLOOD PRESSURE: 65 MMHG

## 2017-02-19 RX ADMIN — METOPROLOL TARTRATE SCH MG: 50 TABLET, FILM COATED ORAL at 20:43

## 2017-02-19 RX ADMIN — DOCUSATE SODIUM SCH MG: 100 CAPSULE, LIQUID FILLED ORAL at 20:41

## 2017-02-19 RX ADMIN — PIPERACILLIN AND TAZOBACTAM SCH MLS/HR: 2; .25 INJECTION, POWDER, FOR SOLUTION INTRAVENOUS at 20:40

## 2017-02-19 RX ADMIN — FAMOTIDINE SCH MG: 20 TABLET ORAL at 08:41

## 2017-02-19 RX ADMIN — LEVETIRACETAM SCH MG: 500 TABLET, FILM COATED ORAL at 08:41

## 2017-02-19 RX ADMIN — METOPROLOL TARTRATE SCH MG: 50 TABLET, FILM COATED ORAL at 20:41

## 2017-02-19 RX ADMIN — METOPROLOL TARTRATE SCH MG: 50 TABLET, FILM COATED ORAL at 08:42

## 2017-02-19 RX ADMIN — IPRATROPIUM BROMIDE AND ALBUTEROL SULFATE SCH ML: .5; 3 SOLUTION RESPIRATORY (INHALATION) at 00:00

## 2017-02-19 RX ADMIN — PIPERACILLIN AND TAZOBACTAM SCH MLS/HR: 2; .25 INJECTION, POWDER, FOR SOLUTION INTRAVENOUS at 08:43

## 2017-02-19 RX ADMIN — IPRATROPIUM BROMIDE AND ALBUTEROL SULFATE SCH ML: .5; 3 SOLUTION RESPIRATORY (INHALATION) at 15:00

## 2017-02-19 RX ADMIN — SEVELAMER CARBONATE SCH GM: 2400 POWDER, FOR SUSPENSION ORAL at 13:30

## 2017-02-19 RX ADMIN — IPRATROPIUM BROMIDE AND ALBUTEROL SULFATE SCH ML: .5; 3 SOLUTION RESPIRATORY (INHALATION) at 23:57

## 2017-02-19 RX ADMIN — LOSARTAN POTASSIUM SCH MG: 50 TABLET, FILM COATED ORAL at 08:42

## 2017-02-19 RX ADMIN — SEVELAMER CARBONATE SCH GM: 2400 POWDER, FOR SUSPENSION ORAL at 21:20

## 2017-02-19 RX ADMIN — ATORVASTATIN CALCIUM SCH MG: 20 TABLET, FILM COATED ORAL at 20:41

## 2017-02-19 RX ADMIN — SEVELAMER CARBONATE SCH GM: 2400 POWDER, FOR SUSPENSION ORAL at 05:33

## 2017-02-19 RX ADMIN — IPRATROPIUM BROMIDE AND ALBUTEROL SULFATE SCH ML: .5; 3 SOLUTION RESPIRATORY (INHALATION) at 09:15

## 2017-02-19 RX ADMIN — SENNOSIDES SCH TAB: 8.6 TABLET, FILM COATED ORAL at 20:41

## 2017-02-19 RX ADMIN — DOCUSATE SODIUM SCH MG: 100 CAPSULE, LIQUID FILLED ORAL at 08:42

## 2017-02-19 NOTE — PN
Date/Time of Note


Date/Time of Note


DATE: 2/19/17 


TIME: 10:55





Assessment/Plan


VTE Prophylaxis


VTE Prophylaxis Intervention:  SCD's





Lines/Catheters


IV Catheter Type (from Nrsg):  Saline Lock


Urinary Cath still in place:  No





Assessment/Plan


Assessment/Plan


1. Status post fall with traumatic brain injury in the setting of acute 

subdural hematoma, bilateral frontal contusions, and skull fractures, managed 

nonoperatively per NSGY, with impaired mobility/gait/ADLs/cognition. Continue PT

/OT/ST. Mod assist for lower body dressing, min assist for upper body dressing. 

Continue Keppra per NSGY.


2.  Dysphagia. Continue modified diet per ST. Maintain aspiration precautions.


3.  End-stage renal disease on dialysis per nephrology.


4.  Status post respiratory failure. Pulmonary status stable, monitor.


5.  Hypertension. BPs have been low. Internal medicine and nephrology aware and 

medically managing.


6.  GERD. Continue Pepcid.





Subjective


24 Hr Interval Summary


Free Text/Dictation


Rehab progress note





Subjective: No acute overnight events per nursing staff. Patient reports no new 

complaints.





ROS: No new visual changes, no new weakness or new paresthesias, no dizziness, 

no shortness of breath, no chest pain, no abdominal pain, no nausea.





Exam/Review of Systems


Vital Signs


Vitals





 Vital Signs








  Date Time  Temp Pulse Resp B/P Pulse Ox O2 Delivery O2 Flow Rate FiO2


 


2/19/17 09:15  87 17  97   21


 


2/18/17 19:00 98.7   99/67    


 


2/18/17 08:47      Room Air  














 Intake and Output   


 


 2/18/17 2/18/17 2/19/17





 15:00 23:00 07:00


 


Intake Total 50 ml 830 ml 200 ml


 


Balance 50 ml 830 ml 200 ml











Exam


General: Laying in bed, awake, no acute distress


CV: Regular rate, s1s2


Lungs: Symmetrical air entry bilaterally, no wheezing


Abdomen soft, nontender, bowel sounds present


Extremities without cyanosis, no new swelling


Neuro: No new focal changes. Follows simple commands.





Results


Result Diagram:  


2/17/17 0559                                                                   

             2/17/17 0559








Medications


Medications





 Current Medications


Docusate Sodium (Colace) 100 mg BID PO  Last administered on 2/19/17at 08:42; 

Admin Dose 100 MG;  Start 2/16/17 at 21:00


Senna (Senokot) 1 tab QHS PO  Last administered on 2/18/17at 20:58; Admin Dose 

1 TAB;  Start 2/16/17 at 21:00


Acetaminophen (Tylenol Tab) 650 mg Q4H  PRN PO PAIN Last administered on 2/19/ 17at 10:31; Admin Dose 650 MG;  Start 2/16/17 at 16:00


Bisacodyl (Dulcolax Supp) 10 mg DAILY  PRN [place] CONSTIPATION;  Start 2/16/17 at 16

:00


Magnesium Hydroxide (Milk Of Mag) 30 ml BID  PRN PO CONSTIPATION;  Start 2/16/ 17 at 16:00


Lactulose (Enulose) 20 gm DAILY  PRN PO CONSTIPATION;  Start 2/16/17 at 16:00


Atorvastatin Calcium (Lipitor) 20 mg QHS PO  Last administered on 2/18/17at 20:

58; Admin Dose 20 MG;  Start 2/16/17 at 21:00


Cyclobenzaprine HCl (Flexeril) 5 mg Q8  PRN PO MUSCLE SPASM ;  Start 2/16/17 at 

16:00


Famotidine (Pepcid) 20 mg DAILY PO  Last administered on 2/19/17at 08:41; Admin 

Dose 20 MG;  Start 2/17/17 at 09:00


Acetaminophen (Tylenol Liquid) 650 mg Q6H  PRN PO PAIN AND OR ELEVATED TEMP 

Last administered on 2/17/17at 21:49; Admin Dose 650 MG;  Start 2/16/17 at 16:00


Sevelamer Carbonate (Renvela) 2.4 gm Q8 PO  Last administered on 2/19/17at 05:33

; Admin Dose 2.4 GM;  Start 2/16/17 at 22:00


Losartan Potassium (Cozaar) 50 mg DAILY PO  Last administered on 2/19/17at 08:42

; Admin Dose 50 MG;  Start 2/17/17 at 09:00


Ondansetron HCl (Zofran Tab) 4 mg Q8H  PRN PO NAUSEA AND/OR VOMITING;  Start 2/ 16/17 at 16:30


Metoprolol Tartrate (Lopressor) 50 mg BID PO  Last administered on 2/19/17at 08:

42; Admin Dose 50 MG;  Start 2/16/17 at 21:00


Multivit/Ca Carb/ B Cmplx/FA/Prenat 1 tab 1 tab DAILY PO  Last administered on 2 /19/17at 08:42; Admin Dose 1 TAB;  Start 2/17/17 at 09:00


Piperacillin Sod/ Tazobactam Sod (Zosyn 2.25gm/ 50ml (Pmx)) 50 ml @  100 mls/hr 

Q12 IVPB  Last administered on 2/19/17at 08:43; Admin Dose 100 MLS/HR;  Start 2/ 16/17 at 21:00


Levetiracetam (Keppra) 500 mg DAILY PO  Last administered on 2/19/17at 08:41; 

Admin Dose 500 MG;  Start 2/17/17 at 09:00











ASPEN AVILA Feb 19, 2017 10:59

## 2017-02-19 NOTE — CONS
Date/Time of Note


Date/Time of Note


DATE: 2/19/17 


TIME: 16:31





Assessment/Plan


Assessment/Plan


Chief Complaint/Hosp Course


1.  The patient has acute hemorrhagic stroke./sdh/trauma better


2.  Malignant hypertension.better


3.  Endstage renal disease.


4.  Leukocytosis. better


5.  Respiratory failure.off vent


6.  ashd


7 hyperkalemia hx


8  s/pSUBCUTANEOUS EPMHYSEMA/s/p chest tube


9 hypernatremia better


10 hyperphosphatemia better


plan continue hd mwf


Problems:  





Consultation Date/Type/Reason


Admit Date/Time


Feb 16, 2017 at 14:40


Type of Consultation:  renal





24 HR Interval Summary


Constitutional:  improved





Exam/Review of Systems


Vital Signs


Vitals





 Vital Signs








  Date Time  Temp Pulse Resp B/P Pulse Ox O2 Delivery O2 Flow Rate FiO2


 


2/19/17 15:01  90 18  97   21


 


2/19/17 07:30 98.7   114/65    


 


2/18/17 08:47      Room Air  














 Intake and Output   


 


 2/18/17 2/18/17 2/19/17





 15:00 23:00 07:00


 


Intake Total 50 ml 830 ml 200 ml


 


Balance 50 ml 830 ml 200 ml











Exam


Neck:  supple


Respiratory:  clear to auscultation


Cardiovascular:  regular rate and rhythm


Gastrointestinal:  soft





Results


Result Diagram:  


2/17/17 0559                                                                   

             2/17/17 0559








Medications


Medications





 Current Medications


Docusate Sodium (Colace) 100 mg BID PO  Last administered on 2/19/17at 08:42; 

Admin Dose 100 MG;  Start 2/16/17 at 21:00


Senna (Senokot) 1 tab QHS PO  Last administered on 2/18/17at 20:58; Admin Dose 

1 TAB;  Start 2/16/17 at 21:00


Acetaminophen (Tylenol Tab) 650 mg Q4H  PRN PO PAIN Last administered on 2/19/ 17at 10:31; Admin Dose 650 MG;  Start 2/16/17 at 16:00


Bisacodyl (Dulcolax Supp) 10 mg DAILY  PRN [place] CONSTIPATION;  Start 2/16/17 at 16

:00


Magnesium Hydroxide (Milk Of Mag) 30 ml BID  PRN PO CONSTIPATION;  Start 2/16/ 17 at 16:00


Lactulose (Enulose) 20 gm DAILY  PRN PO CONSTIPATION;  Start 2/16/17 at 16:00


Atorvastatin Calcium (Lipitor) 20 mg QHS PO  Last administered on 2/18/17at 20:

58; Admin Dose 20 MG;  Start 2/16/17 at 21:00


Cyclobenzaprine HCl (Flexeril) 5 mg Q8  PRN PO MUSCLE SPASM ;  Start 2/16/17 at 

16:00


Famotidine (Pepcid) 20 mg DAILY PO  Last administered on 2/19/17at 08:41; Admin 

Dose 20 MG;  Start 2/17/17 at 09:00


Acetaminophen (Tylenol Liquid) 650 mg Q6H  PRN PO PAIN AND OR ELEVATED TEMP 

Last administered on 2/17/17at 21:49; Admin Dose 650 MG;  Start 2/16/17 at 16:00


Sevelamer Carbonate (Renvela) 2.4 gm Q8 PO  Last administered on 2/19/17at 13:30

; Admin Dose 2.4 GM;  Start 2/16/17 at 22:00


Losartan Potassium (Cozaar) 50 mg DAILY PO  Last administered on 2/19/17at 08:42

; Admin Dose 50 MG;  Start 2/17/17 at 09:00


Ondansetron HCl (Zofran Tab) 4 mg Q8H  PRN PO NAUSEA AND/OR VOMITING;  Start 2/ 16/17 at 16:30


Metoprolol Tartrate (Lopressor) 50 mg BID PO  Last administered on 2/19/17at 08:

42; Admin Dose 50 MG;  Start 2/16/17 at 21:00


Multivit/Ca Carb/ B Cmplx/FA/Prenat 1 tab 1 tab DAILY PO  Last administered on 2 /19/17at 08:42; Admin Dose 1 TAB;  Start 2/17/17 at 09:00


Piperacillin Sod/ Tazobactam Sod (Zosyn 2.25gm/ 50ml (Pmx)) 50 ml @  100 mls/hr 

Q12 IVPB  Last administered on 2/19/17at 08:43; Admin Dose 100 MLS/HR;  Start 2/ 16/17 at 21:00


Levetiracetam (Keppra) 500 mg DAILY PO  Last administered on 2/19/17at 08:41; 

Admin Dose 500 MG;  Start 2/17/17 at 09:00











PRITESH MACKEY MD Feb 19, 2017 16:32

## 2017-02-19 NOTE — PN
Date/Time of Note


Date/Time of Note


DATE: 2/19/17 


TIME: 12:22





Assessment/Plan


VTE Prophylaxis


VTE Prophylaxis Intervention:  other





Lines/Catheters


IV Catheter Type (from [facility]):  Saline Lock


Urinary Cath still in place:  No





Assessment/Plan


Chief Complaint/Hosp Course


1.  Status post right subdural hematoma and brain contusion after traumatic 

injury.


2.  Status post acute encephalopathy.


3.  Status post respiratory failure with pneumothorax, status post chest tube 

placement and removal.


4.  End-stage renal disease on hemodialysis.  The patient will be followed by 

Dr. Chaney from nephrology standpoint and will undergo hemodialysis 3 times per 

week.


5.  Hypertension.  Continue patient on Cozaar.


6.  Continue the patient on Keppra.


7.  Hyperlipidemia.  Continue Lipitor.


8.  Dysphagia.  The patient stated that she wants to eat a regular diet.  We 

will get reevaluation by speech therapist for swallow evaluation.


9.  Gastroesophageal reflux disease.  Continue Pepcid.  


10.  Continue physical and occupational therapy.  Further recommendations based 

on clinical course.


Problems:  





Subjective


24 Hr Interval Summary


Free Text/Dictation


Patient has no complaints





Exam/Review of Systems


Vital Signs


Vitals





 Vital Signs








  Date Time  Temp Pulse Resp B/P Pulse Ox O2 Delivery O2 Flow Rate FiO2


 


2/19/17 09:15  87 17  97   21


 


2/18/17 19:00 98.7   99/67    


 


2/18/17 08:47      Room Air  














 Intake and Output   


 


 2/18/17 2/18/17 2/19/17





 15:00 23:00 07:00


 


Intake Total 50 ml 830 ml 200 ml


 


Balance 50 ml 830 ml 200 ml











Exam


Constitutional:  well developed


Head:  atraumatic, normocephalic


Neck:  supple


Respiratory:  clear to auscultation


Cardiovascular:  regular rate and rhythm


Gastrointestinal:  non-tender, soft





Results


Result Diagram:  


2/17/17 0559                                                                   

             2/17/17 0559








Medications


Medications





 Current Medications


Docusate Sodium (Colace) 100 mg BID PO  Last administered on 2/19/17at 08:42; 

Admin Dose 100 MG;  Start 2/16/17 at 21:00


Senna (Senokot) 1 tab QHS PO  Last administered on 2/18/17at 20:58; Admin Dose 

1 TAB;  Start 2/16/17 at 21:00


Acetaminophen (Tylenol Tab) 650 mg Q4H  PRN PO PAIN Last administered on 2/19/ 17at 10:31; Admin Dose 650 MG;  Start 2/16/17 at 16:00


Bisacodyl (Dulcolax Supp) 10 mg DAILY  PRN MI CONSTIPATION;  Start 2/16/17 at 16

:00


Magnesium Hydroxide (Milk Of Mag) 30 ml BID  PRN PO CONSTIPATION;  Start 2/16/ 17 at 16:00


Lactulose (Enulose) 20 gm DAILY  PRN PO CONSTIPATION;  Start 2/16/17 at 16:00


Atorvastatin Calcium (Lipitor) 20 mg QHS PO  Last administered on 2/18/17at 20:

58; Admin Dose 20 MG;  Start 2/16/17 at 21:00


Cyclobenzaprine HCl (Flexeril) 5 mg Q8  PRN PO MUSCLE SPASM ;  Start 2/16/17 at 

16:00


Famotidine (Pepcid) 20 mg DAILY PO  Last administered on 2/19/17at 08:41; Admin 

Dose 20 MG;  Start 2/17/17 at 09:00


Acetaminophen (Tylenol Liquid) 650 mg Q6H  PRN PO PAIN AND OR ELEVATED TEMP 

Last administered on 2/17/17at 21:49; Admin Dose 650 MG;  Start 2/16/17 at 16:00


Sevelamer Carbonate (Renvela) 2.4 gm Q8 PO  Last administered on 2/19/17at 05:33

; Admin Dose 2.4 GM;  Start 2/16/17 at 22:00


Losartan Potassium (Cozaar) 50 mg DAILY PO  Last administered on 2/19/17at 08:42

; Admin Dose 50 MG;  Start 2/17/17 at 09:00


Ondansetron HCl (Zofran Tab) 4 mg Q8H  PRN PO NAUSEA AND/OR VOMITING;  Start 2/ 16/17 at 16:30


Metoprolol Tartrate (Lopressor) 50 mg BID PO  Last administered on 2/19/17at 08:

42; Admin Dose 50 MG;  Start 2/16/17 at 21:00


Multivit/Ca Carb/ B Cmplx/FA/Prenat 1 tab 1 tab DAILY PO  Last administered on 2 /19/17at 08:42; Admin Dose 1 TAB;  Start 2/17/17 at 09:00


Piperacillin Sod/ Tazobactam Sod (Zosyn 2.25gm/ 50ml (Pmx)) 50 ml @  100 mls/hr 

Q12 IVPB  Last administered on 2/19/17at 08:43; Admin Dose 100 MLS/HR;  Start 2/ 16/17 at 21:00


Levetiracetam (Keppra) 500 mg DAILY PO  Last administered on 2/19/17at 08:41; 

Admin Dose 500 MG;  Start 2/17/17 at 09:00











HOWIE [place] Feb 19, 2017 12:22

## 2017-02-20 VITALS — DIASTOLIC BLOOD PRESSURE: 64 MMHG | HEART RATE: 106 BPM | SYSTOLIC BLOOD PRESSURE: 101 MMHG

## 2017-02-20 VITALS — SYSTOLIC BLOOD PRESSURE: 101 MMHG | DIASTOLIC BLOOD PRESSURE: 62 MMHG | HEART RATE: 99 BPM

## 2017-02-20 VITALS — DIASTOLIC BLOOD PRESSURE: 63 MMHG | SYSTOLIC BLOOD PRESSURE: 105 MMHG | HEART RATE: 101 BPM

## 2017-02-20 VITALS — HEART RATE: 116 BPM | SYSTOLIC BLOOD PRESSURE: 96 MMHG | DIASTOLIC BLOOD PRESSURE: 65 MMHG

## 2017-02-20 VITALS — RESPIRATION RATE: 20 BRPM | SYSTOLIC BLOOD PRESSURE: 112 MMHG | DIASTOLIC BLOOD PRESSURE: 70 MMHG

## 2017-02-20 VITALS — SYSTOLIC BLOOD PRESSURE: 104 MMHG | DIASTOLIC BLOOD PRESSURE: 66 MMHG | HEART RATE: 11 BPM

## 2017-02-20 VITALS — SYSTOLIC BLOOD PRESSURE: 94 MMHG | DIASTOLIC BLOOD PRESSURE: 61 MMHG | RESPIRATION RATE: 18 BRPM

## 2017-02-20 VITALS — SYSTOLIC BLOOD PRESSURE: 112 MMHG | HEART RATE: 102 BPM | DIASTOLIC BLOOD PRESSURE: 70 MMHG

## 2017-02-20 VITALS — SYSTOLIC BLOOD PRESSURE: 105 MMHG | DIASTOLIC BLOOD PRESSURE: 68 MMHG | HEART RATE: 110 BPM

## 2017-02-20 RX ADMIN — IPRATROPIUM BROMIDE AND ALBUTEROL SULFATE SCH ML: .5; 3 SOLUTION RESPIRATORY (INHALATION) at 23:53

## 2017-02-20 RX ADMIN — ATORVASTATIN CALCIUM SCH MG: 20 TABLET, FILM COATED ORAL at 20:16

## 2017-02-20 RX ADMIN — FAMOTIDINE SCH MG: 20 TABLET ORAL at 08:19

## 2017-02-20 RX ADMIN — METOPROLOL TARTRATE SCH MG: 50 TABLET, FILM COATED ORAL at 20:17

## 2017-02-20 RX ADMIN — IPRATROPIUM BROMIDE AND ALBUTEROL SULFATE SCH ML: .5; 3 SOLUTION RESPIRATORY (INHALATION) at 09:19

## 2017-02-20 RX ADMIN — SENNOSIDES SCH TAB: 8.6 TABLET, FILM COATED ORAL at 21:00

## 2017-02-20 RX ADMIN — ONDANSETRON PRN MG: 4 TABLET, FILM COATED ORAL at 18:59

## 2017-02-20 RX ADMIN — SEVELAMER CARBONATE SCH GM: 2400 POWDER, FOR SUSPENSION ORAL at 06:37

## 2017-02-20 RX ADMIN — PIPERACILLIN AND TAZOBACTAM SCH MLS/HR: 2; .25 INJECTION, POWDER, FOR SOLUTION INTRAVENOUS at 08:37

## 2017-02-20 RX ADMIN — METOPROLOL TARTRATE SCH MG: 50 TABLET, FILM COATED ORAL at 08:53

## 2017-02-20 RX ADMIN — LEVETIRACETAM SCH MG: 500 TABLET, FILM COATED ORAL at 08:19

## 2017-02-20 RX ADMIN — SEVELAMER CARBONATE SCH GM: 2400 POWDER, FOR SUSPENSION ORAL at 14:06

## 2017-02-20 RX ADMIN — SEVELAMER CARBONATE SCH GM: 2400 POWDER, FOR SUSPENSION ORAL at 21:11

## 2017-02-20 RX ADMIN — IPRATROPIUM BROMIDE AND ALBUTEROL SULFATE SCH ML: .5; 3 SOLUTION RESPIRATORY (INHALATION) at 16:14

## 2017-02-20 RX ADMIN — DOCUSATE SODIUM SCH MG: 100 CAPSULE, LIQUID FILLED ORAL at 08:19

## 2017-02-20 RX ADMIN — DOCUSATE SODIUM SCH MG: 100 CAPSULE, LIQUID FILLED ORAL at 21:00

## 2017-02-20 RX ADMIN — LOSARTAN POTASSIUM SCH MG: 50 TABLET, FILM COATED ORAL at 08:53

## 2017-02-20 NOTE — CONS
Date/Time of Note


Date/Time of Note


DATE: 2/20/17 


TIME: 11:26





Consult Date/Type/Reason


Admit Date/Time


Feb 16, 2017 at 14:40


Initial Consult Date





Type of Consultation:  renal





Subjective


Patient feeling better.





Objective


pulm-cta


card-s1s2


 Vital Signs








  Date Time  Temp Pulse Resp B/P Pulse Ox O2 Delivery O2 Flow Rate FiO2


 


2/20/17 09:20  92 18  98   21


 


2/19/17 19:19 98.6   99/60    


 


2/18/17 08:47      Room Air  














 Intake and Output   


 


 2/19/17 2/19/17 2/20/17





 15:00 23:00 07:00


 


Intake Total 50 ml 420 ml 


 


Balance 50 ml 420 ml 





INTERDISCIPLINARY TEAM CONFERENCE





BOWEL- Cont


BLADDER-Cont 


SKIN- intact











OT-     DRESSING-min/mod


   BATHING-min/mod


   TOILETING





PT-      BED MOBILITY-mod


   TRANSFERS-mod


   AMBULATION-mod/max 10


   





SPEECH- 


COGNITION-max


   DYPHAGIA-puree








A/P-


Interdisciplinary team conference held today. Please see interdisciplinary 

sheet.  Working toward d.c. on 2/26 with post discharge follow up of physical 

therapy, occupational therapy, speech therapy.





Results/Medications


Result Diagram:  


2/17/17 0559 2/17/17 0559





Medications





 Current Medications


Docusate Sodium (Colace) 100 mg BID PO  Last administered on 2/20/17at 08:19; 

Admin Dose 100 MG;  Start 2/16/17 at 21:00


Senna (Senokot) 1 tab QHS PO  Last administered on 2/19/17at 20:41; Admin Dose 

1 TAB;  Start 2/16/17 at 21:00


Acetaminophen (Tylenol Tab) 650 mg Q4H  PRN PO PAIN Last administered on 2/19/ 17at 10:31; Admin Dose 650 MG;  Start 2/16/17 at 16:00


Bisacodyl (Dulcolax Supp) 10 mg DAILY  PRN [place] CONSTIPATION;  Start 2/16/17 at 16

:00


Magnesium Hydroxide (Milk Of Mag) 30 ml BID  PRN PO CONSTIPATION;  Start 2/16/ 17 at 16:00


Lactulose (Enulose) 20 gm DAILY  PRN PO CONSTIPATION;  Start 2/16/17 at 16:00


Atorvastatin Calcium (Lipitor) 20 mg QHS PO  Last administered on 2/19/17at 20:

41; Admin Dose 20 MG;  Start 2/16/17 at 21:00


Cyclobenzaprine HCl (Flexeril) 5 mg Q8  PRN PO MUSCLE SPASM ;  Start 2/16/17 at 

16:00


Famotidine (Pepcid) 20 mg DAILY PO  Last administered on 2/20/17at 08:19; Admin 

Dose 20 MG;  Start 2/17/17 at 09:00


Acetaminophen (Tylenol Liquid) 650 mg Q6H  PRN PO PAIN AND OR ELEVATED TEMP 

Last administered on 2/17/17at 21:49; Admin Dose 650 MG;  Start 2/16/17 at 16:00


Sevelamer Carbonate (Renvela) 2.4 gm Q8 PO  Last administered on 2/20/17at 06:37

; Admin Dose 2.4 GM;  Start 2/16/17 at 22:00


Losartan Potassium (Cozaar) 50 mg DAILY PO  Last administered on 2/19/17at 08:42

; Admin Dose 50 MG;  Start 2/17/17 at 09:00


Ondansetron HCl (Zofran Tab) 4 mg Q8H  PRN PO NAUSEA AND/OR VOMITING;  Start 2/ 16/17 at 16:30


Metoprolol Tartrate (Lopressor) 50 mg BID PO  Last administered on 2/19/17at 08:

42; Admin Dose 50 MG;  Start 2/16/17 at 21:00


Multivit/Ca Carb/ B Cmplx/FA/Prenat 1 tab 1 tab DAILY PO  Last administered on 2 /20/17at 08:19; Admin Dose 1 TAB;  Start 2/17/17 at 09:00


Piperacillin Sod/ Tazobactam Sod (Zosyn 2.25gm/ 50ml (Pmx)) 50 ml @  100 mls/hr 

Q12 IVPB  Last administered on 2/20/17at 08:37; Admin Dose 100 MLS/HR;  Start 2/ 16/17 at 21:00


Levetiracetam (Keppra) 500 mg DAILY PO  Last administered on 2/20/17at 08:19; 

Admin Dose 500 MG;  Start 2/17/17 at 09:00











SURAJ MACKEY MD Feb 20, 2017 11:26

## 2017-02-20 NOTE — CONS
Date/Time of Note


Date/Time of Note


DATE: 2/20/17 


TIME: 19:56





Assessment/Plan


Assessment/Plan


Chief Complaint/Hosp Course


1.  The patient has acute hemorrhagic stroke./sdh/trauma better


2.  Malignant hypertension.better


3.  Endstage renal disease.


4.  Leukocytosis. better


5.  Respiratory failure.off vent


6.  ashd


7 hyperkalemia hx


8  s/pSUBCUTANEOUS EPMHYSEMA/s/p chest tube


9 hypernatremia better


10 hyperphosphatemia better


plan continue hd mwf


PT/OT


Problems:  





Consultation Date/Type/Reason


Admit Date/Time


Feb 16, 2017 at 14:40


Type of Consultation:  renal





24 HR Interval Summary


Constitutional:  no complaints





Exam/Review of Systems


Vital Signs


Vitals





 Vital Signs








  Date Time  Temp Pulse Resp B/P Pulse Ox O2 Delivery O2 Flow Rate FiO2


 


2/20/17 19:33 98.5 102 20 112/70 94   


 


2/20/17 16:16        21


 


2/18/17 08:47      Room Air  














 Intake and Output   


 


 2/19/17 2/19/17 2/20/17





 15:00 23:00 07:00


 


Intake Total 50 ml 420 ml 


 


Balance 50 ml 420 ml 











Exam


Neck:  supple


Respiratory:  clear to auscultation


Cardiovascular:  regular rate and rhythm


Gastrointestinal:  soft


Musculoskeletal:  nl extremities to inspection


Extremities:  normal pulses





Results


Result Diagram:  


2/17/17 0559                                                                   

             2/17/17 0559








Medications


Medications





 Current Medications


Docusate Sodium (Colace) 100 mg BID PO  Last administered on 2/20/17at 08:19; 

Admin Dose 100 MG;  Start 2/16/17 at 21:00


Senna (Senokot) 1 tab QHS PO  Last administered on 2/19/17at 20:41; Admin Dose 

1 TAB;  Start 2/16/17 at 21:00


Acetaminophen (Tylenol Tab) 650 mg Q4H  PRN PO PAIN Last administered on 2/19/ 17at 10:31; Admin Dose 650 MG;  Start 2/16/17 at 16:00


Bisacodyl (Dulcolax Supp) 10 mg DAILY  PRN [place] CONSTIPATION;  Start 2/16/17 at 16

:00


Magnesium Hydroxide (Milk Of Mag) 30 ml BID  PRN PO CONSTIPATION;  Start 2/16/ 17 at 16:00


Lactulose (Enulose) 20 gm DAILY  PRN PO CONSTIPATION;  Start 2/16/17 at 16:00


Atorvastatin Calcium (Lipitor) 20 mg QHS PO  Last administered on 2/19/17at 20:

41; Admin Dose 20 MG;  Start 2/16/17 at 21:00


Cyclobenzaprine HCl (Flexeril) 5 mg Q8  PRN PO MUSCLE SPASM ;  Start 2/16/17 at 

16:00


Famotidine (Pepcid) 20 mg DAILY PO  Last administered on 2/20/17at 08:19; Admin 

Dose 20 MG;  Start 2/17/17 at 09:00


Acetaminophen (Tylenol Liquid) 650 mg Q6H  PRN PO PAIN AND OR ELEVATED TEMP 

Last administered on 2/17/17at 21:49; Admin Dose 650 MG;  Start 2/16/17 at 16:00


Sevelamer Carbonate (Renvela) 2.4 gm Q8 PO  Last administered on 2/20/17at 14:06

; Admin Dose 2.4 GM;  Start 2/16/17 at 22:00


Losartan Potassium (Cozaar) 50 mg DAILY PO  Last administered on 2/19/17at 08:42

; Admin Dose 50 MG;  Start 2/17/17 at 09:00


Ondansetron HCl (Zofran Tab) 4 mg Q8H  PRN PO NAUSEA AND/OR VOMITING Last 

administered on 2/20/17at 18:59; Admin Dose 4 MG;  Start 2/16/17 at 16:30


Metoprolol Tartrate (Lopressor) 50 mg BID PO  Last administered on 2/19/17at 08:

42; Admin Dose 50 MG;  Start 2/16/17 at 21:00


Multivit/Ca Carb/ B Cmplx/FA/Prenat (Charlotte-Argenis) 1 tab DAILY PO  Last 

administered on 2/20/17at 08:19; Admin Dose 1 TAB;  Start 2/17/17 at 09:00


Levetiracetam (Keppra) 500 mg DAILY PO  Last administered on 2/20/17at 08:19; 

Admin Dose 500 MG;  Start 2/17/17 at 09:00











PRITESH MACKEY MD Feb 20, 2017 19:57

## 2017-02-20 NOTE — PN
Date/Time of Note


Date/Time of Note


DATE: 2/20/17 


TIME: 19:44





Assessment/Plan


VTE Prophylaxis


VTE Prophylaxis Intervention:  SCD's





Lines/Catheters


IV Catheter Type (from [facility]):  Saline Lock


Urinary Cath still in place:  No





Assessment/Plan


Chief Complaint/Hosp Course


ASSESSMENT AND PLAN:


1.  Status post right subdural hematoma and brain contusion after traumatic 

injury.


2.  Status post acute encephalopathy.


3.  Status post respiratory failure with pneumothorax, status post chest tube 

placement and removal.


4.  End-stage renal disease on hemodialysis.  The patient will be followed by 

Dr. Chaney from nephrology standpoint and will undergo hemodialysis 3 times per 

week.


5.  Hypertension.  Continue patient on Cozaar.


6.  Continue the patient on Keppra.


7.  Hyperlipidemia.  Continue Lipitor.


8.  Dysphagia.  Patient tolerates pured diet well.


9.  Gastroesophageal reflux disease.  Continue Pepcid.  


10.  Continue physical and occupational therapy.  Further recommendations based 

on clinical course.  


 


Plan of care discussed with Dr. Brink.


Problems:  





Subjective


24 Hr Interval Summary


Free Text/Dictation


Patient is currently undergoing hemodialysis, remains afebrile, able to 

progress with physical therapy.





Exam/Review of Systems


Vital Signs


Vitals





 Vital Signs








  Date Time  Temp Pulse Resp B/P Pulse Ox O2 Delivery O2 Flow Rate FiO2


 


2/20/17 19:33 98.5 102 20 112/70 94   


 


2/20/17 16:16        21


 


2/18/17 08:47      Room Air  














 Intake and Output   


 


 2/19/17 2/19/17 2/20/17





 15:00 23:00 07:00


 


Intake Total 50 ml 420 ml 


 


Balance 50 ml 420 ml 











Exam


GENERAL:  Well-developed, fragile female in no acute distress.


HEENT:  Head is atraumatic, normocephalic.  Pupils equal, round, reactive to 

light and accommodation.  Oral mucosa is pink, moist.


NECK:  Supple, no cervical lymphadenopathy, no thyromegaly.


CHEST:  Lungs clear bilaterally, slightly diminished at the bases.  There is no 

rhonchi, wheezes, rales noted.


CARDIOVASCULAR:  Normal S1, S2.  No murmurs, gallops, clicks, rubs noted.


ABDOMEN:  Round, soft, nondistended, nontender.  Bowel sounds present.  There 

is no guarding, no rebound tenderness.


EXTREMITIES:  There is no edema, clubbing, cyanosis.  Pulses equal bilaterally 2

+.  The patient has a left upper extremity arteriovenous fistula with a 

palpable thrill and audible bruit.


SKIN:  There is no rash, petechiae noted.  The patient has a right chest 

healing scar from the right chest tube.


NEUROLOGICAL:  The patient is awake, alert and oriented to name.





Results


Result Diagram:  


2/17/17 0559 2/17/17 0559








Medications


Medications





 Current Medications


Docusate Sodium (Colace) 100 mg BID PO  Last administered on 2/20/17at 08:19; 

Admin Dose 100 MG;  Start 2/16/17 at 21:00


Senna (Senokot) 1 tab QHS PO  Last administered on 2/19/17at 20:41; Admin Dose 

1 TAB;  Start 2/16/17 at 21:00


Acetaminophen (Tylenol Tab) 650 mg Q4H  PRN PO PAIN Last administered on 2/19/ 17at 10:31; Admin Dose 650 MG;  Start 2/16/17 at 16:00


Bisacodyl (Dulcolax Supp) 10 mg DAILY  PRN [place] CONSTIPATION;  Start 2/16/17 at 16

:00


Magnesium Hydroxide (Milk Of Mag) 30 ml BID  PRN PO CONSTIPATION;  Start 2/16/ 17 at 16:00


Lactulose (Enulose) 20 gm DAILY  PRN PO CONSTIPATION;  Start 2/16/17 at 16:00


Atorvastatin Calcium (Lipitor) 20 mg QHS PO  Last administered on 2/19/17at 20:

41; Admin Dose 20 MG;  Start 2/16/17 at 21:00


Cyclobenzaprine HCl (Flexeril) 5 mg Q8  PRN PO MUSCLE SPASM ;  Start 2/16/17 at 

16:00


Famotidine (Pepcid) 20 mg DAILY PO  Last administered on 2/20/17at 08:19; Admin 

Dose 20 MG;  Start 2/17/17 at 09:00


Acetaminophen (Tylenol Liquid) 650 mg Q6H  PRN PO PAIN AND OR ELEVATED TEMP 

Last administered on 2/17/17at 21:49; Admin Dose 650 MG;  Start 2/16/17 at 16:00


Sevelamer Carbonate (Renvela) 2.4 gm Q8 PO  Last administered on 2/20/17at 14:06

; Admin Dose 2.4 GM;  Start 2/16/17 at 22:00


Losartan Potassium (Cozaar) 50 mg DAILY PO  Last administered on 2/19/17at 08:42

; Admin Dose 50 MG;  Start 2/17/17 at 09:00


Ondansetron HCl (Zofran Tab) 4 mg Q8H  PRN PO NAUSEA AND/OR VOMITING Last 

administered on 2/20/17at 18:59; Admin Dose 4 MG;  Start 2/16/17 at 16:30


Metoprolol Tartrate (Lopressor) 50 mg BID PO  Last administered on 2/19/17at 08:

42; Admin Dose 50 MG;  Start 2/16/17 at 21:00


Multivit/Ca Carb/ B Cmplx/FA/Prenat (Charlotte-Argenis) 1 tab DAILY PO  Last 

administered on 2/20/17at 08:19; Admin Dose 1 TAB;  Start 2/17/17 at 09:00


Levetiracetam (Keppra) 500 mg DAILY PO  Last administered on 2/20/17at 08:19; 

Admin Dose 500 MG;  Start 2/17/17 at 09:00











IRAJ TINOCO Feb 20, 2017 19:46

## 2017-02-21 VITALS — HEART RATE: 99 BPM | SYSTOLIC BLOOD PRESSURE: 99 MMHG | DIASTOLIC BLOOD PRESSURE: 66 MMHG

## 2017-02-21 VITALS — SYSTOLIC BLOOD PRESSURE: 115 MMHG | RESPIRATION RATE: 18 BRPM | DIASTOLIC BLOOD PRESSURE: 76 MMHG

## 2017-02-21 VITALS — DIASTOLIC BLOOD PRESSURE: 63 MMHG | SYSTOLIC BLOOD PRESSURE: 91 MMHG | RESPIRATION RATE: 18 BRPM

## 2017-02-21 LAB
ANION GAP SERPL CALC-SCNC: 20 MMOL/L (ref 8–16)
BUN SERPL-MCNC: 22 MG/DL (ref 7–20)
CALCIUM SERPL-MCNC: 8.7 MG/DL (ref 8.4–10.2)
CHLORIDE SERPL-SCNC: 90 MMOL/L (ref 97–110)
CO2 SERPL-SCNC: 28 MMOL/L (ref 21–31)
CREAT SERPL-MCNC: 4.94 MG/DL (ref 0.44–1)
GLUCOSE SERPL-MCNC: 116 MG/DL (ref 70–220)
PHOSPHATE SERPL-MCNC: 4.5 MG/DL (ref 2.5–4.9)
POTASSIUM SERPL-SCNC: 3.8 MMOL/L (ref 3.5–5.1)
SODIUM SERPL-SCNC: 134 MMOL/L (ref 135–144)

## 2017-02-21 RX ADMIN — IPRATROPIUM BROMIDE AND ALBUTEROL SULFATE SCH ML: .5; 3 SOLUTION RESPIRATORY (INHALATION) at 15:42

## 2017-02-21 RX ADMIN — SEVELAMER CARBONATE SCH GM: 2400 POWDER, FOR SUSPENSION ORAL at 21:13

## 2017-02-21 RX ADMIN — SEVELAMER CARBONATE SCH GM: 2400 POWDER, FOR SUSPENSION ORAL at 14:00

## 2017-02-21 RX ADMIN — LOSARTAN POTASSIUM SCH MG: 50 TABLET, FILM COATED ORAL at 09:00

## 2017-02-21 RX ADMIN — SEVELAMER CARBONATE SCH GM: 2400 POWDER, FOR SUSPENSION ORAL at 06:11

## 2017-02-21 RX ADMIN — IPRATROPIUM BROMIDE AND ALBUTEROL SULFATE SCH ML: .5; 3 SOLUTION RESPIRATORY (INHALATION) at 08:27

## 2017-02-21 RX ADMIN — LEVETIRACETAM SCH MG: 500 TABLET, FILM COATED ORAL at 08:27

## 2017-02-21 RX ADMIN — METOPROLOL TARTRATE SCH MG: 50 TABLET, FILM COATED ORAL at 09:00

## 2017-02-21 RX ADMIN — ATORVASTATIN CALCIUM SCH MG: 20 TABLET, FILM COATED ORAL at 21:11

## 2017-02-21 RX ADMIN — METOPROLOL TARTRATE SCH MG: 50 TABLET, FILM COATED ORAL at 21:15

## 2017-02-21 RX ADMIN — SENNOSIDES SCH TAB: 8.6 TABLET, FILM COATED ORAL at 21:12

## 2017-02-21 RX ADMIN — ONDANSETRON PRN MG: 4 TABLET, FILM COATED ORAL at 23:40

## 2017-02-21 RX ADMIN — FAMOTIDINE SCH MG: 20 TABLET ORAL at 08:27

## 2017-02-21 RX ADMIN — DOCUSATE SODIUM SCH MG: 100 CAPSULE, LIQUID FILLED ORAL at 09:00

## 2017-02-21 RX ADMIN — DOCUSATE SODIUM SCH MG: 100 CAPSULE, LIQUID FILLED ORAL at 21:12

## 2017-02-21 NOTE — CONS
Date/Time of Note


Date/Time of Note


DATE: 2/21/17 


TIME: 20:51





Assessment/Plan


Assessment/Plan


Chief Complaint/Hosp Course


1.  The patient has acute hemorrhagic stroke./sdh/trauma better


2.  Malignant hypertension.better


3.  Endstage renal disease.


4.  Leukocytosis. better


5.  Respiratory failure.off vent


6.  ashd


7 hyperkalemia hx


8  s/pSUBCUTANEOUS EPMHYSEMA/s/p chest tube


9 hypernatremia better


10 hyperphosphatemia better


plan continue hd mwf


PT/OT


Problems:  





Consultation Date/Type/Reason


Admit Date/Time


Feb 16, 2017 at 14:40


Type of Consultation:  renal





24 HR Interval Summary


Constitutional:  no complaints





Exam/Review of Systems


Vital Signs


Vitals





 Vital Signs








  Date Time  Temp Pulse Resp B/P Pulse Ox O2 Delivery O2 Flow Rate FiO2


 


2/21/17 20:29 98.5 80 18 115/76 98   


 


2/21/17 15:43        21


 


2/18/17 08:47      Room Air  














 Intake and Output   


 


 2/20/17 2/20/17 2/21/17





 15:00 23:00 07:00


 


Intake Total 50 ml 920 ml 100 ml


 


Output Total  1690 ml 1300 ml


 


Balance 50 ml -770 ml -1200 ml











Exam


Respiratory:  clear to auscultation


Cardiovascular:  regular rate and rhythm


Gastrointestinal:  soft





Results


Result Diagram:  


2/17/17 0559                                                                   

             2/21/17 0934





Results 24 hrs





Laboratory Tests








Test


  2/21/17


09:34


 


Anion Gap 20  H


 


Blood Urea Nitrogen 22  H


 


Calcium Level 8.7  


 


Carbon Dioxide Level 28  


 


Chloride Level 90  L


 


Creatinine 4.94  H


 


Glucose Level 116  


 


Phosphorus Level 4.5  


 


Potassium Level 3.8  


 


Sodium Level 134  L











Medications


Medications





 Current Medications


Docusate Sodium (Colace) 100 mg BID PO  Last administered on 2/20/17at 08:19; 

Admin Dose 100 MG;  Start 2/16/17 at 21:00


Senna (Senokot) 1 tab QHS PO  Last administered on 2/19/17at 20:41; Admin Dose 

1 TAB;  Start 2/16/17 at 21:00


Acetaminophen (Tylenol Tab) 650 mg Q4H  PRN PO PAIN Last administered on 2/19/ 17at 10:31; Admin Dose 650 MG;  Start 2/16/17 at 16:00


Bisacodyl (Dulcolax Supp) 10 mg DAILY  PRN [place] CONSTIPATION;  Start 2/16/17 at 16

:00


Magnesium Hydroxide (Milk Of Mag) 30 ml BID  PRN PO CONSTIPATION;  Start 2/16/ 17 at 16:00


Lactulose (Enulose) 20 gm DAILY  PRN PO CONSTIPATION;  Start 2/16/17 at 16:00


Atorvastatin Calcium (Lipitor) 20 mg QHS PO  Last administered on 2/20/17at 20:

16; Admin Dose 20 MG;  Start 2/16/17 at 21:00


Cyclobenzaprine HCl (Flexeril) 5 mg Q8  PRN PO MUSCLE SPASM ;  Start 2/16/17 at 

16:00


Famotidine (Pepcid) 20 mg DAILY PO  Last administered on 2/21/17at 08:27; Admin 

Dose 20 MG;  Start 2/17/17 at 09:00


Acetaminophen (Tylenol Liquid) 650 mg Q6H  PRN PO PAIN AND OR ELEVATED TEMP 

Last administered on 2/17/17at 21:49; Admin Dose 650 MG;  Start 2/16/17 at 16:00


Sevelamer Carbonate (Renvela) 2.4 gm Q8 PO  Last administered on 2/21/17at 06:11

; Admin Dose 2.4 GM;  Start 2/16/17 at 22:00


Losartan Potassium (Cozaar) 50 mg DAILY PO  Last administered on 2/19/17at 08:42

; Admin Dose 50 MG;  Start 2/17/17 at 09:00


Ondansetron HCl (Zofran Tab) 4 mg Q8H  PRN PO NAUSEA AND/OR VOMITING Last 

administered on 2/20/17at 18:59; Admin Dose 4 MG;  Start 2/16/17 at 16:30


Metoprolol Tartrate (Lopressor) 50 mg BID PO  Last administered on 2/20/17at 20:

17; Admin Dose 50 MG;  Start 2/16/17 at 21:00


Multivit/Ca Carb/ B Cmplx/FA/Prenat (Charlotte-Argenis) 1 tab DAILY PO  Last 

administered on 2/21/17at 08:27; Admin Dose 1 TAB;  Start 2/17/17 at 09:00


Levetiracetam (Keppra) 500 mg DAILY PO  Last administered on 2/21/17at 08:27; 

Admin Dose 500 MG;  Start 2/17/17 at 09:00











PRITESH MACKEY MD Feb 21, 2017 20:52

## 2017-02-21 NOTE — CONS
Date/Time of Note


Date/Time of Note


DATE: 2/21/17 


TIME: 11:16





Consult Date/Type/Reason


Admit Date/Time


Feb 16, 2017 at 14:40


Type of Consultation:  renal





Subjective


Feeling better





Objective


pulm- cta


card-s1s2


cga 100 feet


 Vital Signs








  Date Time  Temp Pulse Resp B/P Pulse Ox O2 Delivery O2 Flow Rate FiO2


 


2/21/17 08:29  97 17  98   21


 


2/21/17 07:28 98.7   91/63    


 


2/18/17 08:47      Room Air  














 Intake and Output   


 


 2/20/17 2/20/17 2/21/17





 15:00 23:00 07:00


 


Intake Total 50 ml 920 ml 100 ml


 


Output Total  1690 ml 1300 ml


 


Balance 50 ml -770 ml -1200 ml











Results/Medications


Result Diagram:  


2/17/17 0559                                                                   

             2/21/17 0934





Results 24 hrs





Laboratory Tests








Test


  2/21/17


09:34


 


Anion Gap 20  H


 


Blood Urea Nitrogen 22  H


 


Calcium Level 8.7  


 


Carbon Dioxide Level 28  


 


Chloride Level 90  L


 


Creatinine 4.94  H


 


Glucose Level 116  


 


Phosphorus Level 4.5  


 


Potassium Level 3.8  


 


Sodium Level 134  L








Medications





 Current Medications


Docusate Sodium (Colace) 100 mg BID PO  Last administered on 2/20/17at 08:19; 

Admin Dose 100 MG;  Start 2/16/17 at 21:00


Senna (Senokot) 1 tab QHS PO  Last administered on 2/19/17at 20:41; Admin Dose 

1 TAB;  Start 2/16/17 at 21:00


Acetaminophen (Tylenol Tab) 650 mg Q4H  PRN PO PAIN Last administered on 2/19/ 17at 10:31; Admin Dose 650 MG;  Start 2/16/17 at 16:00


Bisacodyl (Dulcolax Supp) 10 mg DAILY  PRN [place] CONSTIPATION;  Start 2/16/17 at 16

:00


Magnesium Hydroxide (Milk Of Mag) 30 ml BID  PRN PO CONSTIPATION;  Start 2/16/ 17 at 16:00


Lactulose (Enulose) 20 gm DAILY  PRN PO CONSTIPATION;  Start 2/16/17 at 16:00


Atorvastatin Calcium (Lipitor) 20 mg QHS PO  Last administered on 2/20/17at 20:

16; Admin Dose 20 MG;  Start 2/16/17 at 21:00


Cyclobenzaprine HCl (Flexeril) 5 mg Q8  PRN PO MUSCLE SPASM ;  Start 2/16/17 at 

16:00


Famotidine (Pepcid) 20 mg DAILY PO  Last administered on 2/21/17at 08:27; Admin 

Dose 20 MG;  Start 2/17/17 at 09:00


Acetaminophen (Tylenol Liquid) 650 mg Q6H  PRN PO PAIN AND OR ELEVATED TEMP 

Last administered on 2/17/17at 21:49; Admin Dose 650 MG;  Start 2/16/17 at 16:00


Sevelamer Carbonate (Renvela) 2.4 gm Q8 PO  Last administered on 2/21/17at 06:11

; Admin Dose 2.4 GM;  Start 2/16/17 at 22:00


Losartan Potassium (Cozaar) 50 mg DAILY PO  Last administered on 2/19/17at 08:42

; Admin Dose 50 MG;  Start 2/17/17 at 09:00


Ondansetron HCl (Zofran Tab) 4 mg Q8H  PRN PO NAUSEA AND/OR VOMITING Last 

administered on 2/20/17at 18:59; Admin Dose 4 MG;  Start 2/16/17 at 16:30


Metoprolol Tartrate (Lopressor) 50 mg BID PO  Last administered on 2/20/17at 20:

17; Admin Dose 50 MG;  Start 2/16/17 at 21:00


Multivit/Ca Carb/ B Cmplx/FA/Prenat (Charlotte-Argenis) 1 tab DAILY PO  Last 

administered on 2/21/17at 08:27; Admin Dose 1 TAB;  Start 2/17/17 at 09:00


Levetiracetam (Keppra) 500 mg DAILY PO  Last administered on 2/21/17at 08:27; 

Admin Dose 500 MG;  Start 2/17/17 at 09:00





Assessment/Plan


Additional Assessment/Plan


Rehab-  Traumatic brain injury with bilateral frontal contusions, acute 

subdural hematoma, traumatic subarachnoid hemorrhage, and skull fracture. 


    Progressing well with rehab program  Working toward home this weekend, 

after caregiver training


Right-sided abrasions.


Dysphagia-coninue speech therapy


End-stage renal disease on dialysis-M,W,F schedule


Status post respiratory failure.


Hypertension.


Gastroesophageal reflux disease.











SURAJ MACKEY MD Feb 21, 2017 11:18

## 2017-02-21 NOTE — PN
Date/Time of Note


Date/Time of Note


DATE: 2/21/17 


TIME: 12:32





Assessment/Plan


VTE Prophylaxis


VTE Prophylaxis Intervention:  SCD's





Lines/Catheters


IV Catheter Type (from [facility]):  Saline Lock


Urinary Cath still in place:  No





Assessment/Plan


Chief Complaint/Hosp Course


ASSESSMENT AND PLAN:


1.  Status post right subdural hematoma and brain contusion after traumatic 

injury.


2.  Status post acute encephalopathy.


3.  Status post respiratory failure with pneumothorax, status post chest tube 

placement and removal.


4.  End-stage renal disease on hemodialysis.  The patient will be followed by 

Dr. Chaney from nephrology standpoint and will undergo hemodialysis 3 times per 

week.


5.  Hypertension.  Continue patient on Cozaar.


6.  Continue the patient on Keppra.


7.  Hyperlipidemia.  Continue Lipitor.


8.  Dysphagia.  Patient tolerates pured diet well.


9.  Gastroesophageal reflux disease.  Continue Pepcid.  


10.  Continue physical and occupational therapy.  Further recommendations based 

on clinical course.  


 


Plan of care discussed with Dr. Brink.


Problems:  





Subjective


24 Hr Interval Summary


Free Text/Dictation


Patient is comfortable, status post hemodialysis yesterday, awake alert and 

oriented 4 , able to exercise was physical therapist.





Exam/Review of Systems


Vital Signs


Vitals





 Vital Signs








  Date Time  Temp Pulse Resp B/P Pulse Ox O2 Delivery O2 Flow Rate FiO2


 


2/21/17 08:29  97 17  98   21


 


2/21/17 07:28 98.7   91/63    


 


2/18/17 08:47      Room Air  














 Intake and Output   


 


 2/20/17 2/20/17 2/21/17





 15:00 23:00 07:00


 


Intake Total 50 ml 920 ml 100 ml


 


Output Total  1690 ml 1300 ml


 


Balance 50 ml -770 ml -1200 ml











Exam


GENERAL:  Well-developed, fragile female in no acute distress.


HEENT:  Head is atraumatic, normocephalic.  Pupils equal, round, reactive to 

light and accommodation.  Oral mucosa is pink, moist.


NECK:  Supple, no cervical lymphadenopathy, no thyromegaly.


CHEST:  Lungs clear bilaterally, slightly diminished at the bases.  There is no 

rhonchi, wheezes, rales noted.


CARDIOVASCULAR:  Normal S1, S2.  No murmurs, gallops, clicks, rubs noted.


ABDOMEN:  Round, soft, nondistended, nontender.  Bowel sounds present.  There 

is no guarding, no rebound tenderness.


EXTREMITIES:  There is no edema, clubbing, cyanosis.  Pulses equal bilaterally 2

+.  The patient has a left upper extremity arteriovenous fistula with a 

palpable thrill and audible bruit.


SKIN:  There is no rash, petechiae noted.  The patient has a right chest 

healing scar from the right chest tube.


NEUROLOGICAL:  The patient is awake, alert and oriented to name.





Results


Result Diagram:  


2/17/17 0559                                                                   

             2/21/17 0934





Results 24 hrs





Laboratory Tests








Test


  2/21/17


09:34


 


Anion Gap 20  H


 


Blood Urea Nitrogen 22  H


 


Calcium Level 8.7  


 


Carbon Dioxide Level 28  


 


Chloride Level 90  L


 


Creatinine 4.94  H


 


Glucose Level 116  


 


Phosphorus Level 4.5  


 


Potassium Level 3.8  


 


Sodium Level 134  L











Medications


Medications





 Current Medications


Docusate Sodium (Colace) 100 mg BID PO  Last administered on 2/20/17at 08:19; 

Admin Dose 100 MG;  Start 2/16/17 at 21:00


Senna (Senokot) 1 tab QHS PO  Last administered on 2/19/17at 20:41; Admin Dose 

1 TAB;  Start 2/16/17 at 21:00


Acetaminophen (Tylenol Tab) 650 mg Q4H  PRN PO PAIN Last administered on 2/19/ 17at 10:31; Admin Dose 650 MG;  Start 2/16/17 at 16:00


Bisacodyl (Dulcolax Supp) 10 mg DAILY  PRN [place] CONSTIPATION;  Start 2/16/17 at 16

:00


Magnesium Hydroxide (Milk Of Mag) 30 ml BID  PRN PO CONSTIPATION;  Start 2/16/ 17 at 16:00


Lactulose (Enulose) 20 gm DAILY  PRN PO CONSTIPATION;  Start 2/16/17 at 16:00


Atorvastatin Calcium (Lipitor) 20 mg QHS PO  Last administered on 2/20/17at 20:

16; Admin Dose 20 MG;  Start 2/16/17 at 21:00


Cyclobenzaprine HCl (Flexeril) 5 mg Q8  PRN PO MUSCLE SPASM ;  Start 2/16/17 at 

16:00


Famotidine (Pepcid) 20 mg DAILY PO  Last administered on 2/21/17at 08:27; Admin 

Dose 20 MG;  Start 2/17/17 at 09:00


Acetaminophen (Tylenol Liquid) 650 mg Q6H  PRN PO PAIN AND OR ELEVATED TEMP 

Last administered on 2/17/17at 21:49; Admin Dose 650 MG;  Start 2/16/17 at 16:00


Sevelamer Carbonate (Renvela) 2.4 gm Q8 PO  Last administered on 2/21/17at 06:11

; Admin Dose 2.4 GM;  Start 2/16/17 at 22:00


Losartan Potassium (Cozaar) 50 mg DAILY PO  Last administered on 2/19/17at 08:42

; Admin Dose 50 MG;  Start 2/17/17 at 09:00


Ondansetron HCl (Zofran Tab) 4 mg Q8H  PRN PO NAUSEA AND/OR VOMITING Last 

administered on 2/20/17at 18:59; Admin Dose 4 MG;  Start 2/16/17 at 16:30


Metoprolol Tartrate (Lopressor) 50 mg BID PO  Last administered on 2/20/17at 20:

17; Admin Dose 50 MG;  Start 2/16/17 at 21:00


Multivit/Ca Carb/ B Cmplx/FA/Prenat (Charlotte-Argenis) 1 tab DAILY PO  Last 

administered on 2/21/17at 08:27; Admin Dose 1 TAB;  Start 2/17/17 at 09:00


Levetiracetam (Keppra) 500 mg DAILY PO  Last administered on 2/21/17at 08:27; 

Admin Dose 500 MG;  Start 2/17/17 at 09:00











IRAJ TINOCO Feb 21, 2017 12:33

## 2017-02-22 VITALS — DIASTOLIC BLOOD PRESSURE: 66 MMHG | SYSTOLIC BLOOD PRESSURE: 100 MMHG | RESPIRATION RATE: 18 BRPM

## 2017-02-22 VITALS — RESPIRATION RATE: 20 BRPM | DIASTOLIC BLOOD PRESSURE: 64 MMHG | HEART RATE: 90 BPM | SYSTOLIC BLOOD PRESSURE: 103 MMHG

## 2017-02-22 VITALS — DIASTOLIC BLOOD PRESSURE: 72 MMHG | SYSTOLIC BLOOD PRESSURE: 103 MMHG | HEART RATE: 86 BPM

## 2017-02-22 RX ADMIN — DOCUSATE SODIUM SCH MG: 100 CAPSULE, LIQUID FILLED ORAL at 21:00

## 2017-02-22 RX ADMIN — DOCUSATE SODIUM SCH MG: 100 CAPSULE, LIQUID FILLED ORAL at 09:00

## 2017-02-22 RX ADMIN — ATORVASTATIN CALCIUM SCH MG: 20 TABLET, FILM COATED ORAL at 21:00

## 2017-02-22 RX ADMIN — SEVELAMER CARBONATE SCH GM: 2400 POWDER, FOR SUSPENSION ORAL at 06:44

## 2017-02-22 RX ADMIN — LOSARTAN POTASSIUM SCH MG: 50 TABLET, FILM COATED ORAL at 08:20

## 2017-02-22 RX ADMIN — METOPROLOL TARTRATE SCH MG: 50 TABLET, FILM COATED ORAL at 08:20

## 2017-02-22 RX ADMIN — SEVELAMER CARBONATE SCH GM: 2400 POWDER, FOR SUSPENSION ORAL at 22:00

## 2017-02-22 RX ADMIN — FAMOTIDINE SCH MG: 20 TABLET ORAL at 09:00

## 2017-02-22 RX ADMIN — IPRATROPIUM BROMIDE AND ALBUTEROL SULFATE SCH ML: .5; 3 SOLUTION RESPIRATORY (INHALATION) at 00:23

## 2017-02-22 RX ADMIN — FAMOTIDINE SCH MG: 20 TABLET ORAL at 08:19

## 2017-02-22 RX ADMIN — METOPROLOL TARTRATE SCH MG: 50 TABLET, FILM COATED ORAL at 21:00

## 2017-02-22 RX ADMIN — DOCUSATE SODIUM SCH MG: 100 CAPSULE, LIQUID FILLED ORAL at 08:19

## 2017-02-22 RX ADMIN — IPRATROPIUM BROMIDE AND ALBUTEROL SULFATE SCH ML: .5; 3 SOLUTION RESPIRATORY (INHALATION) at 09:30

## 2017-02-22 RX ADMIN — SEVELAMER CARBONATE SCH GM: 2400 POWDER, FOR SUSPENSION ORAL at 14:35

## 2017-02-22 RX ADMIN — LEVETIRACETAM SCH MG: 500 TABLET, FILM COATED ORAL at 09:00

## 2017-02-22 RX ADMIN — LEVETIRACETAM SCH MG: 500 TABLET, FILM COATED ORAL at 14:37

## 2017-02-22 RX ADMIN — SENNOSIDES SCH TAB: 8.6 TABLET, FILM COATED ORAL at 21:00

## 2017-02-22 RX ADMIN — IPRATROPIUM BROMIDE AND ALBUTEROL SULFATE SCH ML: .5; 3 SOLUTION RESPIRATORY (INHALATION) at 16:38

## 2017-02-22 RX ADMIN — LEVETIRACETAM SCH MG: 500 TABLET, FILM COATED ORAL at 08:19

## 2017-02-22 NOTE — DS
DATE OF ADMISSION: 01/31/2017

DATE OF DISCHARGE: 02/16/2017

 

PRIMARY CARE PHYSICIAN:  Primary nephrologist, Dr. Chaney.

 

CONSULTANTS DURING THIS ADMISSION:  Dr. Chaney  

 

ADMITTING PHYSICIAN:  Dr. Gan

 

DISCHARGING PHYSICIAN:  Dr. Gan 

 

OTHER CONSULTANTS DURING THIS ADMISSION:  

1.  Dr. Medina  from cardiothoracic surgery

2.  Dr. Palomo from neurosurgery

3.  Dr. Magana from pulmonary critical care 

4.  Dr. Hanks from pulmonary critical care.

 

CHIEF COMPLAINT ON ADMISSION:  Altered level of consciousness.

 

BRIEF HISTORY OF PRESENT ILLNESS:  This is a 46-year-old female with history of hypertension, end-st
age renal disease, currently on hemodialysis, gastroesophageal reflux disease, who was brought into 
the emergency department severely lethargic after an episode of fall earlier that day.  The patient 
had to be intubated to protect her airway.  She was found to have a subdural hematoma, a brain contu
tasneem.  The patient was admitted to the intensive care unit and Dr. Palomo from neurosurgery was con
sulted along with Dr. Chaney.  

 

HOSPITAL COURSE:  The patient was admitted to the intensive care unit intubated.  She was followed b
y Dr. Palomo. There was not much of a surgical intervention, just close monitoring.  Repeat CAT sca
n did show that the patient did have brain contusions along with skull fractures and a stable subdur
al hematoma, and also a possible epidural hematoma.  Unfortunately, 48 hours after admission around 
that time frame the patient did have an episode of barotrauma. She ended up having a diffuse subcuta
neous emphysema, bilateral pneumothoraces and pneumoperitoneum.  This was while she was on the venti
lator.  She had to be extubated and reintubated. She had 2 chest tubes placed by Dr. Medina from 
cardiothoracic surgery.  Within 72 hours of those interventions her subcutaneous emphysema resolved 
along with the pneumoperitoneum.  Her pneumothoraces where resolving.  She eventually had both chest
 tubes removed.  She was subsequently extubated, stable on nasal cannula and transferred to [facility].  Her mental status kept improving.  She continued her regular dialysis.  She had an episode of as
piration pneumonia which had to be treated with antibiotics.  Once her mental status was much improv
ed she was able to pass a swallow eval, start physical therapy and actually started recovering very 
well.  She was subsequently transferred to acute rehabilitation unit for further therapy.  Dr. Bryan rebolledo did take over care for this patient while in acute rehabilitation unit.  By the time of discharg
e, she did complete her antibiotic therapy.  She was tolerating p.o.  Vital signs were stable.  She 
has an ongoing headache, probably has to do with her head trauma, but her repeat imagings were stabl
e.  Therefore, her pain is being managed with pain medication.

 

DISPOSITION:  Discharge to acute rehabilitation unit.

 

DISCHARGE CONDITION:  Stable.

 

DISCHARGE DIET:  The patient is on mechanical soft diet.

 

FOLLOWUP:  Patient will be followed by Dr. Chaney for dialysis while in acute rehabilitation unit, an
mayo Brink will be the admitting physician.  Also, Dr. Palomo will follow the patient as needed
 from a neurosurgical standpoint.  From the pulmonary standpoint she is back to baseline on room air
, actually by the time of discharge.

 

DISCHARGE DIAGNOSES

1.  Status post fall with head trauma, skull fracture, bilateral acute inferior frontal hemorrhagic 
contusion, subdural hematoma, mild subarachnoid hemorrhage all stable and resolving.

2.  Status post acute respiratory failure secondary to severe encephalopathy.

3.  Status post chest barotrauma with bilateral pneumothoraces and subcutaneous emphysema  all resol
john.

4.  Leukocytosis, resolved.

5.  Aspiration pneumonia, resolved.

6.  End-stage renal disease on hemodialysis.

7.  Gastroesophageal reflux disease.

8.  Hyponatremia, resolved.

 

DISCHARGE MEDICATIONS:

1.  Tylenol 650 mg p.o. q.4h. p.r.n. pain or fever.

2.  Multivitamin 1 tab p.o. daily, Charlotte-Argenis.

3.  Lipitor 20 mg p.o. at bedtime. 

4.  Flexeril 5 mg p.o. q.  8 hours p.r.n. muscle spasms.

5.  Pepcid 20 mg p.o. daily.

6.  Keppra 500 mg p.o. b.i.d. for seizure prophylaxis.

 

 

Dictated By: CECILIO BEDOLLA/BITA

DD:    02/21/2017 18:10:08

DT:    02/22/2017 00:39:09

Conf#: [id number]

DID#:  [id number]

## 2017-02-22 NOTE — PN
Date/Time of Note


Date/Time of Note


DATE: 2/22/17 


TIME: 13:29





Assessment/Plan


VTE Prophylaxis


VTE Prophylaxis Intervention:  SCD's





Lines/Catheters


IV Catheter Type (from [facility] [place]):  Saline Lock


Urinary Cath still in place:  No





Assessment/Plan


Chief Complaint/Hosp Course


ASSESSMENT AND PLAN:


1.  Status post right subdural hematoma and brain contusion after traumatic 

injury.


2.  Status post acute encephalopathy.


3.  Status post respiratory failure with pneumothorax, status post chest tube 

placement and removal.


4.  End-stage renal disease on hemodialysis.  The patient will be followed by 

Dr. Chaney from nephrology standpoint and will undergo hemodialysis 3 times per 

week.


5.  Hypertension.  Continue patient on Cozaar.


6.  Continue the patient on Keppra.


7.  Hyperlipidemia.  Continue Lipitor.


8.  Dysphagia.  Patient tolerates pured diet well.


9.  Gastroesophageal reflux disease.  Continue Pepcid.  


10.  Continue physical and occupational therapy.  Further recommendations based 

on clinical course.  


 


Plan of care discussed with Dr. Brink.


Problems:  





Exam/Review of Systems


Vital Signs


Vitals





 Vital Signs








  Date Time  Temp Pulse Resp B/P Pulse Ox O2 Delivery O2 Flow Rate FiO2


 


2/22/17 08:09 98.8 90 20 103/64 100 Room Air  


 


2/22/17 00:23        21














 Intake and Output   


 


 2/21/17 2/21/17 2/22/17





 15:00 23:00 07:00


 


Intake Total  450 ml 740 ml


 


Balance  450 ml 740 ml











Exam


GENERAL:  Well-developed, fragile female in no acute distress.


HEENT:  Head is atraumatic, normocephalic.  Pupils equal, round, reactive to 

light and accommodation.  Oral mucosa is pink, moist.


NECK:  Supple, no cervical lymphadenopathy, no thyromegaly.


CHEST:  Lungs clear bilaterally, slightly diminished at the bases.  There is no 

rhonchi, wheezes, rales noted.


CARDIOVASCULAR:  Normal S1, S2.  No murmurs, gallops, clicks, rubs noted.


ABDOMEN:  Round, soft, nondistended, nontender.  Bowel sounds present.  There 

is no guarding, no rebound tenderness.


EXTREMITIES:  There is no edema, clubbing, cyanosis.  Pulses equal bilaterally 2

+.  The patient has a left upper extremity arteriovenous fistula with a 

palpable thrill and audible bruit.


SKIN:  There is no rash, petechiae noted.  The patient has a right chest 

healing scar from the right chest tube.


NEUROLOGICAL:  The patient is awake, alert and oriented to name.





Results


Result Diagram:  


2/21/17 0934








Medications


Medications





 Current Medications


Docusate Sodium (Colace) 100 mg BID PO  Last administered on 2/21/17at 21:12; 

Admin Dose 100 MG;  Start 2/16/17 at 21:00


Senna (Senokot) 1 tab QHS PO  Last administered on 2/21/17at 21:12; Admin Dose 

1 TAB;  Start 2/16/17 at 21:00


Acetaminophen (Tylenol Tab) 650 mg Q4H  PRN PO PAIN Last administered on 2/19/ 17at 10:31; Admin Dose 650 MG;  Start 2/16/17 at 16:00


Bisacodyl (Dulcolax Supp) 10 mg DAILY  PRN [place] CONSTIPATION;  Start 2/16/17 at 16

:00


Magnesium Hydroxide (Milk Of Mag) 30 ml BID  PRN PO CONSTIPATION;  Start 2/16/ 17 at 16:00


Lactulose (Enulose) 20 gm DAILY  PRN PO CONSTIPATION;  Start 2/16/17 at 16:00


Atorvastatin Calcium (Lipitor) 20 mg QHS PO  Last administered on 2/21/17at 21:

11; Admin Dose 20 MG;  Start 2/16/17 at 21:00


Cyclobenzaprine HCl (Flexeril) 5 mg Q8  PRN PO MUSCLE SPASM ;  Start 2/16/17 at 

16:00


Famotidine (Pepcid) 20 mg DAILY PO  Last administered on 2/21/17at 08:27; Admin 

Dose 20 MG;  Start 2/17/17 at 09:00


Acetaminophen (Tylenol Liquid) 650 mg Q6H  PRN PO PAIN AND OR ELEVATED TEMP 

Last administered on 2/17/17at 21:49; Admin Dose 650 MG;  Start 2/16/17 at 16:00


Sevelamer Carbonate (Renvela) 2.4 gm Q8 PO  Last administered on 2/22/17at 06:44

; Admin Dose 2.4 GM;  Start 2/16/17 at 22:00


Losartan Potassium (Cozaar) 50 mg DAILY PO  Last administered on 2/19/17at 08:42

; Admin Dose 50 MG;  Start 2/17/17 at 09:00


Ondansetron HCl (Zofran Tab) 4 mg Q8H  PRN PO NAUSEA AND/OR VOMITING Last 

administered on 2/21/17at 23:40; Admin Dose 4 MG;  Start 2/16/17 at 16:30


Metoprolol Tartrate (Lopressor) 50 mg BID PO  Last administered on 2/21/17at 21:

15; Admin Dose 50 MG;  Start 2/16/17 at 21:00


Multivit/Ca Carb/ B Cmplx/FA/Prenat (Charlotte-Argenis) 1 tab DAILY PO  Last 

administered on 2/22/17at 08:19; Admin Dose 1 TAB;  Start 2/17/17 at 09:00


Levetiracetam (Keppra) 500 mg DAILY PO  Last administered on 2/21/17at 08:27; 

Admin Dose 500 MG;  Start 2/17/17 at 09:00











IRAJ TINOCO Feb 22, 2017 13:30

## 2017-02-22 NOTE — CONS
Date/Time of Note


Date/Time of Note


DATE: 2/22/17 


TIME: 19:26





Assessment/Plan


Assessment/Plan


Chief Complaint/Hosp Course


1.  The patient has acute hemorrhagic stroke./sdh/trauma better


2.  Malignant hypertension.better


3.  Endstage renal disease.


4.  Leukocytosis. better


5.  Respiratory failure.off vent


6.  ashd


7 hyperkalemia hx


8  s/pSUBCUTANEOUS EPMHYSEMA/s/p chest tube


9 hypernatremia better


10 hyperphosphatemia better


plan continue hd mwf


PT/OT


Problems:  





Consultation Date/Type/Reason


Admit Date/Time


Feb 16, 2017 at 14:40


Type of Consultation:  renal





24 HR Interval Summary


Constitutional:  no complaints





Exam/Review of Systems


Vital Signs


Vitals





 Vital Signs








  Date Time  Temp Pulse Resp B/P Pulse Ox O2 Delivery O2 Flow Rate FiO2


 


2/22/17 16:39  90 17  98   21


 


2/22/17 08:09 98.8   103/64  Room Air  














 Intake and Output   


 


 2/21/17 2/21/17 2/22/17





 15:00 23:00 07:00


 


Intake Total  450 ml 740 ml


 


Balance  450 ml 740 ml











Exam


Respiratory:  clear to auscultation


Cardiovascular:  regular rate and rhythm


Gastrointestinal:  soft


Musculoskeletal:  nl extremities to inspection





Results


Result Diagram:  


2/21/17 0934








Medications


Medications





 Current Medications


Docusate Sodium (Colace) 100 mg BID PO  Last administered on 2/21/17at 21:12; 

Admin Dose 100 MG;  Start 2/16/17 at 21:00


Senna (Senokot) 1 tab QHS PO  Last administered on 2/21/17at 21:12; Admin Dose 

1 TAB;  Start 2/16/17 at 21:00


Acetaminophen (Tylenol Tab) 650 mg Q4H  PRN PO PAIN Last administered on 2/22/ 17at 16:19; Admin Dose 650 MG;  Start 2/16/17 at 16:00


Bisacodyl (Dulcolax Supp) 10 mg DAILY  PRN [place] CONSTIPATION;  Start 2/16/17 at 16

:00


Magnesium Hydroxide (Milk Of Mag) 30 ml BID  PRN PO CONSTIPATION;  Start 2/16/ 17 at 16:00


Lactulose (Enulose) 20 gm DAILY  PRN PO CONSTIPATION;  Start 2/16/17 at 16:00


Atorvastatin Calcium (Lipitor) 20 mg QHS PO  Last administered on 2/21/17at 21:

11; Admin Dose 20 MG;  Start 2/16/17 at 21:00


Cyclobenzaprine HCl (Flexeril) 5 mg Q8  PRN PO MUSCLE SPASM ;  Start 2/16/17 at 

16:00


Famotidine (Pepcid) 20 mg DAILY PO  Last administered on 2/21/17at 08:27; Admin 

Dose 20 MG;  Start 2/17/17 at 09:00


Acetaminophen (Tylenol Liquid) 650 mg Q6H  PRN PO PAIN AND OR ELEVATED TEMP 

Last administered on 2/17/17at 21:49; Admin Dose 650 MG;  Start 2/16/17 at 16:00


Sevelamer Carbonate (Renvela) 2.4 gm Q8 PO  Last administered on 2/22/17at 14:35

; Admin Dose 2.4 GM;  Start 2/16/17 at 22:00


Losartan Potassium (Cozaar) 50 mg DAILY PO  Last administered on 2/19/17at 08:42

; Admin Dose 50 MG;  Start 2/17/17 at 09:00


Ondansetron HCl (Zofran Tab) 4 mg Q8H  PRN PO NAUSEA AND/OR VOMITING Last 

administered on 2/21/17at 23:40; Admin Dose 4 MG;  Start 2/16/17 at 16:30


Metoprolol Tartrate (Lopressor) 50 mg BID PO  Last administered on 2/21/17at 21:

15; Admin Dose 50 MG;  Start 2/16/17 at 21:00


Multivit/Ca Carb/ B Cmplx/FA/Prenat (Charlotte-Argenis) 1 tab DAILY PO  Last 

administered on 2/22/17at 08:19; Admin Dose 1 TAB;  Start 2/17/17 at 09:00


Levetiracetam (Keppra) 500 mg DAILY PO  Last administered on 2/22/17at 14:37; 

Admin Dose 500 MG;  Start 2/17/17 at 09:00











PRITESH MACKEY MD Feb 22, 2017 19:27

## 2017-02-22 NOTE — CONS
DATE OF ADMISSION: 02/16/2017

DATE OF CONSULTATION:  02/22/2017

 

 

 

TYPE OF CONSULTATION:  Psychological.

 

REFERRING PHYSICIAN:  Meghana Dior MD

 

CONSULTING PSYCHOLOGIST:  Tim Webb, PhD

 

REASON FOR CONSULTATION:  This consultation was requested by Dr. Rosemary Dior in order to evaluate 
the cognitive and emotional functioning of this patient related to her present medical condition.

 

HISTORY OF PRESENT ILLNESS:  The patient is a 46-year-old female.  The patient has end-stage renal d
isease and is on hemodialysis 3 days a week.  The patient has been having medical problems for at le
ast 4 to 5 years.  The patient also has a history of hypertension and GERD.  The patient did fall an
d had trauma to her head.  The patient did develop a right subdural hematoma and brain contusion and
 skull fracture.  The patient was admitted to [facility].  The patient was cleared
 medically and then transferred to the acute rehabilitation unit for acute multidisciplinary rehabil
itation.  The patient was noted to have acute encephalopathy.  The patient is concerned about her pr
esent medical condition.  The patient is very depressed.  The patient does say that she has been dep
ressed for at least the last 3 weeks.  The patient states that she had been on antidepressants befor
e she entered the hospital that were prescribed by her dialysis MD, but she did not know the name of
 the medication.  The patient is motivated to try and do as well as she can to recover.

 

SOCIAL HISTORY:  The patient reports that she lives at home with her family.  The patient does want 
to return there after discharge.

 

MEDICATIONS:  The patient is currently not on any psychotropic medications.  The patient does report
 that she was on antidepressant medication prior to entering the hospital.

 

SUBSTANCE USE:  The patient denies any use of alcohol or other drugs.  The patient reports that she 
does not smoke.

 

MENTAL STATUS EXAMINATION:

APPEARANCE:  The patient was seen in her wheelchair.  She appears of average height and short in sta
ture.  The patient says that she is right-handed.

BEHAVIOR:  The patient was cooperative during the consultation.  The patient did attempt to answer a
ll questions presented to her by the interviewer and the .  The patient does speak mainly
 Greek and was aided in this evaluation by Radha Paz,  on the unit interpreting MASSIEL ley.  The examiner does understand and speak some Greek.

MOOD AND AFFECT:  The patient's mood appears to be depressed.  Affect does appear to be slightly anx
ious.

PERCEPTION:  The patient reports no hallucinations or delusions.  The patient was alert to person, p
lace, situation, and time.

MEMORY AND COGNITION:  The patient's memory and cognition appear to be impaired at the present time.
  This probably relates to her fall and encephalopathy.  The patient was unable to state who the Pre
sident of the [place] is.  She could not state who the governor of the state is, or who the ma
yor of the city is.  The patient did know it was February, but did know it was 2017.  She did not kn
ow the name of the hospital.  She thought it was [facility].  The patient was able to state that the
 previous president was the "-[place] one," but could not come up with his name either.  Debi meadows, the patient's cognition is showing some impairment at the present time and is likely related t
o her recent fall and resulting encephalopathy.

INTELLIGENCE:  Intelligence appears to fall in the average range when she is functioning well.

INSIGHT:  Fair.

JUDGMENT:  Fair.

THOUGHT CONTENT:  The patient is concerned about her present medical condition.  The patient does wa
nt to get better and return home after discharge.  The patient is very frustrated and depressed abou
t all her medical problems.

 

DISCUSSION:  The patient can likely benefit from some cognitive/behavioral psychotherapy while she i
s on the unit.  This psychotherapy would address her underlying level of cognitive dysfunction as we
ll as her emotional issues surrounding all her medical problems.

 

DIAGNOSTIC IMPRESSION:  

1.  F33.1, major depressive disorder, recurrent, moderate.

2.  F06.8, cognitive disorder, not otherwise specified.

 

Thank you very much, Dr. Rosemary Dior, for referring this individual.  Please do not hesitate to ca
ll if you have any additional questions.

 

 

Dictated By: TIM WEBB PHD

 

[place]/BITA

DD:    02/22/2017 18:02:45

DT:    02/22/2017 18:27:29

Conf#: [id number]

DID#:  [id number]

## 2017-02-22 NOTE — CONS
Date/Time of Note


Date/Time of Note


DATE: 2/22/17 


TIME: 12:55





Consult Date/Type/Reason


Admit Date/Time


Feb 16, 2017 at 14:40


Type of Consultation:  renal





Subjective


Overall improving





Objective


pulm- cta


abd-soft


min ambulation


 Vital Signs








  Date Time  Temp Pulse Resp B/P Pulse Ox O2 Delivery O2 Flow Rate FiO2


 


2/22/17 08:09 98.8 90 20 103/64 100 Room Air  


 


2/22/17 00:23        21














 Intake and Output   


 


 2/21/17 2/21/17 2/22/17





 15:00 23:00 07:00


 


Intake Total  450 ml 740 ml


 


Balance  450 ml 740 ml











Results/Medications


Result Diagram:  


2/21/17 0934





Medications





 Current Medications


Docusate Sodium (Colace) 100 mg BID PO  Last administered on 2/21/17at 21:12; 

Admin Dose 100 MG;  Start 2/16/17 at 21:00


Senna (Senokot) 1 tab QHS PO  Last administered on 2/21/17at 21:12; Admin Dose 

1 TAB;  Start 2/16/17 at 21:00


Acetaminophen (Tylenol Tab) 650 mg Q4H  PRN PO PAIN Last administered on 2/19/ 17at 10:31; Admin Dose 650 MG;  Start 2/16/17 at 16:00


Bisacodyl (Dulcolax Supp) 10 mg DAILY  PRN [place] CONSTIPATION;  Start 2/16/17 at 16

:00


Magnesium Hydroxide (Milk Of Mag) 30 ml BID  PRN PO CONSTIPATION;  Start 2/16/ 17 at 16:00


Lactulose (Enulose) 20 gm DAILY  PRN PO CONSTIPATION;  Start 2/16/17 at 16:00


Atorvastatin Calcium (Lipitor) 20 mg QHS PO  Last administered on 2/21/17at 21:

11; Admin Dose 20 MG;  Start 2/16/17 at 21:00


Cyclobenzaprine HCl (Flexeril) 5 mg Q8  PRN PO MUSCLE SPASM ;  Start 2/16/17 at 

16:00


Famotidine (Pepcid) 20 mg DAILY PO  Last administered on 2/21/17at 08:27; Admin 

Dose 20 MG;  Start 2/17/17 at 09:00


Acetaminophen (Tylenol Liquid) 650 mg Q6H  PRN PO PAIN AND OR ELEVATED TEMP 

Last administered on 2/17/17at 21:49; Admin Dose 650 MG;  Start 2/16/17 at 16:00


Sevelamer Carbonate (Renvela) 2.4 gm Q8 PO  Last administered on 2/22/17at 06:44

; Admin Dose 2.4 GM;  Start 2/16/17 at 22:00


Losartan Potassium (Cozaar) 50 mg DAILY PO  Last administered on 2/19/17at 08:42

; Admin Dose 50 MG;  Start 2/17/17 at 09:00


Ondansetron HCl (Zofran Tab) 4 mg Q8H  PRN PO NAUSEA AND/OR VOMITING Last 

administered on 2/21/17at 23:40; Admin Dose 4 MG;  Start 2/16/17 at 16:30


Metoprolol Tartrate (Lopressor) 50 mg BID PO  Last administered on 2/21/17at 21:

15; Admin Dose 50 MG;  Start 2/16/17 at 21:00


Multivit/Ca Carb/ B Cmplx/FA/Prenat (Charlotte-Argenis) 1 tab DAILY PO  Last 

administered on 2/22/17at 08:19; Admin Dose 1 TAB;  Start 2/17/17 at 09:00


Levetiracetam (Keppra) 500 mg DAILY PO  Last administered on 2/21/17at 08:27; 

Admin Dose 500 MG;  Start 2/17/17 at 09:00





Assessment/Plan


Additional Assessment/Plan


Rehab-  Traumatic brain injury with bilateral frontal contusions, acute 

subdural hematoma, traumatic subarachnoid hemorrhage, and skull fracture. 


    Continue interdisciplinary rehab program


Right-sided abrasions- improving.


Dysphagia-coninue speech therapy


End-stage renal disease on dialysis-M,W,F schedule


Status post respiratory failure.


Hypertension.


Gastroesophageal reflux disease.











SURAJ MACKEY MD Feb 22, 2017 12:56

## 2017-02-23 VITALS — SYSTOLIC BLOOD PRESSURE: 98 MMHG | DIASTOLIC BLOOD PRESSURE: 59 MMHG | RESPIRATION RATE: 18 BRPM

## 2017-02-23 VITALS — DIASTOLIC BLOOD PRESSURE: 76 MMHG | SYSTOLIC BLOOD PRESSURE: 119 MMHG | HEART RATE: 114 BPM

## 2017-02-23 VITALS — SYSTOLIC BLOOD PRESSURE: 110 MMHG | DIASTOLIC BLOOD PRESSURE: 72 MMHG | HEART RATE: 102 BPM

## 2017-02-23 VITALS — DIASTOLIC BLOOD PRESSURE: 72 MMHG | SYSTOLIC BLOOD PRESSURE: 116 MMHG | HEART RATE: 108 BPM

## 2017-02-23 VITALS — DIASTOLIC BLOOD PRESSURE: 75 MMHG | HEART RATE: 84 BPM | SYSTOLIC BLOOD PRESSURE: 112 MMHG

## 2017-02-23 VITALS — RESPIRATION RATE: 18 BRPM | DIASTOLIC BLOOD PRESSURE: 62 MMHG | SYSTOLIC BLOOD PRESSURE: 100 MMHG

## 2017-02-23 VITALS — DIASTOLIC BLOOD PRESSURE: 76 MMHG | HEART RATE: 90 BPM | SYSTOLIC BLOOD PRESSURE: 117 MMHG

## 2017-02-23 VITALS — SYSTOLIC BLOOD PRESSURE: 105 MMHG | HEART RATE: 105 BPM | DIASTOLIC BLOOD PRESSURE: 68 MMHG

## 2017-02-23 RX ADMIN — DOCUSATE SODIUM SCH MG: 100 CAPSULE, LIQUID FILLED ORAL at 09:11

## 2017-02-23 RX ADMIN — METOPROLOL TARTRATE SCH MG: 50 TABLET, FILM COATED ORAL at 09:11

## 2017-02-23 RX ADMIN — SENNOSIDES SCH TAB: 8.6 TABLET, FILM COATED ORAL at 20:51

## 2017-02-23 RX ADMIN — METOPROLOL TARTRATE SCH MG: 50 TABLET, FILM COATED ORAL at 20:52

## 2017-02-23 RX ADMIN — SEVELAMER CARBONATE SCH GM: 2400 POWDER, FOR SUSPENSION ORAL at 14:05

## 2017-02-23 RX ADMIN — IPRATROPIUM BROMIDE AND ALBUTEROL SULFATE SCH ML: .5; 3 SOLUTION RESPIRATORY (INHALATION) at 08:45

## 2017-02-23 RX ADMIN — SEVELAMER CARBONATE SCH GM: 2400 POWDER, FOR SUSPENSION ORAL at 20:55

## 2017-02-23 RX ADMIN — LEVETIRACETAM SCH MG: 500 TABLET, FILM COATED ORAL at 09:11

## 2017-02-23 RX ADMIN — DOCUSATE SODIUM SCH MG: 100 CAPSULE, LIQUID FILLED ORAL at 20:51

## 2017-02-23 RX ADMIN — FAMOTIDINE SCH MG: 20 TABLET ORAL at 09:11

## 2017-02-23 RX ADMIN — SEVELAMER CARBONATE SCH GM: 2400 POWDER, FOR SUSPENSION ORAL at 06:04

## 2017-02-23 RX ADMIN — LOSARTAN POTASSIUM SCH MG: 50 TABLET, FILM COATED ORAL at 09:11

## 2017-02-23 RX ADMIN — IPRATROPIUM BROMIDE AND ALBUTEROL SULFATE SCH ML: .5; 3 SOLUTION RESPIRATORY (INHALATION) at 16:00

## 2017-02-23 RX ADMIN — IPRATROPIUM BROMIDE AND ALBUTEROL SULFATE SCH ML: .5; 3 SOLUTION RESPIRATORY (INHALATION) at 00:28

## 2017-02-23 RX ADMIN — ATORVASTATIN CALCIUM SCH MG: 20 TABLET, FILM COATED ORAL at 20:51

## 2017-02-23 NOTE — CONS
Date/Time of Note


Date/Time of Note


DATE: 2/23/17 


TIME: 20:10





Assessment/Plan


Assessment/Plan


Chief Complaint/Hosp Course


1.  The patient has acute hemorrhagic stroke./sdh/trauma better


2.  Malignant hypertension.better


3.  Endstage renal disease.


4.  Leukocytosis. better


5.  Respiratory failure.off vent


6.  ashd


7 hyperkalemia hx


8  s/pSUBCUTANEOUS EPMHYSEMA/s/p chest tube


9 hypernatremia better


10 hyperphosphatemia better


plan continue hd mwf


PT/OT


Problems:  





Consultation Date/Type/Reason


Admit Date/Time


Feb 16, 2017 at 14:40


Type of Consultation:  renal





24 HR Interval Summary


Constitutional:  other (no distress)





Exam/Review of Systems


Vital Signs


Vitals





 Vital Signs








  Date Time  Temp Pulse Resp B/P Pulse Ox O2 Delivery O2 Flow Rate FiO2


 


2/23/17 08:45  86 18  97   21


 


2/23/17 07:30 98.6   100/62    


 


2/22/17 08:09      Room Air  














 Intake and Output   


 


 2/22/17 2/22/17 2/23/17





 15:00 23:00 07:00


 


Intake Total   500 ml


 


Output Total   1870 ml


 


Balance   -1370 ml











Exam


Respiratory:  clear to auscultation


Cardiovascular:  regular rate and rhythm


Gastrointestinal:  soft


Musculoskeletal:  nl extremities to inspection





Results


Result Diagram:  


2/21/17 0934








Medications


Medications





 Current Medications


Docusate Sodium (Colace) 100 mg BID PO  Last administered on 2/23/17at 09:11; 

Admin Dose 100 MG;  Start 2/16/17 at 21:00


Senna (Senokot) 1 tab QHS PO  Last administered on 2/21/17at 21:12; Admin Dose 

1 TAB;  Start 2/16/17 at 21:00


Acetaminophen (Tylenol Tab) 650 mg Q4H  PRN PO PAIN Last administered on 2/23/ 17at 10:13; Admin Dose 650 MG;  Start 2/16/17 at 16:00


Bisacodyl (Dulcolax Supp) 10 mg DAILY  PRN [place] CONSTIPATION;  Start 2/16/17 at 16

:00


Magnesium Hydroxide (Milk Of Mag) 30 ml BID  PRN PO CONSTIPATION;  Start 2/16/ 17 at 16:00


Lactulose (Enulose) 20 gm DAILY  PRN PO CONSTIPATION;  Start 2/16/17 at 16:00


Atorvastatin Calcium (Lipitor) 20 mg QHS PO  Last administered on 2/21/17at 21:

11; Admin Dose 20 MG;  Start 2/16/17 at 21:00


Cyclobenzaprine HCl (Flexeril) 5 mg Q8  PRN PO MUSCLE SPASM ;  Start 2/16/17 at 

16:00


Famotidine (Pepcid) 20 mg DAILY PO  Last administered on 2/23/17at 09:11; Admin 

Dose 20 MG;  Start 2/17/17 at 09:00


Acetaminophen (Tylenol Liquid) 650 mg Q6H  PRN PO PAIN AND OR ELEVATED TEMP 

Last administered on 2/17/17at 21:49; Admin Dose 650 MG;  Start 2/16/17 at 16:00


Sevelamer Carbonate (Renvela) 2.4 gm Q8 PO  Last administered on 2/23/17at 14:05

; Admin Dose 2.4 GM;  Start 2/16/17 at 22:00


Losartan Potassium (Cozaar) 50 mg DAILY PO  Last administered on 2/23/17at 09:11

; Admin Dose 50 MG;  Start 2/17/17 at 09:00


Ondansetron HCl (Zofran Tab) 4 mg Q8H  PRN PO NAUSEA AND/OR VOMITING Last 

administered on 2/21/17at 23:40; Admin Dose 4 MG;  Start 2/16/17 at 16:30


Metoprolol Tartrate (Lopressor) 50 mg BID PO  Last administered on 2/23/17at 09:

11; Admin Dose 50 MG;  Start 2/16/17 at 21:00


Multivit/Ca Carb/ B Cmplx/FA/Prenat ([place]-Argenis) 1 tab DAILY PO  Last 

administered on 2/23/17at 09:10; Admin Dose 1 TAB;  Start 2/17/17 at 09:00


Levetiracetam (Keppra) 500 mg DAILY PO  Last administered on 2/23/17at 09:11; 

Admin Dose 500 MG;  Start 2/17/17 at 09:00











PRITESH MACKEY MD Feb 23, 2017 20:11

## 2017-02-23 NOTE — PN
Date/Time of Note


Date/Time of Note


DATE: 2/23/17 


TIME: 18:00





Assessment/Plan


VTE Prophylaxis


VTE Prophylaxis Intervention:  other





Lines/Catheters


IV Catheter Type (from [facility]):  Saline Lock


Urinary Cath still in place:  No





Assessment/Plan


Assessment/Plan


1.  Status post right subdural hematoma and brain contusion after traumatic 

injury.


2.  Status post acute encephalopathy.


3.  Status post respiratory failure with pneumothorax, status post chest tube 

placement and removal.


4.  End-stage renal disease on hemodialysis.  The patient will be followed by 

Dr. Chaney from nephrology standpoint and will undergo hemodialysis 3 times per 

week.


5.  Hypertension.  Continue patient on Cozaar.


6.  Continue the patient on Keppra.


7.  Hyperlipidemia.  Continue Lipitor.


8.  Dysphagia.  Patient tolerates pured diet well.


9.  Gastroesophageal reflux disease.  Continue Pepcid.  


10.  Continue physical and occupational therapy.  Further recommendations based 

on clinical course.  


 


Plan of care discussed with Dr. Brink.





Subjective


24 Hr Interval Summary


Free Text/Dictation


nad, tolerating PT, dw staff.no new issues reported.





Exam/Review of Systems


Vital Signs


Vitals





 Vital Signs








  Date Time  Temp Pulse Resp B/P Pulse Ox O2 Delivery O2 Flow Rate FiO2


 


2/23/17 08:45  86 18  97   21


 


2/23/17 07:30 98.6   100/62    


 


2/22/17 08:09      Room Air  














 Intake and Output   


 


 2/22/17 2/22/17 2/23/17





 15:00 23:00 07:00


 


Intake Total   500 ml


 


Output Total   1870 ml


 


Balance   -1370 ml











Exam


Constitutional:  alert, well developed


Psych:  no complaints


Eyes:  EOMI, PERRL, nl sclera


ENMT:  nl external ears & nose


Neck:  non-tender


Respiratory:  clear to auscultation


Cardiovascular:  nl pulses


Gastrointestinal:  non-tender, soft


Musculoskeletal:  nl extremities to inspection


Neurological:  nl mental status, nl speech





Results


Result Diagram:  


2/21/17 0934








Medications


Medications





 Current Medications


Docusate Sodium (Colace) 100 mg BID PO  Last administered on 2/23/17at 09:11; 

Admin Dose 100 MG;  Start 2/16/17 at 21:00


Senna (Senokot) 1 tab QHS PO  Last administered on 2/21/17at 21:12; Admin Dose 

1 TAB;  Start 2/16/17 at 21:00


Acetaminophen (Tylenol Tab) 650 mg Q4H  PRN PO PAIN Last administered on 2/23/ 17at 10:13; Admin Dose 650 MG;  Start 2/16/17 at 16:00


Bisacodyl (Dulcolax Supp) 10 mg DAILY  PRN [place] CONSTIPATION;  Start 2/16/17 at 16

:00


Magnesium Hydroxide (Milk Of Mag) 30 ml BID  PRN PO CONSTIPATION;  Start 2/16/ 17 at 16:00


Lactulose (Enulose) 20 gm DAILY  PRN PO CONSTIPATION;  Start 2/16/17 at 16:00


Atorvastatin Calcium (Lipitor) 20 mg QHS PO  Last administered on 2/21/17at 21:

11; Admin Dose 20 MG;  Start 2/16/17 at 21:00


Cyclobenzaprine HCl (Flexeril) 5 mg Q8  PRN PO MUSCLE SPASM ;  Start 2/16/17 at 

16:00


Famotidine (Pepcid) 20 mg DAILY PO  Last administered on 2/23/17at 09:11; Admin 

Dose 20 MG;  Start 2/17/17 at 09:00


Acetaminophen (Tylenol Liquid) 650 mg Q6H  PRN PO PAIN AND OR ELEVATED TEMP 

Last administered on 2/17/17at 21:49; Admin Dose 650 MG;  Start 2/16/17 at 16:00


Sevelamer Carbonate (Renvela) 2.4 gm Q8 PO  Last administered on 2/23/17at 14:05

; Admin Dose 2.4 GM;  Start 2/16/17 at 22:00


Losartan Potassium (Cozaar) 50 mg DAILY PO  Last administered on 2/23/17at 09:11

; Admin Dose 50 MG;  Start 2/17/17 at 09:00


Ondansetron HCl (Zofran Tab) 4 mg Q8H  PRN PO NAUSEA AND/OR VOMITING Last 

administered on 2/21/17at 23:40; Admin Dose 4 MG;  Start 2/16/17 at 16:30


Metoprolol Tartrate (Lopressor) 50 mg BID PO  Last administered on 2/23/17at 09:

11; Admin Dose 50 MG;  Start 2/16/17 at 21:00


Multivit/Ca Carb/ B Cmplx/FA/Prenat (Charlotte-Argenis) 1 tab DAILY PO  Last 

administered on 2/23/17at 09:10; Admin Dose 1 TAB;  Start 2/17/17 at 09:00


Levetiracetam (Keppra) 500 mg DAILY PO  Last administered on 2/23/17at 09:11; 

Admin Dose 500 MG;  Start 2/17/17 at 09:00











JULIAN HERRMANN Feb 23, 2017 18:02

## 2017-02-23 NOTE — CONS
Date/Time of Note


Date/Time of Note


DATE: 2/23/17 


TIME: 13:38





Consult Date/Type/Reason


Admit Date/Time


Feb 16, 2017 at 14:40


Type of Consultation:  renal





Subjective


Family training in progress





Objective


pulm- cta


 card -s1s2


sba ambulation


 Vital Signs








  Date Time  Temp Pulse Resp B/P Pulse Ox O2 Delivery O2 Flow Rate FiO2


 


2/23/17 08:45  86 18  97   21


 


2/23/17 07:30 98.6   100/62    


 


2/22/17 08:09      Room Air  














 Intake and Output   


 


 2/22/17 2/22/17 2/23/17





 15:00 23:00 07:00


 


Intake Total   500 ml


 


Output Total   1870 ml


 


Balance   -1370 ml











Results/Medications


Result Diagram:  


2/21/17 0934





Medications





 Current Medications


Docusate Sodium (Colace) 100 mg BID PO  Last administered on 2/23/17at 09:11; 

Admin Dose 100 MG;  Start 2/16/17 at 21:00


Senna (Senokot) 1 tab QHS PO  Last administered on 2/21/17at 21:12; Admin Dose 

1 TAB;  Start 2/16/17 at 21:00


Acetaminophen (Tylenol Tab) 650 mg Q4H  PRN PO PAIN Last administered on 2/23/ 17at 10:13; Admin Dose 650 MG;  Start 2/16/17 at 16:00


Bisacodyl (Dulcolax Supp) 10 mg DAILY  PRN [place] CONSTIPATION;  Start 2/16/17 at 16

:00


Magnesium Hydroxide (Milk Of Mag) 30 ml BID  PRN PO CONSTIPATION;  Start 2/16/ 17 at 16:00


Lactulose (Enulose) 20 gm DAILY  PRN PO CONSTIPATION;  Start 2/16/17 at 16:00


Atorvastatin Calcium (Lipitor) 20 mg QHS PO  Last administered on 2/21/17at 21:

11; Admin Dose 20 MG;  Start 2/16/17 at 21:00


Cyclobenzaprine HCl (Flexeril) 5 mg Q8  PRN PO MUSCLE SPASM ;  Start 2/16/17 at 

16:00


Famotidine (Pepcid) 20 mg DAILY PO  Last administered on 2/23/17at 09:11; Admin 

Dose 20 MG;  Start 2/17/17 at 09:00


Acetaminophen (Tylenol Liquid) 650 mg Q6H  PRN PO PAIN AND OR ELEVATED TEMP 

Last administered on 2/17/17at 21:49; Admin Dose 650 MG;  Start 2/16/17 at 16:00


Sevelamer Carbonate (Renvela) 2.4 gm Q8 PO  Last administered on 2/23/17at 06:04

; Admin Dose 2.4 GM;  Start 2/16/17 at 22:00


Losartan Potassium (Cozaar) 50 mg DAILY PO  Last administered on 2/23/17at 09:11

; Admin Dose 50 MG;  Start 2/17/17 at 09:00


Ondansetron HCl (Zofran Tab) 4 mg Q8H  PRN PO NAUSEA AND/OR VOMITING Last 

administered on 2/21/17at 23:40; Admin Dose 4 MG;  Start 2/16/17 at 16:30


Metoprolol Tartrate (Lopressor) 50 mg BID PO  Last administered on 2/23/17at 09:

11; Admin Dose 50 MG;  Start 2/16/17 at 21:00


Multivit/Ca Carb/ B Cmplx/FA/Prenat (Charlotte-Argenis) 1 tab DAILY PO  Last 

administered on 2/23/17at 09:10; Admin Dose 1 TAB;  Start 2/17/17 at 09:00


Levetiracetam (Keppra) 500 mg DAILY PO  Last administered on 2/23/17at 09:11; 

Admin Dose 500 MG;  Start 2/17/17 at 09:00





Assessment/Plan


Additional Assessment/Plan


Rehab-  Traumatic brain injury with bilateral frontal contusions, acute 

subdural hematoma, traumatic subarachnoid hemorrhage, and skull fracture. 


    Continue caregiver training, and rehab program


Right-sided abrasions- improving.


Dysphagia-coninue speech therapy


End-stage renal disease on dialysis-M,W,F schedule


Status post respiratory failure.


Hypertension.


Gastroesophageal reflux disease.











SURAJ MACKEY MD Feb 23, 2017 13:38

## 2017-02-24 VITALS — SYSTOLIC BLOOD PRESSURE: 109 MMHG | DIASTOLIC BLOOD PRESSURE: 62 MMHG | HEART RATE: 88 BPM

## 2017-02-24 VITALS — RESPIRATION RATE: 18 BRPM | HEART RATE: 95 BPM | SYSTOLIC BLOOD PRESSURE: 102 MMHG | DIASTOLIC BLOOD PRESSURE: 55 MMHG

## 2017-02-24 VITALS — HEART RATE: 100 BPM | SYSTOLIC BLOOD PRESSURE: 97 MMHG | DIASTOLIC BLOOD PRESSURE: 50 MMHG

## 2017-02-24 VITALS — DIASTOLIC BLOOD PRESSURE: 60 MMHG | SYSTOLIC BLOOD PRESSURE: 109 MMHG | HEART RATE: 88 BPM

## 2017-02-24 VITALS — SYSTOLIC BLOOD PRESSURE: 109 MMHG | DIASTOLIC BLOOD PRESSURE: 60 MMHG | HEART RATE: 80 BPM

## 2017-02-24 VITALS — SYSTOLIC BLOOD PRESSURE: 105 MMHG | DIASTOLIC BLOOD PRESSURE: 60 MMHG | HEART RATE: 90 BPM

## 2017-02-24 VITALS — DIASTOLIC BLOOD PRESSURE: 54 MMHG | HEART RATE: 100 BPM | SYSTOLIC BLOOD PRESSURE: 103 MMHG

## 2017-02-24 VITALS — DIASTOLIC BLOOD PRESSURE: 55 MMHG | HEART RATE: 72 BPM | SYSTOLIC BLOOD PRESSURE: 105 MMHG

## 2017-02-24 VITALS — DIASTOLIC BLOOD PRESSURE: 61 MMHG | SYSTOLIC BLOOD PRESSURE: 98 MMHG | RESPIRATION RATE: 18 BRPM

## 2017-02-24 RX ADMIN — LOSARTAN POTASSIUM SCH MG: 50 TABLET, FILM COATED ORAL at 09:00

## 2017-02-24 RX ADMIN — SENNOSIDES SCH TAB: 8.6 TABLET, FILM COATED ORAL at 21:39

## 2017-02-24 RX ADMIN — SEVELAMER CARBONATE SCH GM: 2400 POWDER, FOR SUSPENSION ORAL at 21:39

## 2017-02-24 RX ADMIN — DOCUSATE SODIUM SCH MG: 100 CAPSULE, LIQUID FILLED ORAL at 08:37

## 2017-02-24 RX ADMIN — SEVELAMER CARBONATE SCH GM: 2400 POWDER, FOR SUSPENSION ORAL at 15:53

## 2017-02-24 RX ADMIN — FAMOTIDINE SCH MG: 20 TABLET ORAL at 08:37

## 2017-02-24 RX ADMIN — METOPROLOL TARTRATE SCH MG: 50 TABLET, FILM COATED ORAL at 21:39

## 2017-02-24 RX ADMIN — SEVELAMER CARBONATE SCH GM: 2400 POWDER, FOR SUSPENSION ORAL at 06:13

## 2017-02-24 RX ADMIN — LEVETIRACETAM SCH MG: 500 TABLET, FILM COATED ORAL at 08:38

## 2017-02-24 RX ADMIN — DOCUSATE SODIUM SCH MG: 100 CAPSULE, LIQUID FILLED ORAL at 21:39

## 2017-02-24 RX ADMIN — METOPROLOL TARTRATE SCH MG: 50 TABLET, FILM COATED ORAL at 09:00

## 2017-02-24 RX ADMIN — ATORVASTATIN CALCIUM SCH MG: 20 TABLET, FILM COATED ORAL at 21:39

## 2017-02-24 RX ADMIN — IPRATROPIUM BROMIDE AND ALBUTEROL SULFATE SCH ML: .5; 3 SOLUTION RESPIRATORY (INHALATION) at 00:02

## 2017-02-24 RX ADMIN — IPRATROPIUM BROMIDE AND ALBUTEROL SULFATE SCH ML: .5; 3 SOLUTION RESPIRATORY (INHALATION) at 08:00

## 2017-02-24 RX ADMIN — IPRATROPIUM BROMIDE AND ALBUTEROL SULFATE SCH ML: .5; 3 SOLUTION RESPIRATORY (INHALATION) at 16:00

## 2017-02-24 NOTE — CONS
Date/Time of Note


Date/Time of Note


DATE: 2/24/17 


TIME: 11:51





Consult Date/Type/Reason


Admit Date/Time


Feb 16, 2017 at 14:40


Type of Consultation:  renal





Subjective


Reports headache





Objective


pulm-cta


card-s1s2


abd-soft


neuro- no change


 Vital Signs








  Date Time  Temp Pulse Resp B/P Pulse Ox O2 Delivery O2 Flow Rate FiO2


 


2/24/17 08:03  84 20  97   21


 


2/24/17 07:30 98.9   98/61    


 


2/22/17 08:09      Room Air  














 Intake and Output   


 


 2/23/17 2/23/17 2/24/17





 15:00 23:00 07:00


 


Intake Total  430 ml 


 


Balance  430 ml 











Results/Medications


Result Diagram:  


2/21/17 0934





Medications





 Current Medications


Docusate Sodium (Colace) 100 mg BID PO  Last administered on 2/24/17at 08:37; 

Admin Dose 100 MG;  Start 2/16/17 at 21:00


Senna (Senokot) 1 tab QHS PO  Last administered on 2/23/17at 20:51; Admin Dose 

1 TAB;  Start 2/16/17 at 21:00


Acetaminophen (Tylenol Tab) 650 mg Q4H  PRN PO PAIN Last administered on 2/24/ 17at 10:09; Admin Dose 650 MG;  Start 2/16/17 at 16:00


Bisacodyl (Dulcolax Supp) 10 mg DAILY  PRN [place] CONSTIPATION;  Start 2/16/17 at 16

:00


Magnesium Hydroxide (Milk Of Mag) 30 ml BID  PRN PO CONSTIPATION;  Start 2/16/ 17 at 16:00


Lactulose (Enulose) 20 gm DAILY  PRN PO CONSTIPATION;  Start 2/16/17 at 16:00


Atorvastatin Calcium (Lipitor) 20 mg QHS PO  Last administered on 2/23/17at 20:

51; Admin Dose 20 MG;  Start 2/16/17 at 21:00


Cyclobenzaprine HCl (Flexeril) 5 mg Q8  PRN PO MUSCLE SPASM ;  Start 2/16/17 at 

16:00


Famotidine (Pepcid) 20 mg DAILY PO  Last administered on 2/24/17at 08:37; Admin 

Dose 20 MG;  Start 2/17/17 at 09:00


Acetaminophen (Tylenol Liquid) 650 mg Q6H  PRN PO PAIN AND OR ELEVATED TEMP 

Last administered on 2/17/17at 21:49; Admin Dose 650 MG;  Start 2/16/17 at 16:00


Sevelamer Carbonate (Renvela) 2.4 gm Q8 PO  Last administered on 2/24/17at 06:13

; Admin Dose 2.4 GM;  Start 2/16/17 at 22:00


Losartan Potassium (Cozaar) 50 mg DAILY PO  Last administered on 2/23/17at 09:11

; Admin Dose 50 MG;  Start 2/17/17 at 09:00


Ondansetron HCl (Zofran Tab) 4 mg Q8H  PRN PO NAUSEA AND/OR VOMITING Last 

administered on 2/21/17at 23:40; Admin Dose 4 MG;  Start 2/16/17 at 16:30


Metoprolol Tartrate (Lopressor) 50 mg BID PO  Last administered on 2/23/17at 09:

11; Admin Dose 50 MG;  Start 2/16/17 at 21:00


Multivit/Ca Carb/ B Cmplx/FA/Prenat (Charlotte-Argenis) 1 tab DAILY PO  Last 

administered on 2/24/17at 08:37; Admin Dose 1 TAB;  Start 2/17/17 at 09:00


Levetiracetam (Keppra) 500 mg DAILY PO  Last administered on 2/24/17at 08:38; 

Admin Dose 500 MG;  Start 2/17/17 at 09:00





Assessment/Plan


Additional Assessment/Plan


Rehab-  Traumatic brain injury with bilateral frontal contusions, acute 

subdural hematoma, traumatic subarachnoid hemorrhage, and skull fracture. 


    Steady progress. Working towards home 2/26. Repeat Head CT ordered for today


Right-sided abrasions- improving.


Dysphagia-continue speech therapy


End-stage renal disease on dialysis-M,W,F schedule


Status post respiratory failure.


Hypertension.


Gastroesophageal reflux disease.











SURAJ MACKEY MD Feb 24, 2017 11:52

## 2017-02-24 NOTE — CONS
Date/Time of Note


Date/Time of Note


DATE: 2/24/17 


TIME: 21:57





Assessment/Plan


Assessment/Plan


Chief Complaint/Hosp Course


1.  The patient has acute hemorrhagic stroke./sdh/trauma better


2.  Malignant hypertension.better


3.  Endstage renal disease.


4.  Leukocytosis. better


5.  Respiratory failure.off vent


6.  ashd


7 hyperkalemia hx


8  s/pSUBCUTANEOUS EPMHYSEMA/s/p chest tube


9 hypernatremia better


10 hyperphosphatemia better


plan continue hd mwf


PT/OT


PER PCP


Problems:  





Consultation Date/Type/Reason


Admit Date/Time


Feb 16, 2017 at 14:40


Type of Consultation:  renal





24 HR Interval Summary


Constitutional:  no complaints





Exam/Review of Systems


Vital Signs


Vitals





 Vital Signs








  Date Time  Temp Pulse Resp B/P Pulse Ox O2 Delivery O2 Flow Rate FiO2


 


2/24/17 21:15  88      


 


2/24/17 20:00 98.0  18 100/61 94   


 


2/24/17 08:03        21


 


2/22/17 08:09      Room Air  














 Intake and Output   


 


 2/23/17 2/23/17 2/24/17





 15:00 23:00 07:00


 


Intake Total  430 ml 


 


Balance  430 ml 











Exam


Respiratory:  clear to auscultation


Cardiovascular:  regular rate and rhythm


Gastrointestinal:  soft


Musculoskeletal:  nl extremities to inspection


Extremities:  normal pulses





Results


Result Diagram:  


2/21/17 0934








Medications


Medications





 Current Medications


Docusate Sodium (Colace) 100 mg BID PO  Last administered on 2/24/17at 21:39; 

Admin Dose 100 MG;  Start 2/16/17 at 21:00


Senna (Senokot) 1 tab QHS PO  Last administered on 2/24/17at 21:39; Admin Dose 

1 TAB;  Start 2/16/17 at 21:00


Acetaminophen (Tylenol Tab) 650 mg Q4H  PRN PO PAIN Last administered on 2/24/ 17at 10:09; Admin Dose 650 MG;  Start 2/16/17 at 16:00


Bisacodyl (Dulcolax Supp) 10 mg DAILY  PRN [place] CONSTIPATION;  Start 2/16/17 at 16

:00


Magnesium Hydroxide (Milk Of Mag) 30 ml BID  PRN PO CONSTIPATION;  Start 2/16/ 17 at 16:00


Lactulose (Enulose) 20 gm DAILY  PRN PO CONSTIPATION;  Start 2/16/17 at 16:00


Atorvastatin Calcium (Lipitor) 20 mg QHS PO  Last administered on 2/24/17at 21:

39; Admin Dose 20 MG;  Start 2/16/17 at 21:00


Cyclobenzaprine HCl (Flexeril) 5 mg Q8  PRN PO MUSCLE SPASM ;  Start 2/16/17 at 

16:00


Famotidine (Pepcid) 20 mg DAILY PO  Last administered on 2/24/17at 08:37; Admin 

Dose 20 MG;  Start 2/17/17 at 09:00


Acetaminophen (Tylenol Liquid) 650 mg Q6H  PRN PO PAIN AND OR ELEVATED TEMP 

Last administered on 2/17/17at 21:49; Admin Dose 650 MG;  Start 2/16/17 at 16:00


Sevelamer Carbonate (Renvela) 2.4 gm Q8 PO  Last administered on 2/24/17at 21:39

; Admin Dose 2.4 GM;  Start 2/16/17 at 22:00


Losartan Potassium (Cozaar) 50 mg DAILY PO  Last administered on 2/23/17at 09:11

; Admin Dose 50 MG;  Start 2/17/17 at 09:00


Ondansetron HCl (Zofran Tab) 4 mg Q8H  PRN PO NAUSEA AND/OR VOMITING Last 

administered on 2/21/17at 23:40; Admin Dose 4 MG;  Start 2/16/17 at 16:30


Metoprolol Tartrate (Lopressor) 50 mg BID PO  Last administered on 2/24/17at 21:

39; Admin Dose 50 MG;  Start 2/16/17 at 21:00


Multivit/Ca Carb/ B Cmplx/FA/Prenat (Charlotte-Argenis) 1 tab DAILY PO  Last 

administered on 2/24/17at 08:37; Admin Dose 1 TAB;  Start 2/17/17 at 09:00


Levetiracetam (Keppra) 500 mg DAILY PO  Last administered on 2/24/17at 08:38; 

Admin Dose 500 MG;  Start 2/17/17 at 09:00











PRITESH MACKEY MD Feb 24, 2017 21:57

## 2017-02-24 NOTE — RADRPT
AMENDMENT:

2/25/2017 5:44:50 PM Reid Larsen M.D

Note is also made of interval resolution of hemorrhagic component of the right frontal lobe contusio
n with persistent  edema/ encephalomalacia compared with prior CT studies.

 

PROCEDURE:   CT Brain without contrast. 

 

CLINICAL INDICATION:  Headache, history of TBI

 

TECHNIQUE:   A multiplanar CT of the brain was performed on a CT scanner utilizing axial imaging fro
m the skull base through the vertex without IV contrast.  The CTDIvol is 39.14 mGy and the DLP is 55
4.95 mGycm.  One or more of the following dose reduction techniques were utilized:  Automated exposu
re control, adjustment of the mA and/or kV according to patient size, use of iterative reconstructio
n technique.

 

COMPARISON:   CT brain 01/31/2017 and 12/17/2016 

 

FINDINGS:

 

Large right frontoparietal subgaleal hematoma/seroma. Right parietal extra-axial subdural CSF collec
tion measuring 2.7 x 5 cm in AP and craniocaudal dimension measuring 1 cm in thickness most compatib
le with residual chronic subdural hematoma.  There is otherwise complete resolution of previously de
scribed subdural hematoma.

 

Persistent lucent right temporo-parietal fracture line extending inferiorly into the right temporal 
bone and condylar fossa..

 

Physiologic bilateral basal ganglia calcifications.  Low-attenuation foci in the periventricular whi
te matter which may reflect sequelae of microvascular ischemic injury or be post-traumatic in nature
. Given the patient's age, evaluation for demyelinating disease or vasculopathy with MRI is recommen
ded.  The ventricles and subarachnoid spaces are age appropriate size.

 

 

The posterior fossa contents, brainstem, craniocervical junction, orbits, pituitary axis, paranasal 
sinuses, mastoid air cells, and calvarium are unremarkable.

 

 

 

IMPRESSION:

 

1.  Right frontal parietal subgaleal hematoma/seroma.

2.  Right parietal extra-axial subdural CSF collection most compatible with residual chronic subdura
l hematoma.  Otherwise resolution of previously described right frontal convexity subdural hematoma.

3.  Persistent lucent right temporo-parietal fracture line.

4.  Multiple hypodense foci throughout the anterior periventricular white matter and basal ganglia b
ilaterally.  Although this may be post-traumatic in nature or reflect microvascular ischemic injury,
 given the patient's age, demyelinating disease or vasculopathy should be considered and MRI is sue
mmended for further evaluation.

 

 

 

 

RPTAT:AAJJ

_____________________________________________ 

DONTAE Larsen Physician           Date    Time 

Electronically viewed and signed by DONTAE Larsen Physician on 02/25/2017 17:45 

 

D:  02/25/2017 17:45  T:  02/25/2017 17:45

JS/

## 2017-02-24 NOTE — PN
Date/Time of Note


Date/Time of Note


DATE: 2/24/17 


TIME: 18:30





Assessment/Plan


VTE Prophylaxis


VTE Prophylaxis Intervention:  SCD's





Lines/Catheters


IV Catheter Type (from [facility]):  Saline Lock


Urinary Cath still in place:  No





Assessment/Plan


Chief Complaint/Hosp Course


ASSESSMENT AND PLAN:


1.  Status post right subdural hematoma and brain contusion after traumatic 

injury.


2.  Status post acute encephalopathy.


3.  Status post respiratory failure with pneumothorax, status post chest tube 

placement and removal.


4.  End-stage renal disease on hemodialysis.  The patient will be followed by 

Dr. Chaney from nephrology standpoint and will undergo hemodialysis 3 times per 

week.


5.  Hypertension.  Continue patient on Cozaar.


6.  Continue the patient on Keppra.


7.  Hyperlipidemia.  Continue Lipitor.


8.  Dysphagia.  Patient tolerates pured diet well.


9.  Gastroesophageal reflux disease.  Continue Pepcid.  


10.  Continue physical and occupational therapy.  





Further recommendations based on clinical course.  Plan of care discussed with 

Dr. Brink.


Problems:  





Subjective


24 Hr Interval Summary


Free Text/Dictation


Patient's complains of some mild headache, relieved with Tylenol.  Patient is 

awake alert and oriented at her baseline, no new neurological deficit noted.





Exam/Review of Systems


Vital Signs


Vitals





 Vital Signs








  Date Time  Temp Pulse Resp B/P Pulse Ox O2 Delivery O2 Flow Rate FiO2


 


2/24/17 08:03  84 20  97   21


 


2/24/17 07:30 98.9   98/61    


 


2/22/17 08:09      Room Air  














 Intake and Output   


 


 2/23/17 2/23/17 2/24/17





 15:00 23:00 07:00


 


Intake Total  430 ml 


 


Balance  430 ml 











Exam


GENERAL:  Well-developed, fragile female in no acute distress.


HEENT:  Head is atraumatic, normocephalic.  Pupils equal, round, reactive to 

light and accommodation.  Oral mucosa is pink, moist.


NECK:  Supple, no cervical lymphadenopathy, no thyromegaly.


CHEST:  Lungs clear bilaterally, slightly diminished at the bases.  There is no 

rhonchi, wheezes, rales noted.


CARDIOVASCULAR:  Normal S1, S2.  No murmurs, gallops, clicks, rubs noted.


ABDOMEN:  Round, soft, nondistended, nontender.  Bowel sounds present.  There 

is no guarding, no rebound tenderness.


EXTREMITIES:  There is no edema, clubbing, cyanosis.  Pulses equal bilaterally 2

+.  The patient has a left upper extremity arteriovenous fistula with a 

palpable thrill and audible bruit.


SKIN:  There is no rash, petechiae noted.  The patient has a right chest 

healing scar from the right chest tube.


NEUROLOGICAL:  The patient is awake, alert and oriented to name.





Results


Result Diagram:  


2/21/17 0934








Medications


Medications





 Current Medications


Docusate Sodium (Colace) 100 mg BID PO  Last administered on 2/24/17at 08:37; 

Admin Dose 100 MG;  Start 2/16/17 at 21:00


Senna (Senokot) 1 tab QHS PO  Last administered on 2/23/17at 20:51; Admin Dose 

1 TAB;  Start 2/16/17 at 21:00


Acetaminophen (Tylenol Tab) 650 mg Q4H  PRN PO PAIN Last administered on 2/24/ 17at 10:09; Admin Dose 650 MG;  Start 2/16/17 at 16:00


Bisacodyl (Dulcolax Supp) 10 mg DAILY  PRN [place] CONSTIPATION;  Start 2/16/17 at 16

:00


Magnesium Hydroxide (Milk Of Mag) 30 ml BID  PRN PO CONSTIPATION;  Start 2/16/ 17 at 16:00


Lactulose (Enulose) 20 gm DAILY  PRN PO CONSTIPATION;  Start 2/16/17 at 16:00


Atorvastatin Calcium (Lipitor) 20 mg QHS PO  Last administered on 2/23/17at 20:

51; Admin Dose 20 MG;  Start 2/16/17 at 21:00


Cyclobenzaprine HCl (Flexeril) 5 mg Q8  PRN PO MUSCLE SPASM ;  Start 2/16/17 at 

16:00


Famotidine (Pepcid) 20 mg DAILY PO  Last administered on 2/24/17at 08:37; Admin 

Dose 20 MG;  Start 2/17/17 at 09:00


Acetaminophen (Tylenol Liquid) 650 mg Q6H  PRN PO PAIN AND OR ELEVATED TEMP 

Last administered on 2/17/17at 21:49; Admin Dose 650 MG;  Start 2/16/17 at 16:00


Sevelamer Carbonate (Renvela) 2.4 gm Q8 PO  Last administered on 2/24/17at 15:53

; Admin Dose 2.4 GM;  Start 2/16/17 at 22:00


Losartan Potassium (Cozaar) 50 mg DAILY PO  Last administered on 2/23/17at 09:11

; Admin Dose 50 MG;  Start 2/17/17 at 09:00


Ondansetron HCl (Zofran Tab) 4 mg Q8H  PRN PO NAUSEA AND/OR VOMITING Last 

administered on 2/21/17at 23:40; Admin Dose 4 MG;  Start 2/16/17 at 16:30


Metoprolol Tartrate (Lopressor) 50 mg BID PO  Last administered on 2/23/17at 09:

11; Admin Dose 50 MG;  Start 2/16/17 at 21:00


Multivit/Ca Carb/ B Cmplx/FA/Prenat (Charlotte-Argenis) 1 tab DAILY PO  Last 

administered on 2/24/17at 08:37; Admin Dose 1 TAB;  Start 2/17/17 at 09:00


Levetiracetam (Keppra) 500 mg DAILY PO  Last administered on 2/24/17at 08:38; 

Admin Dose 500 MG;  Start 2/17/17 at 09:00











IRAJ TINOCO Feb 24, 2017 18:32

## 2017-02-25 ENCOUNTER — HOSPITAL ENCOUNTER (INPATIENT)
Dept: HOSPITAL 10 - MS4 | Age: 47
LOS: 7 days | Discharge: HOME | DRG: 64 | End: 2017-03-04
Attending: INTERNAL MEDICINE | Admitting: INTERNAL MEDICINE
Payer: MEDICARE

## 2017-02-25 VITALS
HEIGHT: 55 IN | WEIGHT: 108.03 LBS | HEIGHT: 55 IN | WEIGHT: 108.03 LBS | BODY MASS INDEX: 25 KG/M2 | BODY MASS INDEX: 25 KG/M2

## 2017-02-25 VITALS — DIASTOLIC BLOOD PRESSURE: 61 MMHG | SYSTOLIC BLOOD PRESSURE: 94 MMHG | RESPIRATION RATE: 16 BRPM

## 2017-02-25 VITALS — DIASTOLIC BLOOD PRESSURE: 66 MMHG | RESPIRATION RATE: 18 BRPM | SYSTOLIC BLOOD PRESSURE: 104 MMHG

## 2017-02-25 DIAGNOSIS — D61.818: ICD-10-CM

## 2017-02-25 DIAGNOSIS — Z99.2: ICD-10-CM

## 2017-02-25 DIAGNOSIS — D63.1: ICD-10-CM

## 2017-02-25 DIAGNOSIS — N18.6: ICD-10-CM

## 2017-02-25 DIAGNOSIS — I12.0: ICD-10-CM

## 2017-02-25 DIAGNOSIS — G93.40: ICD-10-CM

## 2017-02-25 DIAGNOSIS — S06.5X0D: ICD-10-CM
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
## 2017-02-25 DIAGNOSIS — I63.9: Primary | ICD-10-CM

## 2017-02-25 DIAGNOSIS — K21.9: ICD-10-CM

## 2017-02-25 DIAGNOSIS — G40.209: ICD-10-CM
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
## 2017-02-25 DIAGNOSIS — R13.10: ICD-10-CM
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
## 2017-02-25 PROCEDURE — 88305 TISSUE EXAM BY PATHOLOGIST: CPT

## 2017-02-25 PROCEDURE — 99217: CPT

## 2017-02-25 PROCEDURE — 82962 GLUCOSE BLOOD TEST: CPT

## 2017-02-25 PROCEDURE — 92610 EVALUATE SWALLOWING FUNCTION: CPT

## 2017-02-25 PROCEDURE — 86850 RBC ANTIBODY SCREEN: CPT

## 2017-02-25 PROCEDURE — 86709 HEPATITIS A IGM ANTIBODY: CPT

## 2017-02-25 PROCEDURE — 85049 AUTOMATED PLATELET COUNT: CPT

## 2017-02-25 PROCEDURE — 87340 HEPATITIS B SURFACE AG IA: CPT

## 2017-02-25 PROCEDURE — 76700 US EXAM ABDOM COMPLETE: CPT

## 2017-02-25 PROCEDURE — 82668 ASSAY OF ERYTHROPOIETIN: CPT

## 2017-02-25 PROCEDURE — 80076 HEPATIC FUNCTION PANEL: CPT

## 2017-02-25 PROCEDURE — 97162 PT EVAL MOD COMPLEX 30 MIN: CPT

## 2017-02-25 PROCEDURE — 86803 HEPATITIS C AB TEST: CPT

## 2017-02-25 PROCEDURE — 97116 GAIT TRAINING THERAPY: CPT

## 2017-02-25 PROCEDURE — 86920 COMPATIBILITY TEST SPIN: CPT

## 2017-02-25 PROCEDURE — 94640 AIRWAY INHALATION TREATMENT: CPT

## 2017-02-25 PROCEDURE — 90935 HEMODIALYSIS ONE EVALUATION: CPT

## 2017-02-25 PROCEDURE — 85610 PROTHROMBIN TIME: CPT

## 2017-02-25 PROCEDURE — 97530 THERAPEUTIC ACTIVITIES: CPT

## 2017-02-25 PROCEDURE — 86900 BLOOD TYPING SEROLOGIC ABO: CPT

## 2017-02-25 PROCEDURE — 80061 LIPID PANEL: CPT

## 2017-02-25 PROCEDURE — 80048 BASIC METABOLIC PNL TOTAL CA: CPT

## 2017-02-25 PROCEDURE — 82746 ASSAY OF FOLIC ACID SERUM: CPT

## 2017-02-25 PROCEDURE — 86901 BLOOD TYPING SEROLOGIC RH(D): CPT

## 2017-02-25 PROCEDURE — P9047 ALBUMIN (HUMAN), 25%, 50ML: HCPCS

## 2017-02-25 PROCEDURE — 93880 EXTRACRANIAL BILAT STUDY: CPT

## 2017-02-25 PROCEDURE — 85025 COMPLETE CBC W/AUTO DIFF WBC: CPT

## 2017-02-25 PROCEDURE — 83540 ASSAY OF IRON: CPT

## 2017-02-25 PROCEDURE — P9016 RBC LEUKOCYTES REDUCED: HCPCS

## 2017-02-25 PROCEDURE — 86704 HEP B CORE ANTIBODY TOTAL: CPT

## 2017-02-25 PROCEDURE — 83615 LACTATE (LD) (LDH) ENZYME: CPT

## 2017-02-25 PROCEDURE — 36430 TRANSFUSION BLD/BLD COMPNT: CPT

## 2017-02-25 PROCEDURE — 85670 THROMBIN TIME PLASMA: CPT

## 2017-02-25 PROCEDURE — 86703 HIV-1/HIV-2 1 RESULT ANTBDY: CPT

## 2017-02-25 PROCEDURE — 97166 OT EVAL MOD COMPLEX 45 MIN: CPT

## 2017-02-25 PROCEDURE — G0378 HOSPITAL OBSERVATION PER HR: HCPCS

## 2017-02-25 PROCEDURE — 85730 THROMBOPLASTIN TIME PARTIAL: CPT

## 2017-02-25 PROCEDURE — 88313 SPECIAL STAINS GROUP 2: CPT

## 2017-02-25 PROCEDURE — 84443 ASSAY THYROID STIM HORMONE: CPT

## 2017-02-25 PROCEDURE — 82728 ASSAY OF FERRITIN: CPT

## 2017-02-25 PROCEDURE — 85045 AUTOMATED RETICULOCYTE COUNT: CPT

## 2017-02-25 PROCEDURE — 93306 TTE W/DOPPLER COMPLETE: CPT

## 2017-02-25 PROCEDURE — 82607 VITAMIN B-12: CPT

## 2017-02-25 PROCEDURE — 94664 DEMO&/EVAL PT USE INHALER: CPT

## 2017-02-25 PROCEDURE — 77012 CT SCAN FOR NEEDLE BIOPSY: CPT

## 2017-02-25 RX ADMIN — IPRATROPIUM BROMIDE AND ALBUTEROL SULFATE SCH ML: .5; 3 SOLUTION RESPIRATORY (INHALATION) at 15:46

## 2017-02-25 RX ADMIN — METOPROLOL TARTRATE SCH MG: 50 TABLET, FILM COATED ORAL at 08:39

## 2017-02-25 RX ADMIN — DOCUSATE SODIUM SCH MG: 100 CAPSULE, LIQUID FILLED ORAL at 21:00

## 2017-02-25 RX ADMIN — IPRATROPIUM BROMIDE AND ALBUTEROL SULFATE SCH ML: .5; 3 SOLUTION RESPIRATORY (INHALATION) at 07:46

## 2017-02-25 RX ADMIN — SENNOSIDES SCH TAB: 8.6 TABLET, FILM COATED ORAL at 21:00

## 2017-02-25 RX ADMIN — SEVELAMER CARBONATE SCH GM: 2400 POWDER, FOR SUSPENSION ORAL at 14:13

## 2017-02-25 RX ADMIN — DOCUSATE SODIUM SCH MG: 100 CAPSULE, LIQUID FILLED ORAL at 08:38

## 2017-02-25 RX ADMIN — LOSARTAN POTASSIUM SCH MG: 50 TABLET, FILM COATED ORAL at 08:38

## 2017-02-25 RX ADMIN — ONDANSETRON PRN MG: 4 TABLET, FILM COATED ORAL at 11:15

## 2017-02-25 RX ADMIN — ATORVASTATIN CALCIUM SCH MG: 20 TABLET, FILM COATED ORAL at 21:01

## 2017-02-25 RX ADMIN — SEVELAMER CARBONATE SCH GM: 2400 POWDER, FOR SUSPENSION ORAL at 06:33

## 2017-02-25 RX ADMIN — SEVELAMER CARBONATE SCH GM: 2400 POWDER, FOR SUSPENSION ORAL at 21:01

## 2017-02-25 RX ADMIN — IPRATROPIUM BROMIDE AND ALBUTEROL SULFATE SCH ML: .5; 3 SOLUTION RESPIRATORY (INHALATION) at 00:04

## 2017-02-25 RX ADMIN — IPRATROPIUM BROMIDE AND ALBUTEROL SULFATE SCH ML: .5; 3 SOLUTION RESPIRATORY (INHALATION) at 23:51

## 2017-02-25 RX ADMIN — FAMOTIDINE SCH MG: 20 TABLET ORAL at 08:39

## 2017-02-25 RX ADMIN — LEVETIRACETAM SCH MG: 500 TABLET, FILM COATED ORAL at 08:39

## 2017-02-25 NOTE — PN
Date/Time of Note


Date/Time of Note


DATE: 2/25/17 


TIME: 13:06





Assessment/Plan


Lines/Catheters


IV Catheter Type (from Nrsg):  Saline Lock


Urinary Cath still in place:  No





Assessment/Plan


Assessment/Plan


1. Status post fall with traumatic brain injury in the setting of acute 

subdural hematoma, bilateral frontal contusions, and skull fractures, managed 

nonoperatively per NSGY, with impaired mobility/gait/ADLs/cognition. Continue PT

/OT/ST. Continue Keppra per NSGY. Had repeat CT head yesterday for headache, 

reviewed by neurology and ordered for MRI brain. Follow up results and further 

recommendations per neurology. Will need to be medically cleared by neurology 

prior to discharge.


2.  Dysphagia. Continue modified diet per ST. Maintain aspiration precautions.


3.  End-stage renal disease on dialysis per nephrology.


4.  Status post respiratory failure. Pulmonary status stable, monitor.


5.  Hypertension. BPs remain on the low side, internal medicine has adjusted 

her medications. Monitor.


6.  GERD. Continue Pepcid.


7. Disposition planning underway for tomorrow to home with family with home 

health if medically cleared by neurology and internal medicine. SW reports 

dialysis set up for discharge.





Subjective


24 Hr Interval Summary


Free Text/Dictation


Rehab progress note





Subjective: Denies acute complaints. No current headache or dizziness.





ROS: Denies chest pain, no shortness of breath, no abdominal pain, no vomiting, 

no chills, no new weakness.





Exam/Review of Systems


Vital Signs


Vitals





 Vital Signs








  Date Time  Temp Pulse Resp B/P Pulse Ox O2 Delivery O2 Flow Rate FiO2


 


2/25/17 07:46  93 18  98   21


 


2/24/17 20:00 98.0   100/61    


 


2/22/17 08:09      Room Air  














 Intake and Output   


 


 2/24/17 2/24/17 2/25/17





 15:00 23:00 07:00


 


Intake Total  960 ml 240 ml


 


Output Total  1500 ml 


 


Balance  -540 ml 240 ml











Results


Result Diagram:  


2/21/17 0934








Medications


Medications





 Current Medications


Docusate Sodium (Colace) 100 mg BID PO  Last administered on 2/25/17at 08:38; 

Admin Dose 100 MG;  Start 2/16/17 at 21:00


Senna (Senokot) 1 tab QHS PO  Last administered on 2/24/17at 21:39; Admin Dose 

1 TAB;  Start 2/16/17 at 21:00


Acetaminophen (Tylenol Tab) 650 mg Q4H  PRN PO PAIN Last administered on 2/25/ 17at 00:38; Admin Dose 650 MG;  Start 2/16/17 at 16:00


Bisacodyl (Dulcolax Supp) 10 mg DAILY  PRN [place] CONSTIPATION;  Start 2/16/17 at 16

:00


Magnesium Hydroxide (Milk Of Mag) 30 ml BID  PRN PO CONSTIPATION;  Start 2/16/ 17 at 16:00


Lactulose (Enulose) 20 gm DAILY  PRN PO CONSTIPATION;  Start 2/16/17 at 16:00


Atorvastatin Calcium (Lipitor) 20 mg QHS PO  Last administered on 2/24/17at 21:

39; Admin Dose 20 MG;  Start 2/16/17 at 21:00


Cyclobenzaprine HCl (Flexeril) 5 mg Q8  PRN PO MUSCLE SPASM ;  Start 2/16/17 at 

16:00


Famotidine (Pepcid) 20 mg DAILY PO  Last administered on 2/25/17at 08:39; Admin 

Dose 20 MG;  Start 2/17/17 at 09:00


Acetaminophen (Tylenol Liquid) 650 mg Q6H  PRN PO PAIN AND OR ELEVATED TEMP 

Last administered on 2/17/17at 21:49; Admin Dose 650 MG;  Start 2/16/17 at 16:00


Sevelamer Carbonate (Renvela) 2.4 gm Q8 PO  Last administered on 2/25/17at 06:33

; Admin Dose 2.4 GM;  Start 2/16/17 at 22:00


Ondansetron HCl (Zofran Tab) 4 mg Q8H  PRN PO NAUSEA AND/OR VOMITING Last 

administered on 2/25/17at 11:15; Admin Dose 4 MG;  Start 2/16/17 at 16:30


Multivit/Ca Carb/ B Cmplx/FA/Prenat (Charlotte-Argeins) 1 tab DAILY PO  Last 

administered on 2/25/17at 08:38; Admin Dose 1 TAB;  Start 2/17/17 at 09:00


Levetiracetam (Keppra) 500 mg DAILY PO  Last administered on 2/25/17at 08:39; 

Admin Dose 500 MG;  Start 2/17/17 at 09:00


Metoprolol Tartrate (Lopressor) 25 mg BID PO ;  Start 2/25/17 at 21:00











ASPEN AVILA Feb 25, 2017 13:12

## 2017-02-25 NOTE — PN
Date/Time of Note


Date/Time of Note


DATE: 2/25/17 


TIME: 12:24





Assessment/Plan


VTE Prophylaxis


VTE Prophylaxis Intervention:  other





Lines/Catheters


IV Catheter Type (from [facility]):  Saline Lock


Urinary Cath still in place:  No





Assessment/Plan


Chief Complaint/Hosp Course


1.  The patient has acute hemorrhagic stroke./sdh/trauma better


2.  Malignant hypertension.better


3.  Endstage renal disease.


4.  Leukocytosis. better


5.  Respiratory failure.off vent


6.  ashd


7 hyperkalemia hx


8  s/pSUBCUTANEOUS EPMHYSEMA/s/p chest tube


9 hypernatremia better


10 hyperphosphatemia better


plan continue hd mwf


PT/OT


PER PCP


HD MWF


Problems:  





Exam/Review of Systems


Vital Signs


Vitals





 Vital Signs








  Date Time  Temp Pulse Resp B/P Pulse Ox O2 Delivery O2 Flow Rate FiO2


 


2/25/17 07:46  93 18  98   21


 


2/24/17 20:00 98.0   100/61    


 


2/22/17 08:09      Room Air  














 Intake and Output   


 


 2/24/17 2/24/17 2/25/17





 15:00 23:00 07:00


 


Intake Total  960 ml 240 ml


 


Output Total  1500 ml 


 


Balance  -540 ml 240 ml











Exam


Respiratory:  clear to auscultation


Cardiovascular:  regular rate and rhythm


Gastrointestinal:  soft





Results


Result Diagram:  


2/21/17 0934








Medications


Medications





 Current Medications


Docusate Sodium (Colace) 100 mg BID PO  Last administered on 2/25/17at 08:38; 

Admin Dose 100 MG;  Start 2/16/17 at 21:00


Senna (Senokot) 1 tab QHS PO  Last administered on 2/24/17at 21:39; Admin Dose 

1 TAB;  Start 2/16/17 at 21:00


Acetaminophen (Tylenol Tab) 650 mg Q4H  PRN PO PAIN Last administered on 2/25/ 17at 00:38; Admin Dose 650 MG;  Start 2/16/17 at 16:00


Bisacodyl (Dulcolax Supp) 10 mg DAILY  PRN [place] CONSTIPATION;  Start 2/16/17 at 16

:00


Magnesium Hydroxide (Milk Of Mag) 30 ml BID  PRN PO CONSTIPATION;  Start 2/16/ 17 at 16:00


Lactulose (Enulose) 20 gm DAILY  PRN PO CONSTIPATION;  Start 2/16/17 at 16:00


Atorvastatin Calcium (Lipitor) 20 mg QHS PO  Last administered on 2/24/17at 21:

39; Admin Dose 20 MG;  Start 2/16/17 at 21:00


Cyclobenzaprine HCl (Flexeril) 5 mg Q8  PRN PO MUSCLE SPASM ;  Start 2/16/17 at 

16:00


Famotidine (Pepcid) 20 mg DAILY PO  Last administered on 2/25/17at 08:39; Admin 

Dose 20 MG;  Start 2/17/17 at 09:00


Acetaminophen (Tylenol Liquid) 650 mg Q6H  PRN PO PAIN AND OR ELEVATED TEMP 

Last administered on 2/17/17at 21:49; Admin Dose 650 MG;  Start 2/16/17 at 16:00


Sevelamer Carbonate (Renvela) 2.4 gm Q8 PO  Last administered on 2/25/17at 06:33

; Admin Dose 2.4 GM;  Start 2/16/17 at 22:00


Ondansetron HCl (Zofran Tab) 4 mg Q8H  PRN PO NAUSEA AND/OR VOMITING Last 

administered on 2/25/17at 11:15; Admin Dose 4 MG;  Start 2/16/17 at 16:30


Multivit/Ca Carb/ B Cmplx/FA/Prenat (Charlotte-Argenis) 1 tab DAILY PO  Last 

administered on 2/25/17at 08:38; Admin Dose 1 TAB;  Start 2/17/17 at 09:00


Levetiracetam (Keppra) 500 mg DAILY PO  Last administered on 2/25/17at 08:39; 

Admin Dose 500 MG;  Start 2/17/17 at 09:00


Metoprolol Tartrate (Lopressor) 25 mg BID PO ;  Start 2/25/17 at 21:00











PRITESH MACKEY MD Feb 25, 2017 12:25

## 2017-02-25 NOTE — PN
Date/Time of Note


Date/Time of Note


DATE: 2/25/17 


TIME: 17:38





Assessment/Plan


Lines/Catheters


IV Catheter Type (from Nrs):  Saline Lock


Urinary Cath still in place:  No





Assessment/Plan


Assessment/Plan


1.  Status post right subdural hematoma and brain contusion after traumatic 

injury.


   - CT done, c/o headaches


   - MRI pending


2.  Status post acute encephalopathy.


3.  Status post respiratory failure with pneumothorax, status post chest tube 

placement and removal.


4.  End-stage renal disease on hemodialysis.  The patient will be followed by 

Dr. Chaney from nephrology standpoint and will undergo hemodialysis 3 times per 

week.


5.  Hypertension.  Continue patient on Cozaar.


6.  Continue the patient on Keppra.


7.  Hyperlipidemia.  Continue Lipitor.


8.  Dysphagia.  Patient tolerates pured diet well.


9.  Gastroesophageal reflux disease.  Continue Pepcid.  


10.  Continue physical and occupational therapy.  





Further recommendations based on clinical course.  Plan of care discussed with 

Dr. Brink.





Subjective


24 Hr Interval Summary


Eyes:  no complaints


ENT:  no complaints


Respiratory:  no complaints


Cardiovascular:  no complaints


Gastrointestinal:  no complaints


Genitourinary:  no complaints


Musculoskeletal:  no complaints


Neurologic:  headache


Endocrine:  no complaints





Exam/Review of Systems


Vital Signs


Vitals





 Vital Signs








  Date Time  Temp Pulse Resp B/P Pulse Ox O2 Delivery O2 Flow Rate FiO2


 


2/25/17 15:55  107 18  99   21


 


2/24/17 20:00 98.0   100/61    


 


2/22/17 08:09      Room Air  














 Intake and Output   


 


 2/24/17 2/24/17 2/25/17





 15:00 23:00 07:00


 


Intake Total  960 ml 240 ml


 


Output Total  1500 ml 


 


Balance  -540 ml 240 ml











Exam


Constitutional:  alert, oriented, well developed


Psych:  nl mood/affect


Eyes:  EOMI, PERRL, nl sclera


ENMT:  nl external ears & nose


Respiratory:  clear to auscultation


Cardiovascular:  regular rate and rhythm


Gastrointestinal:  non-tender, soft


Extremities:  normal pulses


Neurological:  nl speech





Results


Result Diagram:  


2/21/17 0934








Medications


Medications





 Current Medications


Docusate Sodium (Colace) 100 mg BID PO  Last administered on 2/25/17at 08:38; 

Admin Dose 100 MG;  Start 2/16/17 at 21:00


Senna (Senokot) 1 tab QHS PO  Last administered on 2/24/17at 21:39; Admin Dose 

1 TAB;  Start 2/16/17 at 21:00


Acetaminophen (Tylenol Tab) 650 mg Q4H  PRN PO PAIN Last administered on 2/25/ 17at 00:38; Admin Dose 650 MG;  Start 2/16/17 at 16:00


Bisacodyl (Dulcolax Supp) 10 mg DAILY  PRN [place] CONSTIPATION;  Start 2/16/17 at 16

:00


Magnesium Hydroxide (Milk Of Mag) 30 ml BID  PRN PO CONSTIPATION;  Start 2/16/ 17 at 16:00


Lactulose (Enulose) 20 gm DAILY  PRN PO CONSTIPATION;  Start 2/16/17 at 16:00


Atorvastatin Calcium (Lipitor) 20 mg QHS PO  Last administered on 2/24/17at 21:

39; Admin Dose 20 MG;  Start 2/16/17 at 21:00


Cyclobenzaprine HCl (Flexeril) 5 mg Q8  PRN PO MUSCLE SPASM ;  Start 2/16/17 at 

16:00


Famotidine (Pepcid) 20 mg DAILY PO  Last administered on 2/25/17at 08:39; Admin 

Dose 20 MG;  Start 2/17/17 at 09:00


Acetaminophen (Tylenol Liquid) 650 mg Q6H  PRN PO PAIN AND OR ELEVATED TEMP 

Last administered on 2/17/17at 21:49; Admin Dose 650 MG;  Start 2/16/17 at 16:00


Sevelamer Carbonate (Renvela) 2.4 gm Q8 PO  Last administered on 2/25/17at 14:13

; Admin Dose 2.4 GM;  Start 2/16/17 at 22:00


Ondansetron HCl (Zofran Tab) 4 mg Q8H  PRN PO NAUSEA AND/OR VOMITING Last 

administered on 2/25/17at 11:15; Admin Dose 4 MG;  Start 2/16/17 at 16:30


Multivit/Ca Carb/ B Cmplx/FA/Prenat (Charlotte-Argenis) 1 tab DAILY PO  Last 

administered on 2/25/17at 08:38; Admin Dose 1 TAB;  Start 2/17/17 at 09:00


Levetiracetam (Keppra) 500 mg DAILY PO  Last administered on 2/25/17at 08:39; 

Admin Dose 500 MG;  Start 2/17/17 at 09:00


Metoprolol Tartrate (Lopressor) 25 mg BID PO ;  Start 2/25/17 at 21:00











JULIAN HERRMANN Feb 25, 2017 17:40

## 2017-02-25 NOTE — RADRPT
PROCEDURE:   MR Brain  without contrast. 

 

CLINICAL INDICATION: Follow-up traumatic brain injury.

 

TECHNIQUE:   An MRI of the brain was performed on a 1.5 javier scanner utilizing the following sequen
yuni: Sagittal T1 weighted, axial T2 weighted, axial FLAIR, coronal GRE, and axial diffusion weighted
 with ADC mapping. 

 

COMPARISON:   CT brain 02/24/2017, 02/07/2017, 02/02/2017. 

 

FINDINGS:

 

Focus of T2 signal hyperintensity on diffusion weighted images over the corresponding signal loss on
 ADC map and most compatible with the ischemic infarct in the right basal ganglia. Correlate clinica
lly.

 

Extra-axial right anterior frontal lobe T2 signal hyperintensity subdural collection hematoma/seroma
 encephalomalacia measuring approximately 2.3 x.40 x 9 cm in size with adjacent signal loss in the f
rontal lobe cortex compatible with sequelae of hemorrhagic contusion. There is T2 signal hyperintens
ity in the right frontal lobe compatible with a residual edema/contusion.

 

Right parietal T2 signal hyperintensity collection measuring 5.0 x 1.0 x 3.4 cm (AP, TR, CC)    as w
ell as minimal linear subdural T2 signal hyperintensity along the right convexity compatible with re
sidual subdural hematoma/seroma.

 

Bilateral T2 signal hyperintensity foci throughout the periventricular, pericallosal, subcortical wh
ite matter, basal ganglia bilaterally.  Differential diagnostic considerations include chronic micro
vascular ischemic injury, vasculopathy, demyelinating disease, or migraine.  Mild prominence of the 
ventricles and subarachnoid spaces suggesting mild central cerebral volume loss. 

 

The posterior fossa contents, brainstem, seventh - eighth cranial nerve complexes, pituitary axis, o
rbits, paranasal sinuses, and right mastoid air cells are unremarkable.Small pineal cyst.

 

Fluid signal within the right mastoid air cells most compatible with retained secretions.  The right
 temporal parietal bone calvarial fracture is not well demonstrated on MRI. Right parietal subgaleal
 hematoma/seroma with change compared to recent CT examination. 

 

Normal flow voids are visible in the proximal intracranial arteries and dural sinuses, indicating pa
tency. 

 

IMPRESSION:

 

 

1. Focus of restricted diffusion compatible with acute / early subacute ischemic infarct in the righ
t anterior basal ganglia.

2. Small residual subdural collections in the right frontal lobe, right convexity, and right parieta
l lobes compatible with residual hematoma/seroma.

3.  Diffuse T2 signal hyperintensity within the right frontal lobe most compatible with a residual e
silvia and the sequelae of a right frontal lobe hemorrhagic parenchymal contusion as described in deta
il above.

4.  Extensive nonspecific white matter T2 signal hyperintensity foci.  These findings may be reflect
 sequelae of chronic microvascular ischemic injury, vasculopathy, demyelinating disease, or sequelae
 of trauma.

5.  Fluid signal within the right mastoid air cells compatible with retained secretions.  The right 
temporal bone fracture is not clearly visualized on the current study.

 

 

 

RPTAT:AAJJ

_____________________________________________ 

DONTAE Larsen Physician           Date    Time 

Electronically viewed and signed by Physician Ricardo on 02/25/2017 17:49 

 

D:  02/25/2017 17:49  T:  02/25/2017 17:49

JS/

## 2017-02-26 VITALS — HEART RATE: 100 BPM

## 2017-02-26 VITALS — DIASTOLIC BLOOD PRESSURE: 78 MMHG | SYSTOLIC BLOOD PRESSURE: 119 MMHG | RESPIRATION RATE: 19 BRPM

## 2017-02-26 VITALS — HEART RATE: 108 BPM | SYSTOLIC BLOOD PRESSURE: 110 MMHG | DIASTOLIC BLOOD PRESSURE: 75 MMHG | RESPIRATION RATE: 20 BRPM

## 2017-02-26 VITALS — HEART RATE: 96 BPM

## 2017-02-26 VITALS — HEART RATE: 95 BPM

## 2017-02-26 VITALS — SYSTOLIC BLOOD PRESSURE: 113 MMHG | RESPIRATION RATE: 18 BRPM | DIASTOLIC BLOOD PRESSURE: 74 MMHG

## 2017-02-26 VITALS — DIASTOLIC BLOOD PRESSURE: 74 MMHG | RESPIRATION RATE: 20 BRPM | HEART RATE: 106 BPM | SYSTOLIC BLOOD PRESSURE: 118 MMHG

## 2017-02-26 VITALS — RESPIRATION RATE: 17 BRPM | DIASTOLIC BLOOD PRESSURE: 72 MMHG | SYSTOLIC BLOOD PRESSURE: 113 MMHG

## 2017-02-26 VITALS — SYSTOLIC BLOOD PRESSURE: 118 MMHG | DIASTOLIC BLOOD PRESSURE: 82 MMHG | RESPIRATION RATE: 18 BRPM

## 2017-02-26 VITALS — HEART RATE: 110 BPM

## 2017-02-26 VITALS — HEART RATE: 102 BPM

## 2017-02-26 VITALS — HEART RATE: 89 BPM

## 2017-02-26 LAB
CHOLEST SERPL-MCNC: 79 MG/DL (ref 100–200)
CHOLEST/HDLC SERPL: 2.6 RATIO
HDLC SERPL-MCNC: 30 MG/DL (ref 34–88)
TRIGL SERPL-MCNC: 107 MG/DL (ref 0–149)
TSH SERPL-ACNC: 1.44 MIU/L (ref 0.47–4.68)

## 2017-02-26 RX ADMIN — SEVELAMER CARBONATE SCH GM: 2400 POWDER, FOR SUSPENSION ORAL at 06:00

## 2017-02-26 RX ADMIN — METOPROLOL TARTRATE SCH MG: 25 TABLET, FILM COATED ORAL at 21:26

## 2017-02-26 RX ADMIN — LEVETIRACETAM SCH MG: 500 TABLET, FILM COATED ORAL at 12:36

## 2017-02-26 RX ADMIN — SEVELAMER CARBONATE SCH GM: 2400 POWDER, FOR SUSPENSION ORAL at 14:14

## 2017-02-26 RX ADMIN — IPRATROPIUM BROMIDE AND ALBUTEROL SULFATE SCH ML: .5; 3 SOLUTION RESPIRATORY (INHALATION) at 17:04

## 2017-02-26 RX ADMIN — METOPROLOL TARTRATE SCH MG: 25 TABLET, FILM COATED ORAL at 09:48

## 2017-02-26 RX ADMIN — DEXAMETHASONE SODIUM PHOSPHATE PRN MG: 10 INJECTION, SOLUTION INTRAMUSCULAR; INTRAVENOUS at 22:26

## 2017-02-26 RX ADMIN — IPRATROPIUM BROMIDE AND ALBUTEROL SULFATE SCH ML: .5; 3 SOLUTION RESPIRATORY (INHALATION) at 09:18

## 2017-02-26 RX ADMIN — DOCUSATE SODIUM SCH MG: 100 CAPSULE, LIQUID FILLED ORAL at 21:26

## 2017-02-26 RX ADMIN — SENNOSIDES SCH TAB: 8.6 TABLET, FILM COATED ORAL at 14:14

## 2017-02-26 RX ADMIN — DOCUSATE SODIUM SCH MG: 100 CAPSULE, LIQUID FILLED ORAL at 09:48

## 2017-02-26 RX ADMIN — SEVELAMER CARBONATE SCH GM: 2400 POWDER, FOR SUSPENSION ORAL at 21:25

## 2017-02-26 RX ADMIN — ATORVASTATIN CALCIUM SCH MG: 20 TABLET, FILM COATED ORAL at 21:25

## 2017-02-26 RX ADMIN — SENNOSIDES SCH TAB: 8.6 TABLET, FILM COATED ORAL at 22:00

## 2017-02-26 RX ADMIN — FAMOTIDINE SCH MG: 20 TABLET ORAL at 09:48

## 2017-02-26 NOTE — RADRPT
PROCEDURE:  Doppler US Carotids. 

 

CLINICAL INDICATION: Hemorrhagic stroke.  Trauma.

 

TECHNIQUE:   Multiple sonographic of the carotid bifurcation region and vertebral arteries were obta
ined utilizing gray scale, duplex and color-flow imaging. The images were reviewed on a PACS worksta
tion. Stenoses were measured using velocity criteria that are extrapolated from diameter data as def
ined by the Society of Radiologists in Ultrasound Consensus Conference Radiology 2003; 229;340-346. 
This study does indirectly reference the measurement of the distal ICA diameter as the denominator f
or stenosis measurement, where measured. 

 

 

COMPARISON:   None

 

FINDINGS:

Evaluation of the right carotid system shows minimal intimal irregularity but no significant plaque 
formation or stenosis..

 

Evaluation of the left carotid system shows minimal intimal irregularity with perhaps minimal plaque
 in the proximal ICA..

 

There is antegrade flow within the vertebral arteries bilaterally.

 

RIGHT CAROTID MEASUREMENTS:

Common Carotid [id number] (cm/sec) 

Internal Carotid Artery - ggpjttkc15.6 (cm/sec) 

Internal Carotid Artery - mid90.9 (cm/sec) 

Internal Carotid Artery - rhofnb47.1 (cm/sec) 

 

Internal Carotid/Common Carotid1.1

 

 

LEFT CAROTID MEASUREMENTS:

Common Carotid Artery            104.2 (cm/sec) 

Internal Carotid Artery - proximal           88.3 (cm/sec) 

Internal Carotid Artery - mid            86.9 (cm/sec) 

Internal Carotid Artery - distal            89.7 (cm/sec) 

 

Internal Carotid/Common Carotid           0.9

 

 

IMPRESSION:

 

1.  No evidence for hemodynamically significant carotid artery stenosis.

2.  Normal antegrade flow in the vertebral arteries bilaterally.

 

RPTAT: HLBE

_____________________________________________ 

Physician Ramonita           Date    Time 

Electronically viewed and signed by Physician Ramonita on 02/26/2017 11:52 

 

D:  02/26/2017 11:52  T:  02/26/2017 11:52

BRUNO/

## 2017-02-26 NOTE — CONS
DATE OF ADMISSION: 02/25/2017

DATE OF CONSULTATION:  02/26/2017

 

 

 

TYPE OF CONSULTATION:  Neurology.

 

HISTORY OF PRESENT ILLNESS:  The patient is known to me.  She is a 46-year-old 
lady with past medical history of hypertension, end-stage renal disease who had 
an incidental fall with severe head trauma on 01/31/2017.  She had bilateral 
acute frontal contusions and a right-sided subdural hematoma, as well as 
scattered subarachnoid hemorrhages and nondisplaced right-sided skull fracture.
  No surgery was required.  On initial evaluation, which was about 1 week after 
her trauma, she was somewhat weaker on the left side.  There was an episode of 
transient unresponsiveness suspicious for seizures.  She was put on Keppra and 
did not have any episodes any more.  Gradually, her condition improved.  

 

She was moved to a rehabilitation facility and because of complaints of 
headache had a repeated CAT scan on 02/24/2017, which showed residual chronic 
subdural hematoma, no presence of acute blood seen, still fracture of the right 
temporoparietal area.  There were multiple hypodense foci likely posttraumatic 
in nature but MRI was recommended by radiologist and MRI of the brain was done 
yesterday which shows a small focus of restricted diffusion in the right basal 
ganglia, compatible with an ischemic infarct.  Still, a small residual subdural 
collection was compatible with seroma as well as residual edema and sequela of 
the right frontal lobe hemorrhagic contusion were seen.  Because of acute 
stroke on the MRI, though no clinical presentation of stroke, the patient was 
moved to acute hospital.  Carotid ultrasound was done and did not show any 
abnormality.  Her labs shows anemia 9.2, hemoglobin 27.4, hematocrit that was 
last time done on 02/17/2017.  Chemistry was done 5 days ago showing BUN 22, 
creatinine 4.94.  Today's cholesterol 79, LDL 28.  TSH within normal limits.

 

CURRENT MEDICATIONS:

1.  Lipitor 20 mg.

2.  Colace.

3.  Pepcid.

4.  Keppra 500 once per day.

5.  Metoprolol.

6.  Multivitamins.

7.  Renvela. 

 

ALLERGIES:  MORPHINE.

 

SOCIAL HISTORY:  No alcohol, tobacco, drug use.

 

FAMILY HISTORY:  Noncontributory.

 

REVIEW OF SYSTEMS:  All pertinent positives included in the above history of 
present illness.  Patient denies any new weakness, numbness, vertigo or diplopia
, dysarthria, dysphasia.

 

PHYSICAL EXAMINATION

VITAL SIGNS:  Temperature 98.3, pulse 102, respirations 19, blood pressure 119/
78. 

GENERAL:  Not in acute distress, lying in bed.

HEENT:  Normocephalic, atraumatic head.

NECK:  No carotid bruits.  No thyromegaly.

LUNGS:  Clear to auscultation bilaterally.

CARDIAC:  Normal cardiac rhythm and sounds.

ABDOMEN:  Soft, nontender.

EXTREMITIES:  No cyanosis, clubbing or edema.

NEUROLOGIC:  She is awake, alert, and oriented x3 with fluent speech.  Cranial 
nerve examination shows intact visual fields bilaterally.  Pupils reactive from 
3 to 2 mm bilaterally.  Extraocular movements intact without nystagmus.  
Symmetrical face.  Preserved facial strength and sensation.  Tongue is in 
midline.  Palate elevates symmetrically.  Motor strength examination seems to 
be preserved in all extremities.  No focal weakness noticed.  Sensory 
examination grossly intact to light touch and pain.  Deep tendon reflexes 2+ 
upper extremities, absent knee jerks, 2+ ankle jerks.  No definite response to 
plantar stimulation bilaterally.  Coordination preserved on finger-to-finger 
testing.  No dysmetria or tremor.  Gait was not assessed.

 

IMPRESSION

1.  History of traumatic brain injury on 01/31/2017 with bifrontal contusion, 
subdural hematoma, small subarachnoid hemorrhage. Because of headaches, the 
patient had a CAT scan which by radiologist was suggested to be followed by MRI 
of the brain, and MRI showed incidental tiny area of acute stroke in the right 
basal ganglia which is not present clinically.  Following the injury, the 
patient was having some left-sided weakness but I do not appreciate any 
weakness any more.  I do not know if stroke was present a few weeks ago or not.
  Carotid ultrasound is negative.  She is already on Lipitor.  I would 
recommend to continue Lipitor.  Keep patient euglycemic, normotensive.  I think 
it is safe to start her on a baby aspirin.

2.  The patient had an episode of transient encephalopathy after the trauma, 
suspicious for complex partial seizures.  I would recommend to continue her 
Keppra 500 mg daily plus an additional 500 post-hemodialysis.

 

Thank you very much for this interesting consultation.  No other 
recommendations for patient's management.

 

 

Dictated By: SHANNAN GUERRERO/BITA

DD:    02/26/2017 14:30:50

DT:    02/26/2017 16:52:41

Conf#: [id number]

DID#:  [id number]

 

[facility]

## 2017-02-26 NOTE — CONS
Date/Time of Note


Date/Time of Note


DATE: 2/26/17 


TIME: 13:40





Assessment/Plan


Assessment/Plan


Chief Complaint/Hosp Course


1.  The patient has acute hemorrhagic stroke./sdh/trauma better


2.  Malignant hypertension. hx


3.  Endstage renal disease.


4.  ashd


5.  Respiratory failure.off vent


6.  avf left arm


7 hyperkalemia hx


8  s/pSUBCUTANEOUS EPMHYSEMA/s/p chest tube





plan continue hd mwf


PT/OT


PER PCP


HD MWF


Problems:  





Consultation Date/Type/Reason


Admit Date/Time


Feb 16, 2017 at 14:40


Type of Consultation:  renal





24 HR Interval Summary


Constitutional:  febrile, no complaints, 


   No chills, No diaphoresis, No disoriented





Exam/Review of Systems


Vital Signs


Vitals





 Vital Signs








  Date Time  Temp Pulse Resp B/P Pulse Ox O2 Delivery O2 Flow Rate FiO2


 


2/25/17 23:51  95 18  95   21


 


2/25/17 19:49 98.4   104/66    


 


2/22/17 08:09      Room Air  














 Intake and Output   


 


 2/25/17 2/25/17 2/26/17





 15:00 23:00 07:00


 


Intake Total  700 ml 


 


Output Total  1 ml 


 


Balance  699 ml 











Exam


Constitutional:  alert, oriented, well developed


Psych:  nl mood/affect, no complaints


Head:  atraumatic, normocephalic


Eyes:  EOMI, nl conjunctiva, nl lids


ENMT:  nl lips & teeth, nl nasal mucosa & septum


Neck:  non-tender, supple


Respiratory:  clear to auscultation, normal air movement


Cardiovascular:  nl pulses, regular rate and rhythm


Gastrointestinal:  nl liver, spleen, non-tender, soft


Musculoskeletal:  nl extremities to inspection


Extremities:  calf tenderness, normal pulses, 


   No cyanosis, No edema


Neurological:  CNS II-XII intact, nl mental status











PRITESH MACKEY MD Feb 26, 2017 13:44

## 2017-02-26 NOTE — HP
Date/Time of Note


Date/Time of Note


DATE: 2/26/17 


TIME: 19:17





Assessment/Plan


VTE Prophylaxis


VTE Prophylaxis Intervention:  SCD's, other





Lines/Catheters


IV Catheter Type (from Nrs):  Saline Lock





Assessment/Plan


Assessment/Plan


1.  Status post right subdural hematoma and brain contusion after traumatic 

injury.


   - per neurology


   -- admit on telemetry floor


   - CT done, c/o headaches


   - MRI done


      Possible acute / early subacute ischemic infarct in the right anterior 

basal ganglia per MRI.  Patient was no new neurological deficits.   


2. Episode of transient encephalopathy after the trauma, suspicious for complex 

partial seizures.  


   - Continue Keppra 


   - seizure precautions


3.  Status post respiratory failure with pneumothorax, status post chest tube 

placement and removal.


4.  End-stage renal disease on hemodialysis.  The patient will be followed by 

Dr. Chaney from nephrology standpoint and will undergo hemodialysis 3 times per 

week.


5.  Hypertension.  Continue patient on Cozaar.


6.  Continue the patient on Keppra.


7.  Hyperlipidemia.  Continue Lipitor.


8.  Dysphagia.  Patient tolerates pured diet well.


9.  Gastroesophageal reflux disease.  Continue Pepcid.  


10.  Continue physical and occupational therapy.  


PLAN


- Admit to tele


- Neuro consult


- AM LABS


-PT/OT





Further recommendations based on clinical course.  Plan of care discussed with 

Dr. Brink.





HPI/ROS


Admit Date/Time


Admit Date/Time


Feb 25, 2017 at 22:45





Hx of Present Illness


The patient is a 46-year-old female with Past History of  end-stage renal 

disease on hemodialysis 3 days per week, followed by Dr. Chaney/nephrology, 

hypertension and GERD. The patient sustained a ground level fall and with 

trauma to the head, and developed right subdural hematoma and brain contusion 

and skull fracture. Dr. Palomo- neurosurgeon evaluated the patient bu no 

surgical intervention done.


The patient also had iatrogenic pneumothorax and had right chest tube placement 

and subsequent right chest tube removal.  The patient also with respiratory 

failure, was intubated and subsequently extubated.  The patient was also with 

acute encephalopathy.  The patient was also followed by Dr. Hanks in 

pulmonology consultation.  The patient was admitted to acute rehabilitation 

center for further evaluation and care, and physical and occupational therapy 

for impairment in mobility and cognition.


Patient c/o persistent heaaches, MRI brin done and Patient  was admitted to 

[facility] under Dr Elliott Patient still c/o headaches and 

a CT and MRI brain was done.MRI showed and patient was transferred to tele 

floor and seen by Dr Valencia.





ROS


Constitutional:  improved


Eyes:  no complaints


ENT:  no complaints


Respiratory:  no complaints


Cardiovascular:  no complaints


Gastrointestinal:  no complaints


Genitourinary:  no complaints


Musculoskeletal:  no complaints


Skin:  no complaints


Neurologic:  headache


Endocrine:  no complaints


Lymphatic:  no complaints


Psychological:  nl mood/affect


Immunologic:  no complaints





PMH/Family/Social


Social History


Smoking Status:  Never smoker





Exam/Review of Systems


Vital Signs


Vitals





 Vital Signs








  Date Time  Temp Pulse Resp B/P Pulse Ox O2 Delivery O2 Flow Rate FiO2


 


2/26/17 17:04  92 18  98   21


 


2/26/17 11:48 98.3   119/78    


 


2/26/17 04:21      Room Air  














 Intake and Output   


 


 2/25/17 2/25/17 2/26/17





 15:00 23:00 07:00


 


Intake Total   100 ml


 


Balance   100 ml











Exam


Constitutional:  alert, oriented


Psych:  nl mood/affect


Eyes:  EOMI, PERRL, nl sclera


ENMT:  nl external ears & nose


Neck:  non-tender


Respiratory:  clear to auscultation


Cardiovascular:  nl pulses


Gastrointestinal:  non-tender, soft


Musculoskeletal:  nl extremities to inspection


Extremities:  normal pulses


Neurological:  nl mental status, nl speech


Skin:  nl turgor


Lymph:  nontender





Medications


Medications





 Current Medications


Acetaminophen (Tylenol Liquid) 650 mg Q6  PRN PO PAIN AND OR ELEVATED TEMP;  

Start 2/26/17 at 05:30


Atorvastatin Calcium (Lipitor) 20 mg HS PO ;  Start 2/26/17 at 21:00


Bisacodyl (Dulcolax Supp) 10 mg DAILY  PRN [place] CONSTIPATION;  Start 2/26/17 at 05

:30


Cyclobenzaprine HCl (Flexeril) 5 mg Q8  PRN PO MUSCLE SPASMS;  Start 2/26/17 at 

05:30


Docusate Sodium (Colace) 100 mg BID PO  Last administered on 2/26/17at 09:48; 

Admin Dose 100 MG;  Start 2/26/17 at 09:00


Famotidine (Pepcid) 20 mg DAILY PO  Last administered on 2/26/17at 09:48; Admin 

Dose 20 MG;  Start 2/26/17 at 09:00


Lactulose (Enulose) 20 gm DAILY  PRN PO CONSTIPATION;  Start 2/26/17 at 05:30


Levetiracetam (Keppra) 500 mg DAILY PO  Last administered on 2/26/17at 12:36; 

Admin Dose 500 MG;  Start 2/26/17 at 09:00


Magnesium Hydroxide (Milk Of Mag) 30 ml BID  PRN PO CONSTIPATION;  Start 2/26/ 17 at 05:30


Metoprolol Tartrate (Lopressor) 25 mg BID PO  Last administered on 2/26/17at 09:

48; Admin Dose 25 MG;  Start 2/26/17 at 09:00


Multivit/Ca Carb/ B Cmplx/FA/Prenat (Charlotte-Argenis) 1 tab DAILY PO  Last 

administered on 2/26/17at 12:36; Admin Dose 1 TAB;  Start 2/26/17 at 09:00


Ondansetron HCl (Zofran Tab) 4 mg Q8  PRN PO NAUSEA AND/OR VOMITING;  Start 2/26 /17 at 05:30


Senna (Senokot) 1 tab DAILY  PRN PO CONSTIPATION;  Start 2/26/17 at 05:30


Sevelamer Carbonate (Renvela) 2.4 gm Q8 PO  Last administered on 2/26/17at 14:14

; Admin Dose 2.4 GM;  Start 2/26/17 at 06:00


Senna (Senokot) 2 tab BID PO  Last administered on 2/26/17at 14:14; Admin Dose 

2 TAB;  Start 2/26/17 at 13:30


Aspirin (Halfprin) 81 mg DAILY PO ;  Start 2/27/17 at 09:00





Procedures


Procedures


PROCEDURE:   MR Brain  without contrast. 


 


CLINICAL INDICATION: Follow-up traumatic brain injury.


 


TECHNIQUE:   An MRI of the brain was performed on a 1.5 javier scanner utilizing 

the following sequences: Sagittal T1 weighted, axial T2 weighted, axial FLAIR, 

coronal GRE, and axial diffusion weighted with ADC mapping. 


 


COMPARISON:   CT brain 02/24/2017, 02/07/2017, 02/02/2017. 


 


FINDINGS:


 


Focus of T2 signal hyperintensity on diffusion weighted images over the 

corresponding signal loss on ADC map and most compatible with the ischemic 

infarct in the right basal ganglia. Correlate clinically.


 


Extra-axial right anterior frontal lobe T2 signal hyperintensity subdural 

collection hematoma/seroma encephalomalacia measuring approximately 2.3 x.40 x 

9 cm in size with adjacent signal loss in the frontal lobe cortex compatible 

with sequelae of hemorrhagic contusion. There is T2 signal hyperintensity in 

the right frontal lobe compatible with a residual edema/contusion.


 


Right parietal T2 signal hyperintensity collection measuring 5.0 x 1.0 x 3.4 cm 

(AP, TR, CC)    as well as minimal linear subdural T2 signal hyperintensity 

along the right convexity compatible with residual subdural hematoma/seroma.


 


Bilateral T2 signal hyperintensity foci throughout the periventricular, 

pericallosal, subcortical white matter, basal ganglia bilaterally.  

Differential diagnostic considerations include chronic microvascular ischemic 

injury, vasculopathy, demyelinating disease, or migraine.  Mild prominence of 

the ventricles and subarachnoid spaces suggesting mild central cerebral volume 

loss. 


 


The posterior fossa contents, brainstem, seventh - eighth cranial nerve 

complexes, pituitary axis, orbits, paranasal sinuses, and right mastoid air 

cells are unremarkable.Small pineal cyst.


 


Fluid signal within the right mastoid air cells most compatible with retained 

secretions.  The right temporal parietal bone calvarial fracture is not well 

demonstrated on MRI. Right parietal subgaleal hematoma/seroma with change 

compared to recent CT examination. 


 


Normal flow voids are visible in the proximal intracranial arteries and dural 

sinuses, indicating patency. 


 


IMPRESSION:


 


 


1. Focus of restricted diffusion compatible with acute / early subacute 

ischemic infarct in the right anterior basal ganglia.


2. Small residual subdural collections in the right frontal lobe, right 

convexity, and right parietal lobes compatible with residual hematoma/seroma.


3.  Diffuse T2 signal hyperintensity within the right frontal lobe most 

compatible with a residual edema and the sequelae of a right frontal lobe 

hemorrhagic parenchymal contusion as described in detail above.


4.  Extensive nonspecific white matter T2 signal hyperintensity foci.  These 

findings may be reflect sequelae of chronic microvascular ischemic injury, 

vasculopathy, demyelinating disease, or sequelae of trauma.


5.  Fluid signal within the right mastoid air cells compatible with retained 

secretions.  The right temporal bone fracture is not clearly visualized on the 

current study.











JULIAN HERRMANN Feb 26, 2017 19:27

## 2017-02-26 NOTE — CONS
Date/Time of Note


Date/Time of Note


DATE: 2/26/17 


TIME: 14:31





Assessment


ASSESSMENT


Additional comments:


Full neuro consult dictated. Incidental finding of small lacunar ischemic CVA, 

asymptomatic. Hx of traumatic SDH, contusions, SAH, 27  days  ago, no acute 

blood on imaging.   Start ASA 81, same lipitor, SHANNAN Brumfield MD Feb 26, 2017 14:33

## 2017-02-26 NOTE — RADRPT
Echocardiogram Report

 

Patient Name:  BRIAN ACEVEDO  Gender:       Female

MRN:           [id number]          Accession #:  [id number]

Birth Date:    1970      Study Date:   26-Feb-2017

Sonographer:   BOB [facility]  Location:     [id number]

 

Ref. Physician: JASON MAHMOOD

Quality: Adequate

 

Procedures: Transthoracic echocardiogram with complete 2D, M-Mode, and 

doppler examination.

Indications: New Cerebrovascular Accident.

 

2D/M Mode                          Doppler

Measurement  Value  Normal Ranges  Measurement     Value  Normal Ranges

LVIDd 2D     3.8    3.5 - 5.6 cm   AV Peak Navdeep     1.5    m/sec

LVIDs 2D     2.7    2.1 - 4.1 cm   AV Peak PG      9.1    mmHg

LVPWd 2D     1.4    0.6 - 1.1 cm   LVOT Peak Navdeep   1.0    m/sec

IVSd 2D      1.4    0.6 - 1.1 cm   LVOT Peak PG    3.8    mmHg

AoR Diam 2D  2.2    2.0 - 3.7 cm   MV E Peak Navdeep   0.9    m/sec

EDV 2D       60.4   cm3            MV A Peak Navdeep   1.0    m/sec

ESV 2D       20.1   cm3            MV E/A          0.9

MV Decel Time   160    msec

MV Decel [place]  6

MV E/A          0.9

 

Findings

Left Ventricle: Normal left ventricular systolic function.  Normal left 

ventricular cavity size.  Mild concentric left ventricular hypertrophy.  

Ejection fraction is visually estimated at 65 %.  Tissue Doppler/Mitral 

Doppler indices are consistent with impaired relaxation (Stage I 

diastolic dysfunction).

Right Ventricle: Normal right ventricular size.  Normal right 

ventricular systolic function.

Left Atrium: The left atrium is normal in size.

Right Atrium: The right atrium is normal in size.

Mitral Valve: Mild mitral leaflet calcification.  Mild mitral annular 

calcification.  Trace mitral regurgitation.

Aortic Valve: Trileaflet aortic valve.

Tricuspid Valve: Tricuspid valve not well visualized.  There is trace 

tricuspid regurgitation.

Pulmonic Valve: There is trace pulmonic regurgitation.

Pericardium: Left pleural effusion seen.

Aorta: Normal aortic root.

IVC: Normal size and normal respiratory collapse consistent with normal 

right atrial pressure.

 

Conclusions

1.Normal left ventricular systolic function.  Normal left ventricular 

cavity size.  Mild concentric left ventricular hypertrophy.  Ejection 

fraction is visually estimated at 65 %.  Tissue Doppler/Mitral Doppler 

indices are consistent with impaired relaxation (Stage I diastolic 

dysfunction).

2.Mild mitral leaflet calcification.  Mild mitral annular 

calcification.  Trace mitral regurgitation.

3.Tricuspid valve not well visualized.  There is trace tricuspid 

regurgitation.

 

Electronically Signed By:

Osvaldo Singh

26-Feb-2017 17:31:45  -0800

 

Patient Name: BRIAN ACEVEDO

MRN: [id number]

Study Date: 26-Feb-2017

 

[id number]

## 2017-02-27 VITALS — SYSTOLIC BLOOD PRESSURE: 120 MMHG | DIASTOLIC BLOOD PRESSURE: 80 MMHG | HEART RATE: 96 BPM

## 2017-02-27 VITALS — SYSTOLIC BLOOD PRESSURE: 125 MMHG | DIASTOLIC BLOOD PRESSURE: 86 MMHG | RESPIRATION RATE: 20 BRPM

## 2017-02-27 VITALS — RESPIRATION RATE: 16 BRPM | DIASTOLIC BLOOD PRESSURE: 66 MMHG | HEART RATE: 90 BPM | SYSTOLIC BLOOD PRESSURE: 117 MMHG

## 2017-02-27 VITALS — HEART RATE: 85 BPM

## 2017-02-27 VITALS — SYSTOLIC BLOOD PRESSURE: 117 MMHG | HEART RATE: 94 BPM | DIASTOLIC BLOOD PRESSURE: 66 MMHG

## 2017-02-27 VITALS — HEART RATE: 95 BPM | DIASTOLIC BLOOD PRESSURE: 78 MMHG | SYSTOLIC BLOOD PRESSURE: 106 MMHG

## 2017-02-27 VITALS — RESPIRATION RATE: 20 BRPM | DIASTOLIC BLOOD PRESSURE: 72 MMHG | SYSTOLIC BLOOD PRESSURE: 114 MMHG

## 2017-02-27 VITALS — SYSTOLIC BLOOD PRESSURE: 115 MMHG | DIASTOLIC BLOOD PRESSURE: 78 MMHG | HEART RATE: 77 BPM

## 2017-02-27 VITALS — DIASTOLIC BLOOD PRESSURE: 69 MMHG | HEART RATE: 98 BPM | SYSTOLIC BLOOD PRESSURE: 124 MMHG

## 2017-02-27 VITALS — SYSTOLIC BLOOD PRESSURE: 117 MMHG | RESPIRATION RATE: 19 BRPM | DIASTOLIC BLOOD PRESSURE: 72 MMHG

## 2017-02-27 VITALS — HEART RATE: 93 BPM

## 2017-02-27 VITALS — HEART RATE: 96 BPM

## 2017-02-27 VITALS — HEART RATE: 108 BPM

## 2017-02-27 VITALS — HEART RATE: 97 BPM | DIASTOLIC BLOOD PRESSURE: 66 MMHG | SYSTOLIC BLOOD PRESSURE: 128 MMHG

## 2017-02-27 VITALS — SYSTOLIC BLOOD PRESSURE: 122 MMHG | RESPIRATION RATE: 20 BRPM | DIASTOLIC BLOOD PRESSURE: 80 MMHG

## 2017-02-27 VITALS — HEART RATE: 95 BPM

## 2017-02-27 LAB
ABNORMAL IP MESSAGE: 1
ADD SCAN DIFF: NO
ANION GAP SERPL CALC-SCNC: 16 MMOL/L (ref 8–16)
BUN SERPL-MCNC: 40 MG/DL (ref 7–20)
CALCIUM SERPL-MCNC: 8.8 MG/DL (ref 8.4–10.2)
CHLORIDE SERPL-SCNC: 93 MMOL/L (ref 97–110)
CO2 SERPL-SCNC: 29 MMOL/L (ref 21–31)
CREAT SERPL-MCNC: 7.35 MG/DL (ref 0.44–1)
EOSINOPHIL # BLD: 0.1 10^3/UL (ref 0–0.5)
EOSINOPHIL NFR BLD: 3 % (ref 0–7)
ERYTHROCYTE [DISTWIDTH] IN BLOOD BY AUTOMATED COUNT: 13.9 % (ref 11.5–14.5)
GLUCOSE SERPL-MCNC: 72 MG/DL (ref 70–220)
HCT VFR BLD CALC: 18.5 % (ref 37–47)
HGB BLD-MCNC: 5.9 G/DL (ref 12–16)
IRON SERPL-MCNC: 43 UG/DL (ref 35–150)
LYMPHOCYTES # BLD AUTO: 0.6 10^3/UL (ref 0.8–2.9)
LYMPHOCYTES NFR BLD AUTO: 23 % (ref 15–51)
MCH RBC QN AUTO: 29.2 PG (ref 29–33)
MCHC RBC AUTO-ENTMCNC: 31.9 G/DL (ref 32–37)
MCV RBC AUTO: 91.6 FL (ref 82–101)
MONOCYTES # BLD: 0.1 10^3/UL (ref 0.3–0.9)
MONOCYTES NFR BLD: 2 % (ref 0–11)
NEUTROPHILS # BLD: 1.9 10^3/UL (ref 1.6–7.5)
NEUTROPHILS NFR BLD AUTO: 72 % (ref 39–77)
PLATELET # BLD: 112 10^3/UL (ref 140–415)
PMV BLD AUTO: 10.9 FL (ref 7.4–10.4)
POLYCHROMASIA BLD QL SMEAR: (no result)
POTASSIUM SERPL-SCNC: 4.4 MMOL/L (ref 3.5–5.1)
RBC # BLD AUTO: 2.02 10^6/UL (ref 4.2–5.4)
SODIUM SERPL-SCNC: 134 MMOL/L (ref 135–144)
TIBC SERPL-MCNC: 211 UG/DL (ref 241–421)
TOTAL CELLS COUNTED PERCENT: 100 %
WBC # BLD AUTO: 2.7 10^3/UL (ref 4.8–10.8)

## 2017-02-27 PROCEDURE — 5A1D60Z: ICD-10-PCS

## 2017-02-27 PROCEDURE — 30233N1 TRANSFUSION OF NONAUTOLOGOUS RED BLOOD CELLS INTO PERIPHERAL VEIN, PERCUTANEOUS APPROACH: ICD-10-PCS

## 2017-02-27 RX ADMIN — SENNOSIDES SCH TAB: 8.6 TABLET, FILM COATED ORAL at 09:50

## 2017-02-27 RX ADMIN — DOCUSATE SODIUM SCH MG: 100 CAPSULE, LIQUID FILLED ORAL at 20:53

## 2017-02-27 RX ADMIN — SEVELAMER CARBONATE SCH GM: 2400 POWDER, FOR SUSPENSION ORAL at 06:00

## 2017-02-27 RX ADMIN — IPRATROPIUM BROMIDE AND ALBUTEROL SULFATE SCH ML: .5; 3 SOLUTION RESPIRATORY (INHALATION) at 00:00

## 2017-02-27 RX ADMIN — IPRATROPIUM BROMIDE AND ALBUTEROL SULFATE SCH ML: .5; 3 SOLUTION RESPIRATORY (INHALATION) at 23:50

## 2017-02-27 RX ADMIN — SEVELAMER CARBONATE SCH GM: 2400 POWDER, FOR SUSPENSION ORAL at 14:00

## 2017-02-27 RX ADMIN — DOCUSATE SODIUM SCH MG: 100 CAPSULE, LIQUID FILLED ORAL at 09:50

## 2017-02-27 RX ADMIN — FAMOTIDINE SCH MG: 20 TABLET ORAL at 09:50

## 2017-02-27 RX ADMIN — ASPIRIN SCH MG: 81 TABLET, COATED ORAL at 09:50

## 2017-02-27 RX ADMIN — IPRATROPIUM BROMIDE AND ALBUTEROL SULFATE SCH ML: .5; 3 SOLUTION RESPIRATORY (INHALATION) at 08:25

## 2017-02-27 RX ADMIN — SENNOSIDES SCH TAB: 8.6 TABLET, FILM COATED ORAL at 20:53

## 2017-02-27 RX ADMIN — METOPROLOL TARTRATE SCH MG: 25 TABLET, FILM COATED ORAL at 20:54

## 2017-02-27 RX ADMIN — SEVELAMER CARBONATE SCH GM: 2400 POWDER, FOR SUSPENSION ORAL at 20:54

## 2017-02-27 RX ADMIN — METOPROLOL TARTRATE SCH MG: 25 TABLET, FILM COATED ORAL at 09:00

## 2017-02-27 RX ADMIN — LEVETIRACETAM SCH MG: 500 TABLET, FILM COATED ORAL at 09:50

## 2017-02-27 RX ADMIN — IPRATROPIUM BROMIDE AND ALBUTEROL SULFATE SCH ML: .5; 3 SOLUTION RESPIRATORY (INHALATION) at 16:48

## 2017-02-27 RX ADMIN — ATORVASTATIN CALCIUM SCH MG: 20 TABLET, FILM COATED ORAL at 20:54

## 2017-02-27 NOTE — CONS
Date/Time of Note


Date/Time of Note


DATE: 2/27/17 


TIME: 16:42





Assessment/Plan


Assessment/Plan


Chief Complaint/Hosp Course


ESRD


HTN


ANEMIA


CVA 


S/P CNS BLEED


ASHD


PLAN HD W PRBC


Problems:  





Consultation Date/Type/Reason


Admit Date/Time


Feb 27, 2017 at 08:01


Initial Consult Date





Type of Consultation:  renal





24 HR Interval Summary


Constitutional:  no complaints





Exam/Review of Systems


Vital Signs


Vitals





 Vital Signs








  Date Time  Temp Pulse Resp B/P Pulse Ox O2 Delivery O2 Flow Rate FiO2


 


2/27/17 16:17 98.4 102 19 117/72 100   


 


2/27/17 08:25        21


 


2/26/17 04:21      Room Air  














 Intake and Output   


 


 2/26/17 2/26/17 2/27/17





 15:00 23:00 07:00


 


Intake Total  250 ml 


 


Balance  250 ml 











Exam


Respiratory:  clear to auscultation


Genitourinary - Female:  No CVA tenderness





Results


Result Diagram:  


2/27/17 0620                                                                   

             2/27/17 0620





Results 24 hrs





Laboratory Tests








Test


  2/27/17


06:20 2/27/17


09:31


 


Anion Gap 16   


 


Blood Urea Nitrogen 40  H 


 


Calcium Level 8.8   


 


Carbon Dioxide Level 29   


 


Chloride Level 93  L 


 


Creatinine 7.35  H 


 


Eosinophils # 0.1   


 


Eosinophils % 3.0   


 


Ferritin 1240.0  H 


 


Glucose Level 72   


 


Hematocrit 18.5  #L 


 


Hemoglobin 5.9  #*L 


 


Lymphocytes # 0.6  L 


 


Lymphocytes % 23.0   


 


Mean Corpuscular Hemoglobin 29.2   


 


Mean Corpuscular Hemoglobin


Concent 31.9  L


  


 


 


Mean Corpuscular Volume 91.6   


 


Mean Platelet Volume 10.9  H 


 


Monocytes # 0.1  L 


 


Monocytes % 2.0   


 


Neutrophils # 1.9   


 


Neutrophils % 72.0   


 


Platelet Count 112  #L 


 


Polychromasia 1+   


 


Potassium Level 4.4   


 


Red Blood Count 2.02  #L 


 


Red Cell Distribution Width 13.9   


 


Sodium Level 134  L 


 


Tear Drop Cells    


 


White Blood Count 2.7  #L 


 


Iron Level  43  


 


Percent Iron Saturation  20  L


 


Total Iron Binding Capacity  211  L











Medications


Medications





 Current Medications


Acetaminophen (Tylenol Liquid) 650 mg Q6  PRN PO PAIN AND OR ELEVATED TEMP;  

Start 2/26/17 at 05:30


Atorvastatin Calcium (Lipitor) 20 mg HS PO  Last administered on 2/26/17at 21:25

; Admin Dose 20 MG;  Start 2/26/17 at 21:00


Bisacodyl (Dulcolax Supp) 10 mg DAILY  PRN [place] CONSTIPATION;  Start 2/26/17 at 05

:30


Cyclobenzaprine HCl (Flexeril) 5 mg Q8  PRN PO MUSCLE SPASMS;  Start 2/26/17 at 

05:30


Docusate Sodium (Colace) 100 mg BID PO  Last administered on 2/27/17at 09:50; 

Admin Dose 100 MG;  Start 2/26/17 at 09:00


Famotidine (Pepcid) 20 mg DAILY PO  Last administered on 2/27/17at 09:50; Admin 

Dose 20 MG;  Start 2/26/17 at 09:00


Lactulose (Enulose) 20 gm DAILY  PRN PO CONSTIPATION;  Start 2/26/17 at 05:30


Levetiracetam (Keppra) 500 mg DAILY PO  Last administered on 2/27/17at 09:50; 

Admin Dose 500 MG;  Start 2/26/17 at 09:00


Magnesium Hydroxide (Milk Of Mag) 30 ml BID  PRN PO CONSTIPATION;  Start 2/26/ 17 at 05:30


Metoprolol Tartrate (Lopressor) 25 mg BID PO  Last administered on 2/26/17at 21:

26; Admin Dose 25 MG;  Start 2/26/17 at 09:00


Multivit/Ca Carb/ B Cmplx/FA/Prenat (Charlotte-Argenis) 1 tab DAILY PO  Last 

administered on 2/27/17at 09:50; Admin Dose 1 TAB;  Start 2/26/17 at 09:00


Ondansetron HCl (Zofran Tab) 4 mg Q8  PRN PO NAUSEA AND/OR VOMITING;  Start 2/26 /17 at 05:30


Senna (Senokot) 1 tab DAILY  PRN PO CONSTIPATION;  Start 2/26/17 at 05:30


Sevelamer Carbonate (Renvela) 2.4 gm Q8 PO  Last administered on 2/26/17at 21:25

; Admin Dose 2.4 GM;  Start 2/26/17 at 06:00


Senna (Senokot) 2 tab BID PO  Last administered on 2/27/17at 09:50; Admin Dose 

2 TAB;  Start 2/26/17 at 13:30


Aspirin (Halfprin) 81 mg DAILY PO  Last administered on 2/27/17at 09:50; Admin 

Dose 81 MG;  Start 2/27/17 at 09:00


Ondansetron HCl (Zofran Inj) 4 mg Q6H  PRN IV NAUSEA AND/OR VOMITING Last 

administered on 2/26/17at 22:26; Admin Dose 4 MG;  Start 2/26/17 at 22:00











PRITESH MACKEY MD Feb 27, 2017 16:44

## 2017-02-27 NOTE — PN
Date/Time of Note


Date/Time of Note


DATE: 2/27/17 


TIME: 15:49





Assessment/Plan


VTE Prophylaxis


VTE Prophylaxis Intervention:  SCD's





Lines/Catheters


IV Catheter Type (from [facility]):  Peripheral IV





Assessment/Plan


Chief Complaint/Hosp Course


ASSESSMENT AND PLAN:


- Possible acute / early subacute ischemic infarct in the right anterior basal 

ganglia per MRI.  Patient was no new neurological deficits.  patient is 

followed by Dr. Christensen in neurology consultation.  Started on aspirin.


- Episode of transient encephalopathy after the trauma, suspicious for complex 

partial seizures.  Continue Keppra 500 mg daily plus an additional 500 post-

hemodialysis per neurology recommendation.


- Anemia with hemoglobin 5.9.  Patient status post blood transfusion with 

hemodialysis.  CBC tomorrow.  Continue to monitor hemoglobin and hematocrit.


-  Status post right subdural hematoma and brain contusion after traumatic 

injury.


-  Status post acute encephalopathy 2 to #2.


-  Status post respiratory failure with pneumothorax, status post chest tube 

placement and removal.


-  End-stage renal disease on hemodialysis.  The patient will be followed by 

Dr. Chaney from nephrology standpoint and will undergo hemodialysis 3 times per 

week.


-  Hypertension.  Continue patient on Cozaar.


-  Hyperlipidemia.  Continue Lipitor.


-  Dysphagia.  Patient tolerates pured diet well.


-  Gastroesophageal reflux disease.  Continue Pepcid.  








Further recommendations based on clinical course.  Plan of care discussed with 

Dr. Brink.


Problems:  





Subjective


24 Hr Interval Summary


Free Text/Dictation


Patient is undergoing HD, denies headache. Patient is A&O *4.  Patient status 

post blood transfusion.





Exam/Review of Systems


Vital Signs


Vitals





 Vital Signs








  Date Time  Temp Pulse Resp B/P Pulse Ox O2 Delivery O2 Flow Rate FiO2


 


2/27/17 13:00  95      


 


2/27/17 12:23 99.9  20 122/80 98   


 


2/27/17 08:25        21


 


2/26/17 04:21      Room Air  














 Intake and Output   


 


 2/26/17 2/26/17 2/27/17





 15:00 23:00 07:00


 


Intake Total  250 ml 


 


Balance  250 ml 











Exam


GENERAL:  Well-developed, fragile female in no acute distress.


HEENT:  Head is atraumatic, normocephalic.  Pupils equal, round, reactive to 

light and accommodation.  Oral mucosa is pink, moist.


NECK:  Supple, no cervical lymphadenopathy, no thyromegaly.


CHEST:  Lungs clear bilaterally, slightly diminished at the bases.  There is no 

rhonchi, wheezes, rales noted.


CARDIOVASCULAR:  Normal S1, S2.  No murmurs, gallops, clicks, rubs noted.


ABDOMEN:  Round, soft, nondistended, nontender.  Bowel sounds present.  There 

is no guarding, no rebound tenderness.


EXTREMITIES:  There is no edema, clubbing, cyanosis.  Pulses equal bilaterally 2

+.  The patient has a left upper extremity arteriovenous fistula with a 

palpable thrill and audible bruit.


SKIN:  There is no rash, petechiae noted.  The patient has a right chest 

healing scar from the right chest tube.


NEUROLOGICAL:  The patient is awake, alert and oriented to name.





Results


Result Diagram:  


2/27/17 0620                                                                   

             2/27/17 0620





Results 24 hrs





Laboratory Tests








Test


  2/27/17


06:20 2/27/17


09:31


 


Anion Gap 16   


 


Blood Urea Nitrogen 40  H 


 


Calcium Level 8.8   


 


Carbon Dioxide Level 29   


 


Chloride Level 93  L 


 


Creatinine 7.35  H 


 


Eosinophils # 0.1   


 


Eosinophils % 3.0   


 


Ferritin 1240.0  H 


 


Glucose Level 72   


 


Hematocrit 18.5  #L 


 


Hemoglobin 5.9  #*L 


 


Lymphocytes # 0.6  L 


 


Lymphocytes % 23.0   


 


Mean Corpuscular Hemoglobin 29.2   


 


Mean Corpuscular Hemoglobin


Concent 31.9  L


  


 


 


Mean Corpuscular Volume 91.6   


 


Mean Platelet Volume 10.9  H 


 


Monocytes # 0.1  L 


 


Monocytes % 2.0   


 


Neutrophils # 1.9   


 


Neutrophils % 72.0   


 


Platelet Count 112  #L 


 


Polychromasia 1+   


 


Potassium Level 4.4   


 


Red Blood Count 2.02  #L 


 


Red Cell Distribution Width 13.9   


 


Sodium Level 134  L 


 


Tear Drop Cells    


 


White Blood Count 2.7  #L 


 


Iron Level  43  


 


Percent Iron Saturation  20  L


 


Total Iron Binding Capacity  211  L











Medications


Medications





 Current Medications


Acetaminophen (Tylenol Liquid) 650 mg Q6  PRN PO PAIN AND OR ELEVATED TEMP;  

Start 2/26/17 at 05:30


Atorvastatin Calcium (Lipitor) 20 mg HS PO  Last administered on 2/26/17at 21:25

; Admin Dose 20 MG;  Start 2/26/17 at 21:00


Bisacodyl (Dulcolax Supp) 10 mg DAILY  PRN [place] CONSTIPATION;  Start 2/26/17 at 05

:30


Cyclobenzaprine HCl (Flexeril) 5 mg Q8  PRN PO MUSCLE SPASMS;  Start 2/26/17 at 

05:30


Docusate Sodium (Colace) 100 mg BID PO  Last administered on 2/27/17at 09:50; 

Admin Dose 100 MG;  Start 2/26/17 at 09:00


Famotidine (Pepcid) 20 mg DAILY PO  Last administered on 2/27/17at 09:50; Admin 

Dose 20 MG;  Start 2/26/17 at 09:00


Lactulose (Enulose) 20 gm DAILY  PRN PO CONSTIPATION;  Start 2/26/17 at 05:30


Levetiracetam (Keppra) 500 mg DAILY PO  Last administered on 2/27/17at 09:50; 

Admin Dose 500 MG;  Start 2/26/17 at 09:00


Magnesium Hydroxide (Milk Of Mag) 30 ml BID  PRN PO CONSTIPATION;  Start 2/26/ 17 at 05:30


Metoprolol Tartrate (Lopressor) 25 mg BID PO  Last administered on 2/26/17at 21:

26; Admin Dose 25 MG;  Start 2/26/17 at 09:00


Multivit/Ca Carb/ B Cmplx/FA/Prenat ([place]otte-Argenis) 1 tab DAILY PO  Last 

administered on 2/27/17at 09:50; Admin Dose 1 TAB;  Start 2/26/17 at 09:00


Ondansetron HCl (Zofran Tab) 4 mg Q8  PRN PO NAUSEA AND/OR VOMITING;  Start 2/26 /17 at 05:30


Senna (Senokot) 1 tab DAILY  PRN PO CONSTIPATION;  Start 2/26/17 at 05:30


Sevelamer Carbonate (Renvela) 2.4 gm Q8 PO  Last administered on 2/26/17at 21:25

; Admin Dose 2.4 GM;  Start 2/26/17 at 06:00


Senna (Senokot) 2 tab BID PO  Last administered on 2/27/17at 09:50; Admin Dose 

2 TAB;  Start 2/26/17 at 13:30


Aspirin (Halfprin) 81 mg DAILY PO  Last administered on 2/27/17at 09:50; Admin 

Dose 81 MG;  Start 2/27/17 at 09:00


Ondansetron HCl (Zofran Inj) 4 mg Q6H  PRN IV NAUSEA AND/OR VOMITING Last 

administered on 2/26/17at 22:26; Admin Dose 4 MG;  Start 2/26/17 at 22:00











RADCHENKO,IRAJ Feb 27, 2017 15:49

## 2017-02-28 VITALS — SYSTOLIC BLOOD PRESSURE: 102 MMHG | DIASTOLIC BLOOD PRESSURE: 69 MMHG | RESPIRATION RATE: 18 BRPM

## 2017-02-28 VITALS — HEART RATE: 95 BPM

## 2017-02-28 VITALS — RESPIRATION RATE: 20 BRPM | DIASTOLIC BLOOD PRESSURE: 85 MMHG | SYSTOLIC BLOOD PRESSURE: 112 MMHG

## 2017-02-28 VITALS — SYSTOLIC BLOOD PRESSURE: 109 MMHG | DIASTOLIC BLOOD PRESSURE: 72 MMHG | RESPIRATION RATE: 20 BRPM

## 2017-02-28 VITALS — DIASTOLIC BLOOD PRESSURE: 59 MMHG | HEART RATE: 95 BPM | RESPIRATION RATE: 20 BRPM | SYSTOLIC BLOOD PRESSURE: 92 MMHG

## 2017-02-28 VITALS — HEART RATE: 100 BPM

## 2017-02-28 VITALS — HEART RATE: 92 BPM

## 2017-02-28 VITALS — RESPIRATION RATE: 20 BRPM | SYSTOLIC BLOOD PRESSURE: 119 MMHG | DIASTOLIC BLOOD PRESSURE: 77 MMHG

## 2017-02-28 LAB
ADD SCAN DIFF: NO
ANION GAP SERPL CALC-SCNC: 17 MMOL/L (ref 8–16)
BASOPHILS # BLD AUTO: 0 10^3/UL (ref 0–0.1)
BASOPHILS NFR BLD: 0.3 % (ref 0–2)
BUN SERPL-MCNC: 19 MG/DL (ref 7–20)
CALCIUM SERPL-MCNC: 8.7 MG/DL (ref 8.4–10.2)
CHLORIDE SERPL-SCNC: 94 MMOL/L (ref 97–110)
CO2 SERPL-SCNC: 32 MMOL/L (ref 21–31)
CREAT SERPL-MCNC: 4.78 MG/DL (ref 0.44–1)
EOSINOPHIL # BLD: 0.1 10^3/UL (ref 0–0.5)
EOSINOPHIL NFR BLD: 4.1 % (ref 0–7)
ERYTHROCYTE [DISTWIDTH] IN BLOOD BY AUTOMATED COUNT: 14 % (ref 11.5–14.5)
GLUCOSE SERPL-MCNC: 76 MG/DL (ref 70–220)
HCT VFR BLD CALC: 27.2 % (ref 37–47)
HGB BLD-MCNC: 8.8 G/DL (ref 12–16)
LYMPHOCYTES # BLD AUTO: 0.7 10^3/UL (ref 0.8–2.9)
LYMPHOCYTES NFR BLD AUTO: 22.7 % (ref 15–51)
MCH RBC QN AUTO: 29.3 PG (ref 29–33)
MCHC RBC AUTO-ENTMCNC: 32.4 G/DL (ref 32–37)
MCV RBC AUTO: 90.7 FL (ref 82–101)
MONOCYTES # BLD: 0.3 10^3/UL (ref 0.3–0.9)
MONOCYTES NFR BLD: 8.5 % (ref 0–11)
NEUTROPHILS # BLD: 2 10^3/UL (ref 1.6–7.5)
NEUTROPHILS NFR BLD AUTO: 64.4 % (ref 39–77)
NRBC # BLD MANUAL: 0 10^3/UL (ref 0–0)
NRBC BLD QL: 0 /100WBC (ref 0–0)
PLATELET # BLD: 128 10^3/UL (ref 140–415)
PMV BLD AUTO: 10.7 FL (ref 7.4–10.4)
POTASSIUM SERPL-SCNC: 4.2 MMOL/L (ref 3.5–5.1)
RBC # BLD AUTO: 3 10^6/UL (ref 4.2–5.4)
SODIUM SERPL-SCNC: 139 MMOL/L (ref 135–144)
WBC # BLD AUTO: 3.2 10^3/UL (ref 4.8–10.8)

## 2017-02-28 RX ADMIN — SEVELAMER CARBONATE SCH GM: 2400 POWDER, FOR SUSPENSION ORAL at 23:11

## 2017-02-28 RX ADMIN — ASPIRIN SCH MG: 81 TABLET, COATED ORAL at 08:55

## 2017-02-28 RX ADMIN — IPRATROPIUM BROMIDE AND ALBUTEROL SULFATE SCH ML: .5; 3 SOLUTION RESPIRATORY (INHALATION) at 07:34

## 2017-02-28 RX ADMIN — DOCUSATE SODIUM SCH MG: 100 CAPSULE, LIQUID FILLED ORAL at 21:28

## 2017-02-28 RX ADMIN — FAMOTIDINE SCH MG: 20 TABLET ORAL at 08:55

## 2017-02-28 RX ADMIN — IPRATROPIUM BROMIDE AND ALBUTEROL SULFATE SCH ML: .5; 3 SOLUTION RESPIRATORY (INHALATION) at 15:50

## 2017-02-28 RX ADMIN — DEXAMETHASONE SODIUM PHOSPHATE PRN MG: 10 INJECTION, SOLUTION INTRAMUSCULAR; INTRAVENOUS at 23:14

## 2017-02-28 RX ADMIN — METOPROLOL TARTRATE SCH MG: 25 TABLET, FILM COATED ORAL at 08:56

## 2017-02-28 RX ADMIN — SENNOSIDES SCH TAB: 8.6 TABLET, FILM COATED ORAL at 21:29

## 2017-02-28 RX ADMIN — LEVETIRACETAM SCH MG: 500 TABLET, FILM COATED ORAL at 08:55

## 2017-02-28 RX ADMIN — DOCUSATE SODIUM SCH MG: 100 CAPSULE, LIQUID FILLED ORAL at 08:55

## 2017-02-28 RX ADMIN — SEVELAMER CARBONATE SCH GM: 2400 POWDER, FOR SUSPENSION ORAL at 05:18

## 2017-02-28 RX ADMIN — METOPROLOL TARTRATE SCH MG: 25 TABLET, FILM COATED ORAL at 21:00

## 2017-02-28 RX ADMIN — ATORVASTATIN CALCIUM SCH MG: 20 TABLET, FILM COATED ORAL at 21:29

## 2017-02-28 RX ADMIN — SEVELAMER CARBONATE SCH GM: 2400 POWDER, FOR SUSPENSION ORAL at 13:14

## 2017-02-28 RX ADMIN — SENNOSIDES SCH TAB: 8.6 TABLET, FILM COATED ORAL at 08:55

## 2017-02-28 NOTE — CONS
Date/Time of Note


Date/Time of Note


DATE: 2/28/17 


TIME: 15:14





Assessment/Plan


Assessment/Plan


Chief Complaint/Hosp Course


ESRD


HTN


ANEMIA


CVA 


S/P CNS BLEED


ASHD


PLAN continue hd 


pt/ot


Problems:  





Consultation Date/Type/Reason


Admit Date/Time


Feb 27, 2017 at 08:01


Type of Consultation:  renal





24 HR Interval Summary


Constitutional:  no complaints





Exam/Review of Systems


Vital Signs


Vitals





 Vital Signs








  Date Time  Temp Pulse Resp B/P Pulse Ox O2 Delivery O2 Flow Rate FiO2


 


2/28/17 12:10 98.6 88 20 109/72 99   


 


2/28/17 07:36        21


 


2/26/17 04:21      Room Air  














 Intake and Output   


 


 2/27/17 2/27/17 2/28/17





 15:00 23:00 07:00


 


Intake Total 1000 ml 320 ml 100 ml


 


Output Total 2400 ml  


 


Balance -1400 ml 320 ml 100 ml











Exam


Respiratory:  clear to auscultation


Cardiovascular:  regular rate and rhythm


Gastrointestinal:  soft





Results


Result Diagram:  


2/28/17 0603                                                                   

             2/28/17 0603





Results 24 hrs





Laboratory Tests








Test


  2/28/17


06:03


 


Anion Gap 17  H


 


Basophils # 0.0  


 


Basophils % 0.3  


 


Blood Urea Nitrogen 19  #


 


Calcium Level 8.7  


 


Carbon Dioxide Level 32  H


 


Chloride Level 94  L


 


Creatinine 4.78  #H


 


Eosinophils # 0.1  


 


Eosinophils % 4.1  


 


Glucose Level 76  


 


Hematocrit 27.2  #L


 


Hemoglobin 8.8  #L


 


Lymphocytes # 0.7  L


 


Lymphocytes % 22.7  


 


Mean Corpuscular Hemoglobin 29.3  


 


Mean Corpuscular Hemoglobin


Concent 32.4  


 


 


Mean Corpuscular Volume 90.7  


 


Mean Platelet Volume 10.7  H


 


Monocytes # 0.3  


 


Monocytes % 8.5  


 


Neutrophils # 2.0  


 


Neutrophils % 64.4  


 


Nucleated Red Blood Cells # 0.0  


 


Nucleated Red Blood Cells % 0.0  


 


Platelet Count 128  L


 


Potassium Level 4.2  


 


Red Blood Count 3.00  #L


 


Red Cell Distribution Width 14.0  


 


Sodium Level 139  


 


White Blood Count 3.2  L











Medications


Medications





 Current Medications


Acetaminophen (Tylenol Liquid) 650 mg Q6  PRN PO PAIN AND OR ELEVATED TEMP;  

Start 2/26/17 at 05:30


Atorvastatin Calcium (Lipitor) 20 mg HS PO  Last administered on 2/27/17at 20:54

; Admin Dose 20 MG;  Start 2/26/17 at 21:00


Bisacodyl (Dulcolax Supp) 10 mg DAILY  PRN [place] CONSTIPATION;  Start 2/26/17 at 05

:30


Cyclobenzaprine HCl (Flexeril) 5 mg Q8  PRN PO MUSCLE SPASMS Last administered 

on 2/27/17at 23:23; Admin Dose 5 MG;  Start 2/26/17 at 05:30


Docusate Sodium (Colace) 100 mg BID PO  Last administered on 2/28/17at 08:55; 

Admin Dose 100 MG;  Start 2/26/17 at 09:00


Famotidine (Pepcid) 20 mg DAILY PO  Last administered on 2/28/17at 08:55; Admin 

Dose 20 MG;  Start 2/26/17 at 09:00


Lactulose (Enulose) 20 gm DAILY  PRN PO CONSTIPATION;  Start 2/26/17 at 05:30


Levetiracetam (Keppra) 500 mg DAILY PO  Last administered on 2/28/17at 08:55; 

Admin Dose 500 MG;  Start 2/26/17 at 09:00


Magnesium Hydroxide (Milk Of Mag) 30 ml BID  PRN PO CONSTIPATION;  Start 2/26/ 17 at 05:30


Metoprolol Tartrate (Lopressor) 25 mg BID PO  Last administered on 2/28/17at 08:

56; Admin Dose 25 MG;  Start 2/26/17 at 09:00


Multivit/Ca Carb/ B Cmplx/FA/Prenat ([place]-Argenis) 1 tab DAILY PO  Last 

administered on 2/28/17at 08:55; Admin Dose 1 TAB;  Start 2/26/17 at 09:00


Ondansetron HCl (Zofran Tab) 4 mg Q8  PRN PO NAUSEA AND/OR VOMITING;  Start 2/26 /17 at 05:30


Senna (Senokot) 1 tab DAILY  PRN PO CONSTIPATION;  Start 2/26/17 at 05:30


Sevelamer Carbonate (Renvela) 2.4 gm Q8 PO  Last administered on 2/28/17at 13:14

; Admin Dose 2.4 GM;  Start 2/26/17 at 06:00


Senna (Senokot) 2 tab BID PO  Last administered on 2/28/17at 08:55; Admin Dose 

2 TAB;  Start 2/26/17 at 13:30


Aspirin (Halfprin) 81 mg DAILY PO  Last administered on 2/28/17at 08:55; Admin 

Dose 81 MG;  Start 2/27/17 at 09:00


Ondansetron HCl (Zofran Inj) 4 mg Q6H  PRN IV NAUSEA AND/OR VOMITING Last 

administered on 2/26/17at 22:26; Admin Dose 4 MG;  Start 2/26/17 at 22:00











PRITESH MACKEY MD Feb 28, 2017 15:15

## 2017-02-28 NOTE — PN
Date/Time of Note


Date/Time of Note


DATE: 2/28/17 


TIME: 16:01





Assessment/Plan


VTE Prophylaxis


VTE Prophylaxis Intervention:  SCD's





Lines/Catheters


IV Catheter Type (from [facility]):  Saline Lock





Assessment/Plan


Chief Complaint/Hosp Course


ASSESSMENT AND PLAN:


-  Pancytopenia. Dr. Aly is asked to see patient in hematology consultation.


- Anemia. Status post blood transfusion with hemodialysis.   Continue to 

monitor hemoglobin and hematocrit.  


- Possible acute / early subacute ischemic infarct in the right anterior basal 

ganglia per MRI.  Patient was no new neurological deficits.  patient is 

followed by Dr. Chrisetnsen in neurology consultation.  Started on aspirin.


- Episode of transient encephalopathy after the trauma, suspicious for complex 

partial seizures.  Continue Keppra 500 mg daily plus an additional 500 post-

hemodialysis per neurology recommendation.


-  Status post right subdural hematoma and brain contusion after traumatic 

injury.


-  Status post acute encephalopathy 2 to #2.


-  Status post respiratory failure with pneumothorax, status post chest tube 

placement and removal.


-  End-stage renal disease on hemodialysis.  The patient will be followed by 

Dr. Chaney from nephrology standpoint and will undergo hemodialysis 3 times per 

week.


-  Hypertension.  Continue patient on Cozaar.


-  Hyperlipidemia.  Continue Lipitor.


-  Dysphagia.  Patient tolerates pured diet well.


-  Gastroesophageal reflux disease.  Continue Pepcid.  








Further recommendations based on clinical course.  Plan of care discussed with 

Dr. Brink.


Problems:  





Subjective


24 Hr Interval Summary


Free Text/Dictation


Patient is mental status is at her baseline, patient is awake alert denies any 

nausea vomiting, denies headache.





Exam/Review of Systems


Vital Signs


Vitals





 Vital Signs








  Date Time  Temp Pulse Resp B/P Pulse Ox O2 Delivery O2 Flow Rate FiO2


 


2/28/17 15:53  89 18  99   21


 


2/28/17 12:10 98.6   109/72    


 


2/26/17 04:21      Room Air  














 Intake and Output   


 


 2/27/17 2/27/17 2/28/17





 15:00 23:00 07:00


 


Intake Total 1000 ml 320 ml 100 ml


 


Output Total 2400 ml  


 


Balance -1400 ml 320 ml 100 ml











Exam


GENERAL:  Well-developed, fragile female in no acute distress.


HEENT:  Head is atraumatic, normocephalic.  Pupils equal, round, reactive to 

light and accommodation.  Oral mucosa is pink, moist.


NECK:  Supple, no cervical lymphadenopathy, no thyromegaly.


CHEST:  Lungs clear bilaterally, slightly diminished at the bases.  There is no 

rhonchi, wheezes, rales noted.


CARDIOVASCULAR:  Normal S1, S2.  No murmurs, gallops, clicks, rubs noted.


ABDOMEN:  Round, soft, nondistended, nontender.  Bowel sounds present.  There 

is no guarding, no rebound tenderness.


EXTREMITIES:  There is no edema, clubbing, cyanosis.  Pulses equal bilaterally 2

+.  The patient has a left upper extremity arteriovenous fistula with a 

palpable thrill and audible bruit.


SKIN:  There is no rash, petechiae noted.  The patient has a right chest 

healing scar from the right chest tube.


NEUROLOGICAL:  The patient is awake, alert and oriented to name.





Results


Result Diagram:  


2/28/17 0603 2/28/17 0603





Results 24 hrs





Laboratory Tests








Test


  2/28/17


06:03


 


Anion Gap 17  H


 


Basophils # 0.0  


 


Basophils % 0.3  


 


Blood Urea Nitrogen 19  #


 


Calcium Level 8.7  


 


Carbon Dioxide Level 32  H


 


Chloride Level 94  L


 


Creatinine 4.78  #H


 


Eosinophils # 0.1  


 


Eosinophils % 4.1  


 


Glucose Level 76  


 


Hematocrit 27.2  #L


 


Hemoglobin 8.8  #L


 


Lymphocytes # 0.7  L


 


Lymphocytes % 22.7  


 


Mean Corpuscular Hemoglobin 29.3  


 


Mean Corpuscular Hemoglobin


Concent 32.4  


 


 


Mean Corpuscular Volume 90.7  


 


Mean Platelet Volume 10.7  H


 


Monocytes # 0.3  


 


Monocytes % 8.5  


 


Neutrophils # 2.0  


 


Neutrophils % 64.4  


 


Nucleated Red Blood Cells # 0.0  


 


Nucleated Red Blood Cells % 0.0  


 


Platelet Count 128  L


 


Potassium Level 4.2  


 


Red Blood Count 3.00  #L


 


Red Cell Distribution Width 14.0  


 


Sodium Level 139  


 


White Blood Count 3.2  L











Medications


Medications





 Current Medications


Acetaminophen (Tylenol Liquid) 650 mg Q6  PRN PO PAIN AND OR ELEVATED TEMP;  

Start 2/26/17 at 05:30


Atorvastatin Calcium (Lipitor) 20 mg HS PO  Last administered on 2/27/17at 20:54

; Admin Dose 20 MG;  Start 2/26/17 at 21:00


Bisacodyl (Dulcolax Supp) 10 mg DAILY  PRN [place] CONSTIPATION;  Start 2/26/17 at 05

:30


Cyclobenzaprine HCl (Flexeril) 5 mg Q8  PRN PO MUSCLE SPASMS Last administered 

on 2/27/17at 23:23; Admin Dose 5 MG;  Start 2/26/17 at 05:30


Docusate Sodium (Colace) 100 mg BID PO  Last administered on 2/28/17at 08:55; 

Admin Dose 100 MG;  Start 2/26/17 at 09:00


Famotidine (Pepcid) 20 mg DAILY PO  Last administered on 2/28/17at 08:55; Admin 

Dose 20 MG;  Start 2/26/17 at 09:00


Lactulose (Enulose) 20 gm DAILY  PRN PO CONSTIPATION;  Start 2/26/17 at 05:30


Levetiracetam (Keppra) 500 mg DAILY PO  Last administered on 2/28/17at 08:55; 

Admin Dose 500 MG;  Start 2/26/17 at 09:00


Magnesium Hydroxide (Milk Of Mag) 30 ml BID  PRN PO CONSTIPATION;  Start 2/26/ 17 at 05:30


Metoprolol Tartrate (Lopressor) 25 mg BID PO  Last administered on 2/28/17at 08:

56; Admin Dose 25 MG;  Start 2/26/17 at 09:00


Multivit/Ca Carb/ B Cmplx/FA/Prenat ([place]-Argenis) 1 tab DAILY PO  Last 

administered on 2/28/17at 08:55; Admin Dose 1 TAB;  Start 2/26/17 at 09:00


Ondansetron HCl (Zofran Tab) 4 mg Q8  PRN PO NAUSEA AND/OR VOMITING;  Start 2/26 /17 at 05:30


Senna (Senokot) 1 tab DAILY  PRN PO CONSTIPATION;  Start 2/26/17 at 05:30


Sevelamer Carbonate (Renvela) 2.4 gm Q8 PO  Last administered on 2/28/17at 13:14

; Admin Dose 2.4 GM;  Start 2/26/17 at 06:00


Senna (Senokot) 2 tab BID PO  Last administered on 2/28/17at 08:55; Admin Dose 

2 TAB;  Start 2/26/17 at 13:30


Aspirin (Halfprin) 81 mg DAILY PO  Last administered on 2/28/17at 08:55; Admin 

Dose 81 MG;  Start 2/27/17 at 09:00


Ondansetron HCl (Zofran Inj) 4 mg Q6H  PRN IV NAUSEA AND/OR VOMITING Last 

administered on 2/26/17at 22:26; Admin Dose 4 MG;  Start 2/26/17 at 22:00


Neomycin/ Polymyxin/ Hydrocortisone (Cortisporin Otic Susp) 2 drop DAILY BOTH 

EARS ;  Start 2/28/17 at 21:00;  Stop 3/4/17 at 21:00











IRAJ TINOCO Feb 28, 2017 16:03

## 2017-03-01 VITALS — RESPIRATION RATE: 20 BRPM | DIASTOLIC BLOOD PRESSURE: 96 MMHG | SYSTOLIC BLOOD PRESSURE: 120 MMHG

## 2017-03-01 VITALS — SYSTOLIC BLOOD PRESSURE: 100 MMHG | DIASTOLIC BLOOD PRESSURE: 60 MMHG | RESPIRATION RATE: 20 BRPM

## 2017-03-01 VITALS — HEART RATE: 104 BPM | DIASTOLIC BLOOD PRESSURE: 70 MMHG | SYSTOLIC BLOOD PRESSURE: 106 MMHG

## 2017-03-01 VITALS — HEART RATE: 95 BPM | DIASTOLIC BLOOD PRESSURE: 61 MMHG | SYSTOLIC BLOOD PRESSURE: 101 MMHG

## 2017-03-01 VITALS — HEART RATE: 91 BPM | DIASTOLIC BLOOD PRESSURE: 57 MMHG | SYSTOLIC BLOOD PRESSURE: 99 MMHG

## 2017-03-01 VITALS — DIASTOLIC BLOOD PRESSURE: 68 MMHG | HEART RATE: 98 BPM | SYSTOLIC BLOOD PRESSURE: 113 MMHG

## 2017-03-01 VITALS — HEART RATE: 99 BPM | SYSTOLIC BLOOD PRESSURE: 102 MMHG | DIASTOLIC BLOOD PRESSURE: 62 MMHG

## 2017-03-01 VITALS — HEART RATE: 101 BPM | SYSTOLIC BLOOD PRESSURE: 91 MMHG | DIASTOLIC BLOOD PRESSURE: 60 MMHG

## 2017-03-01 VITALS — HEART RATE: 104 BPM | SYSTOLIC BLOOD PRESSURE: 99 MMHG | DIASTOLIC BLOOD PRESSURE: 68 MMHG

## 2017-03-01 VITALS — SYSTOLIC BLOOD PRESSURE: 101 MMHG | HEART RATE: 103 BPM | DIASTOLIC BLOOD PRESSURE: 70 MMHG

## 2017-03-01 LAB
ADD SCAN DIFF: NO
ANION GAP SERPL CALC-SCNC: 19 MMOL/L (ref 8–16)
APTT BLD: 21 SEC (ref 25–35)
BASOPHILS # BLD AUTO: 0 10^3/UL (ref 0–0.1)
BASOPHILS NFR BLD: 0 % (ref 0–2)
BUN SERPL-MCNC: 31 MG/DL (ref 7–20)
CALCIUM SERPL-MCNC: 9 MG/DL (ref 8.4–10.2)
CHLORIDE SERPL-SCNC: 93 MMOL/L (ref 97–110)
CO2 SERPL-SCNC: 29 MMOL/L (ref 21–31)
CREAT SERPL-MCNC: 6.5 MG/DL (ref 0.44–1)
DEPRECATED HBV CORE AB SER IA-ACNC: NEGATIVE
EOSINOPHIL # BLD: 0.1 10^3/UL (ref 0–0.5)
EOSINOPHIL NFR BLD: 3.4 % (ref 0–7)
ERYTHROCYTE [DISTWIDTH] IN BLOOD BY AUTOMATED COUNT: 13.8 % (ref 11.5–14.5)
FERRITIN SERPL-MCNC: 1340 NG/ML (ref 6.2–137)
GLUCOSE SERPL-MCNC: 78 MG/DL (ref 70–220)
HAAIG REFLEX: (no result)
HCT VFR BLD CALC: 29.7 % (ref 37–47)
HGB BLD-MCNC: 9.4 G/DL (ref 12–16)
INR PPP: 1.04
IRON SERPL-MCNC: 28 UG/DL (ref 35–150)
LDH SERPL-CCNC: 377 IU/L (ref 313–618)
LYMPHOCYTES # BLD AUTO: 0.8 10^3/UL (ref 0.8–2.9)
LYMPHOCYTES NFR BLD AUTO: 25.7 % (ref 15–51)
MCH RBC QN AUTO: 29.1 PG (ref 29–33)
MCHC RBC AUTO-ENTMCNC: 31.6 G/DL (ref 32–37)
MCV RBC AUTO: 92 FL (ref 82–101)
MONOCYTES # BLD: 0.2 10^3/UL (ref 0.3–0.9)
MONOCYTES NFR BLD: 6.2 % (ref 0–11)
NEUTROPHILS # BLD: 1.9 10^3/UL (ref 1.6–7.5)
NEUTROPHILS NFR BLD AUTO: 64.4 % (ref 39–77)
NRBC # BLD MANUAL: 0 10^3/UL (ref 0–0)
NRBC BLD QL: 0 /100WBC (ref 0–0)
PLATELET # BLD: 106 10^3/UL (ref 140–415)
PLATELET # BLD: 106 10^3/UL (ref 140–440)
PMV BLD AUTO: 11.9 FL (ref 7.4–10.4)
POTASSIUM SERPL-SCNC: 4.4 MMOL/L (ref 3.5–5.1)
PROTHROMBIN TIME: 13.6 SEC (ref 12.2–14.2)
PT RATIO: 1.1
RBC # BLD AUTO: 3.23 10^6/UL (ref 4.2–5.4)
RETICS/RBC NFR: 1.7 % (ref 0.5–1.5)
SODIUM SERPL-SCNC: 137 MMOL/L (ref 135–144)
THROMBIN TIME: 15.1 SEC (ref 13.8–19.1)
TIBC SERPL-MCNC: 207 UG/DL (ref 241–421)
VIT B12 SERPL-MCNC: > 1000 PG/ML (ref 239–931)
WBC # BLD AUTO: 2.9 10^3/UL (ref 4.8–10.8)

## 2017-03-01 RX ADMIN — SEVELAMER CARBONATE SCH GM: 2400 POWDER, FOR SUSPENSION ORAL at 06:02

## 2017-03-01 RX ADMIN — DOCUSATE SODIUM SCH MG: 100 CAPSULE, LIQUID FILLED ORAL at 20:46

## 2017-03-01 RX ADMIN — METOPROLOL TARTRATE SCH MG: 25 TABLET, FILM COATED ORAL at 09:00

## 2017-03-01 RX ADMIN — ATORVASTATIN CALCIUM SCH MG: 20 TABLET, FILM COATED ORAL at 20:46

## 2017-03-01 RX ADMIN — METOPROLOL TARTRATE SCH MG: 25 TABLET, FILM COATED ORAL at 20:56

## 2017-03-01 RX ADMIN — SEVELAMER CARBONATE SCH GM: 2400 POWDER, FOR SUSPENSION ORAL at 21:01

## 2017-03-01 RX ADMIN — SENNOSIDES SCH TAB: 8.6 TABLET, FILM COATED ORAL at 20:46

## 2017-03-01 RX ADMIN — IPRATROPIUM BROMIDE AND ALBUTEROL SULFATE SCH ML: .5; 3 SOLUTION RESPIRATORY (INHALATION) at 00:01

## 2017-03-01 RX ADMIN — ASPIRIN SCH MG: 81 TABLET, COATED ORAL at 08:45

## 2017-03-01 RX ADMIN — LEVETIRACETAM SCH MG: 500 TABLET, FILM COATED ORAL at 08:45

## 2017-03-01 RX ADMIN — IPRATROPIUM BROMIDE AND ALBUTEROL SULFATE SCH ML: .5; 3 SOLUTION RESPIRATORY (INHALATION) at 16:00

## 2017-03-01 RX ADMIN — DOCUSATE SODIUM SCH MG: 100 CAPSULE, LIQUID FILLED ORAL at 08:44

## 2017-03-01 RX ADMIN — IPRATROPIUM BROMIDE AND ALBUTEROL SULFATE SCH ML: .5; 3 SOLUTION RESPIRATORY (INHALATION) at 07:00

## 2017-03-01 RX ADMIN — SENNOSIDES SCH TAB: 8.6 TABLET, FILM COATED ORAL at 08:44

## 2017-03-01 RX ADMIN — ACETAMINOPHEN PRN MG: 160 SOLUTION ORAL at 06:05

## 2017-03-01 RX ADMIN — FAMOTIDINE SCH MG: 20 TABLET ORAL at 08:45

## 2017-03-01 RX ADMIN — IPRATROPIUM BROMIDE AND ALBUTEROL SULFATE SCH ML: .5; 3 SOLUTION RESPIRATORY (INHALATION) at 23:23

## 2017-03-01 RX ADMIN — SEVELAMER CARBONATE SCH GM: 2400 POWDER, FOR SUSPENSION ORAL at 12:10

## 2017-03-01 NOTE — CONS
Date/Time of Note


Date/Time of Note


DATE: 3/1/17 


TIME: 20:34





Assessment/Plan


Assessment/Plan


Chief Complaint/Hosp Course


ESRD


HTN


ANEMIA


CVA 


S/P CNS BLEED


ASHD


PLAN continue hd 


pt/ot


Problems:  





Consultation Date/Type/Reason


Admit Date/Time


Feb 27, 2017 at 08:01


Type of Consultation:  renal





24 HR Interval Summary


Constitutional:  no complaints





Exam/Review of Systems


Vital Signs


Vitals





 Vital Signs








  Date Time  Temp Pulse Resp B/P Pulse Ox O2 Delivery O2 Flow Rate FiO2


 


3/1/17 17:45  95      


 


3/1/17 17:45   17     


 


3/1/17 07:48 98.5   120/96 100   


 


3/1/17 07:00        21


 


2/28/17 15:30      Room Air  














 Intake and Output   


 


 2/28/17 2/28/17 3/1/17





 15:00 23:00 07:00


 


Intake Total 280 ml 480 ml 200 ml


 


Balance 280 ml 480 ml 200 ml











Exam


Respiratory:  diminished breath sounds


Cardiovascular:  regular rate and rhythm


Gastrointestinal:  soft


Extremities:  No edema





Results


Result Diagram:  


3/1/17 0450                                                                    

            3/1/17 0450





Results 24 hrs





Laboratory Tests








Test


  3/1/17


04:50 3/1/17


10:50 3/1/17


11:14


 


Activated Partial


Thromboplast Time 21.0  L


  


  


 


 


Anion Gap 19  H  


 


Basophils # 0.0    


 


Basophils % 0.0    


 


Blood Urea Nitrogen 31  #H  


 


Calcium Level 9.0    


 


Carbon Dioxide Level 29    


 


Chloride Level 93  L  


 


Creatinine 6.50  H  


 


Eosinophils # 0.1    


 


Eosinophils % 3.4    


 


Glucose Level 78    


 


Hematocrit 29.7  L  


 


Hemoglobin 9.4  L  


 


INR International Normalized


Ratio 1.04  


  


  


 


 


Lymphocytes # 0.8    


 


Lymphocytes % 25.7    


 


Mean Corpuscular Hemoglobin 29.1    


 


Mean Corpuscular Hemoglobin


Concent 31.6  L


  


  


 


 


Mean Corpuscular Volume 92.0    


 


Mean Platelet Volume 11.9  H  


 


Monocytes # 0.2  L  


 


Monocytes % 6.2    


 


Neutrophils # 1.9    


 


Neutrophils % 64.4    


 


Nucleated Red Blood Cells # 0.0    


 


Nucleated Red Blood Cells % 0.0    


 


Platelet Count 106  L  


 


Potassium Level 4.4    


 


Prothrombin Time 13.6    


 


Prothrombin Time Ratio 1.1    


 


Red Blood Count 3.23  L  


 


Red Cell Distribution Width 13.8    


 


Sodium Level 137    


 


Thrombin Time 15.1    


 


White Blood Count 2.9  L  


 


Bedside Glucose  81   


 


Absolute Reticulocyte Count   0.048  


 


Ferritin   1340.0  H


 


Folate     


 


HIV (1&2) Antibody   NEGATIVE  


 


Hepatitis B Core Total


Antibody 


  


  NEGATIVE  


 


 


Hepatitis B Surface Antigen   NEGATIVE  


 


Hepatitis C Antibody   NEGATIVE  


 


Iron Level   28  L


 


Lactate Dehydrogenase   377  


 


Percent Iron Saturation   14  L


 


Percent Reticulocyte Count   1.7  H


 


Total Iron Binding Capacity   207  L


 


Vitamin B12 Level   > 1000  H











Medications


Medications





 Current Medications


Acetaminophen (Tylenol Liquid) 650 mg Q6  PRN PO PAIN AND OR ELEVATED TEMP Last 

administered on 3/1/17at 06:05; Admin Dose 650 MG;  Start 2/26/17 at 05:30


Atorvastatin Calcium (Lipitor) 20 mg HS PO  Last administered on 2/28/17at 21:29

; Admin Dose 20 MG;  Start 2/26/17 at 21:00


Bisacodyl (Dulcolax Supp) 10 mg DAILY  PRN [place] CONSTIPATION;  Start 2/26/17 at 05

:30


Cyclobenzaprine HCl (Flexeril) 5 mg Q8  PRN PO MUSCLE SPASMS Last administered 

on 2/27/17at 23:23; Admin Dose 5 MG;  Start 2/26/17 at 05:30


Docusate Sodium (Colace) 100 mg BID PO  Last administered on 3/1/17at 08:44; 

Admin Dose 100 MG;  Start 2/26/17 at 09:00


Famotidine (Pepcid) 20 mg DAILY PO  Last administered on 3/1/17at 08:45; Admin 

Dose 20 MG;  Start 2/26/17 at 09:00


Lactulose (Enulose) 20 gm DAILY  PRN PO CONSTIPATION;  Start 2/26/17 at 05:30


Levetiracetam (Keppra) 500 mg DAILY PO  Last administered on 3/1/17at 08:45; 

Admin Dose 500 MG;  Start 2/26/17 at 09:00


Magnesium Hydroxide (Milk Of Mag) 30 ml BID  PRN PO CONSTIPATION;  Start 2/26/ 17 at 05:30


Metoprolol Tartrate (Lopressor) 25 mg BID PO  Last administered on 2/28/17at 08:

56; Admin Dose 25 MG;  Start 2/26/17 at 09:00


Multivit/Ca Carb/ B Cmplx/FA/Prenat ([place]-Argenis) 1 tab DAILY PO  Last 

administered on 3/1/17at 08:44; Admin Dose 1 TAB;  Start 2/26/17 at 09:00


Ondansetron HCl (Zofran Tab) 4 mg Q8  PRN PO NAUSEA AND/OR VOMITING;  Start 2/26 /17 at 05:30


Senna (Senokot) 1 tab DAILY  PRN PO CONSTIPATION;  Start 2/26/17 at 05:30


Sevelamer Carbonate (Renvela) 2.4 gm Q8 PO  Last administered on 3/1/17at 06:02

; Admin Dose 2.4 GM;  Start 2/26/17 at 06:00


Senna (Senokot) 2 tab BID PO  Last administered on 3/1/17at 08:44; Admin Dose 2 

TAB;  Start 2/26/17 at 13:30


Aspirin (Halfprin) 81 mg DAILY PO  Last administered on 3/1/17at 08:45; Admin 

Dose 81 MG;  Start 2/27/17 at 09:00


Ondansetron HCl (Zofran Inj) 4 mg Q6H  PRN IV NAUSEA AND/OR VOMITING Last 

administered on 2/28/17at 23:14; Admin Dose 4 MG;  Start 2/26/17 at 22:00


Neomycin/ Polymyxin/ Hydrocortisone (Cortisporin Otic Susp) 2 drop DAILY BOTH 

EARS  Last administered on 3/1/17at 08:44; Admin Dose 2 DROP;  Start 2/28/17 at 

21:00;  Stop 3/4/17 at 21:00











PRITESH MACKEY MD Mar 1, 2017 20:35

## 2017-03-01 NOTE — CONS
Date/Time of Note


Date/Time of Note


DATE: 3/1/17 


TIME: 12:49





Assessment/Plan


Assessment/Plan


Chief Complaint/Hosp Course


The patient is an unfortunate 46-year-old female with end-stage renal disease 

on hemodialysis 3 days per week, with history of hypertension and GERD.  The 

patient sustained a ground level fall and with trauma to the head, and 

developed right subdural hematoma and brain contusion and skull fracture.  

Recent MRI showed possible acute / early subacute ischemic infarct in the right 

anterior basal ganglia. Patient was no new neurological deficits. She was also 

noted to have pancytopenia for which hematology was consulted with anemia 

status post blood transfusion with hemodialysis. 


- Will obtain HIV and hepatitis panel, retic count, LDH, iron panel (more 

consistent with anemia of chronic inflammation), ferritin, B12/folate


- Peripheral smear with path review requested


- Abdominal ultrasound ordered to evaluate for cirrhosis and splenomegaly


- Will continue to follow.


Problems:  





Consultation Date/Type/Reason


Admit Date/Time


Feb 27, 2017 at 08:01


Date of Consultation:  Mar 1, 2017


Type of Consultation:  Hematology/Oncology


Reason for Consultation


Pancytopenia





Hx of Present Illness


The patient is an unfortunate 46-year-old female with end-stage renal disease 

on hemodialysis 3 days per week, with history of hypertension and GERD.  The 

patient sustained a ground level fall and with trauma to the head, and 

developed right subdural hematoma and brain contusion and skull fracture.  She 

was admitted to [facility] by Dr. Gan and was followed by 

Dr. Palomo in neurosurgery consultation.  There is no surgical intervention 

indicated.  The patient also had iatrogenic pneumothorax and had right chest 

tube placement and subsequent right chest tube removal.  The patient also with 

respiratory failure, was intubated and subsequently extubated.  The patient was 

also with acute encephalopathy.  The patient was admitted to acute 

rehabilitation center for further evaluation and care, and physical and 

occupational therapy for impairment in mobility and cognition.  Patient was 

transferred to acute Rehab. Patient still c/o headaches and a CT and MRI brain 

was done.





MRI showed possible acute / early subacute ischemic infarct in the right 

anterior basal ganglia. Patient was no new neurological deficits. She states 

that she feels well.  She was also noted to have pancytopenia for which 

hematology was consulted with anemia status post blood transfusion with 

hemodialysis.  She is eating well and walking per the nurse.


Constitutional:  no complaints


Eyes:  no complaints


ENT:  no complaints


Genitourinary:  no complaints


Musculoskeletal:  no complaints


Neurologic:  headache


Psychological:  nl mood/affect





Past Medical History


- Possible acute / early subacute ischemic infarct in the right anterior basal 

ganglia per MRI.  Patient was no new neurological deficits.  patient is 

followed by Dr. Christensen in neurology consultation.  Started on aspirin.


- Episode of transient encephalopathy after the trauma, suspicious for complex 

partial seizures.  Continue Keppra 500 mg daily plus an additional 500 post-

hemodialysis per neurology recommendation.


-  Status post right subdural hematoma and brain contusion after traumatic 

injury.


-  Status post acute encephalopathy 2 to #2.


-  Status post respiratory failure with pneumothorax, status post chest tube 

placement and removal.


-  End-stage renal disease on hemodialysis.  The patient will be followed by 

Dr. Chaney from nephrology standpoint and on hemodialysis 3 times per week.


-  Hypertension. 


-  Hyperlipidemia. 


-  Dysphagia.  Patient tolerates pured diet well.


-  Gastroesophageal reflux disease.





Family History


Significant Family History:  no pertinent family hx





Social History


Smoking Status:  Never smoker





Exam/Review of Systems


Vital Signs


Vitals





 Vital Signs








  Date Time  Temp Pulse Resp B/P Pulse Ox O2 Delivery O2 Flow Rate FiO2


 


3/1/17 07:48 98.5 100 20 120/96 100   


 


3/1/17 07:00        21


 


2/28/17 15:30      Room Air  














 Intake and Output   


 


 2/28/17 2/28/17 3/1/17





 15:00 23:00 07:00


 


Intake Total 280 ml 480 ml 200 ml


 


Balance 280 ml 480 ml 200 ml











Exam


Constitutional:  alert, oriented


Psych:  no complaints


Head:  atraumatic, normocephalic


Eyes:  nl conjunctiva


ENMT:  nl external ears & nose


Neck:  non-tender, supple


Respiratory:  clear to auscultation


Cardiovascular:  regular rate and rhythm


Gastrointestinal:  non-tender, soft


Musculoskeletal:  nl extremities to inspection


Neurological:  other (left facial droop)





Results


Result Diagram:  


3/1/17 0450                                                                    

            3/1/17 0450





Results 24 hrs





Laboratory Tests








Test


  3/1/17


04:50 3/1/17


10:50 3/1/17


11:14


 


Activated Partial


Thromboplast Time 21.0  L


  


  


 


 


Anion Gap 19  H  


 


Basophils # 0.0    


 


Basophils % 0.0    


 


Blood Urea Nitrogen 31  #H  


 


Calcium Level 9.0    


 


Carbon Dioxide Level 29    


 


Chloride Level 93  L  


 


Creatinine 6.50  H  


 


Eosinophils # 0.1    


 


Eosinophils % 3.4    


 


Glucose Level 78    


 


Hematocrit 29.7  L  


 


Hemoglobin 9.4  L  


 


INR International Normalized


Ratio 1.04  


  


  


 


 


Lymphocytes # 0.8    


 


Lymphocytes % 25.7    


 


Mean Corpuscular Hemoglobin 29.1    


 


Mean Corpuscular Hemoglobin


Concent 31.6  L


  


  


 


 


Mean Corpuscular Volume 92.0    


 


Mean Platelet Volume 11.9  H  


 


Monocytes # 0.2  L  


 


Monocytes % 6.2    


 


Neutrophils # 1.9    


 


Neutrophils % 64.4    


 


Nucleated Red Blood Cells # 0.0    


 


Nucleated Red Blood Cells % 0.0    


 


Platelet Count 106  L  


 


Potassium Level 4.4    


 


Prothrombin Time 13.6    


 


Prothrombin Time Ratio 1.1    


 


Red Blood Count 3.23  L  


 


Red Cell Distribution Width 13.8    


 


Sodium Level 137    


 


Thrombin Time 15.1    


 


White Blood Count 2.9  L  


 


Bedside Glucose  81   


 


Absolute Reticulocyte Count   0.048  


 


Ferritin   Pending  


 


Folate   Pending  


 


HIV (1&2) Antibody   Pending  


 


Hepatitis B Core Total


Antibody 


  


  Pending  


 


 


Hepatitis B Surface Antigen   Pending  


 


Hepatitis C Antibody   Pending  


 


Iron Level   28  L


 


Lactate Dehydrogenase   377  


 


Percent Iron Saturation   14  L


 


Percent Reticulocyte Count   1.7  H


 


Total Iron Binding Capacity   207  L


 


Vitamin B12 Level   Pending  











Medications


Medications





 Current Medications


Acetaminophen (Tylenol Liquid) 650 mg Q6  PRN PO PAIN AND OR ELEVATED TEMP Last 

administered on 3/1/17at 06:05; Admin Dose 650 MG;  Start 2/26/17 at 05:30


Atorvastatin Calcium (Lipitor) 20 mg HS PO  Last administered on 2/28/17at 21:29

; Admin Dose 20 MG;  Start 2/26/17 at 21:00


Bisacodyl (Dulcolax Supp) 10 mg DAILY  PRN [place] CONSTIPATION;  Start 2/26/17 at 05

:30


Cyclobenzaprine HCl (Flexeril) 5 mg Q8  PRN PO MUSCLE SPASMS Last administered 

on 2/27/17at 23:23; Admin Dose 5 MG;  Start 2/26/17 at 05:30


Docusate Sodium (Colace) 100 mg BID PO  Last administered on 3/1/17at 08:44; 

Admin Dose 100 MG;  Start 2/26/17 at 09:00


Famotidine (Pepcid) 20 mg DAILY PO  Last administered on 3/1/17at 08:45; Admin 

Dose 20 MG;  Start 2/26/17 at 09:00


Lactulose (Enulose) 20 gm DAILY  PRN PO CONSTIPATION;  Start 2/26/17 at 05:30


Levetiracetam (Keppra) 500 mg DAILY PO  Last administered on 3/1/17at 08:45; 

Admin Dose 500 MG;  Start 2/26/17 at 09:00


Magnesium Hydroxide (Milk Of Mag) 30 ml BID  PRN PO CONSTIPATION;  Start 2/26/ 17 at 05:30


Metoprolol Tartrate (Lopressor) 25 mg BID PO  Last administered on 2/28/17at 08:

56; Admin Dose 25 MG;  Start 2/26/17 at 09:00


Multivit/Ca Carb/ B Cmplx/FA/Prenat ([place]-Argenis) 1 tab DAILY PO  Last 

administered on 3/1/17at 08:44; Admin Dose 1 TAB;  Start 2/26/17 at 09:00


Ondansetron HCl (Zofran Tab) 4 mg Q8  PRN PO NAUSEA AND/OR VOMITING;  Start 2/26 /17 at 05:30


Senna (Senokot) 1 tab DAILY  PRN PO CONSTIPATION;  Start 2/26/17 at 05:30


Sevelamer Carbonate (Renvela) 2.4 gm Q8 PO  Last administered on 3/1/17at 06:02

; Admin Dose 2.4 GM;  Start 2/26/17 at 06:00


Senna (Senokot) 2 tab BID PO  Last administered on 3/1/17at 08:44; Admin Dose 2 

TAB;  Start 2/26/17 at 13:30


Aspirin (Halfprin) 81 mg DAILY PO  Last administered on 3/1/17at 08:45; Admin 

Dose 81 MG;  Start 2/27/17 at 09:00


Ondansetron HCl (Zofran Inj) 4 mg Q6H  PRN IV NAUSEA AND/OR VOMITING Last 

administered on 2/28/17at 23:14; Admin Dose 4 MG;  Start 2/26/17 at 22:00


Neomycin/ Polymyxin/ Hydrocortisone (Cortisporin Otic Susp) 2 drop DAILY BOTH 

EARS  Last administered on 3/1/17at 08:44; Admin Dose 2 DROP;  Start 2/28/17 at 

21:00;  Stop 3/4/17 at 21:00











TOLEONID MD Mar 1, 2017 12:52

## 2017-03-01 NOTE — PN
Date/Time of Note


Date/Time of Note


DATE: 3/1/17 


TIME: 19:12





Assessment/Plan


VTE Prophylaxis


VTE Prophylaxis Intervention:  SCD's





Lines/Catheters


IV [facility] Type (from [facility]):  Saline Lock





Assessment/Plan


Chief Complaint/Hosp Course


ASSESSMENT AND PLAN:


-  Pancytopenia. Dr. Aly is following in hematology consultation.


- Anemia. Status post blood transfusion with hemodialysis.   Continue to 

monitor hemoglobin and hematocrit.  


- Possible acute / early subacute ischemic infarct in the right anterior basal 

ganglia per MRI.  Patient was no new neurological deficits.  patient is 

followed by Dr. Christensen in neurology consultation.  Started on aspirin.


- Episode of transient encephalopathy after the trauma, suspicious for complex 

partial seizures.  Continue Keppra 500 mg daily plus an additional 500 post-

hemodialysis per neurology recommendation.


-  Status post right subdural hematoma and brain contusion after traumatic 

injury.


-  Status post acute encephalopathy 2 to #2.


-  Status post respiratory failure with pneumothorax, status post chest tube 

placement and removal.


-  End-stage renal disease on hemodialysis.  The patient will be followed by 

Dr. Chaney from nephrology standpoint and will undergo hemodialysis 3 times per 

week.


-  Hypertension.  Continue patient on Cozaar.


-  Hyperlipidemia.  Continue Lipitor.


-  Dysphagia.  Patient tolerates pured diet well.


-  Gastroesophageal reflux disease.  Continue Pepcid.  








Further recommendations based on clinical course.  Plan of care discussed with 

Dr. Brink.


Problems:  





Subjective


24 Hr Interval Summary


Free Text/Dictation


Patient neurological status is at her baseline, patient denies any headache.





Exam/Review of Systems


Vital Signs


Vitals





 Vital Signs








  Date Time  Temp Pulse Resp B/P Pulse Ox O2 Delivery O2 Flow Rate FiO2


 


3/1/17 17:45  95      


 


3/1/17 17:45   17     


 


3/1/17 07:48 98.5   120/96 100   


 


3/1/17 07:00        21


 


2/28/17 15:30      Room Air  














 Intake and Output   


 


 2/28/17 2/28/17 3/1/17





 15:00 23:00 07:00


 


Intake Total 280 ml 480 ml 200 ml


 


Balance 280 ml 480 ml 200 ml











Exam


GENERAL:  Well-developed, fragile female in no acute distress.


HEENT:  Head is atraumatic, normocephalic.  Pupils equal, round, reactive to 

light and accommodation.  Oral mucosa is pink, moist.


NECK:  Supple, no cervical lymphadenopathy, no thyromegaly.


CHEST:  Lungs clear bilaterally, slightly diminished at the bases.  There is no 

rhonchi, wheezes, rales noted.


CARDIOVASCULAR:  Normal S1, S2.  No murmurs, gallops, clicks, rubs noted.


ABDOMEN:  Round, soft, nondistended, nontender.  Bowel sounds present.  There 

is no guarding, no rebound tenderness.


EXTREMITIES:  There is no edema, clubbing, cyanosis.  Pulses equal bilaterally 2

+.  The patient has a left upper extremity arteriovenous fistula with a 

palpable thrill and audible bruit.


SKIN:  There is no rash, petechiae noted.  The patient has a right chest 

healing scar from the right chest tube.


NEUROLOGICAL:  The patient is awake, alert and oriented to name.





Results


Result Diagram:  


3/1/17 0450                                                                    

            3/1/17 0450





Results 24 hrs





Laboratory Tests








Test


  3/1/17


04:50 3/1/17


10:50 3/1/17


11:14


 


Activated Partial


Thromboplast Time 21.0  L


  


  


 


 


Anion Gap 19  H  


 


Basophils # 0.0    


 


Basophils % 0.0    


 


Blood Urea Nitrogen 31  #H  


 


Calcium Level 9.0    


 


Carbon Dioxide Level 29    


 


Chloride Level 93  L  


 


Creatinine 6.50  H  


 


Eosinophils # 0.1    


 


Eosinophils % 3.4    


 


Glucose Level 78    


 


Hematocrit 29.7  L  


 


Hemoglobin 9.4  L  


 


INR International Normalized


Ratio 1.04  


  


  


 


 


Lymphocytes # 0.8    


 


Lymphocytes % 25.7    


 


Mean Corpuscular Hemoglobin 29.1    


 


Mean Corpuscular Hemoglobin


Concent 31.6  L


  


  


 


 


Mean Corpuscular Volume 92.0    


 


Mean Platelet Volume 11.9  H  


 


Monocytes # 0.2  L  


 


Monocytes % 6.2    


 


Neutrophils # 1.9    


 


Neutrophils % 64.4    


 


Nucleated Red Blood Cells # 0.0    


 


Nucleated Red Blood Cells % 0.0    


 


Platelet Count 106  L  


 


Potassium Level 4.4    


 


Prothrombin Time 13.6    


 


Prothrombin Time Ratio 1.1    


 


Red Blood Count 3.23  L  


 


Red Cell Distribution Width 13.8    


 


Sodium Level 137    


 


Thrombin Time 15.1    


 


White Blood Count 2.9  L  


 


Bedside Glucose  81   


 


Absolute Reticulocyte Count   0.048  


 


Ferritin   1340.0  H


 


Folate     


 


HIV (1&2) Antibody   NEGATIVE  


 


Hepatitis B Core Total


Antibody 


  


  NEGATIVE  


 


 


Hepatitis B Surface Antigen   NEGATIVE  


 


Hepatitis C Antibody   NEGATIVE  


 


Iron Level   28  L


 


Lactate Dehydrogenase   377  


 


Percent Iron Saturation   14  L


 


Percent Reticulocyte Count   1.7  H


 


Total Iron Binding Capacity   207  L


 


Vitamin B12 Level   > 1000  H











Medications


Medications





 Current Medications


Acetaminophen (Tylenol Liquid) 650 mg Q6  PRN PO PAIN AND OR ELEVATED TEMP Last 

administered on 3/1/17at 06:05; Admin Dose 650 MG;  Start 2/26/17 at 05:30


Atorvastatin Calcium (Lipitor) 20 mg HS PO  Last administered on 2/28/17at 21:29

; Admin Dose 20 MG;  Start 2/26/17 at 21:00


Bisacodyl (Dulcolax Supp) 10 mg DAILY  PRN [place] CONSTIPATION;  Start 2/26/17 at 05

:30


Cyclobenzaprine HCl (Flexeril) 5 mg Q8  PRN PO MUSCLE SPASMS Last administered 

on 2/27/17at 23:23; Admin Dose 5 MG;  Start 2/26/17 at 05:30


Docusate Sodium (Colace) 100 mg BID PO  Last administered on 3/1/17at 08:44; 

Admin Dose 100 MG;  Start 2/26/17 at 09:00


Famotidine (Pepcid) 20 mg DAILY PO  Last administered on 3/1/17at 08:45; Admin 

Dose 20 MG;  Start 2/26/17 at 09:00


Lactulose (Enulose) 20 gm DAILY  PRN PO CONSTIPATION;  Start 2/26/17 at 05:30


Levetiracetam (Keppra) 500 mg DAILY PO  Last administered on 3/1/17at 08:45; 

Admin Dose 500 MG;  Start 2/26/17 at 09:00


Magnesium Hydroxide (Milk Of Mag) 30 ml BID  PRN PO CONSTIPATION;  Start 2/26/ 17 at 05:30


Metoprolol Tartrate (Lopressor) 25 mg BID PO  Last administered on 2/28/17at 08:

56; Admin Dose 25 MG;  Start 2/26/17 at 09:00


Multivit/Ca Carb/ B Cmplx/FA/Prenat ([place]-Argenis) 1 tab DAILY PO  Last 

administered on 3/1/17at 08:44; Admin Dose 1 TAB;  Start 2/26/17 at 09:00


Ondansetron HCl (Zofran Tab) 4 mg Q8  PRN PO NAUSEA AND/OR VOMITING;  Start 2/26 /17 at 05:30


Senna (Senokot) 1 tab DAILY  PRN PO CONSTIPATION;  Start 2/26/17 at 05:30


Sevelamer Carbonate (Renvela) 2.4 gm Q8 PO  Last administered on 3/1/17at 06:02

; Admin Dose 2.4 GM;  Start 2/26/17 at 06:00


Senna (Senokot) 2 tab BID PO  Last administered on 3/1/17at 08:44; Admin Dose 2 

TAB;  Start 2/26/17 at 13:30


Aspirin (Halfprin) 81 mg DAILY PO  Last administered on 3/1/17at 08:45; Admin 

Dose 81 MG;  Start 2/27/17 at 09:00


Ondansetron HCl (Zofran Inj) 4 mg Q6H  PRN IV NAUSEA AND/OR VOMITING Last 

administered on 2/28/17at 23:14; Admin Dose 4 MG;  Start 2/26/17 at 22:00


Neomycin/ Polymyxin/ Hydrocortisone (Cortisporin Otic Susp) 2 drop DAILY BOTH 

EARS  Last administered on 3/1/17at 08:44; Admin Dose 2 DROP;  Start 2/28/17 at 

21:00;  Stop 3/4/17 at 21:00











IRAJ TINOCO Mar 1, 2017 19:14

## 2017-03-02 VITALS — RESPIRATION RATE: 18 BRPM | SYSTOLIC BLOOD PRESSURE: 128 MMHG | DIASTOLIC BLOOD PRESSURE: 88 MMHG

## 2017-03-02 VITALS — SYSTOLIC BLOOD PRESSURE: 104 MMHG | RESPIRATION RATE: 16 BRPM | DIASTOLIC BLOOD PRESSURE: 68 MMHG

## 2017-03-02 LAB
ADD SCAN DIFF: NO
ANION GAP SERPL CALC-SCNC: 17 MMOL/L (ref 8–16)
BASOPHILS # BLD AUTO: 0 10^3/UL (ref 0–0.1)
BASOPHILS NFR BLD: 0 % (ref 0–2)
BUN SERPL-MCNC: 12 MG/DL (ref 7–20)
CALCIUM SERPL-MCNC: 8.5 MG/DL (ref 8.4–10.2)
CHLORIDE SERPL-SCNC: 93 MMOL/L (ref 97–110)
CO2 SERPL-SCNC: 30 MMOL/L (ref 21–31)
CREAT SERPL-MCNC: 3.86 MG/DL (ref 0.44–1)
EOSINOPHIL # BLD: 0.1 10^3/UL (ref 0–0.5)
EOSINOPHIL NFR BLD: 2.6 % (ref 0–7)
ERYTHROCYTE [DISTWIDTH] IN BLOOD BY AUTOMATED COUNT: 13.3 % (ref 11.5–14.5)
GLUCOSE SERPL-MCNC: 72 MG/DL (ref 70–220)
HCT VFR BLD CALC: 27 % (ref 37–47)
HGB BLD-MCNC: 8.8 G/DL (ref 12–16)
LYMPHOCYTES # BLD AUTO: 0.7 10^3/UL (ref 0.8–2.9)
LYMPHOCYTES NFR BLD AUTO: 24.7 % (ref 15–51)
MCH RBC QN AUTO: 29.8 PG (ref 29–33)
MCHC RBC AUTO-ENTMCNC: 32.6 G/DL (ref 32–37)
MCV RBC AUTO: 91.5 FL (ref 82–101)
MONOCYTES # BLD: 0.2 10^3/UL (ref 0.3–0.9)
MONOCYTES NFR BLD: 7.7 % (ref 0–11)
NEUTROPHILS # BLD: 1.8 10^3/UL (ref 1.6–7.5)
NEUTROPHILS NFR BLD AUTO: 64.6 % (ref 39–77)
NRBC # BLD MANUAL: 0 10^3/UL (ref 0–0)
NRBC BLD QL: 0 /100WBC (ref 0–0)
PLATELET # BLD: 121 10^3/UL (ref 140–415)
PMV BLD AUTO: 10.8 FL (ref 7.4–10.4)
POTASSIUM SERPL-SCNC: 4 MMOL/L (ref 3.5–5.1)
RBC # BLD AUTO: 2.95 10^6/UL (ref 4.2–5.4)
SODIUM SERPL-SCNC: 136 MMOL/L (ref 135–144)
WBC # BLD AUTO: 2.7 10^3/UL (ref 4.8–10.8)

## 2017-03-02 RX ADMIN — DOCUSATE SODIUM SCH MG: 100 CAPSULE, LIQUID FILLED ORAL at 20:58

## 2017-03-02 RX ADMIN — ASPIRIN SCH MG: 81 TABLET, COATED ORAL at 08:16

## 2017-03-02 RX ADMIN — IPRATROPIUM BROMIDE AND ALBUTEROL SULFATE SCH ML: .5; 3 SOLUTION RESPIRATORY (INHALATION) at 16:13

## 2017-03-02 RX ADMIN — SENNOSIDES SCH TAB: 8.6 TABLET, FILM COATED ORAL at 08:17

## 2017-03-02 RX ADMIN — METOPROLOL TARTRATE SCH MG: 25 TABLET, FILM COATED ORAL at 20:59

## 2017-03-02 RX ADMIN — DOCUSATE SODIUM SCH MG: 100 CAPSULE, LIQUID FILLED ORAL at 08:17

## 2017-03-02 RX ADMIN — FAMOTIDINE SCH MG: 20 TABLET ORAL at 08:17

## 2017-03-02 RX ADMIN — ACETAMINOPHEN PRN MG: 160 SOLUTION ORAL at 22:45

## 2017-03-02 RX ADMIN — IPRATROPIUM BROMIDE AND ALBUTEROL SULFATE SCH ML: .5; 3 SOLUTION RESPIRATORY (INHALATION) at 08:54

## 2017-03-02 RX ADMIN — ACETAMINOPHEN PRN MG: 160 SOLUTION ORAL at 07:54

## 2017-03-02 RX ADMIN — SEVELAMER CARBONATE SCH GM: 2400 POWDER, FOR SUSPENSION ORAL at 05:18

## 2017-03-02 RX ADMIN — SENNOSIDES SCH TAB: 8.6 TABLET, FILM COATED ORAL at 20:58

## 2017-03-02 RX ADMIN — ATORVASTATIN CALCIUM SCH MG: 20 TABLET, FILM COATED ORAL at 20:59

## 2017-03-02 RX ADMIN — IPRATROPIUM BROMIDE AND ALBUTEROL SULFATE SCH ML: .5; 3 SOLUTION RESPIRATORY (INHALATION) at 23:43

## 2017-03-02 RX ADMIN — SEVELAMER CARBONATE SCH GM: 2400 POWDER, FOR SUSPENSION ORAL at 13:13

## 2017-03-02 RX ADMIN — METOPROLOL TARTRATE SCH MG: 25 TABLET, FILM COATED ORAL at 08:18

## 2017-03-02 RX ADMIN — ZOLPIDEM TARTRATE PRN MG: 5 TABLET, FILM COATED ORAL at 22:45

## 2017-03-02 RX ADMIN — SEVELAMER CARBONATE SCH GM: 2400 POWDER, FOR SUSPENSION ORAL at 21:02

## 2017-03-02 RX ADMIN — DEXAMETHASONE SODIUM PHOSPHATE PRN MG: 10 INJECTION, SOLUTION INTRAMUSCULAR; INTRAVENOUS at 21:05

## 2017-03-02 RX ADMIN — LEVETIRACETAM SCH MG: 500 TABLET, FILM COATED ORAL at 08:17

## 2017-03-02 NOTE — RADRPT
PROCEDURE:   US Abdomen complete. 

 

CLINICAL INDICATION:   Cirrhosis 

 

TECHNIQUE:   Multiple real-time longitudinal and transverse images were acquired of the patient's ab
domen and retroperitoneum utilizing a curved array transducer. 

 

COMPARISON:   Abdominal ultrasound dated 10/02/2016 

 

FINDINGS:

 

The liver is normal in size and demonstrates normal  echogenicity.  No focal intrahepatic mass is id
entified.  The gallbladder wall is circumferentially thickened.  No gallstones are identified. No in
tra or extrahepatic biliary dilatation is seen. The common bile duct measures 6.5 mm in maximal dime
nsion.  The visualized portions of the pancreas are unremarkable with obscuration of the tail of the
 pancreas.  The spleen is enlarged, measuring 13.7 cm in diameter  No free fluid is identified.

 

The right kidney measures 6.1 cm in length.  The left kidney measures 5.9 cm in length.  There is in
creased echogenicity of the renal parenchyma with thinning of the renal cortex.  There are no perine
phric fluid collections.  No hydronephrosis, mass, or calculus is seen.  The aorta and IVC are unrem
arkable.  

 

IMPRESSION:

 

 

1.  Diffuse gallbladder wall thickening and no gallstones are identified. The common bile duct is mi
ldly dilated for patient's age measuring 6.4 mm in diameter. Consider MRCP for further evaluation, a
s clinically indicated.

2.  Splenomegaly, the spleen measures 13.7 cm in diameter.

3.  Atrophic echogenic kidneys, consistent medical renal disease.

 

 

 

RPTAT: HH

_____________________________________________ 

.Jaci Arrington MD, MD           Date    Time 

Electronically viewed and signed by .Jaci Arrington MD, MD on 03/02/2017 07:39 

 

D:  03/02/2017 07:39  T:  03/02/2017 07:39

.G/

## 2017-03-02 NOTE — PN
Date/Time of Note


Date/Time of Note


DATE: 3/2/17 


TIME: 11:26





Assessment/Plan


VTE Prophylaxis


VTE Prophylaxis Intervention:  other





Lines/Catheters


IV Catheter Type (from [facility]):  Saline Lock


Urinary Cath still in place:  No





Assessment/Plan


Assessment/Plan


-  Pancytopenia. Dr. Aly is following in hematology consultation.


- Anemia. Status post blood transfusion with hemodialysis.   Continue to 

monitor hemoglobin and hematocrit.  


- Possible acute / early subacute ischemic infarct in the right anterior basal 

ganglia per MRI.  Patient was no new neurological deficits.  patient is 

followed by 


   Dr. Christensen in neurology consultation.  Started on aspirin.


- Episode of transient encephalopathy after the trauma, suspicious for complex 

partial seizures.  Continue Keppra 500 mg daily plus an additional 500 post-


    hemodialysis per neurology recommendation.


-  Status post right subdural hematoma and brain contusion after traumatic 

injury.


-  Status post acute encephalopathy 2 to #2.


-  Status post respiratory failure with pneumothorax, status post chest tube 

placement and removal.


-  End-stage renal disease on hemodialysis.  The patient will be followed by 

Dr. Chaney from nephrology standpoint and will undergo hemodialysis 3 times per 

week.


-  Hypertension.  Continue patient on Cozaar.


-  Hyperlipidemia.  Continue Lipitor.


-  Dysphagia.  Patient tolerates pured diet well.


-  Gastroesophageal reflux disease.  Continue Pepcid.  





Further recommendations based on clinical course.  Plan of care discussed with 

Dr. Brink.





Subjective


24 Hr Interval Summary


Neurologic:  headache





Exam/Review of Systems


Vital Signs


Vitals





 Vital Signs








  Date Time  Temp Pulse Resp B/P Pulse Ox O2 Delivery O2 Flow Rate FiO2


 


3/2/17 08:56  80 16  98   


 


3/2/17 08:05 97.3   104/68    


 


3/1/17 23:24        21


 


2/28/17 15:30      Room Air  














 Intake and Output   


 


 3/1/17 3/1/17 3/2/17





 15:00 23:00 07:00


 


Intake Total  640 ml 440 ml


 


Output Total  3900 ml 


 


Balance  -3260 ml 440 ml











Exam


Constitutional:  alert, oriented


Psych:  no complaints


Head:  other


Eyes:  PERRL, nl sclera


ENMT:  nl external ears & nose


Neck:  non-tender


Respiratory:  clear to auscultation


Cardiovascular:  bruits


Gastrointestinal:  non-tender, soft


Musculoskeletal:  nl extremities to inspection, other (walks with pushing the 

wheel chait.)


Extremities:  normal pulses


Neurological:  nl mental status, nl speech


Skin:  nl turgor





Results


Result Diagram:  


3/2/17 0440                                                                    

            3/2/17 0440





Results 24 hrs





Laboratory Tests








Test


  3/2/17


04:40


 


Anion Gap 17  H


 


Basophils # 0.0  


 


Basophils % 0.0  


 


Blood Urea Nitrogen 12  #


 


Calcium Level 8.5  


 


Carbon Dioxide Level 30  


 


Chloride Level 93  L


 


Creatinine 3.86  #H


 


Eosinophils # 0.1  


 


Eosinophils % 2.6  


 


Glucose Level 72  


 


Hematocrit 27.0  L


 


Hemoglobin 8.8  L


 


Lymphocytes # 0.7  L


 


Lymphocytes % 24.7  


 


Mean Corpuscular Hemoglobin 29.8  


 


Mean Corpuscular Hemoglobin


Concent 32.6  


 


 


Mean Corpuscular Volume 91.5  


 


Mean Platelet Volume 10.8  H


 


Monocytes # 0.2  L


 


Monocytes % 7.7  


 


Neutrophils # 1.8  


 


Neutrophils % 64.6  


 


Nucleated Red Blood Cells # 0.0  


 


Nucleated Red Blood Cells % 0.0  


 


Platelet Count 121  L


 


Potassium Level 4.0  


 


Red Blood Count 2.95  L


 


Red Cell Distribution Width 13.3  


 


Sodium Level 136  


 


White Blood Count 2.7  L











Medications


Medications





 Current Medications


Acetaminophen (Tylenol Liquid) 650 mg Q6  PRN PO PAIN AND OR ELEVATED TEMP Last 

administered on 3/2/17at 07:54; Admin Dose 650 MG;  Start 2/26/17 at 05:30


Atorvastatin Calcium (Lipitor) 20 mg HS PO  Last administered on 3/1/17at 20:46

; Admin Dose 20 MG;  Start 2/26/17 at 21:00


Bisacodyl (Dulcolax Supp) 10 mg DAILY  PRN [place] CONSTIPATION;  Start 2/26/17 at 05

:30


Cyclobenzaprine HCl (Flexeril) 5 mg Q8  PRN PO MUSCLE SPASMS Last administered 

on 2/27/17at 23:23; Admin Dose 5 MG;  Start 2/26/17 at 05:30


Docusate Sodium (Colace) 100 mg BID PO  Last administered on 3/2/17at 08:17; 

Admin Dose 100 MG;  Start 2/26/17 at 09:00


Famotidine (Pepcid) 20 mg DAILY PO  Last administered on 3/2/17at 08:17; Admin 

Dose 20 MG;  Start 2/26/17 at 09:00


Lactulose (Enulose) 20 gm DAILY  PRN PO CONSTIPATION;  Start 2/26/17 at 05:30


Levetiracetam (Keppra) 500 mg DAILY PO  Last administered on 3/2/17at 08:17; 

Admin Dose 500 MG;  Start 2/26/17 at 09:00


Magnesium Hydroxide (Milk Of Mag) 30 ml BID  PRN PO CONSTIPATION;  Start 2/26/ 17 at 05:30


Metoprolol Tartrate (Lopressor) 25 mg BID PO  Last administered on 2/28/17at 08:

56; Admin Dose 25 MG;  Start 2/26/17 at 09:00


Multivit/Ca Carb/ B Cmplx/FA/Prenat ([place]-Argenis) 1 tab DAILY PO  Last 

administered on 3/2/17at 08:17; Admin Dose 1 TAB;  Start 2/26/17 at 09:00


Ondansetron HCl (Zofran Tab) 4 mg Q8  PRN PO NAUSEA AND/OR VOMITING;  Start 2/26 /17 at 05:30


Senna (Senokot) 1 tab DAILY  PRN PO CONSTIPATION;  Start 2/26/17 at 05:30


Sevelamer Carbonate (Renvela) 2.4 gm Q8 PO  Last administered on 3/1/17at 21:01

; Admin Dose 2.4 GM;  Start 2/26/17 at 06:00


Senna (Senokot) 2 tab BID PO  Last administered on 3/2/17at 08:17; Admin Dose 2 

TAB;  Start 2/26/17 at 13:30


Aspirin (Halfprin) 81 mg DAILY PO  Last administered on 3/2/17at 08:16; Admin 

Dose 81 MG;  Start 2/27/17 at 09:00


Ondansetron HCl (Zofran Inj) 4 mg Q6H  PRN IV NAUSEA AND/OR VOMITING Last 

administered on 2/28/17at 23:14; Admin Dose 4 MG;  Start 2/26/17 at 22:00


Neomycin/ Polymyxin/ Hydrocortisone (Cortisporin Otic Susp) 2 drop DAILY BOTH 

EARS  Last administered on 3/2/17at 08:16; Admin Dose 2 DROP;  Start 2/28/17 at 

21:00;  Stop 3/4/17 at 21:00











JULIAN HERRMANN Mar 2, 2017 11:26

## 2017-03-02 NOTE — CONS
Date/Time of Note


Date/Time of Note


DATE: 3/2/17 


TIME: 17:02





Assessment/Plan


Assessment/Plan


Chief Complaint/Hosp Course


ESRD


HTN


ANEMIA


CVA 


S/P CNS BLEED


ASHD


US CORDERO SEEN


PLAN continue hd 


pt/ot


Problems:  





Consultation Date/Type/Reason


Admit Date/Time


Feb 27, 2017 at 08:01


Type of Consultation:  RENAL





24 HR Interval Summary


Constitutional:  no complaints





Exam/Review of Systems


Vital Signs


Vitals





 Vital Signs








  Date Time  Temp Pulse Resp B/P Pulse Ox O2 Delivery O2 Flow Rate FiO2


 


3/2/17 16:14  82 16  99   


 


3/2/17 08:05 97.3   104/68    


 


3/1/17 23:24        21


 


2/28/17 15:30      Room Air  














 Intake and Output   


 


 3/1/17 3/1/17 3/2/17





 15:00 23:00 07:00


 


Intake Total  640 ml 440 ml


 


Output Total  3900 ml 


 


Balance  -3260 ml 440 ml











Exam


Neck:  supple


Respiratory:  diminished breath sounds


Cardiovascular:  regular rate and rhythm


Gastrointestinal:  soft


Musculoskeletal:  nl extremities to inspection





Results


Result Diagram:  


3/2/17 0440                                                                    

            3/2/17 0440





Results 24 hrs





Laboratory Tests








Test


  3/2/17


04:40


 


Anion Gap 17  H


 


Basophils # 0.0  


 


Basophils % 0.0  


 


Blood Urea Nitrogen 12  #


 


Calcium Level 8.5  


 


Carbon Dioxide Level 30  


 


Chloride Level 93  L


 


Creatinine 3.86  #H


 


Eosinophils # 0.1  


 


Eosinophils % 2.6  


 


Glucose Level 72  


 


Hematocrit 27.0  L


 


Hemoglobin 8.8  L


 


Lymphocytes # 0.7  L


 


Lymphocytes % 24.7  


 


Mean Corpuscular Hemoglobin 29.8  


 


Mean Corpuscular Hemoglobin


Concent 32.6  


 


 


Mean Corpuscular Volume 91.5  


 


Mean Platelet Volume 10.8  H


 


Monocytes # 0.2  L


 


Monocytes % 7.7  


 


Neutrophils # 1.8  


 


Neutrophils % 64.6  


 


Nucleated Red Blood Cells # 0.0  


 


Nucleated Red Blood Cells % 0.0  


 


Platelet Count 121  L


 


Potassium Level 4.0  


 


Red Blood Count 2.95  L


 


Red Cell Distribution Width 13.3  


 


Sodium Level 136  


 


White Blood Count 2.7  L











Medications


Medications





 Current Medications


Acetaminophen (Tylenol Liquid) 650 mg Q6  PRN PO PAIN AND OR ELEVATED TEMP Last 

administered on 3/2/17at 07:54; Admin Dose 650 MG;  Start 2/26/17 at 05:30


Atorvastatin Calcium (Lipitor) 20 mg HS PO  Last administered on 3/1/17at 20:46

; Admin Dose 20 MG;  Start 2/26/17 at 21:00


Bisacodyl (Dulcolax Supp) 10 mg DAILY  PRN [place] CONSTIPATION;  Start 2/26/17 at 05

:30


Cyclobenzaprine HCl (Flexeril) 5 mg Q8  PRN PO MUSCLE SPASMS Last administered 

on 2/27/17at 23:23; Admin Dose 5 MG;  Start 2/26/17 at 05:30


Docusate Sodium (Colace) 100 mg BID PO  Last administered on 3/2/17at 08:17; 

Admin Dose 100 MG;  Start 2/26/17 at 09:00


Famotidine (Pepcid) 20 mg DAILY PO  Last administered on 3/2/17at 08:17; Admin 

Dose 20 MG;  Start 2/26/17 at 09:00


Lactulose (Enulose) 20 gm DAILY  PRN PO CONSTIPATION;  Start 2/26/17 at 05:30


Levetiracetam (Keppra) 500 mg DAILY PO  Last administered on 3/2/17at 08:17; 

Admin Dose 500 MG;  Start 2/26/17 at 09:00


Magnesium Hydroxide (Milk Of Mag) 30 ml BID  PRN PO CONSTIPATION;  Start 2/26/ 17 at 05:30


Metoprolol Tartrate (Lopressor) 25 mg BID PO  Last administered on 2/28/17at 08:

56; Admin Dose 25 MG;  Start 2/26/17 at 09:00


Multivit/Ca Carb/ B Cmplx/FA/Prenat ([place]-Argenis) 1 tab DAILY PO  Last 

administered on 3/2/17at 08:17; Admin Dose 1 TAB;  Start 2/26/17 at 09:00


Ondansetron HCl (Zofran Tab) 4 mg Q8  PRN PO NAUSEA AND/OR VOMITING;  Start 2/26 /17 at 05:30


Senna (Senokot) 1 tab DAILY  PRN PO CONSTIPATION;  Start 2/26/17 at 05:30


Sevelamer Carbonate (Renvela) 2.4 gm Q8 PO  Last administered on 3/2/17at 13:13

; Admin Dose 2.4 GM;  Start 2/26/17 at 06:00


Senna (Senokot) 2 tab BID PO  Last administered on 3/2/17at 08:17; Admin Dose 2 

TAB;  Start 2/26/17 at 13:30


Aspirin (Halfprin) 81 mg DAILY PO  Last administered on 3/2/17at 08:16; Admin 

Dose 81 MG;  Start 2/27/17 at 09:00


Ondansetron HCl (Zofran Inj) 4 mg Q6H  PRN IV NAUSEA AND/OR VOMITING Last 

administered on 2/28/17at 23:14; Admin Dose 4 MG;  Start 2/26/17 at 22:00


Neomycin/ Polymyxin/ Hydrocortisone (Cortisporin Otic Susp) 2 drop DAILY BOTH 

EARS  Last administered on 3/2/17at 08:16; Admin Dose 2 DROP;  Start 2/28/17 at 

21:00;  Stop 3/4/17 at 21:00











PRITESH MACKEY MD Mar 2, 2017 17:03

## 2017-03-02 NOTE — CONS
Date/Time of Note


Date/Time of Note


DATE: 3/2/17 


TIME: 16:39





Assessment/Plan


Assessment/Plan


Chief Complaint/Hosp Course


The patient is an unfortunate 46-year-old female with end-stage renal disease 

on hemodialysis 3 days per week, with history of hypertension and GERD.  The 

patient sustained a ground level fall and with trauma to the head, and 

developed right subdural hematoma and brain contusion and skull fracture.  

Recent MRI showed possible acute / early subacute ischemic infarct in the right 

anterior basal ganglia. Patient was no new neurological deficits. She was also 

noted to have pancytopenia for which hematology was consulted with anemia 

status post blood transfusion with hemodialysis. 


- HIV and hep panel negative


- Iron panel consistent with anemia of chronic disease, B12 and LDH WNL, 

pending folate, retic count inappropriately low at 48K


- Pending epo level to see if would benefit from Procrit in setting of renal 

insufficiency


- Peripheral smear reviewed by pathologist and does not show blasts, 

schistocytes or other concerning findings, fairly unremarkable.


- Abdominal ultrasound showed splenomegaly (13.7 cm) which is likely a 

component of thrombocytopenia, also showed diffuse gallbladder wall thickening 

and no gallstones are identified. The common bile duct is mildly dilated for 

patient's age measuring 6.4 mm in diameter. Consider MRCP for further evaluation

, as clinically indicated.  Will order LFTs


- Will order bone marrow biopsy by IR tomorrow a.m. to evaluate for bone marrow 

pathology such as leukemia/lymphoma, MDS, infiltrative process etc.


- Will continue to follow.


Peripheral smear reviewed by pathologist and does not show blasts, schistocytes 

or other concerning findings, unremarkable.


Problems:  





Consultation Date/Type/Reason


Admit Date/Time


Feb 27, 2017 at 08:01


Initial Consult Date


3/1/17


Type of Consultation:  Hematology/Oncology





24 HR Interval Summary


Free Text/Dictation


Patient has no complaints.  She is walking in the hallway with a walker.





Exam/Review of Systems


Vital Signs


Vitals





 Vital Signs








  Date Time  Temp Pulse Resp B/P Pulse Ox O2 Delivery O2 Flow Rate FiO2


 


3/2/17 16:14  82 16  99   


 


3/2/17 08:05 97.3   104/68    


 


3/1/17 23:24        21


 


2/28/17 15:30      Room Air  














 Intake and Output   


 


 3/1/17 3/1/17 3/2/17





 15:00 23:00 07:00


 


Intake Total  640 ml 440 ml


 


Output Total  3900 ml 


 


Balance  -3260 ml 440 ml











Exam


Constitutional:  alert, oriented


Psych:  no complaints


Head:  atraumatic, normocephalic


Eyes:  nl conjunctiva


ENMT:  nl external ears & nose


Neck:  non-tender, supple


Respiratory:  clear to auscultation


Cardiovascular:  regular rate and rhythm


Gastrointestinal:  non-tender, soft


Musculoskeletal:  nl extremities to inspection


Neurological:  other (left facial droop)





Results


Result Diagram:  


3/2/17 0440                                                                    

            3/2/17 0440





Results 24 hrs





Laboratory Tests








Test


  3/2/17


04:40


 


Anion Gap 17  H


 


Basophils # 0.0  


 


Basophils % 0.0  


 


Blood Urea Nitrogen 12  #


 


Calcium Level 8.5  


 


Carbon Dioxide Level 30  


 


Chloride Level 93  L


 


Creatinine 3.86  #H


 


Eosinophils # 0.1  


 


Eosinophils % 2.6  


 


Glucose Level 72  


 


Hematocrit 27.0  L


 


Hemoglobin 8.8  L


 


Lymphocytes # 0.7  L


 


Lymphocytes % 24.7  


 


Mean Corpuscular Hemoglobin 29.8  


 


Mean Corpuscular Hemoglobin


Concent 32.6  


 


 


Mean Corpuscular Volume 91.5  


 


Mean Platelet Volume 10.8  H


 


Monocytes # 0.2  L


 


Monocytes % 7.7  


 


Neutrophils # 1.8  


 


Neutrophils % 64.6  


 


Nucleated Red Blood Cells # 0.0  


 


Nucleated Red Blood Cells % 0.0  


 


Platelet Count 121  L


 


Potassium Level 4.0  


 


Red Blood Count 2.95  L


 


Red Cell Distribution Width 13.3  


 


Sodium Level 136  


 


White Blood Count 2.7  L











Medications


Medications





 Current Medications


Acetaminophen (Tylenol Liquid) 650 mg Q6  PRN PO PAIN AND OR ELEVATED TEMP Last 

administered on 3/2/17at 07:54; Admin Dose 650 MG;  Start 2/26/17 at 05:30


Atorvastatin Calcium (Lipitor) 20 mg HS PO  Last administered on 3/1/17at 20:46

; Admin Dose 20 MG;  Start 2/26/17 at 21:00


Bisacodyl (Dulcolax Supp) 10 mg DAILY  PRN [place] CONSTIPATION;  Start 2/26/17 at 05

:30


Cyclobenzaprine HCl (Flexeril) 5 mg Q8  PRN PO MUSCLE SPASMS Last administered 

on 2/27/17at 23:23; Admin Dose 5 MG;  Start 2/26/17 at 05:30


Docusate Sodium (Colace) 100 mg BID PO  Last administered on 3/2/17at 08:17; 

Admin Dose 100 MG;  Start 2/26/17 at 09:00


Famotidine (Pepcid) 20 mg DAILY PO  Last administered on 3/2/17at 08:17; Admin 

Dose 20 MG;  Start 2/26/17 at 09:00


Lactulose (Enulose) 20 gm DAILY  PRN PO CONSTIPATION;  Start 2/26/17 at 05:30


Levetiracetam (Keppra) 500 mg DAILY PO  Last administered on 3/2/17at 08:17; 

Admin Dose 500 MG;  Start 2/26/17 at 09:00


Magnesium Hydroxide (Milk Of Mag) 30 ml BID  PRN PO CONSTIPATION;  Start 2/26/ 17 at 05:30


Metoprolol Tartrate (Lopressor) 25 mg BID PO  Last administered on 2/28/17at 08:

56; Admin Dose 25 MG;  Start 2/26/17 at 09:00


Multivit/Ca Carb/ B Cmplx/FA/Prenat ([place]-Argenis) 1 tab DAILY PO  Last 

administered on 3/2/17at 08:17; Admin Dose 1 TAB;  Start 2/26/17 at 09:00


Ondansetron HCl (Zofran Tab) 4 mg Q8  PRN PO NAUSEA AND/OR VOMITING;  Start 2/26 /17 at 05:30


Senna (Senokot) 1 tab DAILY  PRN PO CONSTIPATION;  Start 2/26/17 at 05:30


Sevelamer Carbonate (Renvela) 2.4 gm Q8 PO  Last administered on 3/2/17at 13:13

; Admin Dose 2.4 GM;  Start 2/26/17 at 06:00


Senna (Senokot) 2 tab BID PO  Last administered on 3/2/17at 08:17; Admin Dose 2 

TAB;  Start 2/26/17 at 13:30


Aspirin (Halfprin) 81 mg DAILY PO  Last administered on 3/2/17at 08:16; Admin 

Dose 81 MG;  Start 2/27/17 at 09:00


Ondansetron HCl (Zofran Inj) 4 mg Q6H  PRN IV NAUSEA AND/OR VOMITING Last 

administered on 2/28/17at 23:14; Admin Dose 4 MG;  Start 2/26/17 at 22:00


Neomycin/ Polymyxin/ Hydrocortisone (Cortisporin Otic Susp) 2 drop DAILY BOTH 

EARS  Last administered on 3/2/17at 08:16; Admin Dose 2 DROP;  Start 2/28/17 at 

21:00;  Stop 3/4/17 at 21:00











TO,LEONID HERNANDEZ MD Mar 2, 2017 16:45

## 2017-03-03 VITALS — SYSTOLIC BLOOD PRESSURE: 111 MMHG | HEART RATE: 95 BPM | DIASTOLIC BLOOD PRESSURE: 62 MMHG

## 2017-03-03 VITALS — DIASTOLIC BLOOD PRESSURE: 76 MMHG | RESPIRATION RATE: 16 BRPM | SYSTOLIC BLOOD PRESSURE: 117 MMHG | HEART RATE: 96 BPM

## 2017-03-03 VITALS — DIASTOLIC BLOOD PRESSURE: 65 MMHG | SYSTOLIC BLOOD PRESSURE: 106 MMHG | RESPIRATION RATE: 12 BRPM | HEART RATE: 94 BPM

## 2017-03-03 VITALS — DIASTOLIC BLOOD PRESSURE: 67 MMHG | SYSTOLIC BLOOD PRESSURE: 114 MMHG | HEART RATE: 87 BPM

## 2017-03-03 VITALS — HEART RATE: 90 BPM | SYSTOLIC BLOOD PRESSURE: 89 MMHG | DIASTOLIC BLOOD PRESSURE: 60 MMHG

## 2017-03-03 VITALS — SYSTOLIC BLOOD PRESSURE: 114 MMHG | HEART RATE: 97 BPM | DIASTOLIC BLOOD PRESSURE: 79 MMHG | RESPIRATION RATE: 17 BRPM

## 2017-03-03 VITALS — SYSTOLIC BLOOD PRESSURE: 95 MMHG | HEART RATE: 94 BPM | RESPIRATION RATE: 13 BRPM | DIASTOLIC BLOOD PRESSURE: 64 MMHG

## 2017-03-03 VITALS — SYSTOLIC BLOOD PRESSURE: 109 MMHG | HEART RATE: 97 BPM | DIASTOLIC BLOOD PRESSURE: 60 MMHG

## 2017-03-03 VITALS — DIASTOLIC BLOOD PRESSURE: 76 MMHG | SYSTOLIC BLOOD PRESSURE: 125 MMHG | RESPIRATION RATE: 18 BRPM

## 2017-03-03 VITALS — RESPIRATION RATE: 18 BRPM | HEART RATE: 103 BPM | SYSTOLIC BLOOD PRESSURE: 130 MMHG | DIASTOLIC BLOOD PRESSURE: 90 MMHG

## 2017-03-03 VITALS — RESPIRATION RATE: 13 BRPM | HEART RATE: 100 BPM | DIASTOLIC BLOOD PRESSURE: 69 MMHG | SYSTOLIC BLOOD PRESSURE: 100 MMHG

## 2017-03-03 VITALS — SYSTOLIC BLOOD PRESSURE: 104 MMHG | HEART RATE: 95 BPM | RESPIRATION RATE: 12 BRPM | DIASTOLIC BLOOD PRESSURE: 68 MMHG

## 2017-03-03 VITALS — SYSTOLIC BLOOD PRESSURE: 98 MMHG | DIASTOLIC BLOOD PRESSURE: 61 MMHG | RESPIRATION RATE: 18 BRPM

## 2017-03-03 VITALS — RESPIRATION RATE: 16 BRPM | SYSTOLIC BLOOD PRESSURE: 120 MMHG | HEART RATE: 100 BPM | DIASTOLIC BLOOD PRESSURE: 78 MMHG

## 2017-03-03 VITALS — DIASTOLIC BLOOD PRESSURE: 71 MMHG | HEART RATE: 95 BPM | RESPIRATION RATE: 12 BRPM | SYSTOLIC BLOOD PRESSURE: 105 MMHG

## 2017-03-03 VITALS — DIASTOLIC BLOOD PRESSURE: 63 MMHG | HEART RATE: 94 BPM | SYSTOLIC BLOOD PRESSURE: 106 MMHG

## 2017-03-03 VITALS — SYSTOLIC BLOOD PRESSURE: 100 MMHG | DIASTOLIC BLOOD PRESSURE: 65 MMHG | RESPIRATION RATE: 12 BRPM | HEART RATE: 98 BPM

## 2017-03-03 VITALS — RESPIRATION RATE: 14 BRPM | SYSTOLIC BLOOD PRESSURE: 106 MMHG | DIASTOLIC BLOOD PRESSURE: 63 MMHG | HEART RATE: 94 BPM

## 2017-03-03 VITALS — DIASTOLIC BLOOD PRESSURE: 66 MMHG | HEART RATE: 97 BPM | SYSTOLIC BLOOD PRESSURE: 98 MMHG | RESPIRATION RATE: 13 BRPM

## 2017-03-03 LAB
ADD SCAN DIFF: NO
ALBUMIN SERPL-MCNC: 3.4 G/DL (ref 3.3–4.9)
ALP SERPL-CCNC: 151 IU/L (ref 42–121)
ALT SERPL-CCNC: 17 IU/L (ref 13–69)
ANION GAP SERPL CALC-SCNC: 20 MMOL/L (ref 8–16)
AST SERPL-CCNC: 16 IU/L (ref 15–46)
BASOPHILS # BLD AUTO: 0 10^3/UL (ref 0–0.1)
BASOPHILS NFR BLD: 0.4 % (ref 0–2)
BILIRUB DIRECT SERPL-MCNC: 0 MG/DL (ref 0–0.2)
BILIRUB SERPL-MCNC: 0.3 MG/DL (ref 0.2–1.3)
BUN SERPL-MCNC: 19 MG/DL (ref 7–20)
CALCIUM SERPL-MCNC: 8.8 MG/DL (ref 8.4–10.2)
CHLORIDE SERPL-SCNC: 91 MMOL/L (ref 97–110)
CO2 SERPL-SCNC: 29 MMOL/L (ref 21–31)
CREAT SERPL-MCNC: 5.94 MG/DL (ref 0.44–1)
EOSINOPHIL # BLD: 0.1 10^3/UL (ref 0–0.5)
EOSINOPHIL NFR BLD: 4.9 % (ref 0–7)
ERYTHROCYTE [DISTWIDTH] IN BLOOD BY AUTOMATED COUNT: 13.4 % (ref 11.5–14.5)
GLUCOSE SERPL-MCNC: 67 MG/DL (ref 70–220)
HCT VFR BLD CALC: 27.7 % (ref 37–47)
HGB BLD-MCNC: 8.9 G/DL (ref 12–16)
LYMPHOCYTES # BLD AUTO: 0.6 10^3/UL (ref 0.8–2.9)
LYMPHOCYTES NFR BLD AUTO: 25.3 % (ref 15–51)
MCH RBC QN AUTO: 29.6 PG (ref 29–33)
MCHC RBC AUTO-ENTMCNC: 32.1 G/DL (ref 32–37)
MCV RBC AUTO: 92 FL (ref 82–101)
MONOCYTES # BLD: 0.2 10^3/UL (ref 0.3–0.9)
MONOCYTES NFR BLD: 9.4 % (ref 0–11)
NEUTROPHILS # BLD: 1.5 10^3/UL (ref 1.6–7.5)
NEUTROPHILS NFR BLD AUTO: 59.6 % (ref 39–77)
NRBC # BLD MANUAL: 0 10^3/UL (ref 0–0)
NRBC BLD QL: 0 /100WBC (ref 0–0)
PLATELET # BLD: 121 10^3/UL (ref 140–415)
PMV BLD AUTO: 10.8 FL (ref 7.4–10.4)
POTASSIUM SERPL-SCNC: 4.5 MMOL/L (ref 3.5–5.1)
PROT SERPL-MCNC: 7 G/DL (ref 6.1–8.1)
RBC # BLD AUTO: 3.01 10^6/UL (ref 4.2–5.4)
SODIUM SERPL-SCNC: 135 MMOL/L (ref 135–144)
WBC # BLD AUTO: 2.5 10^3/UL (ref 4.8–10.8)

## 2017-03-03 PROCEDURE — 07DR3ZX EXTRACTION OF ILIAC BONE MARROW, PERCUTANEOUS APPROACH, DIAGNOSTIC: ICD-10-PCS | Performed by: RADIOLOGY

## 2017-03-03 RX ADMIN — IPRATROPIUM BROMIDE AND ALBUTEROL SULFATE SCH ML: .5; 3 SOLUTION RESPIRATORY (INHALATION) at 17:59

## 2017-03-03 RX ADMIN — FAMOTIDINE SCH MG: 20 TABLET ORAL at 09:00

## 2017-03-03 RX ADMIN — SEVELAMER CARBONATE SCH GM: 2400 POWDER, FOR SUSPENSION ORAL at 14:46

## 2017-03-03 RX ADMIN — SENNOSIDES SCH TAB: 8.6 TABLET, FILM COATED ORAL at 09:00

## 2017-03-03 RX ADMIN — ATORVASTATIN CALCIUM SCH MG: 20 TABLET, FILM COATED ORAL at 20:25

## 2017-03-03 RX ADMIN — IPRATROPIUM BROMIDE AND ALBUTEROL SULFATE SCH ML: .5; 3 SOLUTION RESPIRATORY (INHALATION) at 23:33

## 2017-03-03 RX ADMIN — SENNOSIDES SCH TAB: 8.6 TABLET, FILM COATED ORAL at 12:20

## 2017-03-03 RX ADMIN — DOCUSATE SODIUM SCH MG: 100 CAPSULE, LIQUID FILLED ORAL at 20:24

## 2017-03-03 RX ADMIN — SEVELAMER CARBONATE SCH GM: 2400 POWDER, FOR SUSPENSION ORAL at 05:24

## 2017-03-03 RX ADMIN — METOPROLOL TARTRATE SCH MG: 25 TABLET, FILM COATED ORAL at 12:21

## 2017-03-03 RX ADMIN — SENNOSIDES SCH TAB: 8.6 TABLET, FILM COATED ORAL at 20:25

## 2017-03-03 RX ADMIN — METOPROLOL TARTRATE SCH MG: 25 TABLET, FILM COATED ORAL at 20:26

## 2017-03-03 RX ADMIN — LEVETIRACETAM SCH MG: 500 TABLET, FILM COATED ORAL at 09:00

## 2017-03-03 RX ADMIN — IPRATROPIUM BROMIDE AND ALBUTEROL SULFATE SCH ML: .5; 3 SOLUTION RESPIRATORY (INHALATION) at 07:57

## 2017-03-03 RX ADMIN — DOCUSATE SODIUM SCH MG: 100 CAPSULE, LIQUID FILLED ORAL at 09:00

## 2017-03-03 RX ADMIN — ASPIRIN SCH MG: 81 TABLET, COATED ORAL at 12:21

## 2017-03-03 RX ADMIN — LEVETIRACETAM SCH MG: 500 TABLET, FILM COATED ORAL at 12:19

## 2017-03-03 RX ADMIN — SEVELAMER CARBONATE SCH GM: 2400 POWDER, FOR SUSPENSION ORAL at 21:03

## 2017-03-03 RX ADMIN — DOCUSATE SODIUM SCH MG: 100 CAPSULE, LIQUID FILLED ORAL at 12:20

## 2017-03-03 RX ADMIN — METOPROLOL TARTRATE SCH MG: 25 TABLET, FILM COATED ORAL at 09:00

## 2017-03-03 RX ADMIN — ASPIRIN SCH MG: 81 TABLET, COATED ORAL at 09:00

## 2017-03-03 RX ADMIN — FAMOTIDINE SCH MG: 20 TABLET ORAL at 12:20

## 2017-03-03 RX ADMIN — ZOLPIDEM TARTRATE PRN MG: 5 TABLET, FILM COATED ORAL at 21:03

## 2017-03-03 RX ADMIN — ACETAMINOPHEN PRN MG: 160 SOLUTION ORAL at 15:16

## 2017-03-03 NOTE — CONS
Date/Time of Note


Date/Time of Note


DATE: 3/3/17 


TIME: 16:47





Assessment/Plan


Assessment/Plan


Chief Complaint/Hosp Course


ESRD


HTN


ANEMIA


CVA 


S/P CNS BLEED


ASHD





PLAN continue hd 


pt/ot


Problems:  





Consultation Date/Type/Reason


Admit Date/Time


Feb 27, 2017 at 08:01


Type of Consultation:  RENAL





24 HR Interval Summary


Constitutional:  no complaints





Exam/Review of Systems


Vital Signs


Vitals





 Vital Signs








  Date Time  Temp Pulse Resp B/P Pulse Ox O2 Delivery O2 Flow Rate FiO2


 


3/3/17 15:24  87      


 


3/3/17 13:30   18     


 


3/3/17 11:21 97.4   130/90 95 Room Air  


 


3/3/17 10:35       2.0 


 


3/3/17 07:59        21














 Intake and Output   


 


 3/2/17 3/2/17 3/3/17





 15:00 23:00 07:00


 


Intake Total  780 ml 240 ml


 


Balance  780 ml 240 ml











Exam


Respiratory:  clear to auscultation


Cardiovascular:  regular rate and rhythm


Gastrointestinal:  soft


Musculoskeletal:  nl extremities to inspection


Extremities:  normal pulses





Results


Result Diagram:  


3/3/17 0455                                                                    

            3/3/17 0455





Results 24 hrs





Laboratory Tests








Test


  3/3/17


04:55


 


Alanine Aminotransferase


(ALT/SGPT) 17  


 


 


Albumin 3.4  


 


Alkaline Phosphatase 151  H


 


Anion Gap 20  H


 


Aspartate Amino Transf


(AST/SGOT) 16  


 


 


Basophils # 0.0  


 


Basophils % 0.4  


 


Blood Urea Nitrogen 19  


 


Calcium Level 8.8  


 


Carbon Dioxide Level 29  


 


Chloride Level 91  L


 


Creatinine 5.94  #H


 


Direct Bilirubin 0.00  


 


Eosinophils # 0.1  


 


Eosinophils % 4.9  


 


Glucose Level 67  L


 


Hematocrit 27.7  L


 


Hemoglobin 8.9  L


 


Indirect Bilirubin 0.3  


 


Lymphocytes # 0.6  L


 


Lymphocytes % 25.3  


 


Mean Corpuscular Hemoglobin 29.6  


 


Mean Corpuscular Hemoglobin


Concent 32.1  


 


 


Mean Corpuscular Volume 92.0  


 


Mean Platelet Volume 10.8  H


 


Monocytes # 0.2  L


 


Monocytes % 9.4  


 


Neutrophils # 1.5  L


 


Neutrophils % 59.6  


 


Nucleated Red Blood Cells # 0.0  


 


Nucleated Red Blood Cells % 0.0  


 


Platelet Count 121  L


 


Potassium Level 4.5  


 


Red Blood Count 3.01  L


 


Red Cell Distribution Width 13.4  


 


Sodium Level 135  


 


Total Bilirubin 0.3  


 


Total Protein 7.0  


 


White Blood Count 2.5  L











Medications


Medications





 Current Medications


Acetaminophen (Tylenol Liquid) 650 mg Q6  PRN PO PAIN AND OR ELEVATED TEMP Last 

administered on 3/3/17at 15:16; Admin Dose 650 MG;  Start 2/26/17 at 05:30


Atorvastatin Calcium (Lipitor) 20 mg HS PO  Last administered on 3/2/17at 20:59

; Admin Dose 20 MG;  Start 2/26/17 at 21:00


Bisacodyl (Dulcolax Supp) 10 mg DAILY  PRN [place] CONSTIPATION;  Start 2/26/17 at 05

:30


Cyclobenzaprine HCl (Flexeril) 5 mg Q8  PRN PO MUSCLE SPASMS Last administered 

on 2/27/17at 23:23; Admin Dose 5 MG;  Start 2/26/17 at 05:30


Docusate Sodium (Colace) 100 mg BID PO  Last administered on 3/3/17at 12:20; 

Admin Dose 100 MG;  Start 2/26/17 at 09:00


Famotidine (Pepcid) 20 mg DAILY PO  Last administered on 3/3/17at 12:20; Admin 

Dose 20 MG;  Start 2/26/17 at 09:00


Lactulose (Enulose) 20 gm DAILY  PRN PO CONSTIPATION;  Start 2/26/17 at 05:30


Levetiracetam (Keppra) 500 mg DAILY PO  Last administered on 3/3/17at 12:19; 

Admin Dose 500 MG;  Start 2/26/17 at 09:00


Magnesium Hydroxide (Milk Of Mag) 30 ml BID  PRN PO CONSTIPATION;  Start 2/26/ 17 at 05:30


Metoprolol Tartrate (Lopressor) 25 mg BID PO  Last administered on 3/3/17at 12:

21; Admin Dose 25 MG;  Start 2/26/17 at 09:00


Multivit/Ca Carb/ B Cmplx/FA/Prenat ([place]-Argenis) 1 tab DAILY PO  Last 

administered on 3/3/17at 12:20; Admin Dose 1 TAB;  Start 2/26/17 at 09:00


Ondansetron HCl (Zofran Tab) 4 mg Q8  PRN PO NAUSEA AND/OR VOMITING Last 

administered on 3/3/17at 11:45; Admin Dose 4 MG;  Start 2/26/17 at 05:30


Senna (Senokot) 1 tab DAILY  PRN PO CONSTIPATION;  Start 2/26/17 at 05:30


Sevelamer Carbonate (Renvela) 2.4 gm Q8 PO  Last administered on 3/3/17at 14:46

; Admin Dose 2.4 GM;  Start 2/26/17 at 06:00


Senna (Senokot) 2 tab BID PO  Last administered on 3/3/17at 12:20; Admin Dose 2 

TAB;  Start 2/26/17 at 13:30


Aspirin (Halfprin) 81 mg DAILY PO  Last administered on 3/3/17at 12:21; Admin 

Dose 81 MG;  Start 2/27/17 at 09:00


Ondansetron HCl (Zofran Inj) 4 mg Q6H  PRN IV NAUSEA AND/OR VOMITING Last 

administered on 3/2/17at 21:05; Admin Dose 4 MG;  Start 2/26/17 at 22:00


Neomycin/ Polymyxin/ Hydrocortisone (Cortisporin Otic Susp) 2 drop DAILY BOTH 

EARS  Last administered on 3/3/17at 12:19; Admin Dose 2 DROP;  Start 2/28/17 at 

21:00;  Stop 3/4/17 at 21:00


Zolpidem Tartrate (Ambien) 5 mg HS  PRN PO INSOMNIA Last administered on 3/2/

17at 22:45; Admin Dose 5 MG;  Start 3/2/17 at 22:30











PRITESH MACKEY MD Mar 3, 2017 16:47

## 2017-03-03 NOTE — RADRPT
PROCEDURE:   CT guided bone marrow aspiration and left iliac bone biopsy.  

 

CLINICAL INDICATION:   History of pancytopenia. 

 

TECHNIQUE:   

Informed consent was obtained. The procedure, risks, benefits, complications and alternatives were e
xplained to the patient. Risks including bleeding and infection were explained. The patient understo
od and was willing to proceed.  A procedural pause was performed. The patient's name, date of birth,
 and procedure to be performed were  verified.    One or more of the following dose reduction techni
ques were used: Automated exposure control, adjustment of the mA and/or kV according to patient size
, use of iterative reconstruction technique.

 

Using local anesthetic, sterile technique and CT guidance, an 11-gauge On Control bone biopsy needle
 was advanced into the left iliac bone via a posterior approach.  Bone marrow aspiration was perform
ed yielding approximately 10 ml.  The bone biopsy needle was then advanced an additional 4 cm using 
the power drill device and tissue was obtained.  Adequate tissue was obtained according to the patho
logist present during the procedure.  The needle was removed.  A postprocedural scan was performed.

A dressing was applied. The patient tolerated procedure well.

 

COMPARISON:   None. 

 

FINDINGS:

Initial images demonstrate the tip of the needle at the posterior margin of the left iliac bone.  Patrick
bsequent images demonstrate the needle within the bone.

Post biopsy images demonstrate no immediate complication.

 

IMPRESSION:

1. Successful CT guided bone marrow aspiration and biopsy.

 

RPTAT: QQ

_____________________________________________ 

.Chinamy Peter MD, MD           Date    Time 

Electronically viewed and signed by .Chinmay Peter MD, MD on 03/03/2017 16:54 

 

D:  03/03/2017 16:54  T:  03/03/2017 16:54

.R/

## 2017-03-03 NOTE — CONS
Date/Time of Note


Date/Time of Note


DATE: 3/3/17 


TIME: 17:05





Assessment/Plan


Assessment/Plan


Chief Complaint/Hosp Course


The patient is an unfortunate 46-year-old female with end-stage renal disease 

on hemodialysis 3 days per week, with history of hypertension and GERD.  The 

patient sustained a ground level fall and with trauma to the head, and 

developed right subdural hematoma and brain contusion and skull fracture.  

Recent MRI showed possible acute / early subacute ischemic infarct in the right 

anterior basal ganglia. Patient was no new neurological deficits. She was also 

noted to have pancytopenia for which hematology was consulted with anemia 

status post blood transfusion with hemodialysis.  Counts have been fairly 

stable.


- HIV and hep panel negative


- Iron panel consistent with anemia of chronic disease, B12 and LDH WNL, 

pending folate, retic count inappropriately low at 48K


- Pending epo level to see if would benefit from Procrit in setting of renal 

insufficiency


- Peripheral smear reviewed by pathologist and does not show blasts, 

schistocytes or other concerning findings, fairly unremarkable.


- Abdominal ultrasound showed splenomegaly (13.7 cm) which is likely a 

component of thrombocytopenia, also showed diffuse gallbladder wall thickening 

and no gallstones are identified. The common bile duct is mildly dilated for 

patient's age measuring 6.4 mm in diameter. Consider MRCP for further evaluation

, as clinically indicated.  LFTs fairly unremarkable.


- s/p bone marrow biopsy by IR today to evaluate for bone marrow pathology such 

as leukemia/lymphoma, MDS, infiltrative process etc.  If otherwise stable to d/

c home, do not need to wait for results, patient can follow up with us as an 

outpatient.


- Will continue to follow.


Peripheral smear reviewed by pathologist and does not show blasts, schistocytes 

or other concerning findings, unremarkable.


Problems:  





Consultation Date/Type/Reason


Admit Date/Time


Feb 27, 2017 at 08:01


Initial Consult Date


3/1/17


Type of Consultation:  Hematology/Oncology





24 HR Interval Summary


Free Text/Dictation


Patient underwent BMBx today.  No complaints.  Asking to go home.





Exam/Review of Systems


Vital Signs


Vitals





 Vital Signs








  Date Time  Temp Pulse Resp B/P Pulse Ox O2 Delivery O2 Flow Rate FiO2


 


3/3/17 15:24  87      


 


3/3/17 13:30   18     


 


3/3/17 11:21 97.4   130/90 95 Room Air  


 


3/3/17 10:35       2.0 


 


3/3/17 07:59        21














 Intake and Output   


 


 3/2/17 3/2/17 3/3/17





 15:00 23:00 07:00


 


Intake Total  780 ml 240 ml


 


Balance  780 ml 240 ml











Exam


Exam


Constitutional:  alert, oriented


Psych:  no complaints


Head:  atraumatic, normocephalic


Eyes:  nl conjunctiva


ENMT:  nl external ears & nose


Neck:  non-tender, supple


Respiratory:  clear to auscultation


Cardiovascular:  regular rate and rhythm


Gastrointestinal:  non-tender, soft


Musculoskeletal:  nl extremities to inspection


Neurological:  other (left facial droop)





Results


Result Diagram:  


3/3/17 0455                                                                    

            3/3/17 0455





Results 24 hrs





Laboratory Tests








Test


  3/3/17


04:55


 


Alanine Aminotransferase


(ALT/SGPT) 17  


 


 


Albumin 3.4  


 


Alkaline Phosphatase 151  H


 


Anion Gap 20  H


 


Aspartate Amino Transf


(AST/SGOT) 16  


 


 


Basophils # 0.0  


 


Basophils % 0.4  


 


Blood Urea Nitrogen 19  


 


Calcium Level 8.8  


 


Carbon Dioxide Level 29  


 


Chloride Level 91  L


 


Creatinine 5.94  #H


 


Direct Bilirubin 0.00  


 


Eosinophils # 0.1  


 


Eosinophils % 4.9  


 


Glucose Level 67  L


 


Hematocrit 27.7  L


 


Hemoglobin 8.9  L


 


Indirect Bilirubin 0.3  


 


Lymphocytes # 0.6  L


 


Lymphocytes % 25.3  


 


Mean Corpuscular Hemoglobin 29.6  


 


Mean Corpuscular Hemoglobin


Concent 32.1  


 


 


Mean Corpuscular Volume 92.0  


 


Mean Platelet Volume 10.8  H


 


Monocytes # 0.2  L


 


Monocytes % 9.4  


 


Neutrophils # 1.5  L


 


Neutrophils % 59.6  


 


Nucleated Red Blood Cells # 0.0  


 


Nucleated Red Blood Cells % 0.0  


 


Platelet Count 121  L


 


Potassium Level 4.5  


 


Red Blood Count 3.01  L


 


Red Cell Distribution Width 13.4  


 


Sodium Level 135  


 


Total Bilirubin 0.3  


 


Total Protein 7.0  


 


White Blood Count 2.5  L











Medications


Medications





 Current Medications


Acetaminophen (Tylenol Liquid) 650 mg Q6  PRN PO PAIN AND OR ELEVATED TEMP Last 

administered on 3/3/17at 15:16; Admin Dose 650 MG;  Start 2/26/17 at 05:30


Atorvastatin Calcium (Lipitor) 20 mg HS PO  Last administered on 3/2/17at 20:59

; Admin Dose 20 MG;  Start 2/26/17 at 21:00


Bisacodyl (Dulcolax Supp) 10 mg DAILY  PRN [place] CONSTIPATION;  Start 2/26/17 at 05

:30


Cyclobenzaprine HCl (Flexeril) 5 mg Q8  PRN PO MUSCLE SPASMS Last administered 

on 2/27/17at 23:23; Admin Dose 5 MG;  Start 2/26/17 at 05:30


Docusate Sodium (Colace) 100 mg BID PO  Last administered on 3/3/17at 12:20; 

Admin Dose 100 MG;  Start 2/26/17 at 09:00


Famotidine (Pepcid) 20 mg DAILY PO  Last administered on 3/3/17at 12:20; Admin 

Dose 20 MG;  Start 2/26/17 at 09:00


Lactulose (Enulose) 20 gm DAILY  PRN PO CONSTIPATION;  Start 2/26/17 at 05:30


Levetiracetam (Keppra) 500 mg DAILY PO  Last administered on 3/3/17at 12:19; 

Admin Dose 500 MG;  Start 2/26/17 at 09:00


Magnesium Hydroxide (Milk Of Mag) 30 ml BID  PRN PO CONSTIPATION;  Start 2/26/ 17 at 05:30


Metoprolol Tartrate (Lopressor) 25 mg BID PO  Last administered on 3/3/17at 12:

21; Admin Dose 25 MG;  Start 2/26/17 at 09:00


Multivit/Ca Carb/ B Cmplx/FA/Prenat ([place]otte-Argenis) 1 tab DAILY PO  Last 

administered on 3/3/17at 12:20; Admin Dose 1 TAB;  Start 2/26/17 at 09:00


Ondansetron HCl (Zofran Tab) 4 mg Q8  PRN PO NAUSEA AND/OR VOMITING Last 

administered on 3/3/17at 11:45; Admin Dose 4 MG;  Start 2/26/17 at 05:30


Senna (Senokot) 1 tab DAILY  PRN PO CONSTIPATION;  Start 2/26/17 at 05:30


Sevelamer Carbonate (Renvela) 2.4 gm Q8 PO  Last administered on 3/3/17at 14:46

; Admin Dose 2.4 GM;  Start 2/26/17 at 06:00


Senna (Senokot) 2 tab BID PO  Last administered on 3/3/17at 12:20; Admin Dose 2 

TAB;  Start 2/26/17 at 13:30


Aspirin (Halfprin) 81 mg DAILY PO  Last administered on 3/3/17at 12:21; Admin 

Dose 81 MG;  Start 2/27/17 at 09:00


Ondansetron HCl (Zofran Inj) 4 mg Q6H  PRN IV NAUSEA AND/OR VOMITING Last 

administered on 3/2/17at 21:05; Admin Dose 4 MG;  Start 2/26/17 at 22:00


Neomycin/ Polymyxin/ Hydrocortisone (Cortisporin Otic Susp) 2 drop DAILY BOTH 

EARS  Last administered on 3/3/17at 12:19; Admin Dose 2 DROP;  Start 2/28/17 at 

21:00;  Stop 3/4/17 at 21:00


Zolpidem Tartrate (Ambien) 5 mg HS  PRN PO INSOMNIA Last administered on 3/2/

17at 22:45; Admin Dose 5 MG;  Start 3/2/17 at 22:30











TOLEONID MD Mar 3, 2017 17:07

## 2017-03-03 NOTE — PN
Date/Time of Note


Date/Time of Note


DATE: 3/3/17 


TIME: 17:57





Assessment/Plan


VTE Prophylaxis


VTE Prophylaxis Intervention:  SCD's





Lines/Catheters


IV Catheter Type (from [facility]):  Saline Lock


Urinary Cath still in place:  No





Assessment/Plan


Chief Complaint/Hosp Course


ASSESSMENT AND PLAN:


-  Pancytopenia. Dr. Aly is following in hematology consultation.  Status 

post bone marrow biopsy, pending results.


- Anemia. Status post blood transfusion with hemodialysis.   Continue to 

monitor hemoglobin and hematocrit.  


- Possible acute / early subacute ischemic infarct in the right anterior basal 

ganglia per MRI.  Patient was no new neurological deficits.  patient is 

followed by Dr. Christensen in neurology consultation.  Started on aspirin.


- Episode of transient encephalopathy after the trauma, suspicious for complex 

partial seizures.  Continue Keppra 500 mg daily plus an additional 500 post-

hemodialysis per neurology recommendation.


-  Status post right subdural hematoma and brain contusion after traumatic 

injury.


-  Status post acute encephalopathy 2 to #2.


-  Status post respiratory failure with pneumothorax, status post chest tube 

placement and removal.


-  End-stage renal disease on hemodialysis.  The patient will be followed by 

Dr. Chaney from nephrology standpoint and will undergo hemodialysis 3 times per 

week.


-  Hypertension.  Continue patient on Cozaar.


-  Hyperlipidemia.  Continue Lipitor.


-  Dysphagia.  Patient tolerates pured diet well.


-  Gastroesophageal reflux disease.  Continue Pepcid.  








Further recommendations based on clinical course.  Plan of care discussed with 

Dr. Brink.


Problems:  





Subjective


24 Hr Interval Summary


Free Text/Dictation


Patient is awake alert, tolerates diet well, denies any nausea vomiting denies 

fever, no change in neurological status.





Exam/Review of Systems


Vital Signs


Vitals





 Vital Signs








  Date Time  Temp Pulse Resp B/P Pulse Ox O2 Delivery O2 Flow Rate FiO2


 


3/3/17 15:24  87      


 


3/3/17 13:30   18     


 


3/3/17 11:21 97.4   130/90 95 Room Air  


 


3/3/17 10:35       2.0 


 


3/3/17 07:59        21














 Intake and Output   


 


 3/2/17 3/2/17 3/3/17





 15:00 23:00 07:00


 


Intake Total  780 ml 240 ml


 


Balance  780 ml 240 ml











Exam


GENERAL:  Well-developed, fragile female in no acute distress.


HEENT:  Head is atraumatic, normocephalic.  Pupils equal, round, reactive to 

light and accommodation.  Oral mucosa is pink, moist.


NECK:  Supple, no cervical lymphadenopathy, no thyromegaly.


CHEST:  Lungs clear bilaterally, slightly diminished at the bases.  There is no 

rhonchi, wheezes, rales noted.


CARDIOVASCULAR:  Normal S1, S2.  No murmurs, gallops, clicks, rubs noted.


ABDOMEN:  Round, soft, nondistended, nontender.  Bowel sounds present.  There 

is no guarding, no rebound tenderness.


EXTREMITIES:  There is no edema, clubbing, cyanosis.  Pulses equal bilaterally 2

+.  The patient has a left upper extremity arteriovenous fistula with a 

palpable thrill and audible bruit.


SKIN:  There is no rash, petechiae noted.  The patient has a right chest 

healing scar from the right chest tube.


NEUROLOGICAL:  The patient is awake, alert and oriented to name.





Results


Result Diagram:  


3/3/17 0455                                                                    

            3/3/17 0455





Results 24 hrs





Laboratory Tests








Test


  3/3/17


04:55


 


Alanine Aminotransferase


(ALT/SGPT) 17  


 


 


Albumin 3.4  


 


Alkaline Phosphatase 151  H


 


Anion Gap 20  H


 


Aspartate Amino Transf


(AST/SGOT) 16  


 


 


Basophils # 0.0  


 


Basophils % 0.4  


 


Blood Urea Nitrogen 19  


 


Calcium Level 8.8  


 


Carbon Dioxide Level 29  


 


Chloride Level 91  L


 


Creatinine 5.94  #H


 


Direct Bilirubin 0.00  


 


Eosinophils # 0.1  


 


Eosinophils % 4.9  


 


Glucose Level 67  L


 


Hematocrit 27.7  L


 


Hemoglobin 8.9  L


 


Indirect Bilirubin 0.3  


 


Lymphocytes # 0.6  L


 


Lymphocytes % 25.3  


 


Mean Corpuscular Hemoglobin 29.6  


 


Mean Corpuscular Hemoglobin


Concent 32.1  


 


 


Mean Corpuscular Volume 92.0  


 


Mean Platelet Volume 10.8  H


 


Monocytes # 0.2  L


 


Monocytes % 9.4  


 


Neutrophils # 1.5  L


 


Neutrophils % 59.6  


 


Nucleated Red Blood Cells # 0.0  


 


Nucleated Red Blood Cells % 0.0  


 


Platelet Count 121  L


 


Potassium Level 4.5  


 


Red Blood Count 3.01  L


 


Red Cell Distribution Width 13.4  


 


Sodium Level 135  


 


Total Bilirubin 0.3  


 


Total Protein 7.0  


 


White Blood Count 2.5  L











Medications


Medications





 Current Medications


Acetaminophen (Tylenol Liquid) 650 mg Q6  PRN PO PAIN AND OR ELEVATED TEMP Last 

administered on 3/3/17at 15:16; Admin Dose 650 MG;  Start 2/26/17 at 05:30


Atorvastatin Calcium (Lipitor) 20 mg HS PO  Last administered on 3/2/17at 20:59

; Admin Dose 20 MG;  Start 2/26/17 at 21:00


Bisacodyl (Dulcolax Supp) 10 mg DAILY  PRN [place] CONSTIPATION;  Start 2/26/17 at 05

:30


Cyclobenzaprine HCl (Flexeril) 5 mg Q8  PRN PO MUSCLE SPASMS Last administered 

on 2/27/17at 23:23; Admin Dose 5 MG;  Start 2/26/17 at 05:30


Docusate Sodium (Colace) 100 mg BID PO  Last administered on 3/3/17at 12:20; 

Admin Dose 100 MG;  Start 2/26/17 at 09:00


Famotidine (Pepcid) 20 mg DAILY PO  Last administered on 3/3/17at 12:20; Admin 

Dose 20 MG;  Start 2/26/17 at 09:00


Lactulose (Enulose) 20 gm DAILY  PRN PO CONSTIPATION;  Start 2/26/17 at 05:30


Levetiracetam (Keppra) 500 mg DAILY PO  Last administered on 3/3/17at 12:19; 

Admin Dose 500 MG;  Start 2/26/17 at 09:00


Magnesium Hydroxide (Milk Of Mag) 30 ml BID  PRN PO CONSTIPATION;  Start 2/26/ 17 at 05:30


Metoprolol Tartrate (Lopressor) 25 mg BID PO  Last administered on 3/3/17at 12:

21; Admin Dose 25 MG;  Start 2/26/17 at 09:00


Multivit/Ca Carb/ B Cmplx/FA/Prenat (Charlotte-Argenis) 1 tab DAILY PO  Last 

administered on 3/3/17at 12:20; Admin Dose 1 TAB;  Start 2/26/17 at 09:00


Ondansetron HCl (Zofran Tab) 4 mg Q8  PRN PO NAUSEA AND/OR VOMITING Last 

administered on 3/3/17at 11:45; Admin Dose 4 MG;  Start 2/26/17 at 05:30


Senna (Senokot) 1 tab DAILY  PRN PO CONSTIPATION;  Start 2/26/17 at 05:30


Sevelamer Carbonate (Renvela) 2.4 gm Q8 PO  Last administered on 3/3/17at 14:46

; Admin Dose 2.4 GM;  Start 2/26/17 at 06:00


Senna (Senokot) 2 tab BID PO  Last administered on 3/3/17at 12:20; Admin Dose 2 

TAB;  Start 2/26/17 at 13:30


Aspirin (Halfprin) 81 mg DAILY PO  Last administered on 3/3/17at 12:21; Admin 

Dose 81 MG;  Start 2/27/17 at 09:00


Ondansetron HCl (Zofran Inj) 4 mg Q6H  PRN IV NAUSEA AND/OR VOMITING Last 

administered on 3/2/17at 21:05; Admin Dose 4 MG;  Start 2/26/17 at 22:00


Neomycin/ Polymyxin/ Hydrocortisone (Cortisporin Otic Susp) 2 drop DAILY BOTH 

EARS  Last administered on 3/3/17at 12:19; Admin Dose 2 DROP;  Start 2/28/17 at 

21:00;  Stop 3/4/17 at 21:00


Zolpidem Tartrate (Ambien) 5 mg HS  PRN PO INSOMNIA Last administered on 3/2/

17at 22:45; Admin Dose 5 MG;  Start 3/2/17 at 22:30











IRAJ TINOCO Mar 3, 2017 17:58

## 2017-03-04 VITALS — RESPIRATION RATE: 21 BRPM | DIASTOLIC BLOOD PRESSURE: 74 MMHG | SYSTOLIC BLOOD PRESSURE: 109 MMHG

## 2017-03-04 LAB
ABNORMAL IP MESSAGE: 1
ADD SCAN DIFF: NO
ANION GAP SERPL CALC-SCNC: 20 MMOL/L (ref 8–16)
BASOPHILS # BLD AUTO: 0 10^3/UL (ref 0–0.1)
BASOPHILS NFR BLD: 0.4 % (ref 0–2)
BUN SERPL-MCNC: 20 MG/DL (ref 7–20)
CALCIUM SERPL-MCNC: 8.9 MG/DL (ref 8.4–10.2)
CHLORIDE SERPL-SCNC: 95 MMOL/L (ref 97–110)
CO2 SERPL-SCNC: 27 MMOL/L (ref 21–31)
CREAT SERPL-MCNC: 5.16 MG/DL (ref 0.44–1)
EOSINOPHIL # BLD: 0 10^3/UL (ref 0–0.5)
EOSINOPHIL NFR BLD: 1.5 % (ref 0–7)
ERYTHROCYTE [DISTWIDTH] IN BLOOD BY AUTOMATED COUNT: 13.8 % (ref 11.5–14.5)
GLUCOSE SERPL-MCNC: 82 MG/DL (ref 70–220)
HCT VFR BLD CALC: 30.3 % (ref 37–47)
HGB BLD-MCNC: 9.6 G/DL (ref 12–16)
LYMPHOCYTES # BLD AUTO: 0.5 10^3/UL (ref 0.8–2.9)
LYMPHOCYTES NFR BLD AUTO: 19.5 % (ref 15–51)
MCH RBC QN AUTO: 29.6 PG (ref 29–33)
MCHC RBC AUTO-ENTMCNC: 31.7 G/DL (ref 32–37)
MCV RBC AUTO: 93.5 FL (ref 82–101)
MONOCYTES # BLD: 0.2 10^3/UL (ref 0.3–0.9)
MONOCYTES NFR BLD: 8.2 % (ref 0–11)
NEUTROPHILS # BLD: 1.9 10^3/UL (ref 1.6–7.5)
NEUTROPHILS NFR BLD AUTO: 70 % (ref 39–77)
NRBC # BLD MANUAL: 0 10^3/UL (ref 0–0)
NRBC BLD QL: 0 /100WBC (ref 0–0)
PLATELET # BLD: 134 10^3/UL (ref 140–415)
PMV BLD AUTO: 11.1 FL (ref 7.4–10.4)
POTASSIUM SERPL-SCNC: 4.1 MMOL/L (ref 3.5–5.1)
RBC # BLD AUTO: 3.24 10^6/UL (ref 4.2–5.4)
SODIUM SERPL-SCNC: 138 MMOL/L (ref 135–144)
WBC # BLD AUTO: 2.7 10^3/UL (ref 4.8–10.8)

## 2017-03-04 RX ADMIN — METOPROLOL TARTRATE SCH MG: 25 TABLET, FILM COATED ORAL at 08:44

## 2017-03-04 RX ADMIN — SENNOSIDES SCH TAB: 8.6 TABLET, FILM COATED ORAL at 08:44

## 2017-03-04 RX ADMIN — SEVELAMER CARBONATE SCH GM: 2400 POWDER, FOR SUSPENSION ORAL at 13:23

## 2017-03-04 RX ADMIN — FAMOTIDINE SCH MG: 20 TABLET ORAL at 08:44

## 2017-03-04 RX ADMIN — ASPIRIN SCH MG: 81 TABLET, COATED ORAL at 08:42

## 2017-03-04 RX ADMIN — IPRATROPIUM BROMIDE AND ALBUTEROL SULFATE SCH ML: .5; 3 SOLUTION RESPIRATORY (INHALATION) at 07:58

## 2017-03-04 RX ADMIN — LEVETIRACETAM SCH MG: 500 TABLET, FILM COATED ORAL at 08:43

## 2017-03-04 RX ADMIN — SEVELAMER CARBONATE SCH GM: 2400 POWDER, FOR SUSPENSION ORAL at 05:04

## 2017-03-04 RX ADMIN — IPRATROPIUM BROMIDE AND ALBUTEROL SULFATE SCH ML: .5; 3 SOLUTION RESPIRATORY (INHALATION) at 16:32

## 2017-03-04 RX ADMIN — DOCUSATE SODIUM SCH MG: 100 CAPSULE, LIQUID FILLED ORAL at 08:42

## 2017-03-04 NOTE — CONS
Date/Time of Note


Date/Time of Note


DATE: 3/4/17 


TIME: 17:26





Assessment/Plan


Assessment/Plan


Chief Complaint/Hosp Course


ESRD


HTN


ANEMIA


CVA 


S/P CNS BLEED


ASHD





PLAN continue hd 


pt/ot


Problems:  





Consultation Date/Type/Reason


Admit Date/Time


Feb 27, 2017 at 08:01


Type of Consultation:  RENAL





24 HR Interval Summary


Constitutional:  no complaints





Exam/Review of Systems


Vital Signs


Vitals





 Vital Signs








  Date Time  Temp Pulse Resp B/P Pulse Ox O2 Delivery O2 Flow Rate FiO2


 


3/4/17 16:34  92 18  97   21


 


3/4/17 08:15 98.2   109/74    


 


3/3/17 11:21      Room Air  


 


3/3/17 10:35       2.0 














 Intake and Output   


 


 3/3/17 3/3/17 3/4/17





 15:00 23:00 07:00


 


Intake Total 200 ml 750 ml 420 ml


 


Output Total  2300 ml 


 


Balance 200 ml -1550 ml 420 ml











Exam


Neck:  supple


Respiratory:  diminished breath sounds


Cardiovascular:  regular rate and rhythm


Gastrointestinal:  soft


Musculoskeletal:  nl extremities to inspection


Extremities:  normal pulses





Results


Result Diagram:  


3/4/17 0516                                                                    

            3/4/17 0516





Results 24 hrs





Laboratory Tests








Test


  3/4/17


05:16


 


Anion Gap 20  H


 


Basophils # 0.0  


 


Basophils % 0.4  


 


Blood Urea Nitrogen 20  


 


Calcium Level 8.9  


 


Carbon Dioxide Level 27  


 


Chloride Level 95  L


 


Creatinine 5.16  H


 


Eosinophils # 0.0  


 


Eosinophils % 1.5  


 


Glucose Level 82  


 


Hematocrit 30.3  L


 


Hemoglobin 9.6  L


 


Lymphocytes # 0.5  L


 


Lymphocytes % 19.5  


 


Mean Corpuscular Hemoglobin 29.6  


 


Mean Corpuscular Hemoglobin


Concent 31.7  L


 


 


Mean Corpuscular Volume 93.5  


 


Mean Platelet Volume 11.1  H


 


Monocytes # 0.2  L


 


Monocytes % 8.2  


 


Neutrophils # 1.9  


 


Neutrophils % 70.0  


 


Nucleated Red Blood Cells # 0.0  


 


Nucleated Red Blood Cells % 0.0  


 


Platelet Count 134  L


 


Potassium Level 4.1  


 


Red Blood Count 3.24  L


 


Red Cell Distribution Width 13.8  


 


Sodium Level 138  


 


White Blood Count 2.7  L











Medications


Medications





 Current Medications


Acetaminophen (Tylenol Liquid) 650 mg Q6  PRN PO PAIN AND OR ELEVATED TEMP Last 

administered on 3/3/17at 15:16; Admin Dose 650 MG;  Start 2/26/17 at 05:30


Atorvastatin Calcium (Lipitor) 20 mg HS PO  Last administered on 3/3/17at 20:25

; Admin Dose 20 MG;  Start 2/26/17 at 21:00


Bisacodyl (Dulcolax Supp) 10 mg DAILY  PRN [place] CONSTIPATION;  Start 2/26/17 at 05

:30


Cyclobenzaprine HCl (Flexeril) 5 mg Q8  PRN PO MUSCLE SPASMS Last administered 

on 2/27/17at 23:23; Admin Dose 5 MG;  Start 2/26/17 at 05:30


Docusate Sodium (Colace) 100 mg BID PO  Last administered on 3/4/17at 08:42; 

Admin Dose 100 MG;  Start 2/26/17 at 09:00


Famotidine (Pepcid) 20 mg DAILY PO  Last administered on 3/4/17at 08:44; Admin 

Dose 20 MG;  Start 2/26/17 at 09:00


Lactulose (Enulose) 20 gm DAILY  PRN PO CONSTIPATION;  Start 2/26/17 at 05:30


Levetiracetam (Keppra) 500 mg DAILY PO  Last administered on 3/4/17at 08:43; 

Admin Dose 500 MG;  Start 2/26/17 at 09:00


Magnesium Hydroxide (Milk Of Mag) 30 ml BID  PRN PO CONSTIPATION;  Start 2/26/ 17 at 05:30


Metoprolol Tartrate (Lopressor) 25 mg BID PO  Last administered on 3/4/17at 08:

44; Admin Dose 25 MG;  Start 2/26/17 at 09:00


Multivit/Ca Carb/ B Cmplx/FA/Prenat (Charlotte-Argenis) 1 tab DAILY PO  Last 

administered on 3/4/17at 08:44; Admin Dose 1 TAB;  Start 2/26/17 at 09:00


Ondansetron HCl (Zofran Tab) 4 mg Q8  PRN PO NAUSEA AND/OR VOMITING Last 

administered on 3/3/17at 11:45; Admin Dose 4 MG;  Start 2/26/17 at 05:30


Senna (Senokot) 1 tab DAILY  PRN PO CONSTIPATION;  Start 2/26/17 at 05:30


Sevelamer Carbonate (Renvela) 2.4 gm Q8 PO  Last administered on 3/4/17at 13:23

; Admin Dose 2.4 GM;  Start 2/26/17 at 06:00


Senna (Senokot) 2 tab BID PO  Last administered on 3/4/17at 08:44; Admin Dose 2 

TAB;  Start 2/26/17 at 13:30


Aspirin (Halfprin) 81 mg DAILY PO  Last administered on 3/4/17at 08:42; Admin 

Dose 81 MG;  Start 2/27/17 at 09:00


Ondansetron HCl (Zofran Inj) 4 mg Q6H  PRN IV NAUSEA AND/OR VOMITING Last 

administered on 3/2/17at 21:05; Admin Dose 4 MG;  Start 2/26/17 at 22:00


Neomycin/ Polymyxin/ Hydrocortisone (Cortisporin Otic Susp) 2 drop DAILY BOTH 

EARS  Last administered on 3/4/17at 12:15; Admin Dose 2 DROP;  Start 2/28/17 at 

21:00;  Stop 3/4/17 at 21:00


Zolpidem Tartrate (Ambien) 5 mg HS  PRN PO INSOMNIA Last administered on 3/3/

17at 21:03; Admin Dose 5 MG;  Start 3/2/17 at 22:30











PRITESH MACKEY MD Mar 4, 2017 17:27

## 2017-03-04 NOTE — PN
Date/Time of Note


Date/Time of Note


DATE: 3/4/17 


TIME: 11:35





Assessment/Plan


VTE Prophylaxis


VTE Prophylaxis Intervention:  other





Lines/Catheters


IV Catheter Type (from [facility]):  Saline Lock


Urinary Cath still in place:  No





Assessment/Plan


Chief Complaint/Hosp Course


-  Pancytopenia. Dr. Aly is following in hematology consultation.  Status 

post bone marrow biopsy, pending results.


- Anemia. Status post blood transfusion with hemodialysis.   Continue to 

monitor hemoglobin and hematocrit.  


- Possible acute / early subacute ischemic infarct in the right anterior basal 

ganglia per MRI.  Patient was no new neurological deficits.  patient is 

followed by Dr. Christensen in neurology consultation.  Started on aspirin.


- Episode of transient encephalopathy after the trauma, suspicious for complex 

partial seizures.  Continue Keppra 500 mg daily plus an additional 500 post-

hemodialysis per neurology recommendation.


-  Status post right subdural hematoma and brain contusion after traumatic 

injury.


-  Status post acute encephalopathy 2 to #2.


-  Status post respiratory failure with pneumothorax, status post chest tube 

placement and removal.


-  End-stage renal disease on hemodialysis.  The patient will be followed by 

Dr. Chaney from nephrology standpoint and will undergo hemodialysis 3 times per 

week.


-  Hypertension.  Continue patient on Cozaar.


-  Hyperlipidemia.  Continue Lipitor.


-  Dysphagia.  Patient tolerates pured diet well.


-  Gastroesophageal reflux disease.  Continue Pepcid.


Problems:  





Subjective


24 Hr Interval Summary


Free Text/Dictation


Patient has no complaints





Exam/Review of Systems


Vital Signs


Vitals





 Vital Signs








  Date Time  Temp Pulse Resp B/P Pulse Ox O2 Delivery O2 Flow Rate FiO2


 


3/4/17 08:15 98.2 100 21 109/74 100   


 


3/4/17 07:58        21


 


3/3/17 11:21      Room Air  


 


3/3/17 10:35       2.0 














 Intake and Output   


 


 3/3/17 3/3/17 3/4/17





 15:00 23:00 07:00


 


Intake Total 200 ml 750 ml 420 ml


 


Output Total  2300 ml 


 


Balance 200 ml -1550 ml 420 ml











Exam


Constitutional:  well developed


Head:  atraumatic, normocephalic


Neck:  supple


Respiratory:  clear to auscultation


Cardiovascular:  regular rate and rhythm


Gastrointestinal:  non-tender, soft


Extremities:  normal pulses





Results


Result Diagram:  


3/4/17 0516                                                                    

            3/4/17 0516





Results 24 hrs





Laboratory Tests








Test


  3/4/17


05:16


 


Anion Gap 20  H


 


Basophils # 0.0  


 


Basophils % 0.4  


 


Blood Urea Nitrogen 20  


 


Calcium Level 8.9  


 


Carbon Dioxide Level 27  


 


Chloride Level 95  L


 


Creatinine 5.16  H


 


Eosinophils # 0.0  


 


Eosinophils % 1.5  


 


Glucose Level 82  


 


Hematocrit 30.3  L


 


Hemoglobin 9.6  L


 


Lymphocytes # 0.5  L


 


Lymphocytes % 19.5  


 


Mean Corpuscular Hemoglobin 29.6  


 


Mean Corpuscular Hemoglobin


Concent 31.7  L


 


 


Mean Corpuscular Volume 93.5  


 


Mean Platelet Volume 11.1  H


 


Monocytes # 0.2  L


 


Monocytes % 8.2  


 


Neutrophils # 1.9  


 


Neutrophils % 70.0  


 


Nucleated Red Blood Cells # 0.0  


 


Nucleated Red Blood Cells % 0.0  


 


Platelet Count 134  L


 


Potassium Level 4.1  


 


Red Blood Count 3.24  L


 


Red Cell Distribution Width 13.8  


 


Sodium Level 138  


 


White Blood Count 2.7  L











Medications


Medications





 Current Medications


Acetaminophen (Tylenol Liquid) 650 mg Q6  PRN PO PAIN AND OR ELEVATED TEMP Last 

administered on 3/3/17at 15:16; Admin Dose 650 MG;  Start 2/26/17 at 05:30


Atorvastatin Calcium (Lipitor) 20 mg HS PO  Last administered on 3/3/17at 20:25

; Admin Dose 20 MG;  Start 2/26/17 at 21:00


Bisacodyl (Dulcolax Supp) 10 mg DAILY  PRN UT CONSTIPATION;  Start 2/26/17 at 05

:30


Cyclobenzaprine HCl (Flexeril) 5 mg Q8  PRN PO MUSCLE SPASMS Last administered 

on 2/27/17at 23:23; Admin Dose 5 MG;  Start 2/26/17 at 05:30


Docusate Sodium (Colace) 100 mg BID PO  Last administered on 3/4/17at 08:42; 

Admin Dose 100 MG;  Start 2/26/17 at 09:00


Famotidine (Pepcid) 20 mg DAILY PO  Last administered on 3/4/17at 08:44; Admin 

Dose 20 MG;  Start 2/26/17 at 09:00


Lactulose (Enulose) 20 gm DAILY  PRN PO CONSTIPATION;  Start 2/26/17 at 05:30


Levetiracetam (Keppra) 500 mg DAILY PO  Last administered on 3/4/17at 08:43; 

Admin Dose 500 MG;  Start 2/26/17 at 09:00


Magnesium Hydroxide (Milk Of Mag) 30 ml BID  PRN PO CONSTIPATION;  Start 2/26/ 17 at 05:30


Metoprolol Tartrate (Lopressor) 25 mg BID PO  Last administered on 3/4/17at 08:

44; Admin Dose 25 MG;  Start 2/26/17 at 09:00


Multivit/Ca Carb/ B Cmplx/FA/Prenat ([place]otte-Argenis) 1 tab DAILY PO  Last 

administered on 3/4/17at 08:44; Admin Dose 1 TAB;  Start 2/26/17 at 09:00


Ondansetron HCl (Zofran Tab) 4 mg Q8  PRN PO NAUSEA AND/OR VOMITING Last 

administered on 3/3/17at 11:45; Admin Dose 4 MG;  Start 2/26/17 at 05:30


Senna (Senokot) 1 tab DAILY  PRN PO CONSTIPATION;  Start 2/26/17 at 05:30


Sevelamer Carbonate (Renvela) 2.4 gm Q8 PO  Last administered on 3/4/17at 05:04

; Admin Dose 2.4 GM;  Start 2/26/17 at 06:00


Senna (Senokot) 2 tab BID PO  Last administered on 3/4/17at 08:44; Admin Dose 2 

TAB;  Start 2/26/17 at 13:30


Aspirin (Halfprin) 81 mg DAILY PO  Last administered on 3/4/17at 08:42; Admin 

Dose 81 MG;  Start 2/27/17 at 09:00


Ondansetron HCl (Zofran Inj) 4 mg Q6H  PRN IV NAUSEA AND/OR VOMITING Last 

administered on 3/2/17at 21:05; Admin Dose 4 MG;  Start 2/26/17 at 22:00


Neomycin/ Polymyxin/ Hydrocortisone (Cortisporin Otic Susp) 2 drop DAILY BOTH 

EARS  Last administered on 3/3/17at 12:19; Admin Dose 2 DROP;  Start 2/28/17 at 

21:00;  Stop 3/4/17 at 21:00


Zolpidem Tartrate (Ambien) 5 mg HS  PRN PO INSOMNIA Last administered on 3/3/

17at 21:03; Admin Dose 5 MG;  Start 3/2/17 at 22:30











HOWIE COLORADO Mar 4, 2017 11:36

## 2017-11-08 NOTE — PN
Date/Time of Note


Date/Time of Note


DATE: 2/7/17 


TIME: 15:06





Assessment/Plan


Lines/Catheters


IV Catheter Type (from Nrsg):  Peripheral IV


Kamara in Place (from Nrsg):  No





Assessment/Plan


Chief Complaint/Hosp Course


PTX


SP  bilateral CT placement


CXR,


 





1


 





will continue CT sxn


Problems:  





Subjective


24 Hr Interval Summary


Constitutional:  improved


Pain Control:  mild





Exam/Review of Systems


Vital Signs


Vitals





 Vital Signs








  Date Time  Temp Pulse Resp B/P Pulse Ox O2 Delivery O2 Flow Rate FiO2


 


2/7/17 14:00  86 23 118/89 100 Nasal Cannula 2.0 


 


2/7/17 12:00 98.8       


 


2/6/17 09:00        30














 Intake and Output   


 


 2/6/17 2/6/17 2/7/17





 15:00 23:00 07:00


 


Intake Total 50 ml 60 ml 100 ml


 


Output Total 0 ml 0 ml 0 ml


 


Balance 50 ml 60 ml 100 ml











Exam


ENMT:  mucosa pink and moist, nl external ears & nose, nl lips & teeth, nl 

nasal mucosa & septum


Neck:  non-tender, supple


Respiratory:  clear to auscultation, normal air movement


Cardiovascular:  nl pulses, regular rate and rhythm


Gastrointestinal:  nl liver, spleen, non-tender, soft





Results


Result Diagram:  


2/6/17 0800                                                                    

            2/7/17 0340














SUNITA BETANCUR MD Feb 7, 2017 15:11 no weight-bearing restrictions

## 2018-03-20 ENCOUNTER — HOSPITAL ENCOUNTER (EMERGENCY)
Age: 48
Discharge: LEFT BEFORE DISCHARGE | End: 2018-03-20

## 2018-03-20 ENCOUNTER — HOSPITAL ENCOUNTER (EMERGENCY)
Dept: HOSPITAL 91 - FTE | Age: 48
Discharge: LEFT BEFORE DISCHARGE | End: 2018-03-20
Payer: MEDICARE

## 2018-03-20 DIAGNOSIS — R51: Primary | ICD-10-CM

## 2018-03-20 DIAGNOSIS — I12.0: ICD-10-CM

## 2018-03-20 DIAGNOSIS — N18.6: ICD-10-CM

## 2018-03-20 LAB
ABNORMAL IP MESSAGE: 1
ADD MAN DIFF?: NO
ALANINE AMINOTRANSFERASE: 28 IU/L (ref 13–69)
ALBUMIN/GLOBULIN RATIO: 1.31
ALBUMIN: 5 G/DL (ref 3.3–4.9)
ALKALINE PHOSPHATASE: 213 IU/L (ref 42–121)
ANION GAP: 26 (ref 8–16)
ASPARTATE AMINO TRANSFERASE: 20 IU/L (ref 15–46)
BASOPHIL #: 0 10^3/UL (ref 0–0.1)
BASOPHILS %: 0.1 % (ref 0–2)
BILIRUBIN,DIRECT: 0 MG/DL (ref 0–0.2)
BILIRUBIN,TOTAL: 0.4 MG/DL (ref 0.2–1.3)
BLOOD UREA NITROGEN: 25 MG/DL (ref 7–20)
CALCIUM: 9.8 MG/DL (ref 8.4–10.2)
CARBON DIOXIDE: 27 MMOL/L (ref 21–31)
CHLORIDE: 96 MMOL/L (ref 97–110)
CREATININE: 5.75 MG/DL (ref 0.44–1)
EOSINOPHILS #: 0 10^3/UL (ref 0–0.5)
EOSINOPHILS %: 0 % (ref 0–7)
GLOBULIN: 3.8 G/DL (ref 1.3–3.2)
GLUCOSE: 71 MG/DL (ref 70–220)
HEMATOCRIT: 46.7 % (ref 37–47)
HEMOGLOBIN: 15.4 G/DL (ref 12–16)
INR: 1.04
LYMPHOCYTES #: 0.4 10^3/UL (ref 0.8–2.9)
LYMPHOCYTES %: 5.9 % (ref 15–51)
MEAN CORPUSCULAR HEMOGLOBIN: 29.3 PG (ref 29–33)
MEAN CORPUSCULAR HGB CONC: 33 G/DL (ref 32–37)
MEAN CORPUSCULAR VOLUME: 89 FL (ref 82–101)
MEAN PLATELET VOLUME: 11.2 FL (ref 7.4–10.4)
MONOCYTE #: 0.2 10^3/UL (ref 0.3–0.9)
MONOCYTES %: 2.2 % (ref 0–11)
NEUTROPHIL #: 6.4 10^3/UL (ref 1.6–7.5)
NEUTROPHILS %: 91.5 % (ref 39–77)
NUCLEATED RED BLOOD CELLS #: 0 10^3/UL (ref 0–0)
NUCLEATED RED BLOOD CELLS%: 0 /100WBC (ref 0–0)
PARTIAL THROMBOPLASTIN TIME: 30.6 SEC (ref 25–35)
PLATELET COUNT: 171 10^3/UL (ref 140–415)
POSITIVE DIFF: (no result)
POTASSIUM: 5.9 MMOL/L (ref 3.5–5.1)
PROTIME: 13.7 SEC (ref 11.9–14.9)
PT RATIO: 1.1
RED BLOOD COUNT: 5.25 10^6/UL (ref 4.2–5.4)
RED CELL DISTRIBUTION WIDTH: 14.6 % (ref 11.5–14.5)
SODIUM: 143 MMOL/L (ref 135–144)
TOTAL PROTEIN: 8.8 G/DL (ref 6.1–8.1)
TROPONIN-I: < 0.012 NG/ML (ref 0–0.12)
WHITE BLOOD COUNT: 6.9 10^3/UL (ref 4.8–10.8)

## 2018-03-20 PROCEDURE — 85025 COMPLETE CBC W/AUTO DIFF WBC: CPT

## 2018-03-20 PROCEDURE — 85610 PROTHROMBIN TIME: CPT

## 2018-03-20 PROCEDURE — 80053 COMPREHEN METABOLIC PANEL: CPT

## 2018-03-20 PROCEDURE — 84484 ASSAY OF TROPONIN QUANT: CPT

## 2018-03-20 PROCEDURE — 70450 CT HEAD/BRAIN W/O DYE: CPT

## 2018-03-20 PROCEDURE — 93005 ELECTROCARDIOGRAM TRACING: CPT

## 2018-03-20 PROCEDURE — 99285 EMERGENCY DEPT VISIT HI MDM: CPT

## 2018-03-20 PROCEDURE — 85730 THROMBOPLASTIN TIME PARTIAL: CPT

## 2018-03-20 RX ADMIN — ACETAMINOPHEN 1 MG: 500 TABLET, FILM COATED ORAL at 15:41

## 2018-03-20 RX ADMIN — ONDANSETRON 1 MG: 4 TABLET, ORALLY DISINTEGRATING ORAL at 15:41

## 2018-06-26 ENCOUNTER — HOSPITAL ENCOUNTER (EMERGENCY)
Dept: HOSPITAL 91 - FTE | Age: 48
Discharge: HOME | End: 2018-06-26
Payer: MEDICARE

## 2018-06-26 ENCOUNTER — HOSPITAL ENCOUNTER (EMERGENCY)
Age: 48
Discharge: HOME | End: 2018-06-26

## 2018-06-26 DIAGNOSIS — M79.604: Primary | ICD-10-CM

## 2018-06-26 DIAGNOSIS — I12.0: ICD-10-CM

## 2018-06-26 DIAGNOSIS — N18.6: ICD-10-CM

## 2018-06-26 PROCEDURE — 93971 EXTREMITY STUDY: CPT

## 2018-06-26 PROCEDURE — 99284 EMERGENCY DEPT VISIT MOD MDM: CPT

## 2018-09-08 ENCOUNTER — HOSPITAL ENCOUNTER (EMERGENCY)
Dept: HOSPITAL 91 - FTE | Age: 48
Discharge: HOME | End: 2018-09-08
Payer: MEDICARE

## 2018-09-08 ENCOUNTER — HOSPITAL ENCOUNTER (EMERGENCY)
Age: 48
Discharge: HOME | End: 2018-09-08

## 2018-09-08 DIAGNOSIS — I12.0: ICD-10-CM

## 2018-09-08 DIAGNOSIS — R11.2: ICD-10-CM

## 2018-09-08 DIAGNOSIS — Z76.0: ICD-10-CM

## 2018-09-08 DIAGNOSIS — Z99.2: ICD-10-CM

## 2018-09-08 DIAGNOSIS — R51: Primary | ICD-10-CM

## 2018-09-08 DIAGNOSIS — N18.6: ICD-10-CM

## 2018-09-08 LAB
ADD MAN DIFF?: NO
ALANINE AMINOTRANSFERASE: 20 IU/L (ref 13–69)
ALBUMIN/GLOBULIN RATIO: 1.18
ALBUMIN: 4.5 G/DL (ref 3.3–4.9)
ALKALINE PHOSPHATASE: 166 IU/L (ref 42–121)
ANION GAP: 21 (ref 8–16)
ASPARTATE AMINO TRANSFERASE: 18 IU/L (ref 15–46)
BASOPHIL #: 0 10^3/UL (ref 0–0.1)
BASOPHILS %: 0.1 % (ref 0–2)
BILIRUBIN,DIRECT: 0 MG/DL (ref 0–0.2)
BILIRUBIN,TOTAL: 0.5 MG/DL (ref 0.2–1.3)
BLOOD UREA NITROGEN: 16 MG/DL (ref 7–20)
CALCIUM: 9.7 MG/DL (ref 8.4–10.2)
CARBON DIOXIDE: 28 MMOL/L (ref 21–31)
CHLORIDE: 95 MMOL/L (ref 97–110)
CREATININE: 3.98 MG/DL (ref 0.44–1)
EOSINOPHILS #: 0 10^3/UL (ref 0–0.5)
EOSINOPHILS %: 0.1 % (ref 0–7)
GLOBULIN: 3.8 G/DL (ref 1.3–3.2)
GLUCOSE: 99 MG/DL (ref 70–220)
HEMATOCRIT: 40.1 % (ref 37–47)
HEMOGLOBIN: 12.8 G/DL (ref 12–16)
IMMATURE GRANS #M: 0.02 10^3/UL (ref 0–0.03)
IMMATURE GRANS % (M): 0.2 % (ref 0–0.43)
LIPASE: 55 U/L (ref 23–300)
LYMPHOCYTES #: 0.9 10^3/UL (ref 0.8–2.9)
LYMPHOCYTES %: 11 % (ref 15–51)
MEAN CORPUSCULAR HEMOGLOBIN: 28.8 PG (ref 29–33)
MEAN CORPUSCULAR HGB CONC: 31.9 G/DL (ref 32–37)
MEAN CORPUSCULAR VOLUME: 90.3 FL (ref 82–101)
MEAN PLATELET VOLUME: 12.3 FL (ref 7.4–10.4)
MONOCYTE #: 0.4 10^3/UL (ref 0.3–0.9)
MONOCYTES %: 4.4 % (ref 0–11)
NEUTROPHIL #: 6.9 10^3/UL (ref 1.6–7.5)
NEUTROPHILS %: 84.2 % (ref 39–77)
NUCLEATED RED BLOOD CELLS #: 0 10^3/UL (ref 0–0)
NUCLEATED RED BLOOD CELLS%: 0 /100WBC (ref 0–0)
PLATELET COUNT: 171 10^3/UL (ref 140–415)
POTASSIUM: 5.2 MMOL/L (ref 3.5–5.1)
RED BLOOD COUNT: 4.44 10^6/UL (ref 4.2–5.4)
RED CELL DISTRIBUTION WIDTH: 13.9 % (ref 11.5–14.5)
SODIUM: 139 MMOL/L (ref 135–144)
TOTAL PROTEIN: 8.3 G/DL (ref 6.1–8.1)
WHITE BLOOD COUNT: 8.2 10^3/UL (ref 4.8–10.8)

## 2018-09-08 PROCEDURE — 96374 THER/PROPH/DIAG INJ IV PUSH: CPT

## 2018-09-08 PROCEDURE — 99285 EMERGENCY DEPT VISIT HI MDM: CPT

## 2018-09-08 PROCEDURE — 83690 ASSAY OF LIPASE: CPT

## 2018-09-08 PROCEDURE — 96375 TX/PRO/DX INJ NEW DRUG ADDON: CPT

## 2018-09-08 PROCEDURE — 85025 COMPLETE CBC W/AUTO DIFF WBC: CPT

## 2018-09-08 PROCEDURE — 80053 COMPREHEN METABOLIC PANEL: CPT

## 2018-09-08 PROCEDURE — 70450 CT HEAD/BRAIN W/O DYE: CPT

## 2018-09-08 PROCEDURE — 96361 HYDRATE IV INFUSION ADD-ON: CPT

## 2018-09-08 RX ADMIN — DIPHENHYDRAMINE HYDROCHLORIDE 1 MG: 50 INJECTION, SOLUTION INTRAMUSCULAR; INTRAVENOUS at 06:40

## 2018-09-08 RX ADMIN — PROCHLORPERAZINE EDISYLATE 1 MG: 5 INJECTION INTRAMUSCULAR; INTRAVENOUS at 06:43

## 2018-09-08 RX ADMIN — LIDOCAINE HYDROCHLORIDE 1 MLS/HR: 10 INJECTION, SOLUTION EPIDURAL; INFILTRATION; INTRACAUDAL; PERINEURAL at 06:44
